# Patient Record
Sex: MALE | Race: WHITE | Employment: OTHER | URBAN - METROPOLITAN AREA
[De-identification: names, ages, dates, MRNs, and addresses within clinical notes are randomized per-mention and may not be internally consistent; named-entity substitution may affect disease eponyms.]

---

## 2017-01-04 ENCOUNTER — OFFICE VISIT (OUTPATIENT)
Dept: SLEEP MEDICINE | Age: 82
End: 2017-01-04

## 2017-01-04 VITALS
SYSTOLIC BLOOD PRESSURE: 106 MMHG | BODY MASS INDEX: 27.77 KG/M2 | HEIGHT: 70 IN | DIASTOLIC BLOOD PRESSURE: 71 MMHG | WEIGHT: 194 LBS | HEART RATE: 85 BPM | OXYGEN SATURATION: 96 %

## 2017-01-04 DIAGNOSIS — E66.3 OVERWEIGHT (BMI 25.0-29.9): ICD-10-CM

## 2017-01-04 DIAGNOSIS — G47.33 OSA (OBSTRUCTIVE SLEEP APNEA): Primary | ICD-10-CM

## 2017-01-04 NOTE — PROGRESS NOTES
217 [facility]. [place], [address]  Tel.  [phone]  Fax. 100 [facility] 60  [place], [address] [facility]  Tel.  [phone]  Fax. [phone] [address]   Tel.  [phone]  Fax. [phone]     S>Christiano Doran is a 80 y.o. male seen for a positive airway pressure follow-up. He reports no problems using the device. The following problems are identified:    Drowsiness no Problems exhaling no   Snoring no Forget to put on no   Mask Comfortable yes Can't fall asleep no   Dry Mouth no Mask falls off no   Air Leaking no Frequent awakenings no         He admits that his sleep has improved. Allergies   Allergen Reactions    Pcn [Penicillins] Rash    Venom-Honey Bee Other (comments)     anaphylaxis       He has a current medication list which includes the following prescription(s): influenza vaccine 2015-16 (65yr+)(pf), vit a/vit c/vit e/zinc/copper, docosahexanoic acid/epa, epinephrine, and cholecalciferol. .      He  has a past medical history of Arthritis; Calculus of kidney; Chronic kidney disease; GERD (gastroesophageal reflux disease); Glaucoma; Pneumonia; prostate cancer (2000); and Unspecified sleep apnea. [place] Sleepiness Score: 7   and Modified F.O.S.Q. Score Total / 2: 18.5   which reflect improved sleep quality over therapy time. O>    Visit Vitals    /71    Pulse 85    Ht 5' 9.5\" (1.765 m)    Wt 194 lb (88 kg)    SpO2 96%    BMI 28.24 kg/m2           General:   Alert, oriented, not in distress   Neck:   No JVD    Chest/Lungs:  symetrical lung expansion , no accessory muscle use    Extremities:  no obvious rashes , negative edema    Neuro:  No focal deficits ; No obvious tremor    Psych:  Normal affect ,  Normal countenance ;           A>    ICD-10-CM ICD-9-CM    1. RONNY (obstructive sleep apnea) G47.33 327.23    2. Overweight (BMI 25.0-29. 9) E66.3 278.02      AHI = 16 (2016).   On CPAP :  5-15 cmH2O. Compliant:      yes    Therapeutic Response:  Positive    P>  * PAP therapy is effective and remains necessary treatment for sleep apnea    * Follow-up Disposition:  Return if symptoms worsen or fail to improve. * He was asked to contact our office for any problems regarding PAP therapy. * Counseling was provided regarding the importance of regular PAP use and on proper sleep hygiene and safe driving. * Re-enforced proper and regular cleaning for the device. Discussed the patient's above normal BMI with him. I have recommended the following interventions: dietary management education, guidance, and counseling . The BMI follow up plan is as follows: BMI is out of normal parameters and plan is as follows: I have counseled this patient on diet and exercise regimens    Thank you for allowing us to participate in your patient's medical care.     Suraj Eden M.D.  (electronically signed)  Diplomate in Sleep Medicine, [facility]

## 2017-01-04 NOTE — PATIENT INSTRUCTIONS
217 [facility]., [address], [address]  Tel.  [phone]  Fax. 100 [facility] 60  [facility], [address]  Tel.  [phone]  Fax. [phone] [facility] [address]  Tel.  [phone]  Fax. [phone]     Learning About CPAP for Sleep Apnea  What is CPAP? CPAP is a small machine that you use at home every night while you sleep. It increases air pressure in your throat to keep your airway open. When you have sleep apnea, this can help you sleep better so you feel much better. CPAP stands for \"continuous positive airway pressure. \"  The CPAP machine will have one of the following:  · A mask that covers your nose and mouth  · Prongs that fit into your nose  · A mask that covers your nose only, the most common type. This type is called NCPAP. The N stands for \"nasal.\"  Why is it done? CPAP is usually the best treatment for obstructive sleep apnea. It is the first treatment choice and the most widely used. Your doctor may suggest CPAP if you have:  · Moderate to severe sleep apnea. · Sleep apnea and coronary artery disease (CAD) or heart failure. How does it help? · CPAP can help you have more normal sleep, so you feel less sleepy and more alert during the daytime. · CPAP may help keep heart failure or other heart problems from getting worse. · NCPAP may help lower your blood pressure. · If you use CPAP, your bed partner may also sleep better because you are not snoring or restless. What are the side effects? Some people who use CPAP have:  · A dry or stuffy nose and a sore throat. · Irritated skin on the face. · Sore eyes. · Bloating. If you have any of these problems, work with your doctor to fix them. Here are some things you can try:  · Be sure the mask or nasal prongs fit well. · See if your doctor can adjust the pressure of your CPAP. · If your nose is dry, try a humidifier.   · If your nose is runny or stuffy, try decongestant medicine or a steroid nasal spray. If these things do not help, you might try a different type of machine. Some machines have air pressure that adjusts on its own. Others have air pressures that are different when you breathe in than when you breathe out. This may reduce discomfort caused by too much pressure in your nose. Where can you learn more? Go to [employer].be  Enter Nyasia Clarkishaan in the search box to learn more about \"Learning About CPAP for Sleep Apnea. \"   © 1029-0897 Healthwise, Incorporated. Care instructions adapted under license by [employer] (which disclaims liability or warranty for this information). This care instruction is for use with your licensed healthcare professional. If you have questions about a medical condition or this instruction, always ask your healthcare professional. [address] any warranty or liability for your use of this information. Content Version: 2.3.65925; Last Revised: January 11, 2010  PROPER SLEEP HYGIENE    What to avoid  · Do not have drinks with caffeine, such as coffee or black tea, for 8 hours before bed. · Do not smoke or use other types of tobacco near bedtime. Nicotine is a stimulant and can keep you awake. · Avoid drinking alcohol late in the evening, because it can cause you to wake in the middle of the night. · Do not eat a big meal close to bedtime. If you are hungry, eat a light snack. · Do not drink a lot of water close to bedtime, because the need to urinate may wake you up during the night. · Do not read or watch TV in bed. Use the bed only for sleeping and sexual activity. What to try  · Go to bed at the same time every night, and wake up at the same time every morning. Do not take naps during the day. · Keep your bedroom quiet, dark, and cool. · Get regular exercise, but not within 3 to 4 hours of your bedtime. .  · Sleep on a comfortable pillow and mattress.   · If watching the clock makes you anxious, turn it facing away from you so you cannot see the time. · If you worry when you lie down, start a worry book. Well before bedtime, write down your worries, and then set the book and your concerns aside. · Try meditation or other relaxation techniques before you go to bed. · If you cannot fall asleep, get up and go to another room until you feel sleepy. Do something relaxing. Repeat your bedtime routine before you go to bed again. · Make your house quiet and calm about an hour before bedtime. Turn down the lights, turn off the TV, log off the computer, and turn down the volume on music. This can help you relax after a busy day. Drowsy Driving: The [employer] cites drowsiness as a causing factor in more than 278,325 police reported crashes annually, resulting in 76,000 injuries and 1,500 deaths. Other surveys suggest 55% of people polled have driven while drowsy in the past year, 23% had fallen asleep but not crashed, 3% crashed, and 2% had and accident due to drowsy driving. Who is at risk? Young Drivers: One study of drowsy driving accidents states that 55% of the drivers were under 25 years. Of those, 75% were male. Shift Workers and Travelers: People who work overnight or travel across time zones frequently are at higher risk of experiencing Circadian Rhythm Disorders. They are trying to work and function when their body is programed to sleep. Sleep Deprived: Lack of sleep has a serious impact on your ability to pay attention or focus on a task. Consistently getting less than the average of 8 hours your body needs creates partial or cumulative sleep deprivation. Untreated Sleep Disorders: Sleep Apnea, Narcolepsy, R.L.S., and other sleep disorders (untreated) prevent a person from getting enough restful sleep. This leads to excessive daytime sleepiness and increases the risk for drowsy driving accidents by up to 7 times.   Medications / Alcohol: Even over the counter medications can cause drowsiness. Medications that impair a drivers attention should have a warning label. Alcohol naturally makes you sleepy and on its own can cause accidents. Combined with excessive drowsiness its effects are amplified. Signs of Drowsy Driving:   * You don't remember driving the last few miles   * You may drift out of your kandis   * You are unable to focus and your thoughts wander   * You may yawn more often than normal   * You have difficulty keeping your eyes open / nodding off   * Missing traffic signs, speeding, or tailgating  Prevention-   Good sleep hygiene, lifestyle and behavioral choices have the most impact on drowsy driving. There is no substitute for sleep and the average person requires 8 hours nightly. If you find yourself driving drowsy, stop and sleep. Consider the sleep hygiene tips provided during your visit as well. Medication Refill Policy: Refills for all medications require 1 week advance notice. Please have your pharmacy fax a refill request. We are unable to fax, or call in \"controled substance\" medications and you will need to pick these prescriptions up from our office. [employer] Activation    Thank you for requesting access to [employer]. Please follow the instructions below to securely access and download your online medical record. [employer] allows you to send messages to your doctor, view your test results, renew your prescriptions, schedule appointments, and more. How Do I Sign Up? 1. In your internet browser, go to [email or web address]. 2. Click on the First Time User? Click Here link in the Sign In box. You will see the New Member Sign Up page. 3. Enter your [employer] Access Code exactly as it appears below. You will not need to use this code after youve completed the sign-up process. If you do not sign up before the expiration date, you must request a new code.     B[employer] Access Code: [email or web address]  Expires: 2017  9:50 AM (This is the date your [employer] access code will )    4. Enter the last four digits of your Social Security Number (xxxx) and Date of Birth (mm/dd/yyyy) as indicated and click Submit. You will be taken to the next sign-up page. 5. Create a [employer]ed Systems ID. This will be your [employer] Systems login ID and cannot be changed, so think of one that is secure and easy to remember. 6. Create a [employer] password. You can change your password at any time. 7. Enter your Password Reset Question and Answer. This can be used at a later time if you forget your password. 8. Enter your e-mail address. You will receive e-mail notification when new information is available in [address] 9. Click Sign Up. You can now view and download portions of your medical record. 10. Click the Download Summary menu link to download a portable copy of your medical information. Additional Information    If you have questions, please call [phone]. Remember, [employer] is NOT to be used for urgent needs. For medical emergencies, dial 911. Starting a Weight Loss Plan: After Your Visit  Your Care Instructions  If you are thinking about losing weight, it can be hard to know where to start. Your doctor can help you set up a weight loss plan that best meets your needs. You may want to take a class on nutrition or exercise, or join a weight loss support group. If you have questions about how to make changes to your eating or exercise habits, ask your doctor about seeing a registered dietitian or an exercise specialist.  It can be a big challenge to lose weight. But you do not have to make huge changes at once. Make small changes, and stick with them. When those changes become habit, add a few more changes. If you do not think you are ready to make changes right now, try to pick a date in the future.  Make an appointment to see your doctor to discuss whether the time is right for you to start a plan.  Follow-up care is a key part of your treatment and safety. Be sure to make and go to all appointments, and call your doctor if you are having problems. It's also a good idea to know your test results and keep a list of the medicines you take. How can you care for yourself at home? · Set realistic goals. Many people expect to lose much more weight than is likely. A weight loss of 5% to 10% of your body weight may be enough to improve your health. · Get family and friends involved to provide support. Talk to them about why you are trying to lose weight, and ask them to help. They can help by participating in exercise and having meals with you, even if they may be eating something different. · Find what works best for you. If you do not have time or do not like to cook, a program that offers meal replacement bars or shakes may be better for you. Or if you like to prepare meals, finding a plan that includes daily menus and recipes may be best.  · Ask your doctor about other health professionals who can help you achieve your weight loss goals. ¨ A dietitian can help you make healthy changes in your diet. ¨ An exercise specialist or  can help you develop a safe and effective exercise program.  ¨ A counselor or psychiatrist can help you cope with issues such as depression, anxiety, or family problems that can make it hard to focus on weight loss. · Consider joining a support group for people who are trying to lose weight. Your doctor can suggest groups in your area. Where can you learn more? Go to [employer].be  Enter U357 in the search box to learn more about \"Starting a Weight Loss Plan: After Your Visit. \"   © 8181-2000 Healthwise, Incorporated. Care instructions adapted under license by [employer] (which disclaims liability or warranty for this information).  This care instruction is for use with your licensed healthcare professional. If you have questions about a medical condition or this instruction, always ask your healthcare professional. [place] [address] any warranty or liability for your use of this information.   Content Version: 6.5.07289; Last Revised: September 1, 2009

## 2017-01-04 NOTE — MR AVS SNAPSHOT
Visit Information Date & Time Provider Department Dept. Phone Encounter #  
 1/4/2017  9:00 AM Albert Piña, [address] [place] [address] [id number] Follow-up Instructions Return if symptoms worsen or fail to improve. Follow-up and Disposition History Your Appointments 1/10/2018  9:00 AM  
Any with Albert Piañ MD  
[id number] [facility] ([address]) Appt Note: yrly cpap follow up Franca [address] [place] [address] [address]  
  
   
 [address] [place] [id number] Upcoming Health Maintenance Date Due MICROALBUMIN Q1 9/23/2016 EYE EXAM RETINAL OR DILATED Q1 11/11/2016 FOOT EXAM Q1 1/20/2017 LIPID PANEL Q1 1/20/2017 HEMOGLOBIN A1C Q6M 3/9/2017 MEDICARE YEARLY EXAM 5/26/2017 GLAUCOMA SCREENING Q2Y 11/11/2017 DTaP/Tdap/Td series (2 - Td) 5/7/2025 Allergies as of 1/4/2017  Review Complete On: 1/4/2017 By: Albert Piña MD  
  
 Severity Noted Reaction Type Reactions Pcn [Penicillins]  12/09/2011    Rash Venom-honey Bee  08/14/2013    Other (comments) anaphylaxis Current Immunizations  Reviewed on 2/21/2014 Name Date Influenza High Dose Vaccine PF 10/15/2015 Influenza Vaccine 10/24/2014, 9/1/2013 Influenza Vaccine Whole 10/28/2012 Pneumococcal Vaccine (Unspecified Type) 10/13/2014, 11/28/2012 Tdap 5/7/2015 Not reviewed this visit You Were Diagnosed With   
  
 Codes Comments RONNY (obstructive sleep apnea)    -  Primary ICD-10-CM: G47.33 
ICD-9-CM: 327.23 Overweight (BMI 25.0-29. 9)     ICD-10-CM: J24.2 ICD-9-CM: 278.02 Vitals BP Pulse Height(growth percentile) Weight(growth percentile) SpO2 BMI  
 106/71 85 5' 9.5\" (1.765 m) 194 lb (88 kg) 96% 28.24 kg/m2 Smoking Status Never Smoker Vitals History BMI and BSA Data Body Mass Index Body Surface Area 28.24 kg/m 2 2.08 m 2 Preferred Pharmacy Pharmacy Name Phone [phone] [phone] Your Updated Medication List  
  
   
This list is accurate as of: 1/4/17  9:27 AM.  Always use your most recent med list.  
  
  
  
  
 EPINEPHrine 0.3 mg/0.3 mL injection Commonly known as:  EPIPEN  
0.3 mL by IntraMUSCular route once as needed for 1 dose. FISH OIL PO Take  by mouth. influenza vaccine 2015-16 (65yr+)(PF) Syrg injection Commonly known as:  FLUZONE HIGH-DOSE  
0.5 mL by IntraMUSCular route PRIOR TO DISCHARGE for 1 dose. PRESERVISION AREDS PO Take  by mouth. VITAMIN D3 1,000 unit tablet Generic drug:  cholecalciferol Take 1,000 Units by mouth daily. Takes one po three times a week. Follow-up Instructions Return if symptoms worsen or fail to improve. Patient Instructions 217 [facility], [place] [place]. 1668 [facility], [address] Tel.  [phone] Fax. 100 [facility] 60 [place], 200 [address] [facility] Tel.  [phone] Fax. [phone] [address] Tel.  [phone] Fax. [phone] Learning About CPAP for Sleep Apnea What is CPAP? CPAP is a small machine that you use at home every night while you sleep. It increases air pressure in your throat to keep your airway open. When you have sleep apnea, this can help you sleep better so you feel much better. CPAP stands for \"continuous positive airway pressure. \" The CPAP machine will have one of the following: · A mask that covers your nose and mouth · Prongs that fit into your nose · A mask that covers your nose only, the most common type. This type is called NCPAP. The N stands for \"nasal.\" Why is it done? CPAP is usually the best treatment for obstructive sleep apnea.  It is the first treatment choice and the most widely used. Your doctor may suggest CPAP if you have: · Moderate to severe sleep apnea. · Sleep apnea and coronary artery disease (CAD) or heart failure. How does it help? · CPAP can help you have more normal sleep, so you feel less sleepy and more alert during the daytime. · CPAP may help keep heart failure or other heart problems from getting worse. · NCPAP may help lower your blood pressure. · If you use CPAP, your bed partner may also sleep better because you are not snoring or restless. What are the side effects? Some people who use CPAP have: · A dry or stuffy nose and a sore throat. · Irritated skin on the face. · Sore eyes. · Bloating. If you have any of these problems, work with your doctor to fix them. Here are some things you can try: · Be sure the mask or nasal prongs fit well. · See if your doctor can adjust the pressure of your CPAP. · If your nose is dry, try a humidifier. · If your nose is runny or stuffy, try decongestant medicine or a steroid nasal spray. If these things do not help, you might try a different type of machine. Some machines have air pressure that adjusts on its own. Others have air pressures that are different when you breathe in than when you breathe out. This may reduce discomfort caused by too much pressure in your nose. Where can you learn more? Go to [employer].be Enter Z527 in the search box to learn more about \"Learning About CPAP for Sleep Apnea. \"  
© 2376-8560 Healthwise, Incorporated. Care instructions adapted under license by Brandy Grimm (which disclaims liability or warranty for this information). This care instruction is for use with your licensed healthcare professional. If you have questions about a medical condition or this instruction, always ask your healthcare professional. [address] any warranty or liability for your use of this information. Content Version: 7.0.27856; Last Revised: January 11, 2010 PROPER SLEEP HYGIENE What to avoid · Do not have drinks with caffeine, such as coffee or black tea, for 8 hours before bed. · Do not smoke or use other types of tobacco near bedtime. Nicotine is a stimulant and can keep you awake. · Avoid drinking alcohol late in the evening, because it can cause you to wake in the middle of the night. · Do not eat a big meal close to bedtime. If you are hungry, eat a light snack. · Do not drink a lot of water close to bedtime, because the need to urinate may wake you up during the night. · Do not read or watch TV in bed. Use the bed only for sleeping and sexual activity. What to try · Go to bed at the same time every night, and wake up at the same time every morning. Do not take naps during the day. · Keep your bedroom quiet, dark, and cool. · Get regular exercise, but not within 3 to 4 hours of your bedtime. Pankaj Grullon · Sleep on a comfortable pillow and mattress. · If watching the clock makes you anxious, turn it facing away from you so you cannot see the time. · If you worry when you lie down, start a worry book. Well before bedtime, write down your worries, and then set the book and your concerns aside. · Try meditation or other relaxation techniques before you go to bed. · If you cannot fall asleep, get up and go to another room until you feel sleepy. Do something relaxing. Repeat your bedtime routine before you go to bed again. · Make your house quiet and calm about an hour before bedtime. Turn down the lights, turn off the TV, log off the computer, and turn down the volume on music. This can help you relax after a busy day. Drowsy Driving: The [employer] cites drowsiness as a causing factor in more than 822,203 police reported crashes annually, resulting in 76,000 injuries and 1,500 deaths.  Other surveys suggest 55% of people polled have driven while drowsy in the past year, 23% had fallen asleep but not crashed, 3% crashed, and 2% had and accident due to drowsy driving. Who is at risk? Young Drivers: One study of drowsy driving accidents states that 55% of the drivers were under 25 years. Of those, 75% were male. Shift Workers and Travelers: People who work overnight or travel across time zones frequently are at higher risk of experiencing Circadian Rhythm Disorders. They are trying to work and function when their body is programed to sleep. Sleep Deprived: Lack of sleep has a serious impact on your ability to pay attention or focus on a task. Consistently getting less than the average of 8 hours your body needs creates partial or cumulative sleep deprivation. Untreated Sleep Disorders: Sleep Apnea, Narcolepsy, R.L.S., and other sleep disorders (untreated) prevent a person from getting enough restful sleep. This leads to excessive daytime sleepiness and increases the risk for drowsy driving accidents by up to 7 times. Medications / Alcohol: Even over the counter medications can cause drowsiness. Medications that impair a drivers attention should have a warning label. Alcohol naturally makes you sleepy and on its own can cause accidents. Combined with excessive drowsiness its effects are amplified. Signs of Drowsy Driving: * You don't remember driving the last few miles * You may drift out of your kandis * You are unable to focus and your thoughts wander * You may yawn more often than normal 
 * You have difficulty keeping your eyes open / nodding off * Missing traffic signs, speeding, or tailgating Prevention-  
Good sleep hygiene, lifestyle and behavioral choices have the most impact on drowsy driving. There is no substitute for sleep and the average person requires 8 hours nightly.  If you find yourself driving drowsy, stop and sleep. Consider the sleep hygiene tips provided during your visit as well. Medication Refill Policy: Refills for all medications require 1 week advance notice. Please have your pharmacy fax a refill request. We are unable to fax, or call in \"controled substance\" medications and you will need to pick these prescriptions up from our office. [employer] Activation Thank you for requesting access to [employer]. Please follow the instructions below to securely access and download your online medical record. [employer] allows you to send messages to your doctor, view your test results, renew your prescriptions, schedule appointments, and more. How Do I Sign Up? 1. In your internet browser, go to [email or web address] 2. Click on the First Time User? Click Here link in the Sign In box. You will see the New Member Sign Up page. 3. Enter your [employer] Access Code exactly as it appears below. You will not need to use this code after youve completed the sign-up process. If you do not sign up before the expiration date, you must request a new code. [employer] Access Code: [email or web address] Expires: 2017  9:50 AM (This is the date your Ar[employer] access code will ) 4. Enter the last four digits of your Social Security Number (xxxx) and Date of Birth (mm/dd/yyyy) as indicated and click Submit. You will be taken to the next sign-up page. 5. Create a Ar[employer]igm ID. This will be your Aradigm login ID and cannot be changed, so think of one that is secure and easy to remember. 6. Create a [employer]igm password. You can change your password at any time. 7. Enter your Password Reset Question and Answer. This can be used at a later time if you forget your password. 8. Enter your e-mail address. You will receive e-mail notification when new information is available in [address] 9. Click Sign Up. You can now view and download portions of your medical record. 10. Click the Download Summary menu link to download a portable copy of your medical information. Additional Information If you have questions, please call [phone]. Remember, [employer] is NOT to be used for urgent needs. For medical emergencies, dial 911. Starting a Weight Loss Plan: After Your Visit Your Care Instructions If you are thinking about losing weight, it can be hard to know where to start. Your doctor can help you set up a weight loss plan that best meets your needs. You may want to take a class on nutrition or exercise, or join a weight loss support group. If you have questions about how to make changes to your eating or exercise habits, ask your doctor about seeing a registered dietitian or an exercise specialist. 
It can be a big challenge to lose weight. But you do not have to make huge changes at once. Make small changes, and stick with them. When those changes become habit, add a few more changes. If you do not think you are ready to make changes right now, try to pick a date in the future. Make an appointment to see your doctor to discuss whether the time is right for you to start a plan. Follow-up care is a key part of your treatment and safety. Be sure to make and go to all appointments, and call your doctor if you are having problems. It's also a good idea to know your test results and keep a list of the medicines you take. How can you care for yourself at home? · Set realistic goals. Many people expect to lose much more weight than is likely. A weight loss of 5% to 10% of your body weight may be enough to improve your health. · Get family and friends involved to provide support. Talk to them about why you are trying to lose weight, and ask them to help. They can help by participating in exercise and having meals with you, even if they may be eating something different. · Find what works best for you.  If you do not have time or do not like to cook, a program that offers meal replacement bars or shakes may be better for you. Or if you like to prepare meals, finding a plan that includes daily menus and recipes may be best. 
· Ask your doctor about other health professionals who can help you achieve your weight loss goals. ¨ A dietitian can help you make healthy changes in your diet. ¨ An exercise specialist or  can help you develop a safe and effective exercise program. 
¨ A counselor or psychiatrist can help you cope with issues such as depression, anxiety, or family problems that can make it hard to focus on weight loss. · Consider joining a support group for people who are trying to lose weight. Your doctor can suggest groups in your area. Where can you learn more? Go to Paperlit.be Enter F061 in the search box to learn more about \"Starting a Weight Loss Plan: After Your Visit. \"  
© 6898-1369 Healthwise, Incorporated. Care instructions adapted under license by Praneeth Dahl (which disclaims liability or warranty for this information). This care instruction is for use with your licensed healthcare professional. If you have questions about a medical condition or this instruction, always ask your healthcare professional. [place] [address] any warranty or liability for your use of this information. Content Version: 2.4.94295; Last Revised: September 1, 2009 Introducing [facility]! Praneeth Dahl introduces doo patient portal. Now you can access parts of your medical record, email your doctor's office, and request medication refills online. 1. In your internet browser, go to [email or web address] 2. Click on the First Time User? Click Here link in the Sign In box. You will see the New Member Sign Up page. 3. Enter your doo Access Code exactly as it appears below.  You will not need to use this code after youve completed the sign-up process. If you do not sign up before the expiration date, you must request a new code. · [employer] Access Code: [email or web address] Expires: 2/7/2017  9:50 AM 
 
4. Enter the last four digits of your Social Security Number (xxxx) and Date of Birth (mm/dd/yyyy) as indicated and click Submit. You will be taken to the next sign-up page. 5. Create a [employer] ID. This will be your [employer] login ID and cannot be changed, so think of one that is secure and easy to remember. 6. Create a [employer] password. You can change your password at any time. 7. Enter your Password Reset Question and Answer. This can be used at a later time if you forget your password. 8. Enter your e-mail address. You will receive e-mail notification when new information is available in [address] 9. Click Sign Up. You can now view and download portions of your medical record. 10. Click the Download Summary menu link to download a portable copy of your medical information. If you have questions, please visit the Frequently Asked Questions section of the [employer] website. Remember, [employer] is NOT to be used for urgent needs. For medical emergencies, dial 911. Now available from your iPhone and Android! Please provide this summary of care documentation to your next provider. Your primary care clinician is listed as Cindi Arriaza. If you have any questions after today's visit, please call [phone].

## 2017-10-03 ENCOUNTER — OFFICE VISIT (OUTPATIENT)
Dept: INTERNAL MEDICINE CLINIC | Age: 82
End: 2017-10-03

## 2017-10-03 VITALS
OXYGEN SATURATION: 97 % | BODY MASS INDEX: 27.99 KG/M2 | DIASTOLIC BLOOD PRESSURE: 84 MMHG | HEIGHT: 70 IN | RESPIRATION RATE: 16 BRPM | HEART RATE: 72 BPM | WEIGHT: 195.5 LBS | SYSTOLIC BLOOD PRESSURE: 140 MMHG | TEMPERATURE: 96.2 F

## 2017-10-03 DIAGNOSIS — Z99.89 OSA ON CPAP: ICD-10-CM

## 2017-10-03 DIAGNOSIS — G47.33 OSA ON CPAP: ICD-10-CM

## 2017-10-03 DIAGNOSIS — N18.30 CONTROLLED TYPE 2 DIABETES MELLITUS WITH STAGE 3 CHRONIC KIDNEY DISEASE, WITHOUT LONG-TERM CURRENT USE OF INSULIN (HCC): ICD-10-CM

## 2017-10-03 DIAGNOSIS — E11.22 CONTROLLED TYPE 2 DIABETES MELLITUS WITH STAGE 3 CHRONIC KIDNEY DISEASE, WITHOUT LONG-TERM CURRENT USE OF INSULIN (HCC): ICD-10-CM

## 2017-10-03 DIAGNOSIS — Z00.00 MEDICARE ANNUAL WELLNESS VISIT, SUBSEQUENT: Primary | ICD-10-CM

## 2017-10-03 DIAGNOSIS — Z00.00 WELL ADULT EXAM: ICD-10-CM

## 2017-10-03 DIAGNOSIS — M25.50 ARTHRALGIA, UNSPECIFIED JOINT: ICD-10-CM

## 2017-10-03 NOTE — PROGRESS NOTES
Subjective:     Chief Complaint   Patient presents with    Diabetes     He  is a 80y.o. year old male who presents for evaluation. Here for follow up of DM. Saw Pulmonary and has new CPAP. Changed filter and doing better. Concerns:  1. Neck and shoulder (left side) pain  2. Bowel incontinence  3. Left arm shoulder and wrist pain  4. Left elbow with bursal swelling  5. Right knee pain  6. Joint stiffness  7. Some urinary difficulties  8. Sleepiness  9. Glaucoma concerns. 10.  Dental issues. 11. Taste and smell diminished. 12. Legs tire easily. Historical Data    Past Medical History:   Diagnosis Date    Arthritis     Calculus of kidney     Chronic kidney disease     slightly increased creatinine    GERD (gastroesophageal reflux disease)     Glaucoma     Pneumonia     prostate cancer 2000    seed radiation, cryosurgery    Unspecified sleep apnea     uses CPAP        Past Surgical History:   Procedure Laterality Date    ENDOSCOPY, COLON, DIAGNOSTIC  2010    HX HEENT      uvulectomy    HX PACEMAKER  09 01 14    HX PROSTATECTOMY      brachytherapy, cryosurgery    HX TONSILLECTOMY      HX UROLOGICAL      vasectomy    LASER SURGERY OF EYE          Outpatient Encounter Prescriptions as of 10/3/2017   Medication Sig Dispense Refill    [DISCONTINUED] influenza vaccine 2015-16, 65yr+,,PF, (FLUZONE HIGH-DOSE) syrg injection 0.5 mL by IntraMUSCular route PRIOR TO DISCHARGE for 1 dose. 1 Syringe 0    VIT A/VIT C/VIT E/ZINC/COPPER (PRESERVISION AREDS PO) Take  by mouth.  DOCOSAHEXANOIC ACID/EPA (FISH OIL PO) Take  by mouth.  EPINEPHrine (EPIPEN) 0.3 mg/0.3 mL injection 0.3 mL by IntraMUSCular route once as needed for 1 dose. 0.3 mL 1    cholecalciferol, vitamin d3, (VITAMIN D3) 1,000 unit tablet Take 1,000 Units by mouth daily. Takes one po three times a week. No facility-administered encounter medications on file as of 10/3/2017.          Allergies   Allergen Reactions    Pcn [Penicillins] Rash    Venom-Honey Bee Other (comments)     anaphylaxis        Social History     Social History    Marital status:      Spouse name: N/A    Number of children: N/A    Years of education: N/A     Occupational History    Not on file. Social History Main Topics    Smoking status: Never Smoker    Smokeless tobacco: Never Used    Alcohol use 3.6 oz/week     4 Standard drinks or equivalent, 2 Glasses of wine per week    Drug use: No    Sexual activity: No     Other Topics Concern    Not on file     Social History Narrative        Review of Systems  A comprehensive review of systems was negative except for that written in the HPI. Objective:     Vitals:    10/03/17 0832   BP: 140/84   Pulse: 72   Resp: 16   Temp: 96.2 °F (35.7 °C)   SpO2: 97%   Weight: 195 lb 8 oz (88.7 kg)   Height: 5' 9.5\" (1.765 m)     Pleasant WM in no acute distress. Neck: Supple. Cardiac: RRR without murmurs, gallops or rubs. Lungs: Clear to auscultation. Abdomen: Benign  Extremities:  Left olecranon bursitis                        DJD knees  Diabetic foot exam:     Left: Reflexes 1+     Vibratory sensation absent    Proprioception normal   Sharp/dull discrimination diminished    Filament test reduced sensation with micro filament   Pulse DP: 2+ (normal)   Pulse PT: 2+ (normal)   Deformities: Mild - fungal nail disease  Right: Reflexes 1+   Vibratory sensation absent   Proprioception diminished   Sharp/dull discrimination diminished   Filament test reduced sensation with micro filament   Pulse DP: 2+ (normal)   Pulse PT: 2+ (normal)   Deformities: Mild - fungal nail disease    ASSESSMENT / PLAN:   1. Medicare annual wellness visit, subsequent  · Patient desires updated AMD    2.  Controlled type 2 diabetes mellitus with stage 3 chronic kidney disease, without long-term current use of insulin (HCC)  · Avoid concentrated sweets  - MICROALBUMIN, UR, RAND W/ MICROALBUMIN/CREA RATIO  - LIPID PANEL  - METABOLIC PANEL, COMPREHENSIVE  - HEMOGLOBIN A1C WITH [facility]  -  DIABETES FOOT EXAM    3. RONNY on CPAP  · Continue follow up with Dr Dominique August    4. Well adult exam    - TSH REFLEX TO T4    5. Arthralgia, unspecified joint  · Has generalized joint aches and pains and some stability concerns.  - REFERRAL TO PHYSICAL THERAPY    Patient Instructions     Follow a no concentrated sweets diet. Stay physically active. Have fasting blood work analysis. Medicare Wellness Visit, Male    The best way to live healthy is to have a healthy lifestyle by eating a well-balanced diet, exercising regularly, limiting alcohol and stopping smoking. Regular physical exams and screening tests are another way to keep healthy. Preventive exams provided by your health care provider can find health problems before they become diseases or illnesses. Preventive services including immunizations, screening tests, monitoring and exams can help you take care of your own health. All people over age 72 should have a pneumovax  and and a prevnar shot to prevent pneumonia. These are once in a lifetime unless you and your provider decide differently. All people over 65 should have a yearly flu shot and a tetanus vaccine every 10 years. Screening for diabetes mellitus with a blood sugar test should be done every year. Glaucoma is a disease of the eye due to increased ocular pressure that can lead to blindness and it should be done every year by an eye professional.    Cardiovascular screening tests that check for elevated lipids (fatty part of blood) which can lead to heart disease and strokes should be done every 5 years. Colorectal screening that evaluates for blood or polyps in your colon should be done yearly as a stool test or every five years as a flexible sigmoidoscope or every 10 years as a colonoscopy up to age 76.     Men up to age 76 may need a screening blood test for prostate cancer at certain intervals, depending on their personal and family history. This decision is between the patient and his provider. If you have been a smoker or had family history of abdominal aortic aneurysms, you and your provider may decide to schedule an ultrasound test of your aorta. Hepatitis C screening is also recommended for anyone born between [birth year] through [address]. A shingles vaccine is also recommended once in a lifetime after age 61. Your Medicare Wellness Exam is recommended annually. Here is a list of your current Health Maintenance items with a due date:  Health Maintenance Due   Topic Date Due    Albumin Urine Test  09/23/2016    Diabetic Foot Care  01/20/2017    Cholesterol Test   01/20/2017    Hemoglobin A1C    03/09/2017    Annual Well Visit  05/26/2017                Follow-up Disposition:  Return in about 6 months (around 4/3/2018) for F/U DM, etc.   Advised him to call back or return to office if symptoms worsen/change/persist.  Discussed expected course/resolution/complications of diagnosis in detail with patient. Medication risks/benefits/costs/interactions/alternatives discussed with patient. He was given an after visit summary which includes diagnoses, current medications, & vitals. He expressed understanding with the diagnosis and plan. This is a Subsequent Medicare Annual Wellness Exam (AWV) (Performed 12 months after IPPE or effective date of Medicare Part B enrollment, Once in a lifetime)    I have reviewed the patient's medical history in detail and updated the computerized patient record.      History     Past Medical History:   Diagnosis Date    Arthritis     Calculus of kidney     Chronic kidney disease     slightly increased creatinine    GERD (gastroesophageal reflux disease)     Glaucoma     Pneumonia     prostate cancer 2000    seed radiation, cryosurgery    Unspecified sleep apnea     uses CPAP      Past Surgical History:   Procedure Laterality Date    ENDOSCOPY, COLON, DIAGNOSTIC  2010  HX HEENT      uvulectomy    HX PACEMAKER  09 01 14    HX PROSTATECTOMY      brachytherapy, cryosurgery    HX TONSILLECTOMY      HX UROLOGICAL      vasectomy    LASER SURGERY OF EYE       Current Outpatient Prescriptions   Medication Sig Dispense Refill    VIT A/VIT C/VIT E/ZINC/COPPER (PRESERVISION AREDS PO) Take  by mouth.  DOCOSAHEXANOIC ACID/EPA (FISH OIL PO) Take  by mouth.  EPINEPHrine (EPIPEN) 0.3 mg/0.3 mL injection 0.3 mL by IntraMUSCular route once as needed for 1 dose. 0.3 mL 1    cholecalciferol, vitamin d3, (VITAMIN D3) 1,000 unit tablet Take 1,000 Units by mouth daily. Takes one po three times a week. Allergies   Allergen Reactions    Pcn [Penicillins] Rash    Venom-Honey Bee Other (comments)     anaphylaxis     Family History   Problem Relation Age of Onset    Heart Disease Father     Cancer Sister      breast/lung    Other Mother      encephalitis    Cancer Maternal Aunt      breast/lower extremity - 2 maternal aunts    Cancer Other      vaginal    No Known Problems Daughter      Social History   Substance Use Topics    Smoking status: Never Smoker    Smokeless tobacco: Never Used    Alcohol use 3.6 oz/week     4 Standard drinks or equivalent, 2 Glasses of wine per week     Patient Active Problem List   Diagnosis Code    Peripheral neuropathy G62.9    Proctitis, 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       Functional Ability and Level of Safety:   Hearing Loss  Hearing is good. Activities of Daily Living  The home contains: no safety equipment  Patient does total self care    Fall RiskFall Risk Assessment, last 12 mths 10/3/2017   Able to walk? Yes   Fall in past 12 months? No       Abuse Screen  Patient is not abused    Cognitive Screening   Evaluation of Cognitive Function:  Has your family/caregiver stated any concerns about your memory: no  Normal    Patient Care Team   Patient Care Team:  Monse Schaefer MD as PCP - General (Internal Medicine)  Chelsi Glover MD (Ophthalmology)  Carmina Bhat MD (Urology)  Ronda Be MD (Orthopedic Surgery)  Aaliyah Caba MD (Cardiology)  Gurdeep Zaldivar MD as Physician (Sleep Medicine)    Assessment/Plan   Education and counseling provided:  Are appropriate based on today's review and evaluation  End-of-Life planning (with patient's consent)    Diagnoses and all orders for this visit:    1. Medicare annual wellness visit, subsequent    2. Controlled type 2 diabetes mellitus with stage 3 chronic kidney disease, without long-term current use of insulin (HCC)  -     MICROALBUMIN, UR, RAND W/ MICROALBUMIN/CREA RATIO  -     LIPID PANEL  -     METABOLIC PANEL, COMPREHENSIVE  -     HEMOGLOBIN A1C WITH EAG  -      DIABETES FOOT EXAM    3. RONNY on CPAP    4. Well adult exam  -     TSH REFLEX TO T4    5.  Arthralgia, unspecified joint  -     REFERRAL TO PHYSICAL THERAPY      Health Maintenance Due   Topic Date Due    MICROALBUMIN Q1  09/23/2016    FOOT EXAM Q1  01/20/2017    LIPID PANEL Q1  01/20/2017    HEMOGLOBIN A1C Q6M  03/09/2017    MEDICARE YEARLY EXAM  05/26/2017

## 2017-10-03 NOTE — PATIENT INSTRUCTIONS
Follow a no concentrated sweets diet. Stay physically active. Have fasting blood work analysis. Medicare Wellness Visit, Male    The best way to live healthy is to have a healthy lifestyle by eating a well-balanced diet, exercising regularly, limiting alcohol and stopping smoking. Regular physical exams and screening tests are another way to keep healthy. Preventive exams provided by your health care provider can find health problems before they become diseases or illnesses. Preventive services including immunizations, screening tests, monitoring and exams can help you take care of your own health. All people over age 72 should have a pneumovax  and and a prevnar shot to prevent pneumonia. These are once in a lifetime unless you and your provider decide differently. All people over 65 should have a yearly flu shot and a tetanus vaccine every 10 years. Screening for diabetes mellitus with a blood sugar test should be done every year. Glaucoma is a disease of the eye due to increased ocular pressure that can lead to blindness and it should be done every year by an eye professional.    Cardiovascular screening tests that check for elevated lipids (fatty part of blood) which can lead to heart disease and strokes should be done every 5 years. Colorectal screening that evaluates for blood or polyps in your colon should be done yearly as a stool test or every five years as a flexible sigmoidoscope or every 10 years as a colonoscopy up to age 76. Men up to age 76 may need a screening blood test for prostate cancer at certain intervals, depending on their personal and family history. This decision is between the patient and his provider. If you have been a smoker or had family history of abdominal aortic aneurysms, you and your provider may decide to schedule an ultrasound test of your aorta. Hepatitis C screening is also recommended for anyone born between [birth year] through [address].     CHELSEA hanley vaccine is also recommended once in a lifetime after age 61. Your Medicare Wellness Exam is recommended annually.     Here is a list of your current Health Maintenance items with a due date:  Health Maintenance Due   Topic Date Due    Albumin Urine Test  09/23/2016    Diabetic Foot Care  01/20/2017    Cholesterol Test   01/20/2017    Hemoglobin A1C    03/09/2017    Annual Well Visit  05/26/2017

## 2017-10-03 NOTE — MR AVS SNAPSHOT
Visit Information Date & Time Provider Department Dept. Phone Encounter #  
 10/3/2017  8:15 AM Ryan Kaminski MD [address] [facility] [id number] Follow-up Instructions Return in about 6 months (around 4/3/2018) for F/U DM, etc.  
  
Your Appointments 1/10/2018  9:00 AM  
Any with Trinidad Corbett MD  
[address] [facility] ([address]) Appt Note: yrly cpap follow up [address] [place] [address] [address]  
  
   
 [address] [address] Upcoming Health Maintenance Date Due MICROALBUMIN Q1 9/23/2016 FOOT EXAM Q1 1/20/2017 LIPID PANEL Q1 1/20/2017 HEMOGLOBIN A1C Q6M 3/9/2017 MEDICARE YEARLY EXAM 5/26/2017 EYE EXAM RETINAL OR DILATED Q1 7/18/2018 GLAUCOMA SCREENING Q2Y 7/18/2019 DTaP/Tdap/Td series (2 - Td) 5/7/2025 Allergies as of 10/3/2017  Review Complete On: 10/3/2017 By: Ryan Kaminski MD  
  
 Severity Noted Reaction Type Reactions Pcn [Penicillins]  12/09/2011    Rash Venom-honey Bee  08/14/2013    Other (comments) anaphylaxis Current Immunizations  Reviewed on 9/20/2017 Name Date Influenza High Dose Vaccine PF 9/13/2017, 10/15/2015 Influenza Vaccine 10/24/2014, 9/1/2013 Influenza Vaccine Whole 10/28/2012 Pneumococcal Vaccine (Unspecified Type) 10/13/2014 Tdap 5/7/2015 ZZZ-RETIRED (DO NOT USE) Pneumococcal Vaccine (Unspecified Type) 11/28/2012 Not reviewed this visit You Were Diagnosed With   
  
 Codes Comments Controlled type 2 diabetes mellitus with stage 3 chronic kidney disease, without long-term current use of insulin (HCC)    -  Primary ICD-10-CM: E11.22, N18.3 ICD-9-CM: 250.40, 585.3 RONNY on CPAP     ICD-10-CM: G47.33, Z99.89 ICD-9-CM: 327.23, V46.8 Well adult exam     ICD-10-CM: Z00.00 ICD-9-CM: V70.0 Vitals BP Pulse Temp Resp Height(growth percentile) Weight(growth percentile) 140/84 (BP 1 Location: Right arm, BP Patient Position: Sitting) 72 96.2 °F (35.7 °C) 16 5' 9.5\" (1.765 m) 195 lb 8 oz (88.7 kg) SpO2 BMI Smoking Status 97% 28.46 kg/m2 Never Smoker BMI and BSA Data Body Mass Index Body Surface Area  
 28.46 kg/m 2 2.09 m 2 Preferred Pharmacy Pharmacy Name Phone [phone] [phone] Your Updated Medication List  
  
   
This list is accurate as of: 10/3/17  8:47 AM.  Always use your most recent med list.  
  
  
  
  
 EPINEPHrine 0.3 mg/0.3 mL injection Commonly known as:  EPIPEN  
0.3 mL by IntraMUSCular route once as needed for 1 dose. FISH OIL PO Take  by mouth. PRESERVISION AREDS PO Take  by mouth. VITAMIN D3 1,000 unit tablet Generic drug:  cholecalciferol Take 1,000 Units by mouth daily. Takes one po three times a week. We Performed the Following HEMOGLOBIN A1C WITH EAG [59729 CPT(R)] LIPID PANEL [26542 CPT(R)] METABOLIC PANEL, COMPREHENSIVE [08750 CPT(R)] MICROALBUMIN, UR, RAND W/ MICROALBUMIN/CREA RATIO [id number] CPT(R)] TSH REFLEX TO T4 [[id number] Custom] Follow-up Instructions Return in about 6 months (around 4/3/2018) for F/U DM, etc.  
  
  
Patient Instructions Follow a no concentrated sweets diet. Stay physically active. Have fasting blood work analysis. Medicare Wellness Visit, Male The best way to live healthy is to have a healthy lifestyle by eating a well-balanced diet, exercising regularly, limiting alcohol and stopping smoking. Regular physical exams and screening tests are another way to keep healthy. Preventive exams provided by your health care provider can find health problems before they become diseases or illnesses.  Preventive services including immunizations, screening tests, monitoring and exams can help you take care of your own health. All people over age 72 should have a pneumovax  and and a prevnar shot to prevent pneumonia. These are once in a lifetime unless you and your provider decide differently. All people over 65 should have a yearly flu shot and a tetanus vaccine every 10 years. Screening for diabetes mellitus with a blood sugar test should be done every year. Glaucoma is a disease of the eye due to increased ocular pressure that can lead to blindness and it should be done every year by an eye professional. 
 
Cardiovascular screening tests that check for elevated lipids (fatty part of blood) which can lead to heart disease and strokes should be done every 5 years. Colorectal screening that evaluates for blood or polyps in your colon should be done yearly as a stool test or every five years as a flexible sigmoidoscope or every 10 years as a colonoscopy up to age 76. Men up to age 76 may need a screening blood test for prostate cancer at certain intervals, depending on their personal and family history. This decision is between the patient and his provider. If you have been a smoker or had family history of abdominal aortic aneurysms, you and your provider may decide to schedule an ultrasound test of your aorta. Hepatitis C screening is also recommended for anyone born between [birth year] through [address]. A shingles vaccine is also recommended once in a lifetime after age 61. Your Medicare Wellness Exam is recommended annually. Here is a list of your current Health Maintenance items with a due date: 
Health Maintenance Due Topic Date Due  
 Albumin Urine Test  09/23/2016 [place] Diabetic Foot Care  01/20/2017  Cholesterol Test   01/20/2017  Hemoglobin A1C    03/09/2017 [place] Annual Well Visit  05/26/2017 Introducing [facility]! [facility] introduces [employer] patient portal. Now you can access parts of your medical record, email your doctor's office, and request medication refills online. 1. In your internet browser, go to [email or web address] 2. Click on the First Time User? Click Here link in the Sign In box. You will see the New Member Sign Up page. 3. Enter your [employer] Access Code exactly as it appears below. You will not need to use this code after youve completed the sign-up process. If you do not sign up before the expiration date, you must request a new code. · [employer] Access Code: [email or web address] Expires: 1/1/2018  8:47 AM 
 
4. Enter the last four digits of your Social Security Number (xxxx) and Date of Birth (mm/dd/yyyy) as indicated and click Submit. You will be taken to the next sign-up page. 5. Create a [employer] ID. This will be your [employer] login ID and cannot be changed, so think of one that is secure and easy to remember. 6. Create a [employer] password. You can change your password at any time. 7. Enter your Password Reset Question and Answer. This can be used at a later time if you forget your password. 8. Enter your e-mail address. You will receive e-mail notification when new information is available in [address] 9. Click Sign Up. You can now view and download portions of your medical record. 10. Click the Download Summary menu link to download a portable copy of your medical information. If you have questions, please visit the Frequently Asked Questions section of the [employer] website. Remember, [employer] is NOT to be used for urgent needs. For medical emergencies, dial 911. Now available from your iPhone and Android! Please provide this summary of care documentation to your next provider. Your primary care clinician is listed as Ryan Kaminski. If you have any questions after today's visit, please call [phone].

## 2017-10-03 NOTE — PROGRESS NOTES
Chief Complaint   Patient presents with    Diabetes     Reviewed record in preparation for visit and have obtained necessary documentation. Identified pt with two pt identifiers(name and ). Health Maintenance Due   Topic    MICROALBUMIN Q1     FOOT EXAM Q1     LIPID PANEL Q1     HEMOGLOBIN A1C Q6M     MEDICARE YEARLY EXAM          Chief Complaint   Patient presents with    Diabetes        Wt Readings from Last 3 Encounters:   10/03/17 195 lb 8 oz (88.7 kg)   17 194 lb (88 kg)   16 191 lb (86.6 kg)     Temp Readings from Last 3 Encounters:   10/03/17 96.2 °F (35.7 °C)   16 97.1 °F (36.2 °C) (Oral)   16 97.6 °F (36.4 °C) (Oral)     BP Readings from Last 3 Encounters:   10/03/17 140/84   17 106/71   16 121/69     Pulse Readings from Last 3 Encounters:   10/03/17 72   17 85   16 83           Learning Assessment:  :     Learning Assessment 2016   PRIMARY LEARNER Patient Patient Patient   HIGHEST LEVEL OF EDUCATION - PRIMARY LEARNER  - > 4 YEARS OF COLLEGE > 4 YEARS OF COLLEGE   BARRIERS PRIMARY LEARNER - NONE NONE   CO-LEARNER CAREGIVER - No No   PRIMARY LANGUAGE ENGLISH ENGLISH ENGLISH    NEED - No No   LEARNER PREFERENCE PRIMARY DEMONSTRATION READING DEMONSTRATION     - DEMONSTRATION READING     - - LISTENING   LEARNING SPECIAL TOPICS - no no   ANSWERED BY patient patient patient   RELATIONSHIP SELF SELF SELF   ASSESSMENT COMMENT - n/a -       Depression Screening:  :     PHQ over the last two weeks 10/3/2017   Little interest or pleasure in doing things Not at all   Feeling down, depressed or hopeless Not at all   Total Score PHQ 2 0       Fall Risk Assessment:  :     Fall Risk Assessment, last 12 mths 10/3/2017   Able to walk? Yes   Fall in past 12 months? No       Abuse Screening:  :     Abuse Screening Questionnaire 2015   Do you ever feel afraid of your partner?  N N   Are you in a relationship with someone who physically or mentally threatens you? N N   Is it safe for you to go home? Y Y       Coordination of Care Questionnaire:  :     1) Have you been to an emergency room, urgent care clinic since your last visit? no   Hospitalized since your last visit? no             2) Have you seen or consulted any other health care providers outside of [address] [place] [address] since your last visit? no  (Include any pap smears or colon screenings in this section.)    3) Do you have an Advance Directive on file? no    4) Are you interested in receiving information on Advance Directives? NO      Patient is accompanied by self I have received verbal consent from Cabe na Mala Solders to discuss any/all medical information while they are present in the room. Reviewed record  In preparation for visit and have obtained necessary documentation.

## 2017-10-05 LAB
ALBUMIN SERPL-MCNC: 3.9 G/DL (ref 3.5–4.7)
ALBUMIN/CREAT UR: 10 MG/G CREAT (ref 0–30)
ALBUMIN/GLOB SERPL: 1.6 {RATIO} (ref 1.2–2.2)
ALP SERPL-CCNC: 54 IU/L (ref 39–117)
ALT SERPL-CCNC: 17 IU/L (ref 0–44)
AST SERPL-CCNC: 20 IU/L (ref 0–40)
BILIRUB SERPL-MCNC: 0.4 MG/DL (ref 0–1.2)
BUN SERPL-MCNC: 18 MG/DL (ref 8–27)
BUN/CREAT SERPL: 12 (ref 10–24)
CALCIUM SERPL-MCNC: 9.3 MG/DL (ref 8.6–10.2)
CHLORIDE SERPL-SCNC: 104 MMOL/L (ref 96–106)
CHOLEST SERPL-MCNC: 159 MG/DL (ref 100–199)
CO2 SERPL-SCNC: 24 MMOL/L (ref 18–29)
CREAT SERPL-MCNC: 1.49 MG/DL (ref 0.76–1.27)
CREAT UR-MCNC: 139.6 MG/DL
EST. AVERAGE GLUCOSE BLD GHB EST-MCNC: 154 MG/DL
GLOBULIN SER CALC-MCNC: 2.5 G/DL (ref 1.5–4.5)
GLUCOSE SERPL-MCNC: 133 MG/DL (ref 65–99)
HBA1C MFR BLD: 7 % (ref 4.8–5.6)
HDLC SERPL-MCNC: 49 MG/DL
INTERPRETATION, 910389: NORMAL
INTERPRETATION: NORMAL
LDLC SERPL CALC-MCNC: 87 MG/DL (ref 0–99)
Lab: NORMAL
MICROALBUMIN UR-MCNC: 14 UG/ML
PDF IMAGE, 910387: NORMAL
POTASSIUM SERPL-SCNC: 4.7 MMOL/L (ref 3.5–5.2)
PROT SERPL-MCNC: 6.4 G/DL (ref 6–8.5)
SODIUM SERPL-SCNC: 142 MMOL/L (ref 134–144)
TRIGL SERPL-MCNC: 115 MG/DL (ref 0–149)
TSH SERPL DL<=0.005 MIU/L-ACNC: 2.11 UIU/ML (ref 0.45–4.5)
VLDLC SERPL CALC-MCNC: 23 MG/DL (ref 5–40)

## 2017-10-13 ENCOUNTER — HOSPITAL ENCOUNTER (OUTPATIENT)
Dept: PHYSICAL THERAPY | Age: 82
Discharge: HOME | End: 2017-10-13
Payer: MEDICARE

## 2017-10-13 PROCEDURE — G8979 MOBILITY GOAL STATUS: HCPCS

## 2017-10-13 PROCEDURE — 97110 THERAPEUTIC EXERCISES: CPT

## 2017-10-13 PROCEDURE — G8978 MOBILITY CURRENT STATUS: HCPCS

## 2017-10-13 PROCEDURE — 97162 PT EVAL MOD COMPLEX 30 MIN: CPT

## 2017-10-13 NOTE — PROGRESS NOTES
[address] [address] (MOB IV), [address] [facility] [place] Elizalde  Phone: [phone] Fax: [phone]    Plan of Care/Statement of Necessity for Physical Therapy Services  2-15    Patient name: Robb Ambrocio  : 1932  Provider#: [id number]  Referral source: Maricarmen aRder MD      Medical/Treatment Diagnosis: Pain in unspecified joint [M25.50]  Knee pain [M25.569]  Foot pain [M79.673]     Prior Hospitalization: see medical history     Comorbidities: Arthritis, cancer, gastrointestinal disease, incontinence, pacemaker, prior surgery, visual impairment  Prior Level of Function: The patient completed 20 minutes of exercise at least 1-2 times a week  Medications: Verified on Patient Summary List    Start of Care: 10/13/17      Onset Date: Chronic       The Plan of Care and following information is based on the information from the initial evaluation. Assessment/ key information: The Pt is a pleasant 80year old male who presents to therapy with chronic right knee pain that began insidiously and right foot pain that occurred following a right Achilles tear 5 years ago. The Pt currently displays decreased right lower extremity strength and stability (mainly hip), decreased right foot and ankle intrinsic and extrinsic strength and stability, restrictions in his hip musculature flexibility and gastroc/solues flexibility, decreased right knee ROM, decreased balance and neuromuscular control, gait impairments, and decreased activity tolerance and endurance. The patient would benefit from skilled physical therapy to help improve the above listed impairments to allow the patient to safely return to their prior level of function with less overall pain or risk of further injury. The patient has a fair prognosis with skilled physical therapy.       Evaluation Complexity History MEDIUM  Complexity : 1-2 comorbidities / personal factors will impact the outcome/ POC ; Examination HIGH Complexity : 4+ Standardized tests and measures addressing body structure, function, activity limitation and / or participation in recreation  ;Presentation MEDIUM Complexity : Evolving with changing characteristics  ; Clinical Decision Making MEDIUM Complexity : FOTO score of 26-74  Overall Complexity Rating: MEDIUM    Problem List: pain affecting function, decrease ROM, decrease strength, edema affecting function, impaired gait/ balance, decrease ADL/ functional abilitiies, decrease activity tolerance, decrease flexibility/ joint mobility and decrease transfer abilities   Treatment Plan may include any combination of the following: Therapeutic exercise, Therapeutic activities, Neuromuscular re-education, Physical agent/modality, Gait/balance training, Manual therapy, Patient education, Self Care training, Functional mobility training, Home safety training and Stair training  Patient / Family readiness to learn indicated by: asking questions and interest  Persons(s) to be included in education: patient (P)  Barriers to Learning/Limitations: None  Patient Goal (s): fix knee and wrist problem  Patient Self Reported Health Status: good  Rehabilitation Potential: good    Short Term Goals: To be accomplished in 6 treatments:  1. The Pt will be independent and complaint with his HEP. Long Term Goals: To be accomplished in 12 treatments:  1. The Pt will score the MCII on his FOTO survey demonstrating improved overall function (50 to 56 points). 2. The Pt will be able to walk independently >/= 30 minutes with 0-2/10 knee and foot pain to allow the Pt to be able to walk for recreation. 3. The Pt will be able to ascend and descend 1 flight of stairs with 0-2/10 pain to allow the Pt to reach his bedroom with less pain or discomfort. Frequency / Duration: Patient to be seen 1-2 times per week for 12 treatments.     Patient/ Caregiver education and instruction: self care, activity modification and exercises    [] Plan of care has been reviewed with PTA    G-Codes (GP)  Mobility   Current  CK= 40-59%   Goal  CK= 40-59%    The severity rating is based on clinical judgment and the FOTO Score score. Certification Period: 10/13/17- 1/13/18    Aparna Menjivar, PT 10/13/2017 12:41 PM    ________________________________________________________________________    I certify that the above Therapy Services are being furnished while the patient is under my care. I agree with the treatment plan and certify that this therapy is necessary.     [de-identified] Signature:____________________  Date:____________Time: _________

## 2017-10-13 NOTE — PROGRESS NOTES
PT INITIAL EVALUATION NOTE - [facility] 2-15    Patient Name: Mansi Juan  Date:10/13/2017  : 1932  [x]  Patient  Verified  Payor: Catrachitomarkell Floresper / Plan: [facility] MEDICARE EXPANDED PART B / Product Type: Managed Care Medicare /    In time: 8:40 AM  Out time: 9:45 AM  Total Treatment Time (min): 65 minutes  Total Timed Codes (min): 25 minutes  1:1 Treatment Time ( only): 65 minutes   Visit #: 1     Treatment Area: Pain in unspecified joint [M25.50]  Knee pain [M25.569]  Foot pain [M79.673]    SUBJECTIVE  Pain Level (0-10 scale): 3/10  Any medication changes, allergies to medications, adverse drug reactions, diagnosis change, or new procedure performed?: [] No    [x] Yes (see summary sheet for update)  Subjective: The Pt reports that her has general aches and pains, but his main complaint is his right knee pain. He reports that his knee has been bothering him for 1-2 years that has gotten progressively worse. He has noticed swelling in the right knee. He had a right Achilles tendon rupture 5 years ago and opted to let it heal without surgery and as a result has hammer toes and right foot/ankle pain and thinks this may have effected his right knee. His knee pain is aggravated by going up and down, walking > 20 minutes, and standing from a low chair. His pain is reduced by rest and hot water soaks. He is independent with all ADLs, but has more pain and discomfort. He has neuropathy in his feet bilaterally, and denies any other numbness/tingling. He denies any shooting pains, weakness, or instability in his LEs. He lives in a 2-story home with his bedroom on the 2nd floor; pain going up and down the stairs with handrails on both sides. He is recently retired. PMH: Right achilles rupture, right hammer toes, neuropathy in feet, pacemaker, prediabetic, and prostate cancer in remission.      OBJECTIVE/EXAMINATION  Posture:  Unremarkable   Other Observations:  N/A  Gait and Functional Mobility:  Right knee no eccentric control into midstance, decreased heel strike bilaterally, mild right trendelenburg gait    Lumbar ROM:   Flexion: Mild   Extension: Moderate   Side Bending: Right: Moderate   Left: Moderate   Rotation: Right: Moderate    Left: Moderate    Balance Assessment: Moderate deficits in balance and neuromuscular control in single limb stance bilaterally (right > left)    Squat Assessment:   Double Leg Forward trunk lean, quadriceps dominant, mild genu valgus   Single Leg NT    Neurological: Sensations: Intact to light touch sensation L1-S1 bilaterally    Flexibility: Moderate-severe restrictions in hamstrings, hip internal and external rotators, quadriceps, iliopsoas, and gastrocnemius/soleus complex bilaterally    Right Knee:  AROM +7-121   Left  Knee:  AROM +4-132     LOWER QUARTER   MUSCLE STRENGTH  KEY       R  L  0 - No Contraction  Hip flex  4  4  1 - Trace          er    4  4  2 - Poor          ir   4  4  3 - Fair           abd  4-  4  4 - Good          ext  3+  4-  5 - Normal   Knee flex  4  4+               Ext  4  4+      Ankle DF  4  4+                PF  4  4+        Gluteal activation: The Pt has improper gluteal activation with hip extension bilaterally     Knee Circumference Measures:  Right: Superior patellar pole- 42.8 cm, middle patella- 42.1 cm, and inferior patellar pole- 41.2 cm  Left: Superior patellar pole- 40.9 cm, middle patella- 40.5 cm, and inferior patellar pole- 37.9 cm    Joint Mobility Assessment: Decreased patellar mobility (superior, inferior, medial, and lateral) bilaterally (right > left); decreased hip capsule mobility  Palpation: Pt is TTP along medial and lateral knee joint lines bilaterally    Special Tests:Varus: Neg B     SLR: Neg B    Valgus: Neg B     Slump: Neg B    Bolivar's: Neg B    Karel: Positive B    Anterior Drawer: Neg B   Obers: Positive B    Posterior Drawer: Neg B   Clonus: Neg B    Lachman's: Neg B      25 min Therapeutic Exercise:  [x] See flow sheet : Rationale: increase ROM, increase strength, improve coordination, improve balance and increase proprioception to improve the patients ability to ambulate and perform ADLs with less pain or discomfort.     With   [x] TE   [] TA   [] neuro   [x] other: Patient Education: [x] Review HEP    [] Progressed/Changed HEP based on:   [] positioning   [] body mechanics   [] transfers   [] heat/ice application    [x] other: Pt educated on diagnosis and prognosis with therapy     Other Objective/Functional Measures: Knee FOTO 50/100    Pain Level (0-10 scale) post treatment: 3/10    ASSESSMENT/Changes in Function:     [x]  See Plan of Mitzi 139, PT 10/13/2017  8:42 AM

## 2017-10-18 ENCOUNTER — TELEPHONE (OUTPATIENT)
Dept: INTERNAL MEDICINE CLINIC | Age: 82
End: 2017-10-18

## 2017-10-20 ENCOUNTER — HOSPITAL ENCOUNTER (OUTPATIENT)
Dept: PHYSICAL THERAPY | Age: 82
Discharge: HOME | End: 2017-10-20
Payer: MEDICARE

## 2017-10-20 PROCEDURE — 97140 MANUAL THERAPY 1/> REGIONS: CPT

## 2017-10-20 PROCEDURE — 97110 THERAPEUTIC EXERCISES: CPT

## 2017-10-20 NOTE — PROGRESS NOTES
PT DAILY TREATMENT NOTE - [facility] 2-15    Patient Name: Mike Irwin  Date:10/20/2017  : 1932  [x]  Patient  Verified  Payor: [employer] / Plan: [facility] HUMAN MEDICARE EXPANDED PART B / Product Type: Managed Care Medicare /    In time: 9:30 AM  Out time: 10:26 AM  Total Treatment Time (min): 56 minutes  Total Timed Codes (min): 56 minutes  1:1 Treatment Time ( only): 45 minutes   Visit #: 2     Treatment Area: Pain in unspecified joint [M25.50]  Knee pain [M25.569]  Foot pain [M79.673]    SUBJECTIVE  Pain Level (0-10 scale): 3/10  Any medication changes, allergies to medications, adverse drug reactions, diagnosis change, or new procedure performed?: [x] No    [] Yes (see summary sheet for update)  Subjective functional status/changes:   [] No changes reported  The Pt denied any increased pain after his initial evaluation, but he tried to do his exercises 2x and each time he had severe left knee pain. He denies any history of left knee pain, but reports that the pain was so severe he could not bend the left knee or put pressure through it without severe pain. The pain would last the rest of the day and then dissipated throughout the next day. He has not had the left knee pain since last Wednesday and today his pain is only in the right foot and knee. OBJECTIVE    41 min Therapeutic Exercise:  [x] See flow sheet :   Rationale: increase ROM, increase strength, improve coordination, improve balance and increase proprioception to improve the patients ability to ambulate and perform ADLs with less pain or discomfort. 15 min Manual Therapy: Right patellar mobilizations (superior, inferior, medial, and lateral) and inferior and lateral hip glides using the [facility] bilaterally    Rationale: decrease pain, increase ROM and increase tissue extensibility to improve the patients ability to ambulate and perform ADLs with less pain or discomfort.     With   [x] TE   [] TA   [] neuro   [] other: Patient Education: [x] Review HEP    [] Progressed/Changed HEP based on:   [] positioning   [] body mechanics   [] transfers   [] heat/ice application    [] other:      Other Objective/Functional Measures: None noted     Pain Level (0-10 scale) post treatment: 2/10    ASSESSMENT/Changes in Function:   The Pt was able to perform all of his exercises without any left knee pain. He did require moderate verbal cues to correct for proper form and technique, but he was able to make the corrections easily. Will continue to progress as tolerated next session. Patient will continue to benefit from skilled PT services to modify and progress therapeutic interventions, address functional mobility deficits, address ROM deficits, address strength deficits, analyze and address soft tissue restrictions, analyze and cue movement patterns, analyze and modify body mechanics/ergonomics, assess and modify postural abnormalities and instruct in home and community integration to attain remaining goals. []  See Plan of Care  []  See progress note/recertification  []  See Discharge Summary         Progress towards goals / Updated goals:  Short Term Goals: To be accomplished in 6 treatments:  1. The Pt will be independent and complaint with his HEP- progressing  Long Term Goals: To be accomplished in 12 treatments:  1. The Pt will score the MCII on his FOTO survey demonstrating improved overall function (50 to 56 points)- progressing  2. The Pt will be able to walk independently >/= 30 minutes with 0-2/10 knee and foot pain to allow the Pt to be able to walk for recreation- progressing  3.  The Pt will be able to ascend and descend 1 flight of stairs with 0-2/10 pain to allow the Pt to reach his bedroom with less pain or discomfort- progressing    PLAN  [x]  Upgrade activities as tolerated     [x]  Continue plan of care  [x]  Update interventions per flow sheet       []  Discharge due to:_  []  Other:_      Jesus Ambrocio, PT 10/20/2017  9:49 AM

## 2017-10-27 ENCOUNTER — APPOINTMENT (OUTPATIENT)
Dept: PHYSICAL THERAPY | Age: 82
End: 2017-10-27
Payer: MEDICARE

## 2017-11-01 ENCOUNTER — HOSPITAL ENCOUNTER (OUTPATIENT)
Dept: PHYSICAL THERAPY | Age: 82
Discharge: HOME | End: 2017-11-01
Payer: MEDICARE

## 2017-11-01 PROCEDURE — 97140 MANUAL THERAPY 1/> REGIONS: CPT

## 2017-11-01 PROCEDURE — 97110 THERAPEUTIC EXERCISES: CPT

## 2017-11-01 NOTE — PROGRESS NOTES
PT DAILY TREATMENT NOTE - [facility] 2-15    Patient Name: Mali Pena  Date:2017  : 1932  [x]  Patient  Verified  Payor: Lana Downey / Plan: [facility] HUMAN MEDICARE EXPANDED PART B / Product Type: Managed Care Medicare /    In time: 7:30 AM  Out time: 8:24 AM  Total Treatment Time (min): 54 minutes  Total Timed Codes (min): 54 minutes  1:1 Treatment Time ( only): 49 minutes   Visit #: 3     Treatment Area: Pain in unspecified joint [M25.50]  Knee pain [M25.569]  Foot pain [M79.673]    SUBJECTIVE  Pain Level (0-10 scale): 0.5/10  Any medication changes, allergies to medications, adverse drug reactions, diagnosis change, or new procedure performed?: [x] No    [] Yes (see summary sheet for update)  Subjective functional status/changes:   [] No changes reported  The Pt reports that he is feeling a little stiff, but no significant pain. He has been able to do his exercises at home without any left knee pain or discomfort. He states that his right foot is also feeling a little better, but continues to bother when he walks for long distances or stands for long periods of time. OBJECTIVE    39 min Therapeutic Exercise:  [x] See flow sheet :   Rationale: increase ROM, increase strength, improve coordination, improve balance and increase proprioception to improve the patients ability to ambulate and perform ADLs with less pain or discomfort. 15 min Manual Therapy:  Right patellar mobilizations (superior, inferior, medial, and lateral) and inferior and lateral hip glides using the [facility] bilaterally     Rationale: decrease pain, increase ROM and increase tissue extensibility to improve the patients ability to ambulate and perform ADLs with less pain or discomfort.           With   [x] TE   [] TA   [] neuro   [] other: Patient Education: [x] Review HEP    [] Progressed/Changed HEP based on:   [] positioning   [] body mechanics   [] transfers   [] heat/ice application    [] other:      Other Objective/Functional Measures: None noted     Pain Level (0-10 scale) post treatment: 0.5/10    ASSESSMENT/Changes in Function:   The Pt tolerated the additions to his therapy program well. He had improved activity tolerance and endurance and did not fatigue as quickly with his exercises. He did have mild discomfort with the step ups, but it was not severe and dissipated quickly. Patient will continue to benefit from skilled PT services to modify and progress therapeutic interventions, address functional mobility deficits, address ROM deficits, address strength deficits, analyze and address soft tissue restrictions, analyze and cue movement patterns, analyze and modify body mechanics/ergonomics, assess and modify postural abnormalities and instruct in home and community integration to attain remaining goals. []  See Plan of Care  []  See progress note/recertification  []  See Discharge Summary         Progress towards goals / Updated goals:  Short Term Goals: To be accomplished in 6 treatments:  1. The Pt will be independent and complaint with his HEP- progressing  Long Term Goals: To be accomplished in 12 treatments:  1. The Pt will score the MCII on his FOTO survey demonstrating improved overall function (50 to 56 points)- progressing  2. The Pt will be able to walk independently >/= 30 minutes with 0-2/10 knee and foot pain to allow the Pt to be able to walk for recreation- progressing  3.  The Pt will be able to ascend and descend 1 flight of stairs with 0-2/10 pain to allow the Pt to reach his bedroom with less pain or discomfort- progressing     PLAN  [x]  Upgrade activities as tolerated     [x]  Continue plan of care  [x]  Update interventions per flow sheet       []  Discharge due to:_  []  Other:_      Ludy Morrell, PT 11/1/2017  8:04 AM

## 2017-11-03 ENCOUNTER — APPOINTMENT (OUTPATIENT)
Dept: PHYSICAL THERAPY | Age: 82
End: 2017-11-03
Payer: MEDICARE

## 2017-11-08 ENCOUNTER — HOSPITAL ENCOUNTER (OUTPATIENT)
Dept: PHYSICAL THERAPY | Age: 82
Discharge: HOME | End: 2017-11-08
Payer: MEDICARE

## 2017-11-08 PROCEDURE — 97110 THERAPEUTIC EXERCISES: CPT

## 2017-11-08 PROCEDURE — 97140 MANUAL THERAPY 1/> REGIONS: CPT

## 2017-11-08 NOTE — PROGRESS NOTES
PT DAILY TREATMENT NOTE - [facility] 2-15    Patient Name: Larry Posada  Date:2017  : 1932  [x]  Patient  Verified  Payor: Roe Becker / Plan: [facility] HUMAN MEDICARE EXPANDED PART B / Product Type: Managed Care Medicare /    In time: 7:32 AM  Out time: 8:30 AM  Total Treatment Time (min): 58 minutes  Total Timed Codes (min): 58 minutes  1:1 Treatment Time ( only): 40 minutes   Visit #: 4     Treatment Area: Pain in unspecified joint [M25.50]  Knee pain [M25.569]  Foot pain [M79.673]    SUBJECTIVE  Pain Level (0-10 scale): 0.5/10  Any medication changes, allergies to medications, adverse drug reactions, diagnosis change, or new procedure performed?: [x] No    [] Yes (see summary sheet for update)  Subjective functional status/changes:   [] No changes reported  The pt reports that his right knee and foot are feeling much better overall and his hips have not bothered him greatly over the last week. 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 5mi without any increased pain. He states that the right knee got achy, but there was no severe pain. He continues to report diligence with his HEP. OBJECTIVE    43 min Therapeutic Exercise:  [x] See flow sheet :   Rationale: increase ROM, increase strength, improve coordination, improve balance and increase proprioception to improve the patients ability to ambulate and perform ADLs with less pain or discomfort. 15 min Manual Therapy:  Right patellar mobilizations (superior, inferior, medial, and lateral) and inferior and lateral hip glides using the [employer]    Rationale: decrease pain, increase ROM and increase tissue extensibility  to improve the patients ability to ambulate and perform ADLs with less pain or discomfort.     With   [x] TE   [] TA   [] neuro   [] other: Patient Education: [x] Review HEP    [] Progressed/Changed HEP based on:   [] positioning   [] body mechanics   [] transfers   [] heat/ice application    [] other:      Other Objective/Functional Measures: None noted     Pain Level (0-10 scale) post treatment: 0/10    ASSESSMENT/Changes in Function:   The Pt tolerated the additions to his therapy program well without any increased pain or discomfort. He had improved balance and neuromuscular control and was able to balance on single leg without any external support or loss of balance. Will continue to progress his strengthening and neuromuscular control and balance exercises as tolerated during next PT session. Patient will continue to benefit from skilled PT services to modify and progress therapeutic interventions, address functional mobility deficits, address ROM deficits, address strength deficits, analyze and address soft tissue restrictions, analyze and cue movement patterns, analyze and modify body mechanics/ergonomics, assess and modify postural abnormalities and instruct in home and community integration to attain remaining goals. []  See Plan of Care  []  See progress note/recertification  []  See Discharge Summary         Progress towards goals / Updated goals:  Short Term Goals: To be accomplished in 6 treatments:  1. The Pt will be independent and complaint with his HEP- progressing  Long Term Goals: To be accomplished in 12 treatments:  1. The Pt will score the MCII on his FOTO survey demonstrating improved overall function (50 to 56 points)- progressing  2. The Pt will be able to walk independently >/= 30 minutes with 0-2/10 knee and foot pain to allow the Pt to be able to walk for recreation- progressing  3.  The Pt will be able to ascend and descend 1 flight of stairs with 0-2/10 pain to allow the Pt to reach his bedroom with less pain or discomfort- progressing     PLAN  [x]  Upgrade activities as tolerated     [x]  Continue plan of care  [x]  Update interventions per flow sheet       []  Discharge due to:_  []  Other:_      Ai Joy, PT 11/8/2017  11:46 AM

## 2017-11-10 ENCOUNTER — APPOINTMENT (OUTPATIENT)
Dept: PHYSICAL THERAPY | Age: 82
End: 2017-11-10
Payer: MEDICARE

## 2017-11-13 ENCOUNTER — HOSPITAL ENCOUNTER (OUTPATIENT)
Dept: PHYSICAL THERAPY | Age: 82
Discharge: HOME | End: 2017-11-13
Payer: MEDICARE

## 2017-11-13 PROCEDURE — G8979 MOBILITY GOAL STATUS: HCPCS

## 2017-11-13 PROCEDURE — 97140 MANUAL THERAPY 1/> REGIONS: CPT

## 2017-11-13 PROCEDURE — 97110 THERAPEUTIC EXERCISES: CPT

## 2017-11-13 PROCEDURE — G8978 MOBILITY CURRENT STATUS: HCPCS

## 2017-11-13 NOTE — PROGRESS NOTES
PT DAILY TREATMENT NOTE - [facility] 2-15    Patient Name: Eliseo Mckenna  Date:2017  : 1932  [x]  Patient  Verified  Payor: Natalie Zhou / Plan: [facility] MEDICARE EXPANDED PART B / Product Type: Managed Care Medicare /    In time: 7:55 AM  Out time: 8:57 AM  Total Treatment Time (min): 62 minutes  Total Timed Codes (min): 62 minutes  1:1 Treatment Time ( only): 57 minutes   Visit #: 5     Treatment Area: Pain in unspecified joint [M25.50]  Knee pain [M25.569]  Foot pain [M79.673]    SUBJECTIVE  Pain Level (0-10 scale): 0/10  Any medication changes, allergies to medications, adverse drug reactions, diagnosis change, or new procedure performed?: [x] No    [] Yes (see summary sheet for update)  Subjective functional status/changes:   [] No changes reported  The Pt reports that his pain is less intense and he is having more frequent periods of relief. He reports that the only discomfort he has is in the knee and he states that it is more stiffness than pain. Overall, he believes that he is 90% improved since beginning therapy. OBJECTIVE  47 min Therapeutic Exercise:  [x] See flow sheet :   Rationale: increase ROM, increase strength, improve coordination, improve balance and increase proprioception to improve the patients ability to ambulate and perform ADLs with less pain or discomfort. 15 min Manual Therapy: Right patellar mobilizations (superior, inferior, medial, and lateral) and inferior and lateral hip glides using the [employer]    Rationale: decrease pain, increase ROM and increase tissue extensibility to improve the patients ability to ambulate and perform ADLs with less pain or discomfort.     With   [x] TE   [] TA   [] neuro   [] other: Patient Education: [x] Review HEP    [] Progressed/Changed HEP based on:   [] positioning   [] body mechanics   [] transfers   [] heat/ice application    [] other:      Other Objective/Functional Measures:  FOTO 71/100 (50/100 at initial evaluation)     Pain Level (0-10 scale) post treatment: 0/10    ASSESSMENT/Changes in Function:     Patient will continue to benefit from skilled PT services to modify and progress therapeutic interventions, address functional mobility deficits, address ROM deficits, address strength deficits, analyze and address soft tissue restrictions, analyze and cue movement patterns, analyze and modify body mechanics/ergonomics, assess and modify postural abnormalities and instruct in home and community integration to attain remaining goals. []  See Plan of Care  [x]  See progress note/recertification  []  See Discharge Summary         Progress towards goals / Updated goals:  Short Term Goals: To be accomplished in 6 treatments:  1. The Pt will be independent and complaint with his HEP- progressing  Long Term Goals: To be accomplished in 12 treatments:  1. The Pt will score the MCII on his FOTO survey demonstrating improved overall function (50 to 56 points)- met FOTO 71/100   2. The Pt will be able to walk independently >/= 30 minutes with 0-2/10 knee and foot pain to allow the Pt to be able to walk for recreation- progressing  3.  The Pt will be able to ascend and descend 1 flight of stairs with 0-2/10 pain to allow the Pt to reach his bedroom with less pain or discomfort- progressing      PLAN  [x]  Upgrade activities as tolerated     [x]  Continue plan of care  [x]  Update interventions per flow sheet       []  Discharge due to:_  []  Other:_      Yuri Sullivan, PT 11/13/2017  8:22 AM

## 2017-11-13 NOTE — PROGRESS NOTES
[facility]  [address] ([facility]), [address] [facility] [place]  Phone: [phone] Fax: [phone]    Progress Note    Name: Jaylyn Patel   : 1932   MD: Dennise Solares MD       Treatment Diagnosis: Pain in unspecified joint [M25.50]  Knee pain [M25.569]  Foot pain [M79.673]  Start of Care: 10/13/17    Visits from Start of Care: 5  Missed Visits: 0    Summary of Care: The Pt has been seen for 5 outpatient physical therapy sessions with chronic right knee pain that began insidiously and right foot pain that occurred following a right Achilles tear 5 years ago. The Pt has progressed well with his therapy program that has focused on improving his lower extremity strength and stability, improving foot and ankle intrinsic and extrinsic muscle strength and stability, stretching his hip capsule and musculature, improving his right patellar mobility, improving his balance and neuromuscular control, and improving his activity tolerance and endurance via therapeutic exercises and manual therapy techniques. The Pt reports that the right foot pain has dissipated greatly and he only notices it when he is walking/standing for significant amounts of time. His right knee pain has dissipated as well and he describes it more a stiffness in the knee. His walking tolerance and endurance has improved greatly and he was able to walk ~1mi round trip without any increased pain. Recommend continued PT for 2 additional visits to help progress his HEP to allow the Pt to fully return to his prior level of function safely with less risk of future injury. Thank you for this referral.    Progress towards goals / Updated goals:  Short Term Goals: To be accomplished in 6 treatments:  1. The Pt will be independent and complaint with his HEP- progressing  Long Term Goals: To be accomplished in 12 treatments:  1.  The Pt will score the MCII on his FOTO survey demonstrating improved overall function (50 to 56 points)- met FOTO 71/100   2. The Pt will be able to walk independently >/= 30 minutes with 0-2/10 knee and foot pain to allow the Pt to be able to walk for recreation- progressing  3. The Pt will be able to ascend and descend 1 flight of stairs with 0-2/10 pain to allow the Pt to reach his bedroom with less pain or discomfort- progressing        G-Codes (GP)  Mobility  [id number] Current  CJ= 20-39%   Goal  CK= 40-59%    The severity rating is based on the FOTO Score score. Ashlee Snellen, PT 11/13/2017 11:32 AM    ________________________________________________________________________  NOTE TO PHYSICIAN:  Please complete the following and fax to: [facility]: Fax: [phone]  . Retain this original for your records. If you are unable to process this request in 24 hours, please contact our office.        ____ I have read the above report and request that my patient continue therapy with the following changes/special instructions:  ____ I have read the above report and request that my patient be discharged from therapy    Physician's Signature:_________________ Date:___________Time:__________

## 2017-11-22 ENCOUNTER — HOSPITAL ENCOUNTER (OUTPATIENT)
Dept: PHYSICAL THERAPY | Age: 82
Discharge: HOME | End: 2017-11-22
Payer: MEDICARE

## 2017-11-22 PROCEDURE — 97140 MANUAL THERAPY 1/> REGIONS: CPT

## 2017-11-22 PROCEDURE — 97110 THERAPEUTIC EXERCISES: CPT

## 2017-11-22 NOTE — PROGRESS NOTES
PT DAILY TREATMENT NOTE - [facility] 2-15    Patient Name: Ben Forbes  Date:2017  : 1932  [x]  Patient  Verified  Payor: Marek Garcia / Plan: [facility] MEDICARE EXPANDED PART B / Product Type: Managed Care Medicare /    In time: 8:02 AM  Out time: 8:55 AM  Total Treatment Time (min): 53 minutes  Total Timed Codes (min): 43 minutes  1:1 Treatment Time ( only): 35 minutes   Visit #: 6     Treatment Area: Pain in unspecified joint [M25.50]  Knee pain [M25.569]  Foot pain [M79.673]    SUBJECTIVE  Pain Level (0-10 scale): 8/10 (right knee)  Any medication changes, allergies to medications, adverse drug reactions, diagnosis change, or new procedure performed?: [x] No    [] Yes (see summary sheet for update)  Subjective functional status/changes:   [] No changes reported  The Pt reports that he was volunteering over the weekend and was having to do a lot of standing and walking. He noticed a little right knee pain, but yesterday he went on a mile walk with his wife and his pain got progressively worse. He states that this was a significant increase in activity from his baseline. OBJECTIVE    Modality rationale: decrease edema, decrease inflammation and decrease pain to improve the patients ability to ambulate and perform ADLs with less pain or discomfort.    Min Type Additional Details    [] Estim: []Att   []Unatt        []TENS instruct                  []IFC  []Premod   []NMES                     []Other:  []w/US   []w/ice   []w/heat  Position:  Location:    []  Traction: [] Cervical       []Lumbar                       [] Prone          []Supine                       []Intermittent   []Continuous Lbs:  [] before manual  [] after manual  []w/heat    []  Ultrasound: []Continuous   [] Pulsed at:                           []1MHz   []3MHz Location:  W/cm2:    [] Paraffin         Location:   []w/heat   10 [x]  Ice     []  Heat  []  Ice massage Position: Supine  Location: Right knee    []  Laser  [] Other: Position:  Location:      []  Vasopneumatic Device Pressure:       [] lo [] med [] hi   Temperature:      [x] Skin assessment post-treatment:  [x]intact []redness- no adverse reaction    []redness  adverse reaction:     33 min Therapeutic Exercise:  [x] See flow sheet :   Rationale: increase ROM, increase strength, improve coordination, improve balance and increase proprioception to improve the patients ability to ambulate and perform ADLs with less pain or discomfort. 10 min Manual Therapy: Inferior and lateral hip glides using the Mulligan belt bilaterally    Rationale: decrease pain, increase ROM and increase tissue extensibility to improve the patients ability to ambulate and perform ADLs with less pain or discomfort. With   [x] TE   [] TA   [] neuro   [] other: Patient Education: [x] Review HEP    [] Progressed/Changed HEP based on:   [] positioning   [] body mechanics   [] transfers   [] heat/ice application    [] other:      Other Objective/Functional Measures:   -TTP along right medial knee joint line     Pain Level (0-10 scale) post treatment: 4/10    ASSESSMENT/Changes in Function:   The Pt was able to perform his hip strengthening exercises well without any increased knee pain. All resisted knee flexion exercises were held today to allow the knee time to heal and reduce the irritation. Will progress as tolerated during next PT session. Patient will continue to benefit from skilled PT services to modify and progress therapeutic interventions, address functional mobility deficits, address ROM deficits, address strength deficits, analyze and address soft tissue restrictions, analyze and cue movement patterns, analyze and modify body mechanics/ergonomics, assess and modify postural abnormalities and instruct in home and community integration to attain remaining goals.      []  See Plan of Care  []  See progress note/recertification  []  See Discharge Summary         Progress towards goals / Updated goals:  Short Term Goals: To be accomplished in 6 treatments:  1. The Pt will be independent and complaint with his HEP- progressing  Long Term Goals: To be accomplished in 12 treatments:  1. The Pt will score the MCII on his FOTO survey demonstrating improved overall function (50 to 56 points)- met FOTO 71/100   2. The Pt will be able to walk independently >/= 30 minutes with 0-2/10 knee and foot pain to allow the Pt to be able to walk for recreation- progressing  3.  The Pt will be able to ascend and descend 1 flight of stairs with 0-2/10 pain to allow the Pt to reach his bedroom with less pain or discomfort- progressing      PLAN  [x]  Upgrade activities as tolerated     [x]  Continue plan of care  [x]  Update interventions per flow sheet       []  Discharge due to:_  []  Other:_      Francine Grullon, PT 11/22/2017  8:27 AM\

## 2017-11-28 ENCOUNTER — APPOINTMENT (OUTPATIENT)
Dept: PHYSICAL THERAPY | Age: 82
End: 2017-11-28
Payer: MEDICARE

## 2017-11-30 ENCOUNTER — HOSPITAL ENCOUNTER (OUTPATIENT)
Dept: PHYSICAL THERAPY | Age: 82
Discharge: HOME | End: 2017-11-30
Payer: MEDICARE

## 2017-11-30 PROCEDURE — 97110 THERAPEUTIC EXERCISES: CPT

## 2017-11-30 PROCEDURE — G8979 MOBILITY GOAL STATUS: HCPCS

## 2017-11-30 PROCEDURE — G8980 MOBILITY D/C STATUS: HCPCS

## 2017-11-30 PROCEDURE — 97140 MANUAL THERAPY 1/> REGIONS: CPT

## 2017-11-30 NOTE — ANCILLARY DISCHARGE INSTRUCTIONS
[facility]  [address] ([facility]), [address] [place]  Phone: [phone] Fax: [phone]    Discharge Summary 2-15    Patient name: Madison Hernandez  : 1932  Provider#: [id number]  Referral source: Herminio Cortez MD      Medical/Treatment Diagnosis: Pain in unspecified joint [M25.50]  Knee pain [M25.569]  Foot pain [M79.673]     Prior Hospitalization: see medical history     Comorbidities: See Plan of Care  Prior Level of Function: See Plan of Care  Medications: Verified on Patient Summary List    Start of Care: 10/13/17      Onset Date: Chronic   Visits from Start of Care: 7     Missed Visits: 0  Reporting Period : 10/13/17 to 17    Assessment/Summary of care: The Pt has been seen for 7 outpatient physical therapy sessions with chronic right knee and foot pain. The Pt has met all of his therapeutic goals and progressed very well with his therapy program that has focused on improving his lower extremity strength and stability (mainly hip), improving his foot and ankle intrinsic and extrinsic muscle strength and stability, stretching his hip musculature and capsule, stretching his gastroc/soleus complex, improving his balance and neuromuscular control, correcting gait impairments, and improving activity tolerance and endurance. The Pt is now able to walk ~1 mi continuously with very mild right knee pain. He reports that overall his right knee pain is less intense and less frequent and he is no longer having any right foot pain or discomfort. He denies any functional limitations and believes that he is overall 95% improved since beginning therapy. At this time, it is believed that the Pt has reached maximum benefit from skilled PT and will continue to progress well independently. The Pt was educated how to safely progress his HEP and voiced understanding.   The Pt is discharged from skilled PT and will contact his referring provider with any further questions or concerns. Thank you for this referral.    Progress towards goals / Updated goals:  Short Term Goals: To be accomplished in 6 treatments:  1. The Pt will be independent and complaint with his HEP- met  Long Term Goals: To be accomplished in 12 treatments:  1. The Pt will score the MCII on his FOTO survey demonstrating improved overall function (50 to 56 points)- met FOTO 70/100   2. The Pt will be able to walk independently >/= 30 minutes with 0-2/10 knee and foot pain to allow the Pt to be able to walk for recreation- met  3. The Pt will be able to ascend and descend 1 flight of stairs with 0-2/10 pain to allow the Pt to reach his bedroom with less pain or discomfort- met      G-Codes (GP)  Mobility    Goal  CK= 40-59%  D/C  CJ= 20-39%     The severity rating is based on clinical judgment and the FOTO Score score.     RECOMMENDATIONS:  [x]Discontinue therapy: [x]Patient has reached or is progressing toward set goals     []Patient is non-compliant or has abdicated     []Due to lack of appreciable progress towards set goals     []Other  Macario Bloch, PT 11/30/2017 11:06 AM

## 2017-12-28 ENCOUNTER — ANESTHESIA (OUTPATIENT)
Dept: SURGERY | Age: 82
DRG: 330 | End: 2017-12-28
Payer: MEDICARE

## 2017-12-28 ENCOUNTER — HOSPITAL ENCOUNTER (INPATIENT)
Age: 82
LOS: 8 days | Discharge: HOME HEALTH | DRG: 330 | End: 2018-01-05
Attending: EMERGENCY MEDICINE | Admitting: SURGERY
Payer: MEDICARE

## 2017-12-28 ENCOUNTER — TELEPHONE (OUTPATIENT)
Dept: INTERNAL MEDICINE CLINIC | Age: 82
End: 2017-12-28

## 2017-12-28 ENCOUNTER — APPOINTMENT (OUTPATIENT)
Dept: GENERAL RADIOLOGY | Age: 82
DRG: 330 | End: 2017-12-28
Attending: EMERGENCY MEDICINE
Payer: MEDICARE

## 2017-12-28 ENCOUNTER — APPOINTMENT (OUTPATIENT)
Dept: CT IMAGING | Age: 82
DRG: 330 | End: 2017-12-28
Attending: EMERGENCY MEDICINE
Payer: MEDICARE

## 2017-12-28 ENCOUNTER — ANESTHESIA EVENT (OUTPATIENT)
Dept: SURGERY | Age: 82
DRG: 330 | End: 2017-12-28
Payer: MEDICARE

## 2017-12-28 ENCOUNTER — OFFICE VISIT (OUTPATIENT)
Dept: INTERNAL MEDICINE CLINIC | Age: 82
End: 2017-12-28

## 2017-12-28 VITALS
BODY MASS INDEX: 29.61 KG/M2 | DIASTOLIC BLOOD PRESSURE: 68 MMHG | RESPIRATION RATE: 18 BRPM | SYSTOLIC BLOOD PRESSURE: 120 MMHG | TEMPERATURE: 97.4 F | HEIGHT: 69 IN | HEART RATE: 99 BPM | WEIGHT: 199.9 LBS | OXYGEN SATURATION: 98 %

## 2017-12-28 DIAGNOSIS — R26.9 GAIT DISTURBANCE: Primary | ICD-10-CM

## 2017-12-28 DIAGNOSIS — R32 BOWEL AND BLADDER INCONTINENCE: ICD-10-CM

## 2017-12-28 DIAGNOSIS — R53.82 CHRONIC FATIGUE: ICD-10-CM

## 2017-12-28 DIAGNOSIS — R15.9 BOWEL AND BLADDER INCONTINENCE: ICD-10-CM

## 2017-12-28 DIAGNOSIS — K63.1 BOWEL PERFORATION (HCC): Primary | ICD-10-CM

## 2017-12-28 DIAGNOSIS — R10.84 GENERALIZED ABDOMINAL PAIN: ICD-10-CM

## 2017-12-28 PROBLEM — K57.00 PERFORATED DIVERTICULUM OF SMALL INTESTINE: Status: ACTIVE | Noted: 2017-12-28

## 2017-12-28 LAB
ABO + RH BLD: NORMAL
ALBUMIN SERPL-MCNC: 2.7 G/DL (ref 3.5–5)
ALBUMIN/GLOB SERPL: 0.6 {RATIO} (ref 1.1–2.2)
ALP SERPL-CCNC: 101 U/L (ref 45–117)
ALT SERPL-CCNC: 38 U/L (ref 12–78)
ANION GAP SERPL CALC-SCNC: 9 MMOL/L (ref 5–15)
APPEARANCE UR: ABNORMAL
APTT PPP: 38.5 SEC (ref 22.1–32.5)
AST SERPL-CCNC: 23 U/L (ref 15–37)
BACTERIA URNS QL MICRO: ABNORMAL /HPF
BASOPHILS # BLD: 0 K/UL (ref 0–0.1)
BASOPHILS NFR BLD: 0 % (ref 0–1)
BILIRUB SERPL-MCNC: 1 MG/DL (ref 0.2–1)
BILIRUB UR QL CFM: NEGATIVE
BLOOD GROUP ANTIBODIES SERPL: NORMAL
BUN SERPL-MCNC: 27 MG/DL (ref 6–20)
BUN/CREAT SERPL: 15 (ref 12–20)
CALCIUM SERPL-MCNC: 8.5 MG/DL (ref 8.5–10.1)
CHLORIDE SERPL-SCNC: 103 MMOL/L (ref 97–108)
CO2 SERPL-SCNC: 23 MMOL/L (ref 21–32)
COLOR UR: ABNORMAL
CREAT SERPL-MCNC: 1.76 MG/DL (ref 0.7–1.3)
EOSINOPHIL # BLD: 0 K/UL (ref 0–0.4)
EOSINOPHIL NFR BLD: 0 % (ref 0–7)
EPITH CASTS URNS QL MICRO: ABNORMAL /LPF
ERYTHROCYTE [DISTWIDTH] IN BLOOD BY AUTOMATED COUNT: 13.7 % (ref 11.5–14.5)
FLUAV AG NPH QL IA: NEGATIVE
FLUBV AG NOSE QL IA: NEGATIVE
GLOBULIN SER CALC-MCNC: 4.2 G/DL (ref 2–4)
GLUCOSE BLD STRIP.AUTO-MCNC: 147 MG/DL (ref 65–100)
GLUCOSE SERPL-MCNC: 187 MG/DL (ref 65–100)
GLUCOSE UR STRIP.AUTO-MCNC: 250 MG/DL
GRAN CASTS URNS QL MICRO: ABNORMAL /LPF
HCT VFR BLD AUTO: 36.7 % (ref 36.6–50.3)
HGB BLD-MCNC: 12.7 G/DL (ref 12.1–17)
HGB UR QL STRIP: NEGATIVE
HYALINE CASTS URNS QL MICRO: ABNORMAL /LPF (ref 0–5)
INR PPP: 1.1 (ref 0.9–1.1)
KETONES UR QL STRIP.AUTO: NEGATIVE MG/DL
LACTATE SERPL-SCNC: 0.8 MMOL/L (ref 0.4–2)
LEUKOCYTE ESTERASE UR QL STRIP.AUTO: NEGATIVE
LYMPHOCYTES # BLD: 0.7 K/UL (ref 0.8–3.5)
LYMPHOCYTES NFR BLD: 7 % (ref 12–49)
MCH RBC QN AUTO: 29.9 PG (ref 26–34)
MCHC RBC AUTO-ENTMCNC: 34.6 G/DL (ref 30–36.5)
MCV RBC AUTO: 86.4 FL (ref 80–99)
MONOCYTES # BLD: 0.7 K/UL (ref 0–1)
MONOCYTES NFR BLD: 7 % (ref 5–13)
NEUTS SEG # BLD: 8.6 K/UL (ref 1.8–8)
NEUTS SEG NFR BLD: 86 % (ref 32–75)
NITRITE UR QL STRIP.AUTO: NEGATIVE
PH UR STRIP: 5.5 [PH] (ref 5–8)
PLATELET # BLD AUTO: 206 K/UL (ref 150–400)
POTASSIUM SERPL-SCNC: 4 MMOL/L (ref 3.5–5.1)
PROT SERPL-MCNC: 6.9 G/DL (ref 6.4–8.2)
PROT UR STRIP-MCNC: 100 MG/DL
PROTHROMBIN TIME: 11.1 SEC (ref 9–11.1)
RBC # BLD AUTO: 4.25 M/UL (ref 4.1–5.7)
RBC #/AREA URNS HPF: ABNORMAL /HPF (ref 0–5)
RBC MORPH BLD: ABNORMAL
SERVICE CMNT-IMP: ABNORMAL
SODIUM SERPL-SCNC: 135 MMOL/L (ref 136–145)
SP GR UR REFRACTOMETRY: 1.02 (ref 1–1.03)
SPECIMEN EXP DATE BLD: NORMAL
THERAPEUTIC RANGE,PTTT: ABNORMAL SECS (ref 58–77)
UROBILINOGEN UR QL STRIP.AUTO: 1 EU/DL (ref 0.2–1)
WBC # BLD AUTO: 10 K/UL (ref 4.1–11.1)
WBC URNS QL MICRO: ABNORMAL /HPF (ref 0–4)

## 2017-12-28 PROCEDURE — 76210000016 HC OR PH I REC 1 TO 1.5 HR: Performed by: SURGERY

## 2017-12-28 PROCEDURE — 74011250636 HC RX REV CODE- 250/636: Performed by: EMERGENCY MEDICINE

## 2017-12-28 PROCEDURE — 96375 TX/PRO/DX INJ NEW DRUG ADDON: CPT

## 2017-12-28 PROCEDURE — 77030032490 HC SLV COMPR SCD KNE COVD -B: Performed by: SURGERY

## 2017-12-28 PROCEDURE — 86900 BLOOD TYPING SEROLOGIC ABO: CPT | Performed by: ANESTHESIOLOGY

## 2017-12-28 PROCEDURE — 74011250636 HC RX REV CODE- 250/636

## 2017-12-28 PROCEDURE — 80053 COMPREHEN METABOLIC PANEL: CPT | Performed by: STUDENT IN AN ORGANIZED HEALTH CARE EDUCATION/TRAINING PROGRAM

## 2017-12-28 PROCEDURE — 77030034850: Performed by: SURGERY

## 2017-12-28 PROCEDURE — 85025 COMPLETE CBC W/AUTO DIFF WBC: CPT | Performed by: STUDENT IN AN ORGANIZED HEALTH CARE EDUCATION/TRAINING PROGRAM

## 2017-12-28 PROCEDURE — 71010 XR CHEST PORT: CPT

## 2017-12-28 PROCEDURE — 74011000250 HC RX REV CODE- 250

## 2017-12-28 PROCEDURE — 77030003029 HC SUT VCRL J&J -B: Performed by: SURGERY

## 2017-12-28 PROCEDURE — 87086 URINE CULTURE/COLONY COUNT: CPT | Performed by: SURGERY

## 2017-12-28 PROCEDURE — 65610000006 HC RM INTENSIVE CARE

## 2017-12-28 PROCEDURE — 85610 PROTHROMBIN TIME: CPT | Performed by: EMERGENCY MEDICINE

## 2017-12-28 PROCEDURE — 77030018846 HC SOL IRR STRL H20 ICUM -A: Performed by: SURGERY

## 2017-12-28 PROCEDURE — 77030011640 HC PAD GRND REM COVD -A: Performed by: SURGERY

## 2017-12-28 PROCEDURE — 76010000131 HC OR TIME 2 TO 2.5 HR: Performed by: SURGERY

## 2017-12-28 PROCEDURE — 76010000171 HC OR TIME 2 TO 2.5 HR INTENSV-TIER 1: Performed by: SURGERY

## 2017-12-28 PROCEDURE — 87040 BLOOD CULTURE FOR BACTERIA: CPT | Performed by: EMERGENCY MEDICINE

## 2017-12-28 PROCEDURE — 87205 SMEAR GRAM STAIN: CPT | Performed by: EMERGENCY MEDICINE

## 2017-12-28 PROCEDURE — 87186 SC STD MICRODIL/AGAR DIL: CPT | Performed by: EMERGENCY MEDICINE

## 2017-12-28 PROCEDURE — 87077 CULTURE AEROBIC IDENTIFY: CPT | Performed by: EMERGENCY MEDICINE

## 2017-12-28 PROCEDURE — 77030011264 HC ELECTRD BLD EXT COVD -A: Performed by: SURGERY

## 2017-12-28 PROCEDURE — 87075 CULTR BACTERIA EXCEPT BLOOD: CPT | Performed by: EMERGENCY MEDICINE

## 2017-12-28 PROCEDURE — 85730 THROMBOPLASTIN TIME PARTIAL: CPT | Performed by: EMERGENCY MEDICINE

## 2017-12-28 PROCEDURE — 74011250636 HC RX REV CODE- 250/636: Performed by: ANESTHESIOLOGY

## 2017-12-28 PROCEDURE — 74011000258 HC RX REV CODE- 258: Performed by: EMERGENCY MEDICINE

## 2017-12-28 PROCEDURE — 99285 EMERGENCY DEPT VISIT HI MDM: CPT

## 2017-12-28 PROCEDURE — 83605 ASSAY OF LACTIC ACID: CPT | Performed by: EMERGENCY MEDICINE

## 2017-12-28 PROCEDURE — 87804 INFLUENZA ASSAY W/OPTIC: CPT | Performed by: EMERGENCY MEDICINE

## 2017-12-28 PROCEDURE — 77030008684 HC TU ET CUF COVD -B: Performed by: ANESTHESIOLOGY

## 2017-12-28 PROCEDURE — 77030011278 HC ELECTRD LIG IMPT COVD -F: Performed by: SURGERY

## 2017-12-28 PROCEDURE — 82962 GLUCOSE BLOOD TEST: CPT

## 2017-12-28 PROCEDURE — 77030008467 HC STPLR SKN COVD -B: Performed by: SURGERY

## 2017-12-28 PROCEDURE — 76060000035 HC ANESTHESIA 2 TO 2.5 HR: Performed by: SURGERY

## 2017-12-28 PROCEDURE — 77030002966 HC SUT PDS J&J -A: Performed by: SURGERY

## 2017-12-28 PROCEDURE — 96365 THER/PROPH/DIAG IV INF INIT: CPT

## 2017-12-28 PROCEDURE — 88307 TISSUE EXAM BY PATHOLOGIST: CPT | Performed by: SURGERY

## 2017-12-28 PROCEDURE — 77030018836 HC SOL IRR NACL ICUM -A: Performed by: SURGERY

## 2017-12-28 PROCEDURE — 77030013079 HC BLNKT BAIR HGGR 3M -A: Performed by: ANESTHESIOLOGY

## 2017-12-28 PROCEDURE — 77030026438 HC STYL ET INTUB CARD -A: Performed by: ANESTHESIOLOGY

## 2017-12-28 PROCEDURE — 77030034818 HC STPLR INT GIA COVD -C: Performed by: SURGERY

## 2017-12-28 PROCEDURE — 74176 CT ABD & PELVIS W/O CONTRAST: CPT

## 2017-12-28 PROCEDURE — 0DTA0ZZ RESECTION OF JEJUNUM, OPEN APPROACH: ICD-10-PCS | Performed by: SURGERY

## 2017-12-28 PROCEDURE — 36415 COLL VENOUS BLD VENIPUNCTURE: CPT | Performed by: EMERGENCY MEDICINE

## 2017-12-28 PROCEDURE — 81001 URINALYSIS AUTO W/SCOPE: CPT | Performed by: EMERGENCY MEDICINE

## 2017-12-28 PROCEDURE — 93005 ELECTROCARDIOGRAM TRACING: CPT

## 2017-12-28 PROCEDURE — 77030002996 HC SUT SLK J&J -A: Performed by: SURGERY

## 2017-12-28 PROCEDURE — 77030025240 HC RELD STPL GIA 2 COVD -C: Performed by: SURGERY

## 2017-12-28 RX ORDER — ONDANSETRON 2 MG/ML
4 INJECTION INTRAMUSCULAR; INTRAVENOUS
Status: DISCONTINUED | OUTPATIENT
Start: 2017-12-28 | End: 2018-01-05 | Stop reason: HOSPADM

## 2017-12-28 RX ORDER — DIPHENHYDRAMINE HYDROCHLORIDE 50 MG/ML
12.5 INJECTION, SOLUTION INTRAMUSCULAR; INTRAVENOUS
Status: ACTIVE | OUTPATIENT
Start: 2017-12-28 | End: 2017-12-29

## 2017-12-28 RX ORDER — FENTANYL CITRATE 50 UG/ML
INJECTION, SOLUTION INTRAMUSCULAR; INTRAVENOUS AS NEEDED
Status: DISCONTINUED | OUTPATIENT
Start: 2017-12-28 | End: 2017-12-28 | Stop reason: HOSPADM

## 2017-12-28 RX ORDER — ROCURONIUM BROMIDE 10 MG/ML
INJECTION, SOLUTION INTRAVENOUS AS NEEDED
Status: DISCONTINUED | OUTPATIENT
Start: 2017-12-28 | End: 2017-12-28 | Stop reason: HOSPADM

## 2017-12-28 RX ORDER — PHENYLEPHRINE HCL IN 0.9% NACL 0.4MG/10ML
SYRINGE (ML) INTRAVENOUS AS NEEDED
Status: DISCONTINUED | OUTPATIENT
Start: 2017-12-28 | End: 2017-12-28 | Stop reason: HOSPADM

## 2017-12-28 RX ORDER — SODIUM CHLORIDE 0.9 % (FLUSH) 0.9 %
5-10 SYRINGE (ML) INJECTION AS NEEDED
Status: DISCONTINUED | OUTPATIENT
Start: 2017-12-28 | End: 2017-12-29 | Stop reason: HOSPADM

## 2017-12-28 RX ORDER — LIDOCAINE HYDROCHLORIDE 10 MG/ML
0.1 INJECTION, SOLUTION EPIDURAL; INFILTRATION; INTRACAUDAL; PERINEURAL AS NEEDED
Status: DISCONTINUED | OUTPATIENT
Start: 2017-12-28 | End: 2017-12-29 | Stop reason: HOSPADM

## 2017-12-28 RX ORDER — MIDAZOLAM HYDROCHLORIDE 1 MG/ML
1 INJECTION, SOLUTION INTRAMUSCULAR; INTRAVENOUS AS NEEDED
Status: DISCONTINUED | OUTPATIENT
Start: 2017-12-28 | End: 2017-12-29 | Stop reason: HOSPADM

## 2017-12-28 RX ORDER — FENTANYL CITRATE 50 UG/ML
50 INJECTION, SOLUTION INTRAMUSCULAR; INTRAVENOUS AS NEEDED
Status: DISCONTINUED | OUTPATIENT
Start: 2017-12-28 | End: 2017-12-29 | Stop reason: HOSPADM

## 2017-12-28 RX ORDER — ONDANSETRON 2 MG/ML
INJECTION INTRAMUSCULAR; INTRAVENOUS AS NEEDED
Status: DISCONTINUED | OUTPATIENT
Start: 2017-12-28 | End: 2017-12-28 | Stop reason: HOSPADM

## 2017-12-28 RX ORDER — MIDAZOLAM HYDROCHLORIDE 1 MG/ML
0.5 INJECTION, SOLUTION INTRAMUSCULAR; INTRAVENOUS
Status: DISCONTINUED | OUTPATIENT
Start: 2017-12-28 | End: 2017-12-29 | Stop reason: HOSPADM

## 2017-12-28 RX ORDER — MORPHINE SULFATE 10 MG/ML
2 INJECTION, SOLUTION INTRAMUSCULAR; INTRAVENOUS
Status: DISCONTINUED | OUTPATIENT
Start: 2017-12-28 | End: 2017-12-29 | Stop reason: HOSPADM

## 2017-12-28 RX ORDER — SODIUM CHLORIDE, SODIUM LACTATE, POTASSIUM CHLORIDE, CALCIUM CHLORIDE 600; 310; 30; 20 MG/100ML; MG/100ML; MG/100ML; MG/100ML
100 INJECTION, SOLUTION INTRAVENOUS CONTINUOUS
Status: DISCONTINUED | OUTPATIENT
Start: 2017-12-29 | End: 2017-12-29

## 2017-12-28 RX ORDER — FENTANYL CITRATE 50 UG/ML
25 INJECTION, SOLUTION INTRAMUSCULAR; INTRAVENOUS
Status: COMPLETED | OUTPATIENT
Start: 2017-12-28 | End: 2017-12-29

## 2017-12-28 RX ORDER — KETOROLAC TROMETHAMINE 30 MG/ML
15 INJECTION, SOLUTION INTRAMUSCULAR; INTRAVENOUS
Status: COMPLETED | OUTPATIENT
Start: 2017-12-28 | End: 2017-12-28

## 2017-12-28 RX ORDER — DIPHENHYDRAMINE HYDROCHLORIDE 50 MG/ML
12.5 INJECTION, SOLUTION INTRAMUSCULAR; INTRAVENOUS AS NEEDED
Status: ACTIVE | OUTPATIENT
Start: 2017-12-28 | End: 2017-12-28

## 2017-12-28 RX ORDER — ROPIVACAINE HYDROCHLORIDE 5 MG/ML
30 INJECTION, SOLUTION EPIDURAL; INFILTRATION; PERINEURAL AS NEEDED
Status: DISCONTINUED | OUTPATIENT
Start: 2017-12-28 | End: 2017-12-29 | Stop reason: HOSPADM

## 2017-12-28 RX ORDER — SODIUM CHLORIDE, SODIUM LACTATE, POTASSIUM CHLORIDE, CALCIUM CHLORIDE 600; 310; 30; 20 MG/100ML; MG/100ML; MG/100ML; MG/100ML
50 INJECTION, SOLUTION INTRAVENOUS CONTINUOUS
Status: DISCONTINUED | OUTPATIENT
Start: 2017-12-29 | End: 2018-01-02

## 2017-12-28 RX ORDER — SODIUM CHLORIDE 0.9 % (FLUSH) 0.9 %
5-10 SYRINGE (ML) INJECTION EVERY 8 HOURS
Status: DISCONTINUED | OUTPATIENT
Start: 2017-12-29 | End: 2018-01-05 | Stop reason: HOSPADM

## 2017-12-28 RX ORDER — HYDROMORPHONE HYDROCHLORIDE 2 MG/ML
.5-1 INJECTION, SOLUTION INTRAMUSCULAR; INTRAVENOUS; SUBCUTANEOUS
Status: DISCONTINUED | OUTPATIENT
Start: 2017-12-28 | End: 2018-01-05

## 2017-12-28 RX ORDER — LIDOCAINE HYDROCHLORIDE 20 MG/ML
INJECTION, SOLUTION EPIDURAL; INFILTRATION; INTRACAUDAL; PERINEURAL AS NEEDED
Status: DISCONTINUED | OUTPATIENT
Start: 2017-12-28 | End: 2017-12-28 | Stop reason: HOSPADM

## 2017-12-28 RX ORDER — SODIUM CHLORIDE, SODIUM LACTATE, POTASSIUM CHLORIDE, CALCIUM CHLORIDE 600; 310; 30; 20 MG/100ML; MG/100ML; MG/100ML; MG/100ML
100 INJECTION, SOLUTION INTRAVENOUS CONTINUOUS
Status: DISCONTINUED | OUTPATIENT
Start: 2017-12-28 | End: 2017-12-29 | Stop reason: HOSPADM

## 2017-12-28 RX ORDER — CEFAZOLIN SODIUM 1 G/3ML
INJECTION, POWDER, FOR SOLUTION INTRAMUSCULAR; INTRAVENOUS AS NEEDED
Status: DISCONTINUED | OUTPATIENT
Start: 2017-12-28 | End: 2017-12-28 | Stop reason: HOSPADM

## 2017-12-28 RX ORDER — SODIUM CHLORIDE 0.9 % (FLUSH) 0.9 %
5-10 SYRINGE (ML) INJECTION EVERY 8 HOURS
Status: DISCONTINUED | OUTPATIENT
Start: 2017-12-28 | End: 2017-12-29 | Stop reason: HOSPADM

## 2017-12-28 RX ORDER — PROPOFOL 10 MG/ML
INJECTION, EMULSION INTRAVENOUS AS NEEDED
Status: DISCONTINUED | OUTPATIENT
Start: 2017-12-28 | End: 2017-12-28 | Stop reason: HOSPADM

## 2017-12-28 RX ORDER — LEVOFLOXACIN 5 MG/ML
750 INJECTION, SOLUTION INTRAVENOUS
Status: DISCONTINUED | OUTPATIENT
Start: 2017-12-29 | End: 2018-01-05

## 2017-12-28 RX ORDER — SODIUM CHLORIDE 9 MG/ML
25 INJECTION, SOLUTION INTRAVENOUS CONTINUOUS
Status: DISCONTINUED | OUTPATIENT
Start: 2017-12-28 | End: 2017-12-29 | Stop reason: HOSPADM

## 2017-12-28 RX ORDER — ACETAMINOPHEN 325 MG/1
650 TABLET ORAL
Status: DISCONTINUED | OUTPATIENT
Start: 2017-12-28 | End: 2018-01-05 | Stop reason: HOSPADM

## 2017-12-28 RX ORDER — ONDANSETRON 2 MG/ML
4 INJECTION INTRAMUSCULAR; INTRAVENOUS AS NEEDED
Status: DISCONTINUED | OUTPATIENT
Start: 2017-12-28 | End: 2017-12-29 | Stop reason: HOSPADM

## 2017-12-28 RX ORDER — ENOXAPARIN SODIUM 100 MG/ML
40 INJECTION SUBCUTANEOUS EVERY 24 HOURS
Status: DISCONTINUED | OUTPATIENT
Start: 2017-12-29 | End: 2018-01-04

## 2017-12-28 RX ORDER — SODIUM CHLORIDE 0.9 % (FLUSH) 0.9 %
5-10 SYRINGE (ML) INJECTION AS NEEDED
Status: DISCONTINUED | OUTPATIENT
Start: 2017-12-28 | End: 2018-01-05 | Stop reason: HOSPADM

## 2017-12-28 RX ORDER — METRONIDAZOLE 500 MG/100ML
500 INJECTION, SOLUTION INTRAVENOUS EVERY 8 HOURS
Status: DISCONTINUED | OUTPATIENT
Start: 2017-12-29 | End: 2018-01-05

## 2017-12-28 RX ADMIN — Medication 80 MCG: at 21:37

## 2017-12-28 RX ADMIN — FENTANYL CITRATE 50 MCG: 50 INJECTION, SOLUTION INTRAMUSCULAR; INTRAVENOUS at 22:11

## 2017-12-28 RX ADMIN — PROPOFOL 50 MG: 10 INJECTION, EMULSION INTRAVENOUS at 21:38

## 2017-12-28 RX ADMIN — FENTANYL CITRATE 50 MCG: 50 INJECTION, SOLUTION INTRAMUSCULAR; INTRAVENOUS at 23:12

## 2017-12-28 RX ADMIN — ONDANSETRON 4 MG: 2 INJECTION INTRAMUSCULAR; INTRAVENOUS at 23:04

## 2017-12-28 RX ADMIN — FENTANYL CITRATE 75 MCG: 50 INJECTION, SOLUTION INTRAMUSCULAR; INTRAVENOUS at 21:37

## 2017-12-28 RX ADMIN — FENTANYL CITRATE 25 MCG: 50 INJECTION, SOLUTION INTRAMUSCULAR; INTRAVENOUS at 23:55

## 2017-12-28 RX ADMIN — ROCURONIUM BROMIDE 10 MG: 10 INJECTION, SOLUTION INTRAVENOUS at 21:59

## 2017-12-28 RX ADMIN — ROCURONIUM BROMIDE 15 MG: 10 INJECTION, SOLUTION INTRAVENOUS at 21:41

## 2017-12-28 RX ADMIN — PROPOFOL 100 MG: 10 INJECTION, EMULSION INTRAVENOUS at 21:37

## 2017-12-28 RX ADMIN — FENTANYL CITRATE 25 MCG: 50 INJECTION, SOLUTION INTRAMUSCULAR; INTRAVENOUS at 21:34

## 2017-12-28 RX ADMIN — SODIUM CHLORIDE 500 ML: 900 INJECTION, SOLUTION INTRAVENOUS at 18:28

## 2017-12-28 RX ADMIN — LIDOCAINE HYDROCHLORIDE 70 MG: 20 INJECTION, SOLUTION EPIDURAL; INFILTRATION; INTRACAUDAL; PERINEURAL at 21:37

## 2017-12-28 RX ADMIN — SODIUM CHLORIDE, POTASSIUM CHLORIDE, SODIUM LACTATE AND CALCIUM CHLORIDE: 600; 310; 30; 20 INJECTION, SOLUTION INTRAVENOUS at 21:33

## 2017-12-28 RX ADMIN — ROCURONIUM BROMIDE 5 MG: 10 INJECTION, SOLUTION INTRAVENOUS at 21:37

## 2017-12-28 RX ADMIN — SODIUM CHLORIDE 1 G: 900 INJECTION, SOLUTION INTRAVENOUS at 20:16

## 2017-12-28 RX ADMIN — CEFAZOLIN SODIUM 2 G: 1 INJECTION, POWDER, FOR SOLUTION INTRAMUSCULAR; INTRAVENOUS at 21:58

## 2017-12-28 RX ADMIN — KETOROLAC TROMETHAMINE 15 MG: 30 INJECTION, SOLUTION INTRAMUSCULAR at 18:28

## 2017-12-28 NOTE — MR AVS SNAPSHOT
Visit Information Date & Time Provider Department Dept. Phone Encounter #  
 12/28/2017  9:00 AM MD [facility] [phone] [id number] Follow-up Instructions Return in about 10 days (around 1/7/2018) for F/U multiple symptoms. Your Appointments 1/17/2018  2:00 PM  
Any with Eulalia Harry MD  
[address] [facility] ([address]) Appt Note: yrly cpap follow up (NEEDS REFERRAL FROM PCP); yrly cpap follow up (NEEDS REFERRAL FROM PCP) [address] [phone]  
[phone]  
  
   
 [address] [place] [id number]  
  
    
 4/3/2018  8:15 AM  
ROUTINE CARE with MD José [facility] [address]) Appt Note: 6m dm  
 [address] [place] , [address] [place] [address]  
[address], [facility] [place] [address] [phone] Upcoming Health Maintenance Date Due HEMOGLOBIN A1C Q6M 4/3/2018 EYE EXAM RETINAL OR DILATED Q1 7/18/2018 FOOT EXAM Q1 10/3/2018 MICROALBUMIN Q1 10/3/2018 LIPID PANEL Q1 10/3/2018 MEDICARE YEARLY EXAM 10/4/2018 GLAUCOMA SCREENING Q2Y 7/18/2019 DTaP/Tdap/Td series (2 - Td) 5/7/2025 Allergies as of 12/28/2017  Review Complete On: 12/28/2017 By: Hollie Claudio MD  
  
 Severity Noted Reaction Type Reactions Pcn [Penicillins]  12/09/2011    Rash Venom-honey Bee  08/14/2013    Other (comments) anaphylaxis Current Immunizations  Reviewed on 9/20/2017 Name Date Influenza High Dose Vaccine PF 9/13/2017, 10/15/2015 Influenza Vaccine 10/24/2014, 9/1/2013 Influenza Vaccine Whole 10/28/2012 Pneumococcal Vaccine (Unspecified Type) 10/13/2014 Tdap 5/7/2015 ZZZ-RETIRED (DO NOT USE) Pneumococcal Vaccine (Unspecified Type) 11/28/2012 Not reviewed this visit You Were Diagnosed With   
  
 Codes Comments Gait disturbance    -  Primary ICD-10-CM: R26.9 ICD-9-CM: 235. 2 Bowel and bladder incontinence     ICD-10-CM: R32, R15.9 ICD-9-CM: 788.30, 787.60 Generalized abdominal pain     ICD-10-CM: R10.84 ICD-9-CM: 789.07 Chronic fatigue     ICD-10-CM: R53.82 
ICD-9-CM: 780.79 Vitals BP Pulse Temp Resp Height(growth percentile) Weight(growth percentile) 120/68 (BP 1 Location: Left arm, BP Patient Position: Sitting) 99 97.4 °F (36.3 °C) (Oral) 18 5' 9\" (1.753 m) 199 lb 14.4 oz (90.7 kg) SpO2 BMI Smoking Status 98% 29.52 kg/m2 Never Smoker Vitals History BMI and BSA Data Body Mass Index Body Surface Area  
 29.52 kg/m 2 2.1 m 2 Preferred Pharmacy Pharmacy Name Phone [id number] [address] [place] [address] [phone] Your Updated Medication List  
  
   
This list is accurate as of: 12/28/17  9:10 AM.  Always use your most recent med list.  
  
  
  
  
 EPINEPHrine 0.3 mg/0.3 mL injection Commonly known as:  EPIPEN  
0.3 mL by IntraMUSCular route once as needed for 1 dose. FISH OIL PO Take  by mouth. PRESERVISION AREDS PO Take  by mouth. VITAMIN D3 1,000 unit tablet Generic drug:  cholecalciferol Take 1,000 Units by mouth daily. Takes one po three times a week. We Performed the Following CBC WITH AUTOMATED DIFF [98565 CPT(R)] METABOLIC PANEL, COMPREHENSIVE [15775 CPT(R)] URINALYSIS W/ RFLX MICROSCOPIC [49040 CPT(R)] Follow-up Instructions Return in about 10 days (around 1/7/2018) for F/U multiple symptoms. To-Do List   
 12/29/2017 Imaging:  MRI BRAIN WO CONT   
  
 12/29/2017 Imaging:  MRI LUMB SPINE WO CONT Patient Instructions Have blood work and MRI's performed. Return in 10 days to discuss. Introducing [facility]!    
 [facility] introduces [employer] patient portal. Now you can access parts of your medical record, email your doctor's office, and request medication refills online. 1. In your internet browser, go to [email or web address] 2. Click on the First Time User? Click Here link in the Sign In box. You will see the New Member Sign Up page. 3. Enter your "[employer]" Access Code exactly as it appears below. You will not need to use this code after youve completed the sign-up process. If you do not sign up before the expiration date, you must request a new code. · "[employer]" Access Code: [email or web address] Expires: 1/1/2018  7:47 AM 
 
4. Enter the last four digits of your Social Security Number (xxxx) and Date of Birth (mm/dd/yyyy) as indicated and click Submit. You will be taken to the next sign-up page. 5. Create a "[employer]" ID. This will be your "[employer]" login ID and cannot be changed, so think of one that is secure and easy to remember. 6. Create a "[employer]" password. You can change your password at any time. 7. Enter your Password Reset Question and Answer. This can be used at a later time if you forget your password. 8. Enter your e-mail address. You will receive e-mail notification when new information is available in [address] 9. Click Sign Up. You can now view and download portions of your medical record. 10. Click the Download Summary menu link to download a portable copy of your medical information. If you have questions, please visit the Frequently Asked Questions section of the "[employer]" website. Remember, "[employer]" is NOT to be used for urgent needs. For medical emergencies, dial 911. Now available from your iPhone and Android! Please provide this summary of care documentation to your next provider. Your primary care clinician is listed as Zion Rodriguez. If you have any questions after today's visit, please call [phone].

## 2017-12-28 NOTE — PROGRESS NOTES
Chief Complaint   Patient presents with    Fatigue    Other     abdominal discomfort, SOB    Neurologic Problem     gait     Reviewed record in preparation for visit and have obtained necessary documentation. Identified pt with two pt identifiers(name and ). There are no preventive care reminders to display for this patient. Chief Complaint   Patient presents with    Fatigue    Other     abdominal discomfort, SOB    Neurologic Problem     gait        Wt Readings from Last 3 Encounters:   17 199 lb 14.4 oz (90.7 kg)   10/03/17 195 lb 8 oz (88.7 kg)   17 194 lb (88 kg)     Temp Readings from Last 3 Encounters:   17 97.4 °F (36.3 °C) (Oral)   10/03/17 96.2 °F (35.7 °C)   16 97.1 °F (36.2 °C) (Oral)     BP Readings from Last 3 Encounters:   17 120/68   10/03/17 140/84   17 106/71     Pulse Readings from Last 3 Encounters:   17 99   10/03/17 72   17 85           Learning Assessment:  :     Learning Assessment 10/3/2017 2016 2015 2014   PRIMARY LEARNER Patient Patient Patient Patient   HIGHEST LEVEL OF EDUCATION - PRIMARY LEARNER  > 4 YEARS OF COLLEGE - > 4 YEARS OF COLLEGE > 4 YEARS OF COLLEGE   BARRIERS PRIMARY LEARNER NONE - NONE NONE   CO-LEARNER CAREGIVER No - No No   PRIMARY LANGUAGE ENGLISH ENGLISH ENGLISH ENGLISH    NEED - - No No   LEARNER PREFERENCE PRIMARY DEMONSTRATION DEMONSTRATION READING DEMONSTRATION     - - DEMONSTRATION READING     - - - LISTENING   LEARNING SPECIAL TOPICS - - no no   ANSWERED BY patient patient patient patient   RELATIONSHIP SELF SELF SELF SELF   ASSESSMENT COMMENT - - n/a -       Depression Screening:  :     PHQ over the last two weeks 10/3/2017   Little interest or pleasure in doing things Not at all   Feeling down, depressed or hopeless Not at all   Total Score PHQ 2 0       Fall Risk Assessment:  :     Fall Risk Assessment, last 12 mths 10/3/2017   Able to walk?  Yes   Fall in past 15 months? No       Abuse Screening:  :     Abuse Screening Questionnaire 10/3/2017 5/5/2015 2/21/2014   Do you ever feel afraid of your partner? N N N   Are you in a relationship with someone who physically or mentally threatens you? N N N   Is it safe for you to go home? Y Y Y       Coordination of Care Questionnaire:  :     1) Have you been to an emergency room, urgent care clinic since your last visit? no   Hospitalized since your last visit? no             2) Have you seen or consulted any other health care providers outside of [address] [place] [address] since your last visit? no  (Include any pap smears or colon screenings in this section.)    3) Do you have an Advance Directive on file? no    4) Are you interested in receiving information on Advance Directives? NO      Patient is accompanied by self I have received verbal consent from Ben Forbes to discuss any/all medical information while they are present in the room. Reviewed record  In preparation for visit and have obtained necessary documentation.

## 2017-12-28 NOTE — TELEPHONE ENCOUNTER
Received call from [address] (wife/LINA). She voiced concerns of the pt having a sudden onset of chills with shivering, states he's freezing (the house is 69 degrees) & hyperventilating. [address] states this is \"abnormal\" and completely different from the symptoms he was previously experiencing earlier today. Briefly spoke to the pt. He c/o shivering, freezing, SOB & \"breathing hard\". Writer had a hard time understanding the pt and was able to identify what sounded like rapid wheezing with difficulty breathing. Consulted with Dr. Marlon Pichardo who recommended the pt be evaluated by the nearest emergency dept. for further evaluation. Informed Ms. Mccrary of the above information and she acknowledged understanding. All questions were answered to satisfaction. No further questions or concerns at this time.

## 2017-12-28 NOTE — IP AVS SNAPSHOT
2700 [facility] [address] [place] [address] 
[phone] Patient: Braxton Long MRN: [id number] OTP:[id number] About your hospitalization You were admitted on:  December 28, 2017 You last received care in the:  [facility] 4 SURG/BARIATRICS You were discharged on:  January 5, 2018 Why you were hospitalized Your primary diagnosis was:  Not on File Your diagnoses also included:  Perforated Diverticulum Of Small Intestine Follow-up Information Follow up With Details Comments Contact Info Jb Piña MD   [address] [place] [address] [address] [facility] [address] 
[phone] Your Scheduled Appointments Tuesday January 09, 2018  8:30 AM EST  
CT HEAD W WO CONT with [facility] CT ER 2 [facility] RAD CT ([address]) 611 [place] [address] [place] [address]  
[phone] CONTRAST STUDY: 1. The patient should not eat solid food four hours before the appointment but should be encouraged to drink clear liquids. 2. The patient will require IV access for contrast administration. 3. The patient should not take  Ibuprofen (Advil, Motrin, etc.) and Naproxen Sodium (Aleve, etc.)  on the day of the exam. Stopping non-steroidal anti-inflammatory agents (NSAIDs) like Ibuprofen decreases the risk of kidney damage from the x-ray contrast (dye). 4. Bring any non [facility] facility films/images pertaining to the area of interest with you on the day of appointment. 5. Bring current lab work if available(within last 90 days CMP) ***If scheduled at [facility], iSTAT is not available, labs will need to be done before appointment*** 6. Check in at registration at least 30 minutes before appt time unless you were instructed to do otherwise. 7. If you have to drink oral contrast please pick it up any weekday prior to your appointment, if you cannot please check in 2 hrs  before appt time. Patient should report to outpatient registration ([address] [place] [address]), 30 minutes prior to the appointment time. (NOT FOR MRI)  After the time of 4:30pm, please go to Admitting (Main Entrance, 1st office on the left) to be registered until 7:00pm (closing). After 7pm pt goes to ER registration On Saturday during the hours of 7:00am - 3:00pm patients can be registered for outpatient services at Admitting. After 3pm go to ER registration. On Sunday pt goes to ER registration only. Tuesday January 09, 2018  9:00 AM EST  
CT SPINE LUMB WO CONT with [facility] CT ER 1 [facility] RAD CT ([address]  
[phone] NON-CONTRAST STUDY: 1. Bring any non [facility] facility films/images pertaining to the area of interest with you on the day of appointment. 2. Check in at registration at least 30 minutes before appt time unless you were instructed to do otherwise. 3. If you have to drink oral contrast please pick it up any weekday prior to your appointment, if you cannot please check in 2 hrs  before appt time. Patient should report to outpatient registration ([address] [place] [address]), 30 minutes prior to the appointment time. (NOT FOR MRI)  After the time of 4:30pm, please go to Admitting (Main Entrance, 1st office on the left) to be registered until 7:00pm (closing). After 7pm pt goes to ER registration On Saturday during the hours of 7:00am - 3:00pm patients can be registered for outpatient services at Admitting. After 3pm go to ER registration. On Sunday pt goes to ER registration only. Thursday January 11, 2018  9:15 AM EST  
POST OP 10 MIN with Dominique Tony MD  
[facility]  
[phone] Wednesday January 17, 2018  2:00 PM EST Any with Oz Roque MD  
 9352 [facility] ([address]) [address] [phone] Discharge Orders None A check saeed indicates which time of day the medication should be taken. My Medications START taking these medications Instructions Each Dose to Equal  
 Morning Noon Evening Bedtime  
 oxyCODONE-acetaminophen 5-325 mg per tablet Commonly known as:  PERCOCET Your last dose was: Your next dose is: Take 1 Tab by mouth every four (4) hours as needed for Pain. Max Daily Amount: 6 Tabs. 1 Tab CONTINUE taking these medications Instructions Each Dose to Equal  
 Morning Noon Evening Bedtime EPINEPHrine 0.3 mg/0.3 mL injection Commonly known as:  Ruffus Grady Your last dose was: Your next dose is: 0.3 mL by IntraMUSCular route once as needed for 1 dose. 0.3 mg Where to Get Your Medications Information on where to get these meds will be given to you by the nurse or doctor. ! Ask your nurse or doctor about these medications  
  oxyCODONE-acetaminophen 5-325 mg per tablet Discharge Instructions 1. Do not drive while on pain medication. You should take a stool softener while on pain medication to prevent constipation. 2.  Do not lift anything greater than 15 pounds for 2 weeks. 3.  You may resume your home medications. 4.  You may shower but do not swim or soak in tub for 2 weeks. 5.  Please call [phone] to schedule a follow-up with Dr. Sánchez Hernandez within 2 weeks. Open Bowel Resection: What to Expect at Home Your Recovery You are likely to have pain that comes and goes for the next few days after bowel surgery. You may have bowel cramps, and your cut (incision) may hurt. You may also feel like you have the flu.  You may have a low fever and feel tired and nauseated. This is common. You should feel better after a week and will probably be back to normal in 2 to 3 weeks. This care sheet gives you a general idea about how long it will take for you to recover. But each person recovers at a different pace. Follow the steps below to get better as quickly as possible. How can you care for yourself at home? Activity ? · Rest when you feel tired. Getting enough sleep will help you recover. ? · Try to walk each day. Start by walking a little more than you did the day before. Bit by bit, increase the amount you walk. Walking boosts blood flow and helps prevent pneumonia and constipation. ? · Avoid strenuous activities, such as biking, jogging, weight lifting, or aerobic exercise, until your doctor says it is okay. ? · Ask your doctor when you can drive again. ? · You will probably need to take 3 to 4 weeks off from work. It depends on the type of work you do and how you feel. You may need to take off 4 to 6 weeks if you lift heavy objects in your job. ? · You may shower 24 to 48 hours after surgery, if your doctor says it is okay. Pat the cut (incision) dry. Do not take a bath for the first 2 weeks, or until your doctor tells you it is okay. ? · Ask your doctor when it is okay for you to have sex. Diet ? · You may not have much appetite after the surgery. But try to eat a healthy diet. Your doctor will tell you about any foods you should not eat. ? · Eat a low-fiber diet for several weeks after surgery. Eat many small meals throughout the day. Add high-fiber foods a little at a time. ? · Eat yogurt. It puts good bacteria into your colon and helps prevent diarrhea. ? · Try to avoid nuts, seeds, and corn for a while. They may be hard to digest.  
? · You may need to take vitamins that contain sodium and potassium. Ask your doctor. ? · Drink plenty of fluids to avoid becoming dehydrated. Medicines ? · Your doctor will tell you if and when you can restart your medicines. He or she will also give you instructions about taking any new medicines. ? · If you take blood thinners, such as warfarin (Coumadin), clopidogrel (Plavix), or aspirin, be sure to talk to your doctor. He or she will tell you if and when to start taking those medicines again. Make sure that you understand exactly what your doctor wants you to do. ? · Take pain medicines exactly as directed. ¨ If the doctor gave you a prescription medicine for pain, take it as prescribed. ¨ If you are not taking a prescription pain medicine, ask your doctor if you can take an over-the-counter medicine. ¨ Do not take two or more pain medicines at the same time unless the doctor told you to. Many pain medicines have acetaminophen, which is Tylenol. Too much acetaminophen (Tylenol) can be harmful. ? · If you think your pain medicine is making you sick to your stomach: 
¨ Take your medicine after meals (unless your doctor tells you not to). ¨ Ask your doctor for a different pain medicine. ? · If your doctor prescribed antibiotics, take them as directed. Do not stop taking them just because you feel better. You need to take the full course of antibiotics. ? · You may need to take some medicines in a different form. You will be told whether to crush pills or take a liquid form of the medicine. ? · If your doctor gives you a stool softener, take it as directed. Incision care ? · If you have strips of tape on the incision, leave the tape on for a week or until it falls off.  
? · Wash the area daily with warm, soapy water, and pat it dry. Follow-up care is a key part of your treatment and safety. Be sure to make and go to all appointments, and call your doctor if you are having problems. It's also a good idea to know your test results and keep a list of the medicines you take. When should you call for help? Call 911 anytime you think you may need emergency care. For example, call if: 
? · You passed out (lost consciousness). ? · You are short of breath. ?Call your doctor now or seek immediate medical care if: 
? · You are sick to your stomach and cannot drink fluids or keep them down. ? · You have signs of a blood clot in your leg (called a deep vein thrombosis), such as: 
¨ Pain in your calf, back of the knee, thigh, or groin. ¨ Redness and swelling in your leg or groin. ? · You have signs of infection, such as: 
¨ Increased pain, swelling, warmth, or redness. ¨ Red streaks leading from the incision. ¨ Pus draining from the incision. ¨ A fever. ? · You have pain that does not get better after you take pain medicine. ? · You have loose stitches, or your incision comes open. ? · Bright red blood has soaked through the bandage. ? · You cannot pass stools or gas. ? Watch closely for any changes in your health, and be sure to contact your doctor if you have any problems. Where can you learn more? Go to http://brien-le.info/. Enter 200 [id number] in the search box to learn more about \"Open Bowel Resection: What to Expect at Home. \" DISCHARGE SUMMARY from Nurse PATIENT INSTRUCTIONS: 
 
After general anesthesia or intravenous sedation, for 24 hours or while taking prescription Narcotics: · Limit your activities · Do not drive and operate hazardous machinery · Do not make important personal or business decisions · Do  not drink alcoholic beverages · If you have not urinated within 8 hours after discharge, please contact your surgeon on call. Report the following to your surgeon: 
· Excessive pain, swelling, redness or odor of or around the surgical area · Temperature over 100.5 · Nausea and vomiting lasting longer than 4 hours or if unable to take medications · Any signs of decreased circulation or nerve impairment to extremity: change in color, persistent  numbness, tingling, coldness or increase pain · Any questions What to do at Home: *  Please give a list of your current medications to your Primary Care Provider. *  Please update this list whenever your medications are discontinued, doses are 
    changed, or new medications (including over-the-counter products) are added. *  Please carry medication information at all times in case of emergency situations. These are general instructions for a healthy lifestyle: No smoking/ No tobacco products/ Avoid exposure to second hand smoke Surgeon General's Warning:  Quitting smoking now greatly reduces serious risk to your health. Obesity, smoking, and sedentary lifestyle greatly increases your risk for illness A healthy diet, regular physical exercise & weight monitoring are important for maintaining a healthy lifestyle You may be retaining fluid if you have a history of heart failure or if you experience any of the following symptoms:  Weight gain of 3 pounds or more overnight or 5 pounds in a week, increased swelling in our hands or feet or shortness of breath while lying flat in bed. Please call your doctor as soon as you notice any of these symptoms; do not wait until your next office visit. Recognize signs and symptoms of STROKE: 
 
F-face looks uneven A-arms unable to move or move unevenly S-speech slurred or non-existent T-time-call 911 as soon as signs and symptoms begin-DO NOT go Back to bed or wait to see if you get better-TIME IS BRAIN. Warning Signs of HEART ATTACK Call 911 if you have these symptoms: 
? Chest discomfort. Most heart attacks involve discomfort in the center of the chest that lasts more than a few minutes, or that goes away and comes back. It can feel like uncomfortable pressure, squeezing, fullness, or pain. ? Discomfort in other areas of the upper body.  Symptoms can include pain or discomfort in one or both arms, the back, neck, jaw, or stomach. ? Shortness of breath with or without chest discomfort. ? Other signs may include breaking out in a cold sweat, nausea, or lightheadedness. Don't wait more than five minutes to call [address]! Fast action can save your life. Calling 911 is almost always the fastest way to get lifesaving treatment. Emergency Medical Services staff can begin treatment when they arrive  up to an hour sooner than if someone gets to the hospital by car. The discharge information has been reviewed with the patient and spouse. The patient and spouse verbalized understanding. Discharge medications reviewed with the patient and spouse and appropriate educational materials and side effects teaching were provided. ___________________________________________________________________________________________________________________________________ Current as of: May 12, 2017 Content Version: 11.4 © 20062835-6288 [employer]. Care instructions adapted under license by [employer] (which disclaims liability or warranty for this information). If you have questions about a medical condition or this instruction, always ask your healthcare professional. [place] [address] any warranty or liability for your use of this information. Skycheckin Announcement We are excited to announce that we are making your provider's discharge notes available to you in Skycheckin. You will see these notes when they are completed and signed by the physician that discharged you from your recent hospital stay. If you have any questions or concerns about any information you see in Skycheckin, please call the Health Information Department where you were seen or reach out to your Primary Care Provider for more information about your plan of care. Introducing hospitals & [facility]! Eve Cobian introduces LiveSchool patient portal. Now you can access parts of your medical record, email your doctor's office, and request medication refills online. 1. In your internet browser, go to [email or web address] 2. Click on the First Time User? Click Here link in the Sign In box. You will see the New Member Sign Up page. 3. Enter your LiveSchool Access Code exactly as it appears below. You will not need to use this code after youve completed the sign-up process. If you do not sign up before the expiration date, you must request a new code. · LiveSchool Access Code: [email or web address] Expires: 4/3/2018  2:35 PM 
 
4. Enter the last four digits of your Social Security Number (xxxx) and Date of Birth (mm/dd/yyyy) as indicated and click Submit. You will be taken to the next sign-up page. 5. Create a LiveSchool ID. This will be your LiveSchool login ID and cannot be changed, so think of one that is secure and easy to remember. 6. Create a LiveSchool password. You can change your password at any time. 7. Enter your Password Reset Question and Answer. This can be used at a later time if you forget your password. 8. Enter your e-mail address. You will receive e-mail notification when new information is available in [address] 9. Click Sign Up. You can now view and download portions of your medical record. 10. Click the Download Summary menu link to download a portable copy of your medical information. If you have questions, please visit the Frequently Asked Questions section of the LiveSchool website. Remember, LiveSchool is NOT to be used for urgent needs. For medical emergencies, dial 911. Now available from your iPhone and Android! Providers Seen During Your Hospitalization Provider Specialty Primary office phone Stephanie Rodriguez MD Emergency Medicine [phone] Cyndi Scott MD General Surgery [phone] Your Primary Care Physician (PCP) Primary Care Physician Office Phone Office Fax Hawk Hammer [phone] You are allergic to the following Allergen Reactions Pcn (Penicillins) Hives Reaction was in 1950's following a PCN shot Venom-Honey Bee Other (comments) anaphylaxis Recent Documentation Height Weight BMI Smoking Status 1.753 m 93.7 kg 30.51 kg/m2 Never Smoker Emergency Contacts Name Discharge Info Relation Home Work Mobile Griselda Mccrary DISCHARGE CAREGIVER [3] Spouse [3] [phone] Patient Belongings The following personal items are in your possession at time of discharge: 
  Dental Appliances: None  Visual Aid: None   Hearing Aids/Status: Right, With patient  Home Medications: None   Jewelry: None  Clothing:  (belongings x2 bags to PACU)    Other Valuables: Cell Phone (with family) Discharge Instructions Attachments/References MEFS - LEVOFLOXACIN (LEVAQUIN, LEVAQUIN LEVA-VINOD) - (BY MOUTH) (ENGLISH) METRONIDAZOLE (BY MOUTH) (ENGLISH) MEFS - OXYCODONE/ACETAMINOPHEN (PERCOCET, ROXICET) - (BY MOUTH) (ENGLISH) Patient Handouts Levofloxacin (Levaquin, Levaquin Leva-vinod) - (By mouth) Why this medicine is used:  
Treats infections. Contact a nurse or doctor right away if you have: · Blistering, peeling, red skin rash · Fast, slow, or uneven heartbeat; lightheadedness or fainting · Dark urine or pale stools, loss of appetite, stomach pain, yellow skin or eyes · Severe or bloody diarrhea · Pain, stiffness, swelling, or bruises around your ankle, leg, shoulder, or other joint Common side effects: · Mild nausea, vomiting, diarrhea · Mild headache © 2017 [facility] Information is for End User's use only and may not be sold, redistributed or otherwise used for commercial purposes. Metronidazole (By mouth) Metronidazole (met-iraida-KYM-da-zole) Treats bacterial infections. Brand Name(s): Flagyl, Flagyl 375 There may be other brand names for this medicine. When This Medicine Should Not Be Used: This medicine is not right for everyone. Do not use if you had an allergic reaction to metronidazole or similar medicines. Do not use this medicine to treat trichomoniasis if you are in the first 3 months of pregnancy. How to Use This Medicine:  
Capsule, Tablet, Long Acting Tablet · Take this medicine as directed, and take it at the same time each day. · Capsule or Tablet: Take with food or milk to avoid stomach upset. · Extended-release tablet: Take it on an empty stomach, 1 hour before or 2 hours after a meal. 
· Swallow the extended-release tablet whole. Do not crush, break, or chew it. · Take all of the medicine in your prescription to clear up your infection, even if you feel better after the first few doses. · Missed dose: Take a dose as soon as you remember. If it is almost time for your next dose, wait until then and take a regular dose. Do not take extra medicine to make up for a missed dose. · Store the medicine in a closed container at room temperature, away from heat, moisture, and direct light. Drugs and Foods to Avoid: Ask your doctor or pharmacist before using any other medicine, including over-the-counter medicines, vitamins, and herbal products. · Do not use this medicine if you have taken disulfiram within the last 2 weeks. Do not drink alcohol or use medicine that contains alcohol or propylene glycol while using this medicine and for at least 3 days after you have finished metronidazole treatment. · Some foods and medicines can affect how metronidazole works. Tell your doctor if you are using busulfan, cimetidine, lithium, phenobarbital, phenytoin, or warfarin or another blood thinner. Warnings While Using This Medicine: · Tell your doctor if you are pregnant or breastfeeding, or if you have kidney disease, liver disease, blood or bone marrow problems, oral thrush, yeast infection, or a history of seizures. · This medicine may cause the following problems: 
¨ Brain or nervous system problems, including seizures, meningitis, or vision problems · Trichomoniasis treatment:  Your doctor may want to also treat your sexual partner, even if he or she has no symptoms. Also, you may want to use a condom during sexual intercourse. These measures will help keep you from getting the infection back again from your partner. If you have any questions, ask your doctor. · Call your doctor if your symptoms do not improve or if they get worse. · Your doctor will do lab tests at regular visits to check on the effects of this medicine. Keep all appointments. · Tell any doctor or dentist who treats you that you are using this medicine. This medicine may affect certain medical test results. · Keep all medicine out of the reach of children. Never share your medicine with anyone. Possible Side Effects While Using This Medicine:  
Call your doctor right away if you notice any of these side effects: · Allergic reaction: Itching or hives, swelling in your face or hands, swelling or tingling in your mouth or throat, chest tightness, trouble breathing · Confusion, drowsiness, fever, headache, loss of appetite, nausea or vomiting, stiff neck or back · Dizziness, problems with muscle control, clumsiness, shakiness, trouble talking · Fever, chills, cough, sore throat, and body aches · Numbness, tingling, or burning pain in your hands, arms, legs, or feet · Seizures If you notice these less serious side effects, talk with your doctor: · Mild nausea · Unusual or unpleasant taste in your mouth If you notice other side effects that you think are caused by this medicine, tell your doctor. Call your doctor for medical advice about side effects. You may report side effects to FDA at 9-620-JGL-1840 © 2017 [address] Information is for End User's use only and may not be sold, redistributed or otherwise used for commercial purposes. The above information is an  only. It is not intended as medical advice for individual conditions or treatments. Talk to your doctor, nurse or pharmacist before following any medical regimen to see if it is safe and effective for you. Oxycodone/Acetaminophen (Percocet, Roxicet) - (By mouth) Why this medicine is used:  
Treats pain. This medicine contains a narcotic pain reliever. Contact a nurse or doctor right away if you have: 
· Extreme weakness, shallow breathing, slow heartbeat · Sweating or cold, clammy skin · Skin blisters, rash, or peeling Common side effects: 
· Constipation · Nausea, vomiting · Tiredness © 2017 [address] Information is for End User's use only and may not be sold, redistributed or otherwise used for commercial purposes. Please provide this summary of care documentation to your next provider. Signatures-by signing, you are acknowledging that this After Visit Summary has been reviewed with you and you have received a copy. Patient Signature:  ____________________________________________________________ Date:  ____________________________________________________________  
  
Desire Taylor Provider Signature:  ____________________________________________________________ Date:  ____________________________________________________________

## 2017-12-28 NOTE — ED TRIAGE NOTES
TRIAGE NOTE:  For the last week, patient has been feeling \" bad\". He is tired, general weakness and feeling SOB.

## 2017-12-28 NOTE — ED PROVIDER NOTES
HPI Comments: 80 y.o. male with past medical history significant for PNA, kidney stones, and CKD who presents from home with chief complaint of SOB. Pt reports an episode of new severe shaking chills 3 hours ago followed by significant SOB after walking across his room. He was seen by his PCP earlier today for 1 week of fatigue, difficulty walking d/t balance, slight headache, L ankle swelling, and RLQ abdominal pain radiating into his LLQ. His PCP ordered a CXR and a CT of brain/spine. Pt also reports he was awakened by sudden explosive diarrhea 3-4 days ago. Pt notes he received a flu vaccine. When asked about urine changes, Pt states he is incontinent of urine d/t prostate removal. Pt denies lung disease, recent antibiotics, and recent hospital admissions. Pt denies cough, fever, N/V, and rash. There are no other acute medical concerns at this time. PCP: Ziggy Jang MD    Note written by Justice Gillis, as dictated by Margaret Kelley MD 5:16 PM    The history is provided by the patient.         Past Medical History:   Diagnosis Date    Arthritis     Calculus of kidney     Chronic kidney disease     slightly increased creatinine    GERD (gastroesophageal reflux disease)     Glaucoma     Pneumonia     prostate cancer 2000    seed radiation, cryosurgery    Unspecified sleep apnea     uses CPAP       Past Surgical History:   Procedure Laterality Date    ENDOSCOPY, COLON, DIAGNOSTIC  2010    HX HEENT      uvulectomy    HX PACEMAKER  09 01 14    HX PROSTATECTOMY      brachytherapy, cryosurgery    HX TONSILLECTOMY      HX UROLOGICAL      vasectomy    LASER SURGERY OF EYE           Family History:   Problem Relation Age of Onset    Heart Disease Father     Cancer Sister      breast/lung    Other Mother      encephalitis    Cancer Maternal Aunt      breast/lower extremity - 2 maternal aunts    Cancer Other      vaginal    No Known Problems Daughter        Social History     Social History    Marital status:      Spouse name: N/A    Number of children: N/A    Years of education: N/A     Occupational History    Not on file. Social History Main Topics    Smoking status: Never Smoker    Smokeless tobacco: Never Used    Alcohol use 3.6 oz/week     2 Glasses of wine, 4 Standard drinks or equivalent per week    Drug use: No    Sexual activity: No     Other Topics Concern    Not on file     Social History Narrative         ALLERGIES: Pcn [penicillins] and Venom-honey bee    Review of Systems   Constitutional: Positive for chills and fatigue. Negative for diaphoresis and fever. HENT: Negative for facial swelling. Eyes: Negative for visual disturbance. Respiratory: Positive for shortness of breath. Negative for cough. Cardiovascular: Positive for leg swelling. Negative for chest pain. Gastrointestinal: Positive for abdominal pain and diarrhea. Negative for nausea and vomiting. Genitourinary: Negative for dysuria. Musculoskeletal: Positive for gait problem. Negative for joint swelling. Skin: Negative for rash. Neurological: Positive for headaches. Hematological: Negative for adenopathy. Psychiatric/Behavioral: Negative for suicidal ideas. All other systems reviewed and are negative. Vitals:    12/28/17 1542 12/28/17 1654 12/28/17 1700   BP: 114/59 128/62 128/62   Pulse: (!) 115 99 98   Resp: 15 14 29   Temp: 98.4 °F (36.9 °C) 99.5 °F (37.5 °C)    SpO2: 95% 95% 95%   Weight: 90.3 kg (199 lb)     Height: 5' 9\" (1.753 m)              Physical Exam   Constitutional: He is oriented to person, place, and time. He appears well-developed and well-nourished. No distress. HENT:   Head: Normocephalic and atraumatic. Mouth/Throat: Oropharynx is clear and moist. Mucous membranes are dry. Eyes: Pupils are equal, round, and reactive to light. Neck: Normal range of motion. Neck supple.    Cardiovascular: Regular rhythm, normal heart sounds and intact distal pulses. Tachycardia present. Pulmonary/Chest: Effort normal and breath sounds normal. No respiratory distress. Pacemaker in R chest wall. Abdominal: Soft. Bowel sounds are normal. He exhibits no distension. There is no tenderness. Musculoskeletal: Normal range of motion. He exhibits no edema. Neurological: He is alert and oriented to person, place, and time. Skin: Skin is warm and dry. Nursing note and vitals reviewed. Note written by Justice Gillis, as dictated by Margaret Kelley MD 5:16 PM    [facility]  ED Course       Procedures    Labs unremarkable. CT Abd Pelvis without contrast:  IMPRESSION:  1. Abnormal small bowel loop in the anterior abdomen with perforation and  extraluminal gas in the mesenteric and omental fat. 2. Normal appendix. 3. Diverticulosis without diverticulitis. 4. Left renal cyst.  5. Atherosclerotic abdominal aorta without aneurysm. 6. Pacemaker. Portable Chest Xray:  Portable chest xray showing no signs of acute disease. This CXR was interpreted by Margaret Kelley MD, ED Provider. CONSULT NOTE:  7:12 PM Margaret Kelley MD spoke with Dr. Anastasiya Zamora, Consult for Surgery. Discussed available diagnostic tests and clinical findings. He is in agreement with care plans as outlined. Dr. Anastasiya Zamora will come evaluate Pt. ED EKG interpretation:  Rhythm: normal sinus rhythm. Rate (approx.): 84. Axis: normal.  ST segment:  No concerning ST elevations or depressions. This EKG was interpreted by Margaret Kelley MD,ED Provider. 7:35 PM  Pt ordered cefepime, blood cultures and lactate. Dr. Anastasiya Zamora to take to surgery. 7:35 PM  I have spent **36* minutes of critical care time involved in lab review, consultations with specialist, family decision-making, and documentation. During this entire length of time I was immediately available to the patient and/or family.

## 2017-12-28 NOTE — IP AVS SNAPSHOT
[address] [facility] [address] [place] [address] 
[phone] Patient: Brooklyn Beltran MRN: [id number] QIL:8/00/2303 A check saeed indicates which time of day the medication should be taken. My Medications START taking these medications Instructions Each Dose to Equal  
 Morning Noon Evening Bedtime  
 oxyCODONE-acetaminophen 5-325 mg per tablet Commonly known as:  PERCOCET Your last dose was: Your next dose is: Take 1 Tab by mouth every four (4) hours as needed for Pain. Max Daily Amount: 6 Tabs. 1 Tab CONTINUE taking these medications Instructions Each Dose to Equal  
 Morning Noon Evening Bedtime EPINEPHrine 0.3 mg/0.3 mL injection Commonly known as:  Didier Elizabeth Your last dose was: Your next dose is: 0.3 mL by IntraMUSCular route once as needed for 1 dose. 0.3 mg Where to Get Your Medications Information on where to get these meds will be given to you by the nurse or doctor. ! Ask your nurse or doctor about these medications  
  oxyCODONE-acetaminophen 5-325 mg per tablet

## 2017-12-29 ENCOUNTER — TELEPHONE (OUTPATIENT)
Dept: INTERNAL MEDICINE CLINIC | Age: 82
End: 2017-12-29

## 2017-12-29 LAB
ALBUMIN SERPL-MCNC: 3.7 G/DL (ref 3.5–4.7)
ALBUMIN/GLOB SERPL: 1.5 {RATIO} (ref 1.2–2.2)
ALP SERPL-CCNC: 96 IU/L (ref 39–117)
ALT SERPL-CCNC: 34 IU/L (ref 0–44)
ANION GAP SERPL CALC-SCNC: 8 MMOL/L (ref 5–15)
APPEARANCE UR: ABNORMAL
AST SERPL-CCNC: 24 IU/L (ref 0–40)
ATRIAL RATE: 84 BPM
BACTERIA #/AREA URNS HPF: NORMAL /[HPF]
BASOPHILS # BLD AUTO: 0 X10E3/UL (ref 0–0.2)
BASOPHILS # BLD: 0 K/UL (ref 0–0.1)
BASOPHILS NFR BLD AUTO: 0 %
BASOPHILS NFR BLD: 0 % (ref 0–1)
BILIRUB SERPL-MCNC: 0.9 MG/DL (ref 0–1.2)
BILIRUB UR QL STRIP: NEGATIVE
BUN SERPL-MCNC: 24 MG/DL (ref 8–27)
BUN SERPL-MCNC: 29 MG/DL (ref 6–20)
BUN/CREAT SERPL: 14 (ref 10–24)
BUN/CREAT SERPL: 15 (ref 12–20)
CALCIUM SERPL-MCNC: 8.2 MG/DL (ref 8.5–10.1)
CALCIUM SERPL-MCNC: 8.9 MG/DL (ref 8.6–10.2)
CALCULATED P AXIS, ECG09: 28 DEGREES
CALCULATED R AXIS, ECG10: -78 DEGREES
CALCULATED T AXIS, ECG11: 64 DEGREES
CASTS URNS QL MICRO: NORMAL /LPF
CHLORIDE SERPL-SCNC: 103 MMOL/L (ref 97–108)
CHLORIDE SERPL-SCNC: 98 MMOL/L (ref 96–106)
CO2 SERPL-SCNC: 23 MMOL/L (ref 18–29)
CO2 SERPL-SCNC: 24 MMOL/L (ref 21–32)
COLOR UR: YELLOW
CREAT SERPL-MCNC: 1.67 MG/DL (ref 0.76–1.27)
CREAT SERPL-MCNC: 1.93 MG/DL (ref 0.7–1.3)
DIAGNOSIS, 93000: NORMAL
EOSINOPHIL # BLD AUTO: 0 X10E3/UL (ref 0–0.4)
EOSINOPHIL # BLD: 0 K/UL (ref 0–0.4)
EOSINOPHIL NFR BLD AUTO: 0 %
EOSINOPHIL NFR BLD: 0 % (ref 0–7)
EPI CELLS #/AREA URNS HPF: NORMAL /HPF
ERYTHROCYTE [DISTWIDTH] IN BLOOD BY AUTOMATED COUNT: 13.8 % (ref 11.5–14.5)
ERYTHROCYTE [DISTWIDTH] IN BLOOD BY AUTOMATED COUNT: 14.4 % (ref 12.3–15.4)
GLOBULIN SER CALC-MCNC: 2.4 G/DL (ref 1.5–4.5)
GLUCOSE SERPL-MCNC: 178 MG/DL (ref 65–100)
GLUCOSE SERPL-MCNC: 196 MG/DL (ref 65–99)
GLUCOSE UR QL: ABNORMAL
HCT VFR BLD AUTO: 38.6 % (ref 36.6–50.3)
HCT VFR BLD AUTO: 38.8 % (ref 37.5–51)
HGB BLD-MCNC: 13.1 G/DL (ref 13–17.7)
HGB BLD-MCNC: 13.2 G/DL (ref 12.1–17)
HGB UR QL STRIP: NEGATIVE
IMM GRANULOCYTES # BLD: 0 X10E3/UL (ref 0–0.1)
IMM GRANULOCYTES NFR BLD: 0 %
INTERPRETATION: NORMAL
KETONES UR QL STRIP: NEGATIVE
LEUKOCYTE ESTERASE UR QL STRIP: NEGATIVE
LYMPHOCYTES # BLD AUTO: 1.3 X10E3/UL (ref 0.7–3.1)
LYMPHOCYTES # BLD: 0.7 K/UL (ref 0.8–3.5)
LYMPHOCYTES NFR BLD AUTO: 10 %
LYMPHOCYTES NFR BLD: 9 % (ref 12–49)
MAGNESIUM SERPL-MCNC: 1.6 MG/DL (ref 1.6–2.4)
MCH RBC QN AUTO: 29.3 PG (ref 26.6–33)
MCH RBC QN AUTO: 30.3 PG (ref 26–34)
MCHC RBC AUTO-ENTMCNC: 33.8 G/DL (ref 31.5–35.7)
MCHC RBC AUTO-ENTMCNC: 34.2 G/DL (ref 30–36.5)
MCV RBC AUTO: 87 FL (ref 79–97)
MCV RBC AUTO: 88.7 FL (ref 80–99)
MICRO URNS: ABNORMAL
MONOCYTES # BLD AUTO: 1.1 X10E3/UL (ref 0.1–0.9)
MONOCYTES # BLD: 0.7 K/UL (ref 0–1)
MONOCYTES NFR BLD AUTO: 9 %
MONOCYTES NFR BLD: 11 % (ref 5–13)
MUCOUS THREADS URNS QL MICRO: PRESENT
NEUTROPHILS # BLD AUTO: 10.1 X10E3/UL (ref 1.4–7)
NEUTROPHILS NFR BLD AUTO: 81 %
NEUTS SEG # BLD: 5.6 K/UL (ref 1.8–8)
NEUTS SEG NFR BLD: 80 % (ref 32–75)
NITRITE UR QL STRIP: NEGATIVE
P-R INTERVAL, ECG05: 172 MS
PH UR STRIP: 5 [PH] (ref 5–7.5)
PHOSPHATE SERPL-MCNC: 2.8 MG/DL (ref 2.6–4.7)
PLATELET # BLD AUTO: 215 K/UL (ref 150–400)
PLATELET # BLD AUTO: 234 X10E3/UL (ref 150–379)
POTASSIUM SERPL-SCNC: 4.3 MMOL/L (ref 3.5–5.2)
POTASSIUM SERPL-SCNC: 4.4 MMOL/L (ref 3.5–5.1)
PROT SERPL-MCNC: 6.1 G/DL (ref 6–8.5)
PROT UR QL STRIP: ABNORMAL
Q-T INTERVAL, ECG07: 422 MS
QRS DURATION, ECG06: 174 MS
QTC CALCULATION (BEZET), ECG08: 498 MS
RBC # BLD AUTO: 4.35 M/UL (ref 4.1–5.7)
RBC # BLD AUTO: 4.47 X10E6/UL (ref 4.14–5.8)
RBC #/AREA URNS HPF: NORMAL /HPF
SODIUM SERPL-SCNC: 135 MMOL/L (ref 136–145)
SODIUM SERPL-SCNC: 136 MMOL/L (ref 134–144)
SP GR UR: 1.02 (ref 1–1.03)
UROBILINOGEN UR STRIP-MCNC: 0.2 MG/DL (ref 0.2–1)
VENTRICULAR RATE, ECG03: 84 BPM
WBC # BLD AUTO: 12.6 X10E3/UL (ref 3.4–10.8)
WBC # BLD AUTO: 7 K/UL (ref 4.1–11.1)
WBC #/AREA URNS HPF: NORMAL /HPF

## 2017-12-29 PROCEDURE — 77010033678 HC OXYGEN DAILY

## 2017-12-29 PROCEDURE — 65610000006 HC RM INTENSIVE CARE

## 2017-12-29 PROCEDURE — 77030027138 HC INCENT SPIROMETER -A

## 2017-12-29 PROCEDURE — 36415 COLL VENOUS BLD VENIPUNCTURE: CPT | Performed by: SURGERY

## 2017-12-29 PROCEDURE — 74011250636 HC RX REV CODE- 250/636: Performed by: ANESTHESIOLOGY

## 2017-12-29 PROCEDURE — 84100 ASSAY OF PHOSPHORUS: CPT | Performed by: SURGERY

## 2017-12-29 PROCEDURE — 74011000250 HC RX REV CODE- 250: Performed by: SURGERY

## 2017-12-29 PROCEDURE — 80048 BASIC METABOLIC PNL TOTAL CA: CPT | Performed by: SURGERY

## 2017-12-29 PROCEDURE — 74011250636 HC RX REV CODE- 250/636: Performed by: SURGERY

## 2017-12-29 PROCEDURE — 74011250637 HC RX REV CODE- 250/637: Performed by: SURGERY

## 2017-12-29 PROCEDURE — 85025 COMPLETE CBC W/AUTO DIFF WBC: CPT | Performed by: SURGERY

## 2017-12-29 PROCEDURE — 83735 ASSAY OF MAGNESIUM: CPT | Performed by: SURGERY

## 2017-12-29 RX ORDER — VANCOMYCIN 2 GRAM/500 ML IN 0.9 % SODIUM CHLORIDE INTRAVENOUS
2000
Status: COMPLETED | OUTPATIENT
Start: 2017-12-29 | End: 2017-12-29

## 2017-12-29 RX ADMIN — FAMOTIDINE 20 MG: 10 INJECTION, SOLUTION INTRAVENOUS at 08:37

## 2017-12-29 RX ADMIN — FAMOTIDINE 20 MG: 10 INJECTION, SOLUTION INTRAVENOUS at 00:12

## 2017-12-29 RX ADMIN — MORPHINE SULFATE 2 MG: 10 INJECTION, SOLUTION INTRAMUSCULAR; INTRAVENOUS at 00:20

## 2017-12-29 RX ADMIN — Medication 10 ML: at 05:30

## 2017-12-29 RX ADMIN — METRONIDAZOLE 500 MG: 500 INJECTION, SOLUTION INTRAVENOUS at 15:49

## 2017-12-29 RX ADMIN — LEVOFLOXACIN 750 MG: 5 INJECTION, SOLUTION INTRAVENOUS at 01:26

## 2017-12-29 RX ADMIN — SODIUM CHLORIDE, SODIUM LACTATE, POTASSIUM CHLORIDE, AND CALCIUM CHLORIDE 125 ML/HR: 600; 310; 30; 20 INJECTION, SOLUTION INTRAVENOUS at 23:36

## 2017-12-29 RX ADMIN — ENOXAPARIN SODIUM 40 MG: 100 INJECTION SUBCUTANEOUS at 15:04

## 2017-12-29 RX ADMIN — SODIUM CHLORIDE, SODIUM LACTATE, POTASSIUM CHLORIDE, AND CALCIUM CHLORIDE 125 ML/HR: 600; 310; 30; 20 INJECTION, SOLUTION INTRAVENOUS at 00:18

## 2017-12-29 RX ADMIN — HYDROMORPHONE HYDROCHLORIDE 1 MG: 2 INJECTION, SOLUTION INTRAMUSCULAR; INTRAVENOUS; SUBCUTANEOUS at 05:30

## 2017-12-29 RX ADMIN — METRONIDAZOLE 500 MG: 500 INJECTION, SOLUTION INTRAVENOUS at 08:38

## 2017-12-29 RX ADMIN — METRONIDAZOLE 500 MG: 500 INJECTION, SOLUTION INTRAVENOUS at 00:12

## 2017-12-29 RX ADMIN — FENTANYL CITRATE 25 MCG: 50 INJECTION, SOLUTION INTRAMUSCULAR; INTRAVENOUS at 00:00

## 2017-12-29 RX ADMIN — HYDROMORPHONE HYDROCHLORIDE 1 MG: 2 INJECTION, SOLUTION INTRAMUSCULAR; INTRAVENOUS; SUBCUTANEOUS at 09:53

## 2017-12-29 RX ADMIN — Medication 10 ML: at 13:11

## 2017-12-29 RX ADMIN — MORPHINE SULFATE 2 MG: 10 INJECTION, SOLUTION INTRAMUSCULAR; INTRAVENOUS at 00:35

## 2017-12-29 RX ADMIN — FENTANYL CITRATE 25 MCG: 50 INJECTION, SOLUTION INTRAMUSCULAR; INTRAVENOUS at 00:15

## 2017-12-29 RX ADMIN — METRONIDAZOLE 500 MG: 500 INJECTION, SOLUTION INTRAVENOUS at 23:36

## 2017-12-29 RX ADMIN — Medication 10 ML: at 00:21

## 2017-12-29 RX ADMIN — HYDROMORPHONE HYDROCHLORIDE 1 MG: 2 INJECTION, SOLUTION INTRAMUSCULAR; INTRAVENOUS; SUBCUTANEOUS at 15:08

## 2017-12-29 RX ADMIN — ACETAMINOPHEN 650 MG: 325 TABLET, FILM COATED ORAL at 18:18

## 2017-12-29 RX ADMIN — MORPHINE SULFATE 2 MG: 10 INJECTION, SOLUTION INTRAMUSCULAR; INTRAVENOUS at 00:30

## 2017-12-29 RX ADMIN — SODIUM CHLORIDE, SODIUM LACTATE, POTASSIUM CHLORIDE, AND CALCIUM CHLORIDE 125 ML/HR: 600; 310; 30; 20 INJECTION, SOLUTION INTRAVENOUS at 11:20

## 2017-12-29 RX ADMIN — VANCOMYCIN HYDROCHLORIDE 2000 MG: 10 INJECTION, POWDER, LYOPHILIZED, FOR SOLUTION INTRAVENOUS at 20:13

## 2017-12-29 RX ADMIN — Medication 10 ML: at 21:00

## 2017-12-29 RX ADMIN — MORPHINE SULFATE 2 MG: 10 INJECTION, SOLUTION INTRAMUSCULAR; INTRAVENOUS at 00:25

## 2017-12-29 RX ADMIN — ACETAMINOPHEN 650 MG: 325 TABLET, FILM COATED ORAL at 23:46

## 2017-12-29 RX ADMIN — FENTANYL CITRATE 25 MCG: 50 INJECTION, SOLUTION INTRAMUSCULAR; INTRAVENOUS at 00:10

## 2017-12-29 RX ADMIN — HYDROMORPHONE HYDROCHLORIDE 1 MG: 2 INJECTION, SOLUTION INTRAMUSCULAR; INTRAVENOUS; SUBCUTANEOUS at 01:28

## 2017-12-29 NOTE — ROUTINE PROCESS
TRANSFER - IN REPORT:    Verbal report received from [facility]) on Nu Elizabeth  being received from ED(unit) for ordered procedure      Report consisted of patients Situation, Background, Assessment and   Recommendations(SBAR). Information from the following report(s) SBAR, Kardex, ED Summary, Procedure Summary, Intake/Output, MAR and Recent Results was reviewed with the receiving nurse. Opportunity for questions and clarification was provided. Assessment completed upon patients arrival to unit and care assumed.

## 2017-12-29 NOTE — ANESTHESIA POSTPROCEDURE EVALUATION
Post-Anesthesia Evaluation and Assessment    Patient: Ben Forbes MRN: [id number]  SSN: [id number]    YOB: 1932  Age: 80 y.o. Sex: male       Cardiovascular Function/Vital Signs  Visit Vitals    /65    Pulse 91    Temp 36.7 °C (98.1 °F)    Resp 19    Ht 5' 9\" (1.753 m)    Wt 91.1 kg (200 lb 12.8 oz)    SpO2 90%    BMI 29.65 kg/m2       Patient is status post general anesthesia for Procedure(s):  LAPAROTOMY EXPLORATORY SMALL BOWEL RESECTION . Nausea/Vomiting: None    Postoperative hydration reviewed and adequate. Pain:  Pain Scale 1: Numeric (0 - 10) (12/29/17 0600)  Pain Intensity 1: 0 (12/29/17 0600)   Managed    Neurological Status:   Neuro (WDL): Exceptions to WDL (12/29/17 0030)  Neuro  Neurologic State: Alert;Drowsy; Eyes open spontaneously (12/29/17 0130)  LUE Motor Response: Purposeful;Spontaneous  (12/29/17 0130)  LLE Motor Response: Purposeful;Spontaneous  (12/29/17 0130)  RUE Motor Response: Purposeful;Spontaneous  (12/29/17 0130)  RLE Motor Response: Purposeful;Spontaneous  (12/29/17 0130)   At baseline    Mental Status and Level of Consciousness: Arousable    Pulmonary Status:   O2 Device: Nasal cannula (12/29/17 0400)   Adequate oxygenation and airway patent    Complications related to anesthesia: None    Post-anesthesia assessment completed.  No concerns    Signed By: Marco Sutherland MD     December 29, 2017

## 2017-12-29 NOTE — TELEPHONE ENCOUNTER
Valeria Manzo 6/22/1932     Pt wife called to inform Dr. Octaviano Mack that pt was taken to the ED like advised and pt was admitted and had emergency surgery last night.

## 2017-12-29 NOTE — BRIEF OP NOTE
BRIEF OPERATIVE NOTE    Date of Procedure: 12/28/2017   Preoperative Diagnosis: PERFORATED BOWEL  Postoperative Diagnosis: PERFORATED BOWEL    Procedure(s):  LAPAROTOMY EXPLORATORY SMALL BOWEL RESECTION   Surgeon(s) and Role:     * Rafaela Ferris MD - Primary         Assistant Staff:   [facility]       Surgical Staff:  Circ-1: Mandie Reyes RN  Circ-Relief: Akash Cerna RN  Scrub Tech-1: Liza Silence  Scrub RN-1: Reema Rader RN  Event Time In   Incision Start 2203   Incision Close 2312     Anesthesia: General   Estimated Blood Loss: 50  Specimens:   ID Type Source Tests Collected by Time Destination   1 : Jejunum Fresh Jejunum  Rafaela Ferris MD 12/28/2017 2222 Pathology   1 : Peritoneal Fluid Body Fluid Peritoneal Fluid AEROBIC/ANAEROBIC CULTURE, GRAM STAIN Rafaela Ferris MD 12/28/2017 2219 Microbiology      Findings: perforated jejunal diverticulum   Complications: none   Implants: * No implants in log *

## 2017-12-29 NOTE — PROGRESS NOTES
Care Management Interventions  PCP Verified by CM: Yes  Last Visit to PCP: 12/28/17  Mode of Transport at Discharge: Other (see comment) (private vehicle)  Discharge Durable Medical Equipment: No  Physical Therapy Consult: No  Occupational Therapy Consult: No  Speech Therapy Consult: No  Current Support Network: Lives with Spouse (independent with ADls)  Confirm Follow Up Transport: Family  Plan discussed with Pt/Family/Caregiver: Yes  Freedom of Choice Offered: Yes  Discharge Location  Discharge Placement: Home    CM reviewed chart. Pt lives with his wife in a 2-story home. Pt was independent with his ADLs and IADLs prior to admission. Pt and his wife are active in the [employer], and pt just recently retired. Pt has participated in Outpatient PT in the past.  Pt's PCP is Dr. Jerson Blake, and he gets his prescriptions filled at his local [address]. CM will continue to follow for discharge needs.   TOAN Rivers, ACM

## 2017-12-29 NOTE — ED NOTES
TRANSFER - OUT REPORT:    Verbal report given to OR RN(name) on Ramo Meyers  being transferred to OR(unit) for ordered procedure       Report consisted of patients Situation, Background, Assessment and   Recommendations(SBAR). Information from the following report(s) SBAR and ED Summary was reviewed with the receiving nurse. Lines:   Peripheral IV 12/28/17 Left Arm (Active)   Site Assessment Clean, dry, & intact 12/28/2017  4:30 PM   Phlebitis Assessment 0 12/28/2017  4:30 PM   Infiltration Assessment 0 12/28/2017  4:30 PM   Dressing Status Clean, dry, & intact 12/28/2017  4:30 PM   Dressing Type 4 X 4;Transparent 12/28/2017  4:30 PM   Hub Color/Line Status Pink 12/28/2017  4:30 PM       Peripheral IV 12/28/17 Right Wrist (Active)   Site Assessment Clean, dry, & intact 12/28/2017  8:03 PM   Phlebitis Assessment 0 12/28/2017  8:03 PM   Infiltration Assessment 0 12/28/2017  8:03 PM   Dressing Status Clean, dry, & intact 12/28/2017  8:03 PM   Dressing Type Transparent 12/28/2017  8:03 PM   Hub Color/Line Status Patent; Flushed 12/28/2017  8:03 PM   Action Taken Blood drawn 12/28/2017  8:03 PM        Opportunity for questions and clarification was provided.

## 2017-12-29 NOTE — TELEPHONE ENCOUNTER
Verbally discussed with Dr. Gio Hopkins this morning. He is aware of the pt's recent ED visit. No further action needed at this time.

## 2017-12-29 NOTE — ANESTHESIA PREPROCEDURE EVALUATION
Anesthetic History   No history of anesthetic complications            Review of Systems / Medical History  Patient summary reviewed, nursing notes reviewed and pertinent labs reviewed    Pulmonary        Sleep apnea           Neuro/Psych   Within defined limits           Cardiovascular            Dysrhythmias   Pacemaker    Exercise tolerance: <4 METS     GI/Hepatic/Renal     GERD    Renal disease: stones       Endo/Other    Diabetes    Arthritis and cancer     Other Findings            Physical Exam    Airway  Mallampati: II  TM Distance: 4 - 6 cm  Neck ROM: normal range of motion   Mouth opening: Normal     Cardiovascular  Regular rate and rhythm,  S1 and S2 normal,  no murmur, click, rub, or gallop             Dental  No notable dental hx       Pulmonary  Breath sounds clear to auscultation               Abdominal  GI exam deferred       Other Findings            Anesthetic Plan    ASA: 3, emergent  Anesthesia type: general          Induction: Intravenous  Anesthetic plan and risks discussed with: Patient

## 2017-12-29 NOTE — PROGRESS NOTES
Day #1 of Levaquin  Indication:  intra-abdominal infection  Current regimen:  500 mg q24h  Abx regimen: Levaquin  Recent Labs      17   1606   WBC  10.0   CREA  1.76*   BUN  27*     Est CrCl: 34 ml/min; UO: n/a ml/kg/hr  Temp (24hrs), Av.5 °F (36.9 °C), Min:97.4 °F (36.3 °C), Max:99.5 °F (37.5 °C)    Cultures:    blood - pending   body fluid - pending    Plan: Change to 750 mg Q48h for CrCl 20-49 ml/min.

## 2017-12-29 NOTE — PROGRESS NOTES
Progress Note    Patient: Wale Oswald MRN: [id number]  SSN: [id number]    YOB: 1932  Age: 80 y.o. Sex: male      Admit Date: 2017    1 Day Post-Op    Procedure:  Procedure(s):  LAPAROTOMY EXPLORATORY SMALL BOWEL RESECTION     Subjective:     Patient has adequate pain control; ambulating, performing spirometry. Objective:     Visit Vitals    /80 (BP 1 Location: Right arm, BP Patient Position: At rest)    Pulse 91    Temp 98.1 °F (36.7 °C)    Resp 18    Ht 5' 9\" (1.753 m)    Wt 200 lb 12.8 oz (91.1 kg)    SpO2 96%    BMI 29.65 kg/m2       Temp (24hrs), Av.4 °F (36.9 °C), Min:97.7 °F (36.5 °C), Max:99.5 °F (37.5 °C)      Physical Exam:    LUNG: clear to auscultation bilaterally, HEART: regular rate and rhythm, S1, S2 normal, no murmur. ABDOMEN: Hypoactive bowel sounds; soft, distended; dressing intact; appropriate incisional pain with palpation.     Data Review: VS, I/O's, Labs    Lab Review:   Recent Results (from the past 12 hour(s))   METABOLIC PANEL, BASIC    Collection Time: 17  5:27 AM   Result Value Ref Range    Sodium 135 (L) 136 - 145 mmol/L    Potassium 4.4 3.5 - 5.1 mmol/L    Chloride 103 97 - 108 mmol/L    CO2 24 21 - 32 mmol/L    Anion gap 8 5 - 15 mmol/L    Glucose 178 (H) 65 - 100 mg/dL    BUN 29 (H) 6 - 20 MG/DL    Creatinine 1.93 (H) 0.70 - 1.30 MG/DL    BUN/Creatinine ratio 15 12 - 20      GFR est AA 40 (L) >60 ml/min/1.73m2    GFR est non-AA 33 (L) >60 ml/min/1.73m2    Calcium 8.2 (L) 8.5 - 10.1 MG/DL   MAGNESIUM    Collection Time: 17  5:27 AM   Result Value Ref Range    Magnesium 1.6 1.6 - 2.4 mg/dL   PHOSPHORUS    Collection Time: 17  5:27 AM   Result Value Ref Range    Phosphorus 2.8 2.6 - 4.7 MG/DL   CBC WITH AUTOMATED DIFF    Collection Time: 17  5:27 AM   Result Value Ref Range    WBC 7.0 4.1 - 11.1 K/uL    RBC 4.35 4.10 - 5.70 M/uL    HGB 13.2 12.1 - 17.0 g/dL    HCT 38.6 36.6 - 50.3 %    MCV 88.7 80.0 - 99.0 FL    MCH 30.3 26.0 - 34.0 PG    MCHC 34.2 30.0 - 36.5 g/dL    RDW 13.8 11.5 - 14.5 %    PLATELET 388 621 - 733 K/uL    NEUTROPHILS 80 (H) 32 - 75 %    LYMPHOCYTES 9 (L) 12 - 49 %    MONOCYTES 11 5 - 13 %    EOSINOPHILS 0 0 - 7 %    BASOPHILS 0 0 - 1 %    ABS. NEUTROPHILS 5.6 1.8 - 8.0 K/UL    ABS. LYMPHOCYTES 0.7 (L) 0.8 - 3.5 K/UL    ABS. MONOCYTES 0.7 0.0 - 1.0 K/UL    ABS. EOSINOPHILS 0.0 0.0 - 0.4 K/UL    ABS. BASOPHILS 0.0 0.0 - 0.1 K/UL         Assessment:     Hospital Problems  Date Reviewed: 2017          Codes Class Noted POA    Perforated diverticulum of small intestine ICD-10-CM: K57.00  ICD-9-CM: 562.00  2017 Unknown            Still with ileus. Plan/Recommendations/Medical Decision Makin. Ice chips today; start clear liquids tomorrow. 2. Ambulation, spirometry. 3. DVT prophylaxis. 4. Antibiotics.     Signed By: Randolph Denny MD     2017

## 2017-12-29 NOTE — ROUTINE PROCESS
0800 Bedside and Verbal shift change report given to Marina Ontiveros RN (oncoming nurse) by Farrukh Keller RN (offgoing nurse). Report included the following information SBAR, Kardex, ED Summary, OR Summary, Procedure Summary, Intake/Output, MAR and Recent Results. 2000 Bedside and Verbal shift change report given to AVI Watts (oncoming nurse) by Marina Ontiveros RN (offgoing nurse). Report included the following information SBAR, Kardex, ED Summary, OR Summary, Procedure Summary, Intake/Output, MAR and Recent Results.

## 2017-12-29 NOTE — PERIOP NOTES
TRANSFER - OUT REPORT:    Verbal report given to AVI Adler(name) on Rosy aMgallanes  being transferred to ICU 10(unit) for routine post - op       Report consisted of patients Situation, Background, Assessment and   Recommendations(SBAR). Time Pre op antibiotic given:21:58 Ancef  Anesthesia Stop time: 23:35  Kunz Present on Transfer to floor:yes  Order for Kunz on Chart:yes    Information from the following report(s) SBAR, Kardex, OR Summary, Procedure Summary, Intake/Output and MAR was reviewed with the receiving nurse. Opportunity for questions and clarification was provided. Is the patient on 02? YES       L/Min 2       Other     Is the patient on a monitor? YES    Is the nurse transporting with the patient? YES    Surgical Waiting Area notified of patient's transfer from PACU? YES      The following personal items collected during your admission accompanied patient upon transfer:   Dental Appliance: Dental Appliances: None  Vision: Visual Aid: None  Hearing Aid:    Jewelry: Jewelry: None  Clothing: Clothing:  (belongings x2 bags to PACU)  Other Valuables:  Other Valuables: Cell Phone (with family)  Valuables sent to safe:

## 2017-12-29 NOTE — ED NOTES
Pt given  chlorahexadine bath. Consent signed and at bedside. Bedside report given to [facility], all questions answered.

## 2017-12-29 NOTE — OP NOTES
[address] [place] [address] REPORT    Raz Pisano  MR#: [id number]  : 1932  ACCOUNT #: [de-identified]   DATE OF SERVICE: 2017    PREOPERATIVE DIAGNOSIS:  Perforated small bowel. POSTOPERATIVE DIAGNOSIS:  Perforated jejunal diverticulum. PROCEDURES PERFORMED:  Exploratory laparotomy with a small-bowel resection. SURGEON:  Enedina Munoz MD     ASSISTANT:  Nemesio Junior.     ANESTHESIA:  General.    ESTIMATED BLOOD LOSS:      SPECIMENS REMOVED:      CLINICAL INDICATIONS:  The patient is an 80-year-old gentleman presented to the hospital complaining of shortness of breath and abdominal pain. Workup revealed a perforated small bowel. He therefore comes to the operating room today for exploratory laparotomy. Informed consent was obtained. DESCRIPTION OF PROCEDURE:  The patient was brought to the operating room and placed on the operative table in supine position. Endotracheal anesthesia was then established. He was then prepped and draped in usual sterile fashion. A midline incision was then made around the umbilicus which was below the area where we expected the area of perforation to be. This was taken down to level of subcutaneous tissues. Bovie electrocautery was used to go through the subcutaneous tissues and the fascia. Once the fascia was opened, the peritoneum was opened using Metzenbaum scissors. There were no adhesions up to the abdominal wall. The incision was taken up superiorly until we were over the area of the hard omentum that appeared to be making somewhat of a mass over the area of perforation. We eventually were able to free up the omentum and discovered what appeared to be a perforated somewhat necrotic diverticulum. The bowel was run both proximally and distally and this was found to be jejunal.  The decision was made at this time to resect it. A window was made within the mesentery proximally and a ERICKA stapler was then fired.   A window was made within the mesentery distally to this area and a ERICKA 80 stapler was fired. The mesentery was then resected using the LigaSure. No bleeding was identified. The area was then sent off to Pathology. The 2 pieces of bowel were then placed next to each other. Enterotomies were made within each and a ERICKA was introduced and fired, creating a healthy sized common channel. The open enterotomy was then grabbed using Allis clamps and this was closed using another firing of the ERICKA stapler. Crotch sutures were then placed using 3-0 Vicryl suture. The mesentery was closed using a running 3-0 Vicryl suture. The suture lines were oversewn using 3-0 Vicryl suture. The anastomosis was found to be adequate. The abdomen was then irrigated. No further bleeding was identified. The abdomen was then closed using #1 PDS, one from above, one from below. The incision was then closed using staples. Dressings were applied. The patient was awoken in the OR and taken to PACU in stable condition.       MD BJORN Julien / RN  D: 12/28/2017 23:26     T: 12/29/2017 10:00  JOB #: [id number]

## 2017-12-29 NOTE — H&P
Surgery History and Physical    Subjective:      Shelli May is a 80 y.o. male who presents complaining of multiple complaints. He has been having SOB and weakness for about a week. For about 3-4 days he  began having RLQ abdominal pain  Radiating to the left. He had a normal BM today. What made him decide to come to the hospital was he developed shaking chills earlier today. Upon arrival to the ER he was found to be tachycardic as well.  He has never had anything similar in the past.       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    Prostate cancer ([facility] 12/09/2011     Past Medical History:   Diagnosis Date    Arthritis     Calculus of kidney     Chronic kidney disease     slightly increased creatinine    GERD (gastroesophageal reflux disease)     Glaucoma     Pneumonia     prostate cancer 2000    seed radiation, cryosurgery    Unspecified sleep apnea     uses CPAP      Past Surgical History:   Procedure Laterality Date    ENDOSCOPY, COLON, DIAGNOSTIC  2010    HX HEENT      uvulectomy    HX PACEMAKER  09 01 14    HX PROSTATECTOMY      brachytherapy, cryosurgery    HX TONSILLECTOMY      HX UROLOGICAL      vasectomy    LASER SURGERY OF EYE        Social History   Substance Use Topics    Smoking status: Never Smoker    Smokeless tobacco: Never Used    Alcohol use 3.6 oz/week     2 Glasses of wine, 4 Standard drinks or equivalent per week      Family History   Problem Relation Age of Onset    Heart Disease Father     Cancer Sister      breast/lung    Other Mother      encephalitis    Cancer Maternal Aunt      breast/lower extremity - 2 maternal aunts    Cancer Other      vaginal    No Known Problems Daughter       Prior to Admission medications Medication Sig Start Date End Date Taking? Authorizing Provider   VIT A/VIT C/VIT E/ZINC/COPPER (PRESERVISION AREDS PO) Take  by mouth. Historical Provider   DOCOSAHEXANOIC ACID/EPA (FISH OIL PO) Take  by mouth. Historical Provider   EPINEPHrine (EPIPEN) 0.3 mg/0.3 mL injection 0.3 mL by IntraMUSCular route once as needed for 1 dose. 7/22/12   Lynda Junior MD   cholecalciferol, vitamin d3, (VITAMIN D3) 1,000 unit tablet Take 1,000 Units by mouth daily. Takes one po three times a week.      Historical Provider     Allergies   Allergen Reactions    Pcn [Penicillins] Rash    Venom-Honey Bee Other (comments)     anaphylaxis         Review of Systems:    Constitutional: positive for chills and fatigue  Eyes: negative  Ears, Nose, Mouth, Throat, and Face: negative  Respiratory: positive for dyspnea  Cardiovascular: positive for lower extremity edema  Gastrointestinal: positive for diarrhea and abdominal pain  Genitourinary:positive for urinary incontinence  Integument/Breast: negative  Hematologic/Lymphatic: negative  Musculoskeletal:negative  Neurological: positive for headaches  Behavioral/Psychiatric: negative  Endocrine: negative  Allergic/Immunologic: negative     Objective:     Visit Vitals    /65 (BP 1 Location: Right arm, BP Patient Position: Supine)    Pulse 86    Temp 98.6 °F (37 °C)    Resp 18    Ht 5' 9\" (1.753 m)    Wt 199 lb (90.3 kg)    SpO2 95%    BMI 29.39 kg/m2       Physical Exam:    GENERAL: alert, cooperative, no distress, appears stated age, EYE: negative, THROAT & NECK: normal, LUNG: clear to auscultation bilaterally, HEART: regular rate and rhythm, ABDOMEN: soft with tenderness in the upper abdomen with some fullness. , EXTREMITIES:  edema 1+, SKIN: Normal., NEUROLOGIC: negative, PSYCH: non focal    Imaging:  images and reports reviewed  CT scan- Abnormal small bowel loop in the anterior abdomen with perforation and  extraluminal gas in the mesenteric and omental fat.  2. Normal appendix. 3. Diverticulosis without diverticulitis. 4. Left renal cyst.  5. Atherosclerotic abdominal aorta without aneurysm. 6. Pacemaker. Lab Review:    Recent Results (from the past 24 hour(s))   CBC WITH AUTOMATED DIFF    Collection Time: 12/28/17  4:06 PM   Result Value Ref Range    WBC 10.0 4.1 - 11.1 K/uL    RBC 4.25 4. 10 - 5.70 M/uL    HGB 12.7 12.1 - 17.0 g/dL    HCT 36.7 36.6 - 50.3 %    MCV 86.4 80.0 - 99.0 FL    MCH 29.9 26.0 - 34.0 PG    MCHC 34.6 30.0 - 36.5 g/dL    RDW 13.7 11.5 - 14.5 %    PLATELET 649 957 - 984 K/uL    NEUTROPHILS 86 (H) 32 - 75 %    LYMPHOCYTES 7 (L) 12 - 49 %    MONOCYTES 7 5 - 13 %    EOSINOPHILS 0 0 - 7 %    BASOPHILS 0 0 - 1 %    ABS. NEUTROPHILS 8.6 (H) 1.8 - 8.0 K/UL    ABS. LYMPHOCYTES 0.7 (L) 0.8 - 3.5 K/UL    ABS. MONOCYTES 0.7 0.0 - 1.0 K/UL    ABS. EOSINOPHILS 0.0 0.0 - 0.4 K/UL    ABS. BASOPHILS 0.0 0.0 - 0.1 K/UL    RBC COMMENTS NORMOCYTIC, NORMOCHROMIC     METABOLIC PANEL, COMPREHENSIVE    Collection Time: 12/28/17  4:06 PM   Result Value Ref Range    Sodium 135 (L) 136 - 145 mmol/L    Potassium 4.0 3.5 - 5.1 mmol/L    Chloride 103 97 - 108 mmol/L    CO2 23 21 - 32 mmol/L    Anion gap 9 5 - 15 mmol/L    Glucose 187 (H) 65 - 100 mg/dL    BUN 27 (H) 6 - 20 MG/DL    Creatinine 1.76 (H) 0.70 - 1.30 MG/DL    BUN/Creatinine ratio 15 12 - 20      GFR est AA 45 (L) >60 ml/min/1.73m2    GFR est non-AA 37 (L) >60 ml/min/1.73m2    Calcium 8.5 8.5 - 10.1 MG/DL    Bilirubin, total 1.0 0.2 - 1.0 MG/DL    ALT (SGPT) 38 12 - 78 U/L    AST (SGOT) 23 15 - 37 U/L    Alk.  phosphatase 101 45 - 117 U/L    Protein, total 6.9 6.4 - 8.2 g/dL    Albumin 2.7 (L) 3.5 - 5.0 g/dL    Globulin 4.2 (H) 2.0 - 4.0 g/dL    A-G Ratio 0.6 (L) 1.1 - 2.2     PROTHROMBIN TIME + INR    Collection Time: 12/28/17  4:07 PM   Result Value Ref Range    INR 1.1 0.9 - 1.1      Prothrombin time 11.1 9.0 - 11.1 sec   PTT    Collection Time: 12/28/17  4:07 PM   Result Value Ref Range    aPTT 38.5 (H) 22.1 - 32.5 sec    aPTT, therapeutic range     58.0 - 77.0 SECS   INFLUENZA A & B AG (RAPID TEST)    Collection Time: 12/28/17  6:33 PM   Result Value Ref Range    Influenza A Antigen NEGATIVE  NEG      Influenza B Antigen NEGATIVE  NEG     EKG, 12 LEAD, INITIAL    Collection Time: 12/28/17  7:24 PM   Result Value Ref Range    Ventricular Rate 84 BPM    Atrial Rate 84 BPM    P-R Interval 172 ms    QRS Duration 174 ms    Q-T Interval 422 ms    QTC Calculation (Bezet) 498 ms    Calculated P Axis 28 degrees    Calculated R Axis -78 degrees    Calculated T Axis 64 degrees    Diagnosis       Atrial-sensed ventricular-paced rhythm  When compared with ECG of 25-OCT-2007 11:41,  Electronic ventricular pacemaker has replaced Sinus rhythm         Assessment:   Perforated Small bowel     Plan:     1. I recommend proceeding with Surgery:  Exploratory laparotomy and and possible bowel resection. 2. Discussed aspects of surgical intervention, methods, risks (including by not limited to infection, bleeding, hematoma, and perforation of the intestines or solid organs), and the risks of general anesthetic. The patient understands the risks; any and all questions were answered to the patient's satisfaction.       Signed By: Dylan Allred MD     December 28, 2017

## 2017-12-29 NOTE — PROGRESS NOTES
Admission Medication Reconciliation:    Information obtained from: patient    Significant PMH/Disease States:   Past Medical History:   Diagnosis Date    Arthritis     Calculus of kidney     Chronic kidney disease     slightly increased creatinine    GERD (gastroesophageal reflux disease)     Glaucoma     Pneumonia     prostate cancer     seed radiation, cryosurgery    Unspecified sleep apnea     uses CPAP       Chief Complaint for this Admission:  fatigue, shortness of breath    Allergies:  Pcn [penicillins] and Venom-honey bee    Prior to Admission Medications:   Prior to Admission Medications   Prescriptions Last Dose Informant Patient Reported? Taking? EPINEPHrine (EPIPEN) 0.3 mg/0.3 mL injection   No No   Si.3 mL by IntraMUSCular route once as needed for 1 dose. Facility-Administered Medications: None       Comments/Recommendations: Medication review done with patient. Removed supplements. Patient takes no regular medications. Epipen for bee stings. Allergies reviewed - hives reaction to a PCN shot in 1954.

## 2017-12-29 NOTE — ED NOTES
Bedside report given to Harry Raya [facility]. Pt remains on monitor x3. Call bell within reach.

## 2017-12-29 NOTE — PROGRESS NOTES
1: TRANSFER - IN REPORT:    Verbal report received from [address], RN(name) on Jabier Peterson  being received from [facility] PACU(unit) for routine post - op      Report consisted of patients Situation, Background, Assessment and   Recommendations(SBAR). Information from the following report(s) SBAR, Kardex, ED Summary, OR Summary, Procedure Summary, Intake/Output, MAR, Cardiac Rhythm VPaced and Alarm Parameters  was reviewed with the receiving nurse. Opportunity for questions and clarification was provided. 0130: Assessment completed upon patients arrival to unit and care assumed. Patient is A/Ox4, Vpaced, afebrile. RAMOS, able to transfer self from stretcher to bed. 2L nasal cannula. 2 PIV's present. Kunz draining vern urine. C/o abdominal pain, medicated with PRN Dilaudid. Primary Nurse Petrona Andrade and Pearle Lefort, RN performed a dual skin assessment on this patient No impairment noted  Gerardo score is 18. Midline abdominal incision dressing is CDI.    0730: Bedside and Verbal shift change report given to BALA Tanner RN (oncoming nurse) by Cloeman Grant. Randa Barrientos RN (offgoing nurse). Report included the following information SBAR, Kardex, ED Summary, OR Summary, Procedure Summary, Intake/Output, MAR, Recent Results, Cardiac Rhythm Vpaced and Alarm Parameters .

## 2017-12-30 LAB
ANION GAP SERPL CALC-SCNC: 9 MMOL/L (ref 5–15)
BACTERIA SPEC CULT: NORMAL
BASOPHILS # BLD: 0 K/UL (ref 0–0.1)
BASOPHILS NFR BLD: 0 % (ref 0–1)
BUN SERPL-MCNC: 26 MG/DL (ref 6–20)
BUN/CREAT SERPL: 15 (ref 12–20)
CALCIUM SERPL-MCNC: 8.3 MG/DL (ref 8.5–10.1)
CC UR VC: NORMAL
CHLORIDE SERPL-SCNC: 108 MMOL/L (ref 97–108)
CO2 SERPL-SCNC: 23 MMOL/L (ref 21–32)
CREAT SERPL-MCNC: 1.79 MG/DL (ref 0.7–1.3)
EOSINOPHIL # BLD: 0.1 K/UL (ref 0–0.4)
EOSINOPHIL NFR BLD: 1 % (ref 0–7)
ERYTHROCYTE [DISTWIDTH] IN BLOOD BY AUTOMATED COUNT: 13.9 % (ref 11.5–14.5)
GLUCOSE SERPL-MCNC: 161 MG/DL (ref 65–100)
HCT VFR BLD AUTO: 37.3 % (ref 36.6–50.3)
HGB BLD-MCNC: 12.6 G/DL (ref 12.1–17)
LYMPHOCYTES # BLD: 0.8 K/UL (ref 0.8–3.5)
LYMPHOCYTES NFR BLD: 11 % (ref 12–49)
MAGNESIUM SERPL-MCNC: 1.7 MG/DL (ref 1.6–2.4)
MCH RBC QN AUTO: 29.7 PG (ref 26–34)
MCHC RBC AUTO-ENTMCNC: 33.8 G/DL (ref 30–36.5)
MCV RBC AUTO: 88 FL (ref 80–99)
MONOCYTES # BLD: 0.9 K/UL (ref 0–1)
MONOCYTES NFR BLD: 12 % (ref 5–13)
NEUTS SEG # BLD: 5.4 K/UL (ref 1.8–8)
NEUTS SEG NFR BLD: 76 % (ref 32–75)
PHOSPHATE SERPL-MCNC: 2.4 MG/DL (ref 2.6–4.7)
PLATELET # BLD AUTO: 235 K/UL (ref 150–400)
POTASSIUM SERPL-SCNC: 4.1 MMOL/L (ref 3.5–5.1)
RBC # BLD AUTO: 4.24 M/UL (ref 4.1–5.7)
SERVICE CMNT-IMP: NORMAL
SODIUM SERPL-SCNC: 140 MMOL/L (ref 136–145)
WBC # BLD AUTO: 7.2 K/UL (ref 4.1–11.1)

## 2017-12-30 PROCEDURE — 97162 PT EVAL MOD COMPLEX 30 MIN: CPT | Performed by: PHYSICAL THERAPIST

## 2017-12-30 PROCEDURE — 84100 ASSAY OF PHOSPHORUS: CPT | Performed by: SURGERY

## 2017-12-30 PROCEDURE — 36415 COLL VENOUS BLD VENIPUNCTURE: CPT | Performed by: SURGERY

## 2017-12-30 PROCEDURE — 74011000250 HC RX REV CODE- 250: Performed by: SURGERY

## 2017-12-30 PROCEDURE — 85025 COMPLETE CBC W/AUTO DIFF WBC: CPT | Performed by: SURGERY

## 2017-12-30 PROCEDURE — 65660000000 HC RM CCU STEPDOWN

## 2017-12-30 PROCEDURE — 74011250636 HC RX REV CODE- 250/636: Performed by: SURGERY

## 2017-12-30 PROCEDURE — 74011250637 HC RX REV CODE- 250/637: Performed by: SURGERY

## 2017-12-30 PROCEDURE — 80048 BASIC METABOLIC PNL TOTAL CA: CPT | Performed by: SURGERY

## 2017-12-30 PROCEDURE — 97530 THERAPEUTIC ACTIVITIES: CPT | Performed by: PHYSICAL THERAPIST

## 2017-12-30 PROCEDURE — 83735 ASSAY OF MAGNESIUM: CPT | Performed by: SURGERY

## 2017-12-30 RX ORDER — VANCOMYCIN HYDROCHLORIDE
1250 EVERY 24 HOURS
Status: DISCONTINUED | OUTPATIENT
Start: 2017-12-30 | End: 2018-01-01 | Stop reason: ALTCHOICE

## 2017-12-30 RX ORDER — DOCUSATE SODIUM 100 MG/1
100 CAPSULE, LIQUID FILLED ORAL DAILY
Status: DISCONTINUED | OUTPATIENT
Start: 2017-12-30 | End: 2017-12-31

## 2017-12-30 RX ADMIN — METRONIDAZOLE 500 MG: 500 INJECTION, SOLUTION INTRAVENOUS at 23:46

## 2017-12-30 RX ADMIN — DOCUSATE SODIUM 100 MG: 100 CAPSULE, LIQUID FILLED ORAL at 15:55

## 2017-12-30 RX ADMIN — LEVOFLOXACIN 750 MG: 5 INJECTION, SOLUTION INTRAVENOUS at 23:41

## 2017-12-30 RX ADMIN — FAMOTIDINE 20 MG: 10 INJECTION, SOLUTION INTRAVENOUS at 08:35

## 2017-12-30 RX ADMIN — Medication 10 ML: at 15:51

## 2017-12-30 RX ADMIN — METRONIDAZOLE 500 MG: 500 INJECTION, SOLUTION INTRAVENOUS at 15:55

## 2017-12-30 RX ADMIN — ACETAMINOPHEN 650 MG: 325 TABLET, FILM COATED ORAL at 08:40

## 2017-12-30 RX ADMIN — SODIUM CHLORIDE, SODIUM LACTATE, POTASSIUM CHLORIDE, AND CALCIUM CHLORIDE 125 ML/HR: 600; 310; 30; 20 INJECTION, SOLUTION INTRAVENOUS at 18:29

## 2017-12-30 RX ADMIN — Medication 10 ML: at 22:30

## 2017-12-30 RX ADMIN — METRONIDAZOLE 500 MG: 500 INJECTION, SOLUTION INTRAVENOUS at 08:41

## 2017-12-30 RX ADMIN — SODIUM CHLORIDE, SODIUM LACTATE, POTASSIUM CHLORIDE, AND CALCIUM CHLORIDE 125 ML/HR: 600; 310; 30; 20 INJECTION, SOLUTION INTRAVENOUS at 08:34

## 2017-12-30 RX ADMIN — VANCOMYCIN HYDROCHLORIDE 1250 MG: 10 INJECTION, POWDER, LYOPHILIZED, FOR SOLUTION INTRAVENOUS at 20:18

## 2017-12-30 RX ADMIN — ENOXAPARIN SODIUM 40 MG: 100 INJECTION SUBCUTANEOUS at 15:51

## 2017-12-30 NOTE — PROGRESS NOTES
TRANSFER - IN REPORT:    Verbal report received from yvonne(name) on Nu Elizabeth  being received from ICU(unit) for routine progression of care      Report consisted of patients Situation, Background, Assessment and   Recommendations(SBAR). Information from the following report(s) SBAR, Intake/Output, MAR and Recent Results was reviewed with the receiving nurse. Opportunity for questions and clarification was provided. Assessment completed upon patients arrival to unit and care assumed.

## 2017-12-30 NOTE — ROUTINE PROCESS
1120:  Bed changed as current bed patient was on was not comfortable. 1400:  Dr. Mariee He voiced to keep Kunz another day because of incontinence issue and pt just started on clears.

## 2017-12-30 NOTE — PROGRESS NOTES
Day #2 of Vancomycin  Indication:  GPC bacteremia; also with peritonitis secondary to perforated jejunal diverticulum  - S/P ex lap with small bowel resection on 17  Current regimen:  Based on levels, last dose: 2 gm IV x 1 on   Abx regimen:  Levaquin + Flagyl + Vancomycin  ID Following ?: NO  Concomitant nephrotoxic drugs (requires more frequent monitoring): None  Frequency of BMP?: Daily    Recent Labs      17   0441  17   0527  17   1606   WBC  7.2  7.0  10.0   CREA  1.79*  1.93*  1.76*   BUN  26*  29*  27*     Est CrCl: ~30-40 ml/min; UO: ~0.4 ml/kg/hr  Temp (24hrs), Av °F (36.7 °C), Min:97.8 °F (36.6 °C), Max:98.2 °F (36.8 °C)    Cultures:    Blood: GPCs in chains in 2/4 bottles, pending   Urine: 15k mixed skin winston, final   Peritoneal fluid: scant GNRs + GPCs, pending   Peritoneal fluid [anaerobic]: pending    Goal trough = 15 - 20 mcg/mL    Recent trough history (date/time/level/dose/action taken):  None    Plan: Given the improvement in the patient's Scr, will order a maintenance dose of 1250 mg IV Q 24 hr to start this evening at 2000. Pharmacy will continue to monitor patient daily and will make dosage adjustments based upon changing renal function.

## 2017-12-30 NOTE — PROGRESS NOTES
General Surgery Daily Progress Note    Admit Date: 2017  Post-Operative Day: 2 Days Post-Op from Procedure(s):  LAPAROTOMY EXPLORATORY SMALL BOWEL RESECTION      Subjective:     Last 24 hrs: Feeling better today but still c/o abdominal pain with , better with lying on his left side. Denies NV. Has only had ice chips thus far. Has not been out of bed, and says he ws very week for about a week pre-op, so he is concerned that he may be weak with walking. PT here now to work with him. Denies fevers or chills. No CP or SOB. No flatus but hears his belly rumbling. Kunz still in.      Objective:     Blood pressure 156/80, pulse 95, temperature 97.8 °F (36.6 °C), resp. rate 20, height 5' 9\" (1.753 m), weight 210 lb 6.4 oz (95.4 kg), SpO2 95 %. Temp (24hrs), Av °F (36.7 °C), Min:97.8 °F (36.6 °C), Max:98.2 °F (36.8 °C)      _____________________  Physical Exam:     Alert and Oriented, cooperative, in no acute distress. Cardiovascular: RRR  Lungs:CTAB   Abdomen: round, firm. +BS Staple line c/d/i      Assessment:   Active Problems:    Perforated diverticulum of small intestine (2017)            Plan:     Ambulation with PT & OOB as tolerated. Analgesics as needed. IS  DVT proph  IV antibiotics  Advance diet to clears & assess tolerablitly.   Further treatment per Dr. Connor Brunner,    Data Review:    Recent Labs      17   0441  17   0527  17   1606   WBC  7.2  7.0  10.0   HGB  12.6  13.2  12.7   HCT  37.3  38.6  36.7   PLT  235  215  206     Recent Labs      17   0441  17   0527  17   1607  17   1606  17   0915   NA  140  135*   --   135*  136   K  4.1  4.4   --   4.0  4.3   CL  108  103   --   103  98   CO2  23  24   --   23  23   GLU  161*  178*   --   187*  196*   BUN  26*  29*   --   27*  24   CREA  1.79*  1.93*   --   1.76*  1.67*   CA  8.3*  8.2*   --   8.5  8.9   MG  1.7  1.6   --    --    --    PHOS  2.4*  2.8   --    --    --    ALB   --    --    -- 2. 7*  3.7   SGOT   --    --    --   23  24   ALT   --    --    --   38  34   INR   --    --   1.1   --    --      No results for input(s): AML, LPSE in the last 72 hours.         ______________________  Medications:    Current Facility-Administered Medications   Medication Dose Route Frequency    famotidine (PF) (PEPCID) 20 mg in sodium chloride 0.9 % 10 mL injection  20 mg IntraVENous DAILY    Vancomycin - pharmacy to dose   Other Rx Dosing/Monitoring    sodium chloride (NS) flush 5-10 mL  5-10 mL IntraVENous Q8H    sodium chloride (NS) flush 5-10 mL  5-10 mL IntraVENous PRN    lactated Ringers infusion  125 mL/hr IntraVENous CONTINUOUS    acetaminophen (TYLENOL) tablet 650 mg  650 mg Oral Q6H PRN    ondansetron (ZOFRAN) injection 4 mg  4 mg IntraVENous Q4H PRN    enoxaparin (LOVENOX) injection 40 mg  40 mg SubCUTAneous Q24H    HYDROmorphone (PF) (DILAUDID) injection 0.5-1 mg  0.5-1 mg IntraVENous Q2H PRN    levoFLOXacin (LEVAQUIN) 750 mg in D5W IVPB  750 mg IntraVENous Q48H    metroNIDAZOLE (FLAGYL) IVPB premix 500 mg  500 mg IntraVENous Q8H       Vandana Camargo PA-C  12/30/2017

## 2017-12-30 NOTE — PROGRESS NOTES
Primary Nurse Tonio Vazquez and Piter Coronado RN performed a dual skin assessment on this patient No impairment noted  Gerardo score is 22

## 2017-12-30 NOTE — PROGRESS NOTES
Pharmacist Note - Vancomycin Dosing    Consult provided for this 80 y.o. male for indication of GPC bacteremia. Antibiotic regimen(s): Vanc + Cefepime + Flagyl    Recent Labs      17   0527  17   1606  17   0915   WBC  7.0  10.0  12.6*   CREA  1.93*  1.76*  1.67*   BUN  29*  27*  24     Frequency of BMP: daily  Height: 175.3 cm  Weight: 91.1 kg  Est CrCl: 31 ml/min; UO: 0.4 ml/kg/hr  Temp (24hrs), Av.1 °F (36.7 °C), Min:97.7 °F (36.5 °C), Max:98.8 °F (37.1 °C)    Cultures:   blood - GPC in 2/4 bottles   urine - pending   peritoneal fluid - scant GNR + scant GPC      Goal trough = 15 - 20 mcg/mL    Therapy will be initiated with a loading dose of 2000 mg IV x 1. Will wait for morning labs to determine dosing plan given rising Scr, currently a 36h dosing interval is predicted. Pharmacy to follow patient daily and order levels / make dose adjustments as appropriate.

## 2017-12-30 NOTE — ROUTINE PROCESS
TRANSFER - OUT REPORT:    Verbal report given to Thea Ramirez RN(name) on Sandra Bay  being transferred to [facility](unit) for routine progression of care       Report consisted of patients Situation, Background, Assessment and   Recommendations(SBAR). Information from the following report(s) SBAR, Kardex, Procedure Summary, Intake/Output, MAR, Accordion, Recent Results, Med Rec Status, Cardiac Rhythm V-Paced and Alarm Parameters  was reviewed with the receiving nurse. Lines:   Peripheral IV 12/28/17 Left Arm (Active)   Site Assessment Clean, dry, & intact 12/30/2017 12:00 AM   Phlebitis Assessment 0 12/30/2017 12:00 AM   Infiltration Assessment 0 12/30/2017 12:00 AM   Dressing Status Clean, dry, & intact 12/30/2017 12:00 AM   Dressing Type Transparent 12/30/2017 12:00 AM   Hub Color/Line Status Pink;Capped 12/30/2017 12:00 AM   Action Taken Open ports on tubing capped 12/30/2017 12:00 AM   Alcohol Cap Used Yes 12/30/2017 12:00 AM       Peripheral IV 12/28/17 Right Wrist (Active)   Site Assessment Clean, dry, & intact 12/30/2017 12:00 AM   Phlebitis Assessment 0 12/30/2017 12:00 AM   Infiltration Assessment 0 12/30/2017 12:00 AM   Dressing Status Clean, dry, & intact 12/30/2017 12:00 AM   Dressing Type Transparent 12/30/2017 12:00 AM   Hub Color/Line Status Green; Infusing 12/30/2017 12:00 AM   Action Taken Open ports on tubing capped 12/30/2017 12:00 AM   Alcohol Cap Used Yes 12/30/2017 12:00 AM        Opportunity for questions and clarification was provided.       Patient transported with:   Monitor  Registered Nurse  Tech   Home CPAP and personal belongings

## 2017-12-30 NOTE — PROGRESS NOTES
Problem: Falls - Risk of  Goal: *Absence of Falls  Document [place] Fall Risk and appropriate interventions in the flowsheet.    Outcome: Progressing Towards Goal  Fall Risk Interventions:            Medication Interventions: Evaluate medications/consider consulting pharmacy, Patient to call before getting OOB, Teach patient to arise slowly    Elimination Interventions: Call light in reach, Patient to call for help with toileting needs

## 2017-12-30 NOTE — ROUTINE PROCESS
Bedside and Verbal shift change report given to AVI Miranda (oncoming nurse) by Nagi Hernandez RN (offgoing nurse). Report included the following information SBAR, Kardex, Intake/Output, MAR, Accordion, Recent Results, Med Rec Status, Cardiac Rhythm V-Paced and Alarm Parameters .

## 2017-12-30 NOTE — PROGRESS NOTES
Problem: Mobility Impaired (Adult and Pediatric)  Goal: *Acute Goals and Plan of Care (Insert Text)  Physical Therapy Goals  Initiated 12/30/2017  1. Patient will move from supine to sit and sit to supine  in bed with modified independence within 7 day(s). 2.  Patient will transfer from bed to chair and chair to bed with modified independence using the least restrictive device within 7 day(s). 3.  Patient will perform sit to stand with modified independence within 7 day(s). 4.  Patient will ambulate with modified independence for 250 feet with the least restrictive device within 7 day(s). 5.  Patient will ascend/descend 12 stairs with 1 handrail(s) with modified independence within 7 day(s). physical Therapy EVALUATION  Patient: Rodolfo Fernandes (5 y.o. male)  Date: 12/30/2017  Primary Diagnosis: PERFORATED BOWEL  Perforated diverticulum of small intestine  Procedure(s) (LRB):  LAPAROTOMY EXPLORATORY SMALL BOWEL RESECTION  (N/A) 2 Days Post-Op   Precautions: cardiac       ASSESSMENT :  Based on the objective data described below, the patient presents with poor activity tolerance, generalized deconditioning, decreased strength and ROM, R knee & L ankle pain at baseline, compromised balance, need for assistance with functional mobility, and pain with motion. Patient was received up in bedside chair and returned to same. Requires min A for mobilizing, tho clearly very effortfull and uncomfortable. Pain at 7 with sit-stand; then down to 3 with gait. Becomes SOB with activity, patient reporting both the act of breathing and mobilizing as 'hard work.'  Remains mildly tachycardic; O2 sats in 90s on room air. He is unstable when ambulating & intermittently seeks tactile stability. Tinetti indicates he is at high risk for fall. Mr Chiquita Teague may benefit from a walker in the short term to enhance endurance vs focusing upon balance. Gait is abnormal at baseline given musculoskeletal impairments.  However, he did not utilize any assistive devices & denies falls. He & wife remain active with programs at [facility]. They live in 2 story home with 'a few' steps to enter. His bedroom is on the 2nd floor, and does not anticipate a problem at discharge. Clinician with concerns as to his personal assessment of anticipated needs at discharge. Discussed probable need for home health, possible necessity of first floor sleeping arrangements, and use of walker, all in short term. Patient will benefit from skilled intervention to address the above impairments. Patients rehabilitation potential is considered to be Excellent  Factors which may influence rehabilitation potential include:   [x]         None noted  []         Mental ability/status  []         Medical condition  []         Home/family situation and support systems  []         Safety awareness  []         Pain tolerance/management  []         Other:      PLAN :  Recommendations and Planned Interventions:  [x]           Bed Mobility Training             [x]    Neuromuscular Re-Education  [x]           Transfer Training                   []    Orthotic/Prosthetic Training  [x]           Gait Training                         []    Modalities  [x]           Therapeutic Exercises           []    Edema Management/Control  [x]           Therapeutic Activities            [x]    Patient and Family Training/Education  []           Other (comment):    Frequency/Duration: Patient will be followed by physical therapy  6 times a week to address goals. Discharge Recommendations: Home Health  Further Equipment Recommendations for Discharge: tbd     SUBJECTIVE:   Patient stated oh, I think I'll be all right.     OBJECTIVE DATA SUMMARY:   HISTORY:    Past Medical History:   Diagnosis Date    Arthritis     Calculus of kidney     Chronic kidney disease     slightly increased creatinine    GERD (gastroesophageal reflux disease)     Glaucoma     Pneumonia     prostate cancer 2000    seed radiation, cryosurgery    Unspecified sleep apnea     uses CPAP     Past Surgical History:   Procedure Laterality Date    ENDOSCOPY, COLON, DIAGNOSTIC  2010    HX HEENT      uvulectomy    HX PACEMAKER  09 01 14    HX PROSTATECTOMY      brachytherapy, cryosurgery    HX TONSILLECTOMY      HX UROLOGICAL      vasectomy    LASER SURGERY OF EYE       Prior Level of Function/Home Situation: see above  Personal factors and/or comorbidities impacting plan of care: neuropathy, msk impairments    Home Situation  Home Environment: Private residence  One/Two Story Residence: Two story  Living Alone: No  Support Systems: Child(dasia), Family member(s)  Patient Expects to be Discharged to[de-identified] Private residence  Current DME Used/Available at Home: CPAP    EXAMINATION/PRESENTATION/DECISION MAKING:   Critical Behavior:  Neurologic State: Alert           Hearing: Auditory  Auditory Impairment: Hard of hearing, bilateral  Hearing Aids/Status: Right, With patient  Skin:  No problems noted  Edema: as above  Range Of Motion:  AROM: Generally decreased, functional.  Minor limitations ankles, L>R                       Strength:    Strength: Generally decreased, functional                    Tone & Sensation:   Tone: Normal              Sensation: Impaired (neuropathy)               Coordination:  Coordination: Within functional limits  Vision:      Functional Mobility:  Bed Mobility: NT today              Transfers:  Sit to Stand: Minimum assistance  Stand to Sit: Minimum assistance                       Balance:   Sitting: Intact  Standing: Impaired; Without support  Standing - Static: Good  Standing - Dynamic : Fair  Ambulation/Gait Training:  Distance (ft): 80 Feet (ft)  Assistive Device:  (none)  Ambulation - Level of Assistance: Minimal assistance (to help stabilize)        Gait Abnormalities: Antalgic;Decreased step clearance (flexed posture)        Base of Support: Widened  Stance: Left decreased  Speed/Nia: Slow  Step Length: Right shortened;Left shortened                       Therapeutic Exercises:   Seated heel & toe presses, hip abd/add  Pursed lip breathing   Breathing coord with arm motion  Axial ext    Functional Measure:  Tinetti test:    Sitting Balance: 1  Arises: 0  Attempts to Rise: 0  Immediate Standing Balance: 0  Standing Balance: 0  Nudged: 1  Eyes Closed: 0  Turn 360 Degrees - Continuous/Discontinuous: 0  Turn 360 Degrees - Steady/Unsteady: 1  Sitting Down: 0  Balance Score: 3  Indication of Gait: 0  R Step Length/Height: 1  L Step Length/Height: 1  R Foot Clearance: 1  L Foot Clearance: 1  Step Symmetry: 1  Step Continuity: 1  Path: 1  Trunk: 1  Walking Time: 0  Gait Score: 8  Total Score: 11       Tinetti Test and G-code impairment scale:  Percentage of Impairment CH    0%   CI    1-19% CJ    20-39% CK    40-59% CL    60-79% CM    80-99% CN     100%   Tinetti  Score 0-28 28 23-27 17-22 12-16 6-11 1-5 0       Tinetti Tool Score Risk of Falls  <19 = High Fall Risk  19-24 = Moderate Fall Risk  25-28 = Low Fall Risk  Tinetti ME. Performance-Oriented Assessment of Mobility Problems in Elderly Patients. Rosales 66; [id number]. (Scoring Description: PT Bulletin Feb. 10, 1993)    Older adults: Abimael Skaggs et al, 2009; n = 1000 [facility] elderly evaluated with ABC, LYN, ADL, and IADL)  · Mean LYN score for males aged 69-68 years = 26.21(3.40)  · Mean LYN score for females age 69-68 years = 25.16(4.30)  · Mean LYN score for males over 80 years = 23.29(6.02)  · Mean LYN score for females over 80 years = 17.20(8.32)       G codes: In compliance with CMSs Claims Based Outcome Reporting, the following G-code set was chosen for this patient based on their primary functional limitation being treated: The outcome measure chosen to determine the severity of the functional limitation was the tinetti with a score of 11/28 which was correlated with the impairment scale.     ? Mobility - Walking and Moving Around:     - CURRENT STATUS: CL - 60%-79% impaired, limited or restricted    - GOAL STATUS: CK - 40%-59% impaired, limited or restricted    - D/C STATUS:  ---------------To be determined---------------      Physical Therapy Evaluation Charge Determination   History Examination Presentation Decision-Making   MEDIUM  Complexity : 1-2 comorbidities / personal factors will impact the outcome/ POC  MEDIUM Complexity : 3 Standardized tests and measures addressing body structure, function, activity limitation and / or participation in recreation  MEDIUM Complexity : Evolving with changing characteristics  Other outcome measures tinetti  MEDIUM      Based on the above components, the patient evaluation is determined to be of the following complexity level: MEDIUM    Pain:  Pain Scale 1: Numeric (0 - 10)  Pain Intensity 1: 3  Pain Location 1: Abdomen  Pain Orientation 1: Anterior  Pain Description 1: Aching  Pain Intervention(s) 1: Medication (see MAR); Repositioned  Activity Tolerance:   See aboe  Please refer to the flowsheet for vital signs taken during this treatment. After treatment:   [x]         Patient left in no apparent distress sitting up in chair  []         Patient left in no apparent distress in bed  [x]         Call bell left within reach  [x]         Nursing notified  []         Caregiver present  []         Bed alarm activated    COMMUNICATION/EDUCATION:   The patients plan of care was discussed with: Registered Nurse and PA. [x]         Fall prevention education was provided and the patient/caregiver indicated understanding. [x]         Patient/family have participated as able in goal setting and plan of care. []         Patient/family agree to work toward stated goals and plan of care. []         Patient understands intent and goals of therapy, but is neutral about his/her participation. []         Patient is unable to participate in goal setting and plan of care.     Thank you for this referral.  Kvng Wiseman, PT   Time Calculation: 23 mins

## 2017-12-31 LAB
ANION GAP SERPL CALC-SCNC: 10 MMOL/L (ref 5–15)
BACTERIA SPEC CULT: NORMAL
BASOPHILS # BLD: 0 K/UL (ref 0–0.1)
BASOPHILS NFR BLD: 0 % (ref 0–1)
BUN SERPL-MCNC: 24 MG/DL (ref 6–20)
BUN/CREAT SERPL: 15 (ref 12–20)
CALCIUM SERPL-MCNC: 8.5 MG/DL (ref 8.5–10.1)
CHLORIDE SERPL-SCNC: 110 MMOL/L (ref 97–108)
CO2 SERPL-SCNC: 22 MMOL/L (ref 21–32)
CREAT SERPL-MCNC: 1.65 MG/DL (ref 0.7–1.3)
EOSINOPHIL # BLD: 0.1 K/UL (ref 0–0.4)
EOSINOPHIL NFR BLD: 2 % (ref 0–7)
ERYTHROCYTE [DISTWIDTH] IN BLOOD BY AUTOMATED COUNT: 14.2 % (ref 11.5–14.5)
GLUCOSE SERPL-MCNC: 174 MG/DL (ref 65–100)
HCT VFR BLD AUTO: 33.1 % (ref 36.6–50.3)
HGB BLD-MCNC: 11 G/DL (ref 12.1–17)
LYMPHOCYTES # BLD: 0.8 K/UL (ref 0.8–3.5)
LYMPHOCYTES NFR BLD: 12 % (ref 12–49)
MAGNESIUM SERPL-MCNC: 1.7 MG/DL (ref 1.6–2.4)
MCH RBC QN AUTO: 29.2 PG (ref 26–34)
MCHC RBC AUTO-ENTMCNC: 33.2 G/DL (ref 30–36.5)
MCV RBC AUTO: 87.8 FL (ref 80–99)
MONOCYTES # BLD: 0.6 K/UL (ref 0–1)
MONOCYTES NFR BLD: 9 % (ref 5–13)
NEUTS SEG # BLD: 5.3 K/UL (ref 1.8–8)
NEUTS SEG NFR BLD: 77 % (ref 32–75)
PHOSPHATE SERPL-MCNC: 2.2 MG/DL (ref 2.6–4.7)
PLATELET # BLD AUTO: 234 K/UL (ref 150–400)
POTASSIUM SERPL-SCNC: 4 MMOL/L (ref 3.5–5.1)
RBC # BLD AUTO: 3.77 M/UL (ref 4.1–5.7)
SERVICE CMNT-IMP: NORMAL
SODIUM SERPL-SCNC: 142 MMOL/L (ref 136–145)
WBC # BLD AUTO: 6.9 K/UL (ref 4.1–11.1)

## 2017-12-31 PROCEDURE — 77010033678 HC OXYGEN DAILY

## 2017-12-31 PROCEDURE — 74011250636 HC RX REV CODE- 250/636: Performed by: SURGERY

## 2017-12-31 PROCEDURE — 74011000250 HC RX REV CODE- 250: Performed by: SURGERY

## 2017-12-31 PROCEDURE — 80048 BASIC METABOLIC PNL TOTAL CA: CPT | Performed by: SURGERY

## 2017-12-31 PROCEDURE — 65660000000 HC RM CCU STEPDOWN

## 2017-12-31 PROCEDURE — 85025 COMPLETE CBC W/AUTO DIFF WBC: CPT | Performed by: SURGERY

## 2017-12-31 PROCEDURE — 74011250637 HC RX REV CODE- 250/637: Performed by: SURGERY

## 2017-12-31 PROCEDURE — 84100 ASSAY OF PHOSPHORUS: CPT | Performed by: SURGERY

## 2017-12-31 PROCEDURE — 83735 ASSAY OF MAGNESIUM: CPT | Performed by: SURGERY

## 2017-12-31 PROCEDURE — 36415 COLL VENOUS BLD VENIPUNCTURE: CPT | Performed by: SURGERY

## 2017-12-31 RX ORDER — HYDRALAZINE HYDROCHLORIDE 20 MG/ML
20 INJECTION INTRAMUSCULAR; INTRAVENOUS
Status: DISCONTINUED | OUTPATIENT
Start: 2017-12-31 | End: 2018-01-01

## 2017-12-31 RX ORDER — DOCUSATE SODIUM 100 MG/1
100 CAPSULE, LIQUID FILLED ORAL 2 TIMES DAILY
Status: DISCONTINUED | OUTPATIENT
Start: 2017-12-31 | End: 2018-01-03

## 2017-12-31 RX ADMIN — VANCOMYCIN HYDROCHLORIDE 1250 MG: 10 INJECTION, POWDER, LYOPHILIZED, FOR SOLUTION INTRAVENOUS at 20:54

## 2017-12-31 RX ADMIN — METRONIDAZOLE 500 MG: 500 INJECTION, SOLUTION INTRAVENOUS at 15:49

## 2017-12-31 RX ADMIN — Medication 10 ML: at 13:58

## 2017-12-31 RX ADMIN — DOCUSATE SODIUM 100 MG: 100 CAPSULE, LIQUID FILLED ORAL at 08:39

## 2017-12-31 RX ADMIN — HYDRALAZINE HYDROCHLORIDE 20 MG: 20 INJECTION INTRAMUSCULAR; INTRAVENOUS at 12:32

## 2017-12-31 RX ADMIN — METRONIDAZOLE 500 MG: 500 INJECTION, SOLUTION INTRAVENOUS at 23:44

## 2017-12-31 RX ADMIN — DOCUSATE SODIUM 100 MG: 100 CAPSULE, LIQUID FILLED ORAL at 17:26

## 2017-12-31 RX ADMIN — HYDRALAZINE HYDROCHLORIDE 20 MG: 20 INJECTION INTRAMUSCULAR; INTRAVENOUS at 04:26

## 2017-12-31 RX ADMIN — ACETAMINOPHEN 650 MG: 325 TABLET, FILM COATED ORAL at 16:26

## 2017-12-31 RX ADMIN — FAMOTIDINE 20 MG: 10 INJECTION, SOLUTION INTRAVENOUS at 08:39

## 2017-12-31 RX ADMIN — METRONIDAZOLE 500 MG: 500 INJECTION, SOLUTION INTRAVENOUS at 08:38

## 2017-12-31 RX ADMIN — ENOXAPARIN SODIUM 40 MG: 100 INJECTION SUBCUTANEOUS at 15:47

## 2017-12-31 NOTE — ROUTINE PROCESS
Bedside and Verbal shift change report given to Venkatesh Steele (oncoming nurse) by Georges Gonzalez (offgoing nurse). Report included the following information SBAR, Kardex, Intake/Output, MAR, Recent Results and Cardiac Rhythm V paced.

## 2017-12-31 NOTE — PROGRESS NOTES
Bedside shift change report given to Sharad (oncoming nurse) by Amanda Zamorano (offgoing nurse). Report included the following information SBAR, Intake/Output, MAR and Cardiac Rhythm V Paced.

## 2017-12-31 NOTE — PROGRESS NOTES
Problem: Falls - Risk of  Goal: *Absence of Falls  Document Lourdes Fall Risk and appropriate interventions in the flowsheet. Outcome: Progressing Towards Goal  Fall Risk Interventions:            Medication Interventions: Patient to call before getting OOB, Teach patient to arise slowly    Elimination Interventions: Call light in reach             Problem: Pressure Injury - Risk of  Goal: *Prevention of pressure ulcer  Outcome: Progressing Towards Goal  Skin on pressure points cdi with no apparent breakdown at this time.     Problem: Surgical Pathway Post-Op Day 2 through Discharge  Goal: *Adequate urinary output (equal to or greater than 30 milliliters/hour)  Outcome: Progressing Towards Goal  Kunz producing adequate urinary output

## 2018-01-01 LAB
ANION GAP SERPL CALC-SCNC: 10 MMOL/L (ref 5–15)
BACTERIA SPEC CULT: ABNORMAL
BUN SERPL-MCNC: 24 MG/DL (ref 6–20)
BUN/CREAT SERPL: 14 (ref 12–20)
CALCIUM SERPL-MCNC: 8.6 MG/DL (ref 8.5–10.1)
CHLORIDE SERPL-SCNC: 111 MMOL/L (ref 97–108)
CO2 SERPL-SCNC: 23 MMOL/L (ref 21–32)
CREAT SERPL-MCNC: 1.69 MG/DL (ref 0.7–1.3)
GLUCOSE SERPL-MCNC: 162 MG/DL (ref 65–100)
GRAM STN SPEC: ABNORMAL
GRAM STN SPEC: ABNORMAL
POTASSIUM SERPL-SCNC: 3.7 MMOL/L (ref 3.5–5.1)
SERVICE CMNT-IMP: ABNORMAL
SODIUM SERPL-SCNC: 144 MMOL/L (ref 136–145)

## 2018-01-01 PROCEDURE — 74011250636 HC RX REV CODE- 250/636: Performed by: SURGERY

## 2018-01-01 PROCEDURE — 74011250637 HC RX REV CODE- 250/637: Performed by: SURGERY

## 2018-01-01 PROCEDURE — 80048 BASIC METABOLIC PNL TOTAL CA: CPT | Performed by: SURGERY

## 2018-01-01 PROCEDURE — 51798 US URINE CAPACITY MEASURE: CPT

## 2018-01-01 PROCEDURE — 36415 COLL VENOUS BLD VENIPUNCTURE: CPT | Performed by: SURGERY

## 2018-01-01 PROCEDURE — 74011000250 HC RX REV CODE- 250: Performed by: SURGERY

## 2018-01-01 PROCEDURE — 65660000000 HC RM CCU STEPDOWN

## 2018-01-01 RX ORDER — HYDRALAZINE HYDROCHLORIDE 20 MG/ML
20 INJECTION INTRAMUSCULAR; INTRAVENOUS
Status: DISCONTINUED | OUTPATIENT
Start: 2018-01-01 | End: 2018-01-05 | Stop reason: HOSPADM

## 2018-01-01 RX ORDER — FAMOTIDINE 20 MG/1
20 TABLET, FILM COATED ORAL DAILY
Status: DISCONTINUED | OUTPATIENT
Start: 2018-01-02 | End: 2018-01-05 | Stop reason: HOSPADM

## 2018-01-01 RX ADMIN — ENOXAPARIN SODIUM 40 MG: 100 INJECTION SUBCUTANEOUS at 15:00

## 2018-01-01 RX ADMIN — METRONIDAZOLE 500 MG: 500 INJECTION, SOLUTION INTRAVENOUS at 23:24

## 2018-01-01 RX ADMIN — DOCUSATE SODIUM 100 MG: 100 CAPSULE, LIQUID FILLED ORAL at 20:09

## 2018-01-01 RX ADMIN — ENOXAPARIN SODIUM 40 MG: 100 INJECTION SUBCUTANEOUS at 13:49

## 2018-01-01 RX ADMIN — ACETAMINOPHEN 650 MG: 325 TABLET, FILM COATED ORAL at 20:09

## 2018-01-01 RX ADMIN — HYDRALAZINE HYDROCHLORIDE 20 MG: 20 INJECTION INTRAMUSCULAR; INTRAVENOUS at 16:50

## 2018-01-01 RX ADMIN — METRONIDAZOLE 500 MG: 500 INJECTION, SOLUTION INTRAVENOUS at 08:31

## 2018-01-01 RX ADMIN — FAMOTIDINE 20 MG: 10 INJECTION, SOLUTION INTRAVENOUS at 08:30

## 2018-01-01 RX ADMIN — DOCUSATE SODIUM 100 MG: 100 CAPSULE, LIQUID FILLED ORAL at 08:30

## 2018-01-01 RX ADMIN — SODIUM CHLORIDE, SODIUM LACTATE, POTASSIUM CHLORIDE, AND CALCIUM CHLORIDE 50 ML/HR: 600; 310; 30; 20 INJECTION, SOLUTION INTRAVENOUS at 23:26

## 2018-01-01 RX ADMIN — METRONIDAZOLE 500 MG: 500 INJECTION, SOLUTION INTRAVENOUS at 16:49

## 2018-01-01 RX ADMIN — HYDRALAZINE HYDROCHLORIDE 20 MG: 20 INJECTION INTRAMUSCULAR; INTRAVENOUS at 07:02

## 2018-01-01 RX ADMIN — Medication 10 ML: at 21:00

## 2018-01-01 NOTE — PROGRESS NOTES
Progress Note    Patient: Emeli Piña MRN: [id number]  SSN: [id number]    YOB: 1932  Age: 80 y.o. Sex: male      Admit Date: 2017    4 Days Post-Op    Procedure:  Procedure(s):  LAPAROTOMY EXPLORATORY SMALL BOWEL RESECTION     Subjective:     Patient didn't take much this morning PO. Wanted just liquids and applesauce. He is passing copious flatus but no stool yet. Had difficulty voiding yesterday. Wants to get oob today. Objective:     Visit Vitals    /75 (BP 1 Location: Right arm, BP Patient Position: At rest)    Pulse 99    Temp 97.7 °F (36.5 °C)    Resp 20    Ht 5' 9\" (1.753 m)    Wt 203 lb 4.2 oz (92.2 kg)    SpO2 94%    BMI 30.02 kg/m2       Temp (24hrs), Av °F (36.7 °C), Min:97.5 °F (36.4 °C), Max:98.5 °F (36.9 °C)        Physical Exam:    Gen:  NAD  Pulm:  Unlabored. Has some end expiratory wheezing centrally  Abd:  S/moderately distended/appropriate TTP. Wound:  C/D/I, closed w/ staples. Recent Results (from the past 24 hour(s))   METABOLIC PANEL, BASIC    Collection Time: 18  3:50 AM   Result Value Ref Range    Sodium 144 136 - 145 mmol/L    Potassium 3.7 3.5 - 5.1 mmol/L    Chloride 111 (H) 97 - 108 mmol/L    CO2 23 21 - 32 mmol/L    Anion gap 10 5 - 15 mmol/L    Glucose 162 (H) 65 - 100 mg/dL    BUN 24 (H) 6 - 20 MG/DL    Creatinine 1.69 (H) 0.70 - 1.30 MG/DL    BUN/Creatinine ratio 14 12 - 20      GFR est AA 47 (L) >60 ml/min/1.73m2    GFR est non-AA 39 (L) >60 ml/min/1.73m2    Calcium 8.6 8.5 - 10.1 MG/DL         Assessment:     Hospital Problems  Date Reviewed: 2017          Codes Class Noted POA    Perforated diverticulum of small intestine ICD-10-CM: K57.00  ICD-9-CM: 562.00  2017 Unknown              Plan/Recommendations/Medical Decision Making:     - On GI lite diet.     - Will keep on low rate IVF until taking better PO  - Bowel regiment  - Out of bed encourage ambulation  - AM labs    Signed By: Ronny Bhatt MD January 1, 2018

## 2018-01-01 NOTE — PROGRESS NOTES
Bedside shift change report given to Juancho's RN (oncoming nurse) by Arthur Alfredo (offgoing nurse). Report included the following information SBAR, Intake/Output, MAR, Recent Results and Cardiac Rhythm V Paced.

## 2018-01-01 NOTE — PROGRESS NOTES
Problem: Falls - Risk of  Goal: *Absence of Falls  Document Lourdes Fall Risk and appropriate interventions in the flowsheet.    Fall Risk Interventions:            Medication Interventions: Teach patient to arise slowly    Elimination Interventions: Call light in reach

## 2018-01-01 NOTE — PROGRESS NOTES
0406  Pt has produced minimal urine overnight and complains of sensation of pressure. Bladder scan shows ~200ml. Straight cathed per protocol for 700ml.

## 2018-01-01 NOTE — ROUTINE PROCESS
Bedside and Verbal shift change report given to Susana (oncoming nurse) by Radha Costa (offgoing nurse). Report included the following information SBAR, Kardex, Procedure Summary, Recent Results and Cardiac Rhythm Sinus Felipe/Sinus rythm.

## 2018-01-01 NOTE — PROGRESS NOTES
Clinical Pharmacy Note: IV to PO Automatic Conversion  Please note: Ping Rai medication(s) (famotidine) has been changed from IV to PO based on the following critiera:    - Patient is taking scheduled oral medications  - Patient is tolerating diet    First dose set for tomorrow AM.    This IV to PO conversion is based on the [employer] approved automatic conversion policy for eligible patients. Please call with questions.

## 2018-01-01 NOTE — ROUTINE PROCESS
Bedside and Verbal shift change report given to [facility] 7715 (oncoming nurse) by Petrona Mir (offgoing nurse). Report included the following information SBAR, Kardex, Intake/Output, MAR, Recent Results and Cardiac Rhythm V paced.

## 2018-01-01 NOTE — PROGRESS NOTES
Problem: Falls - Risk of  Goal: *Absence of Falls  Document Lourdes Fall Risk and appropriate interventions in the flowsheet. Outcome: Progressing Towards Goal  Fall Risk Interventions:            Medication Interventions: Teach patient to arise slowly    Elimination Interventions: Call light in reach             Problem: Pressure Injury - Risk of  Goal: *Prevention of pressure ulcer  Outcome: Progressing Towards Goal  Pt independently ambulatory, turns self in bed easily. Problem: Surgical Pathway Post-Op Day 2 through Discharge  Goal: *Optimal pain control at patient's stated goal  Outcome: Progressing Towards Goal  Pt denies pain at this time.   Goal: *Adequate urinary output (equal to or greater than 30 milliliters/hour)  Outcome: Progressing Towards Goal  Pt producing adequate urinary output after fung removal

## 2018-01-02 LAB
ANION GAP SERPL CALC-SCNC: 9 MMOL/L (ref 5–15)
BASOPHILS # BLD: 0 K/UL (ref 0–0.1)
BASOPHILS NFR BLD: 0 % (ref 0–1)
BUN SERPL-MCNC: 24 MG/DL (ref 6–20)
BUN/CREAT SERPL: 14 (ref 12–20)
CALCIUM SERPL-MCNC: 8.2 MG/DL (ref 8.5–10.1)
CHLORIDE SERPL-SCNC: 111 MMOL/L (ref 97–108)
CO2 SERPL-SCNC: 24 MMOL/L (ref 21–32)
CREAT SERPL-MCNC: 1.73 MG/DL (ref 0.7–1.3)
DIFFERENTIAL METHOD BLD: ABNORMAL
EOSINOPHIL # BLD: 0.1 K/UL (ref 0–0.4)
EOSINOPHIL NFR BLD: 1 % (ref 0–7)
ERYTHROCYTE [DISTWIDTH] IN BLOOD BY AUTOMATED COUNT: 14.6 % (ref 11.5–14.5)
GLUCOSE SERPL-MCNC: 161 MG/DL (ref 65–100)
HCT VFR BLD AUTO: 34.6 % (ref 36.6–50.3)
HGB BLD-MCNC: 11.4 G/DL (ref 12.1–17)
LYMPHOCYTES # BLD: 1.4 K/UL (ref 0.8–3.5)
LYMPHOCYTES NFR BLD: 22 % (ref 12–49)
MCH RBC QN AUTO: 29.2 PG (ref 26–34)
MCHC RBC AUTO-ENTMCNC: 32.9 G/DL (ref 30–36.5)
MCV RBC AUTO: 88.7 FL (ref 80–99)
METAMYELOCYTES NFR BLD MANUAL: 1 %
MONOCYTES # BLD: 0.5 K/UL (ref 0–1)
MONOCYTES NFR BLD: 8 % (ref 5–13)
MYELOCYTES NFR BLD MANUAL: 2 %
NEUTS SEG # BLD: 4.2 K/UL (ref 1.8–8)
NEUTS SEG NFR BLD: 66 % (ref 32–75)
PLATELET # BLD AUTO: 318 K/UL (ref 150–400)
PLATELET COMMENTS,PCOM: ABNORMAL
POTASSIUM SERPL-SCNC: 3.8 MMOL/L (ref 3.5–5.1)
RBC # BLD AUTO: 3.9 M/UL (ref 4.1–5.7)
RBC MORPH BLD: ABNORMAL
SODIUM SERPL-SCNC: 144 MMOL/L (ref 136–145)
WBC # BLD AUTO: 6.4 K/UL (ref 4.1–11.1)

## 2018-01-02 PROCEDURE — 80048 BASIC METABOLIC PNL TOTAL CA: CPT | Performed by: SURGERY

## 2018-01-02 PROCEDURE — 74011250636 HC RX REV CODE- 250/636: Performed by: NURSE PRACTITIONER

## 2018-01-02 PROCEDURE — 74011250637 HC RX REV CODE- 250/637: Performed by: SURGERY

## 2018-01-02 PROCEDURE — 74011000250 HC RX REV CODE- 250: Performed by: SURGERY

## 2018-01-02 PROCEDURE — 51798 US URINE CAPACITY MEASURE: CPT

## 2018-01-02 PROCEDURE — 85025 COMPLETE CBC W/AUTO DIFF WBC: CPT | Performed by: SURGERY

## 2018-01-02 PROCEDURE — 74011250636 HC RX REV CODE- 250/636: Performed by: SURGERY

## 2018-01-02 PROCEDURE — 74011000250 HC RX REV CODE- 250

## 2018-01-02 PROCEDURE — 74011250637 HC RX REV CODE- 250/637: Performed by: NURSE PRACTITIONER

## 2018-01-02 PROCEDURE — 97116 GAIT TRAINING THERAPY: CPT

## 2018-01-02 PROCEDURE — 36415 COLL VENOUS BLD VENIPUNCTURE: CPT | Performed by: SURGERY

## 2018-01-02 PROCEDURE — 65660000000 HC RM CCU STEPDOWN

## 2018-01-02 PROCEDURE — 97530 THERAPEUTIC ACTIVITIES: CPT

## 2018-01-02 RX ORDER — TAMSULOSIN HYDROCHLORIDE 0.4 MG/1
0.4 CAPSULE ORAL DAILY
Status: DISCONTINUED | OUTPATIENT
Start: 2018-01-02 | End: 2018-01-05 | Stop reason: HOSPADM

## 2018-01-02 RX ORDER — FUROSEMIDE 10 MG/ML
20 INJECTION INTRAMUSCULAR; INTRAVENOUS ONCE
Status: COMPLETED | OUTPATIENT
Start: 2018-01-02 | End: 2018-01-02

## 2018-01-02 RX ORDER — SODIUM CHLORIDE 9 MG/ML
INJECTION INTRAMUSCULAR; INTRAVENOUS; SUBCUTANEOUS
Status: COMPLETED
Start: 2018-01-02 | End: 2018-01-02

## 2018-01-02 RX ORDER — SODIUM CHLORIDE 0.9 % (FLUSH) 0.9 %
5-10 SYRINGE (ML) INJECTION EVERY 8 HOURS
Status: DISCONTINUED | OUTPATIENT
Start: 2018-01-02 | End: 2018-01-05 | Stop reason: HOSPADM

## 2018-01-02 RX ORDER — BISACODYL 5 MG
5 TABLET, DELAYED RELEASE (ENTERIC COATED) ORAL DAILY PRN
Status: DISCONTINUED | OUTPATIENT
Start: 2018-01-02 | End: 2018-01-05 | Stop reason: HOSPADM

## 2018-01-02 RX ORDER — SODIUM CHLORIDE 0.9 % (FLUSH) 0.9 %
5-10 SYRINGE (ML) INJECTION AS NEEDED
Status: DISCONTINUED | OUTPATIENT
Start: 2018-01-02 | End: 2018-01-05 | Stop reason: HOSPADM

## 2018-01-02 RX ORDER — MINERAL OIL
45 OIL (ML) ORAL DAILY
Status: DISCONTINUED | OUTPATIENT
Start: 2018-01-03 | End: 2018-01-04

## 2018-01-02 RX ADMIN — LEVOFLOXACIN 750 MG: 5 INJECTION, SOLUTION INTRAVENOUS at 00:00

## 2018-01-02 RX ADMIN — METRONIDAZOLE 500 MG: 500 INJECTION, SOLUTION INTRAVENOUS at 16:28

## 2018-01-02 RX ADMIN — BISACODYL 5 MG: 5 TABLET, COATED ORAL at 13:31

## 2018-01-02 RX ADMIN — DOCUSATE SODIUM 100 MG: 100 CAPSULE, LIQUID FILLED ORAL at 18:09

## 2018-01-02 RX ADMIN — FUROSEMIDE 20 MG: 10 INJECTION, SOLUTION INTRAMUSCULAR; INTRAVENOUS at 13:31

## 2018-01-02 RX ADMIN — Medication 10 ML: at 22:00

## 2018-01-02 RX ADMIN — Medication 10 ML: at 06:00

## 2018-01-02 RX ADMIN — HYDRALAZINE HYDROCHLORIDE 20 MG: 20 INJECTION INTRAMUSCULAR; INTRAVENOUS at 04:03

## 2018-01-02 RX ADMIN — DOCUSATE SODIUM 100 MG: 100 CAPSULE, LIQUID FILLED ORAL at 09:10

## 2018-01-02 RX ADMIN — SODIUM CHLORIDE 10 ML: 9 INJECTION INTRAMUSCULAR; INTRAVENOUS; SUBCUTANEOUS at 04:05

## 2018-01-02 RX ADMIN — METRONIDAZOLE 500 MG: 500 INJECTION, SOLUTION INTRAVENOUS at 09:10

## 2018-01-02 RX ADMIN — ENOXAPARIN SODIUM 40 MG: 100 INJECTION SUBCUTANEOUS at 16:27

## 2018-01-02 RX ADMIN — ACETAMINOPHEN 650 MG: 325 TABLET, FILM COATED ORAL at 02:29

## 2018-01-02 RX ADMIN — TAMSULOSIN HYDROCHLORIDE 0.4 MG: 0.4 CAPSULE ORAL at 18:27

## 2018-01-02 RX ADMIN — FAMOTIDINE 20 MG: 20 TABLET, FILM COATED ORAL at 09:10

## 2018-01-02 NOTE — PROGRESS NOTES
Problem: Falls - Risk of  Goal: *Absence of Falls  Document Lourdes Fall Risk and appropriate interventions in the flowsheet.    Outcome: Progressing Towards Goal  Fall Risk Interventions:  Mobility Interventions: Patient to call before getting OOB         Medication Interventions: Assess postural VS orthostatic hypotension, Patient to call before getting OOB    Elimination Interventions: Call light in reach, Toilet paper/wipes in reach, Patient to call for help with toileting needs

## 2018-01-02 NOTE — PROGRESS NOTES
Problem: Mobility Impaired (Adult and Pediatric)  Goal: *Acute Goals and Plan of Care (Insert Text)  Physical Therapy Goals  Initiated 12/30/2017  1. Patient will move from supine to sit and sit to supine  in bed with modified independence within 7 day(s). 2.  Patient will transfer from bed to chair and chair to bed with modified independence using the least restrictive device within 7 day(s). 3.  Patient will perform sit to stand with modified independence within 7 day(s). 4.  Patient will ambulate with modified independence for 250 feet with the least restrictive device within 7 day(s). 5.  Patient will ascend/descend 12 stairs with 1 handrail(s) with modified independence within 7 day(s). physical Therapy TREATMENT  Patient: Yesenai Mitchell (2 y.o. male)  Date: 1/2/2018  Diagnosis: PERFORATED BOWEL  Perforated diverticulum of small intestine <principal problem not specified>  Procedure(s) (LRB):  LAPAROTOMY EXPLORATORY SMALL BOWEL RESECTION  (N/A) 5 Days Post-Op  Precautions:      ASSESSMENT:  Patient hesitant to ambulate, says he received lasix 2 hours ago and feels intense pressure, but has been unable to urinate. Encouraged patient to participate in the hopes mobility will help with urination. Patient able to perform supine to sit with SBA, then stood with CGA. Upon standing, patient immediately incontinent of urine. Patient utilized urinal and then was incontinent again in standing. Brief donned and patient able to ambulate 120 ft with CGA. Increased stability noted this date compared with last visit. Patient hopes to discharge home tomorrow. Recommend HHPT and family assist at discharge. Will continue to follow.    Progression toward goals:  []    Improving appropriately and progressing toward goals  [x]    Improving slowly and progressing toward goals  []    Not making progress toward goals and plan of care will be adjusted     PLAN:  Patient continues to benefit from skilled intervention to address the above impairments. Continue treatment per established plan of care. Discharge Recommendations:  Home Health  Further Equipment Recommendations for Discharge:  none     SUBJECTIVE:   Patient stated I don't know if I should walk. I'm really hurting from the pressure and I can't pee.     OBJECTIVE DATA SUMMARY:   Critical Behavior:  Neurologic State: Alert           Functional Mobility Training:  Bed Mobility:     Supine to Sit: Stand-by asssistance  Sit to Supine:  (not assessed, OOB to chair)           Transfers:   sit to stand: CGA                                Balance:  Sitting: Intact  Standing: Impaired; Without support  Standing - Static: Good  Standing - Dynamic : Fair  Ambulation/Gait Training:  Distance (ft): 120 Feet (ft)  Assistive Device: Gait belt  Ambulation - Level of Assistance: Contact guard assistance        Gait Abnormalities: Decreased step clearance;Trunk sway increased (flexed posture)        Base of Support: Widened     Speed/Nia: Shuffled             Pain:                    Activity Tolerance:   SpO2 99%, HR 98 during ambulation. Patient visibly SOB during mobility. Please refer to the flowsheet for vital signs taken during this treatment.   After treatment:   [x]    Patient left in no apparent distress sitting up in chair  []    Patient left in no apparent distress in bed  [x]    Call bell left within reach  [x]    Nursing notified  [x]    Caregiver present  []    Bed alarm activated    COMMUNICATION/COLLABORATION:   The patients plan of care was discussed with: Registered Nurse    Jayashree Rodriguez, PT   Time Calculation: 23 mins

## 2018-01-02 NOTE — PROGRESS NOTES
Bedside and Verbal shift change report given to AVI Casey (oncoming nurse) by Blanca Parada RN (offgoing nurse). Report included the following information SBAR, OR Summary, Intake/Output and Cardiac Rhythm V Paced.

## 2018-01-03 ENCOUNTER — HOME HEALTH ADMISSION (OUTPATIENT)
Dept: HOME HEALTH SERVICES | Facility: HOME HEALTH | Age: 83
End: 2018-01-03
Payer: MEDICARE

## 2018-01-03 LAB
ANION GAP SERPL CALC-SCNC: 8 MMOL/L (ref 5–15)
BACTERIA SPEC CULT: ABNORMAL
BUN SERPL-MCNC: 23 MG/DL (ref 6–20)
BUN/CREAT SERPL: 13 (ref 12–20)
CALCIUM SERPL-MCNC: 8.4 MG/DL (ref 8.5–10.1)
CHLORIDE SERPL-SCNC: 110 MMOL/L (ref 97–108)
CO2 SERPL-SCNC: 25 MMOL/L (ref 21–32)
CREAT SERPL-MCNC: 1.71 MG/DL (ref 0.7–1.3)
GLUCOSE SERPL-MCNC: 156 MG/DL (ref 65–100)
MAGNESIUM SERPL-MCNC: 1.7 MG/DL (ref 1.6–2.4)
POTASSIUM SERPL-SCNC: 3.8 MMOL/L (ref 3.5–5.1)
SERVICE CMNT-IMP: ABNORMAL
SODIUM SERPL-SCNC: 143 MMOL/L (ref 136–145)

## 2018-01-03 PROCEDURE — 51798 US URINE CAPACITY MEASURE: CPT

## 2018-01-03 PROCEDURE — 74011250637 HC RX REV CODE- 250/637: Performed by: SURGERY

## 2018-01-03 PROCEDURE — 36415 COLL VENOUS BLD VENIPUNCTURE: CPT | Performed by: SURGERY

## 2018-01-03 PROCEDURE — 74011250636 HC RX REV CODE- 250/636: Performed by: NURSE PRACTITIONER

## 2018-01-03 PROCEDURE — 65660000000 HC RM CCU STEPDOWN

## 2018-01-03 PROCEDURE — 74011250636 HC RX REV CODE- 250/636: Performed by: SURGERY

## 2018-01-03 PROCEDURE — 97116 GAIT TRAINING THERAPY: CPT

## 2018-01-03 PROCEDURE — 74011000250 HC RX REV CODE- 250: Performed by: SURGERY

## 2018-01-03 PROCEDURE — 83735 ASSAY OF MAGNESIUM: CPT | Performed by: SURGERY

## 2018-01-03 PROCEDURE — 80048 BASIC METABOLIC PNL TOTAL CA: CPT | Performed by: SURGERY

## 2018-01-03 RX ORDER — METRONIDAZOLE 500 MG/1
500 TABLET ORAL 3 TIMES DAILY
Qty: 21 TAB | Refills: 0 | Status: SHIPPED | OUTPATIENT
Start: 2018-01-03 | End: 2018-01-05

## 2018-01-03 RX ORDER — LEVOFLOXACIN 500 MG/1
500 TABLET, FILM COATED ORAL DAILY
Qty: 7 TAB | Refills: 0 | Status: SHIPPED | OUTPATIENT
Start: 2018-01-03 | End: 2018-01-05

## 2018-01-03 RX ORDER — OXYCODONE AND ACETAMINOPHEN 5; 325 MG/1; MG/1
1 TABLET ORAL
Qty: 30 TAB | Refills: 0 | Status: SHIPPED | OUTPATIENT
Start: 2018-01-03 | End: 2018-01-16

## 2018-01-03 RX ORDER — FUROSEMIDE 10 MG/ML
20 INJECTION INTRAMUSCULAR; INTRAVENOUS ONCE
Status: COMPLETED | OUTPATIENT
Start: 2018-01-03 | End: 2018-01-03

## 2018-01-03 RX ADMIN — Medication 10 ML: at 14:18

## 2018-01-03 RX ADMIN — DOCUSATE SODIUM 100 MG: 100 CAPSULE, LIQUID FILLED ORAL at 08:46

## 2018-01-03 RX ADMIN — ENOXAPARIN SODIUM 40 MG: 100 INJECTION SUBCUTANEOUS at 14:25

## 2018-01-03 RX ADMIN — METRONIDAZOLE 500 MG: 500 INJECTION, SOLUTION INTRAVENOUS at 08:48

## 2018-01-03 RX ADMIN — FAMOTIDINE 20 MG: 20 TABLET, FILM COATED ORAL at 08:47

## 2018-01-03 RX ADMIN — TAMSULOSIN HYDROCHLORIDE 0.4 MG: 0.4 CAPSULE ORAL at 08:47

## 2018-01-03 RX ADMIN — METRONIDAZOLE 500 MG: 500 INJECTION, SOLUTION INTRAVENOUS at 00:00

## 2018-01-03 RX ADMIN — Medication 10 ML: at 22:00

## 2018-01-03 RX ADMIN — FUROSEMIDE 20 MG: 10 INJECTION, SOLUTION INTRAMUSCULAR; INTRAVENOUS at 14:17

## 2018-01-03 RX ADMIN — Medication 10 ML: at 14:00

## 2018-01-03 RX ADMIN — Medication 10 ML: at 06:00

## 2018-01-03 RX ADMIN — METRONIDAZOLE 500 MG: 500 INJECTION, SOLUTION INTRAVENOUS at 16:37

## 2018-01-03 NOTE — PROGRESS NOTES
Bladder scan at bedside 186ml Dr Meng Kelley aware. Orders for Continue to monitor I/o and encourage patient to increase PO intake.

## 2018-01-03 NOTE — PROGRESS NOTES
Care Management: Physical therapy suggesting at home PT. Spoke with patient and wife and they have used [facility] for wife and were very pleased. Referral made via [facility]. Will follow transition of care. 1-4-18 / 6804: [facility] accepted patient for home PT. To continue to follow transition of care.     Thalia Shi RN BSN CM

## 2018-01-03 NOTE — PROGRESS NOTES
Bedside shift change report given to Joni (oncoming nurse) by Enrique Singh (offgoing nurse). Report included the following information SBAR, OR Summary, MAR, Recent Results and Cardiac Rhythm Paced.

## 2018-01-03 NOTE — PROGRESS NOTES
Problem: Mobility Impaired (Adult and Pediatric)  Goal: *Acute Goals and Plan of Care (Insert Text)  Physical Therapy Goals  Initiated 12/30/2017  1. Patient will move from supine to sit and sit to supine  in bed with modified independence within 7 day(s). 2.  Patient will transfer from bed to chair and chair to bed with modified independence using the least restrictive device within 7 day(s). 3.  Patient will perform sit to stand with modified independence within 7 day(s). 4.  Patient will ambulate with modified independence for 250 feet with the least restrictive device within 7 day(s). 5.  Patient will ascend/descend 12 stairs with 1 handrail(s) with modified independence within 7 day(s). physical Therapy TREATMENT  Patient: River Gallagher (39 y.o. male)  Date: 1/3/2018  Diagnosis: PERFORATED BOWEL  Perforated diverticulum of small intestine <principal problem not specified>  Procedure(s) (LRB):  LAPAROTOMY EXPLORATORY SMALL BOWEL RESECTION  (N/A) 6 Days Post-Op  Precautions:      ASSESSMENT:  Patient up in chair upon arrival with wife present, says he ambulated in hallways with RN earlier. Patient ambulated 200 ft without AD with CGA for safety. Gait steady, however patient dyspneic upon exertion. Verbal cues for slow, controlled breathing during activity. SpO2 98%, HR 99 during mobility on room air. Patient left up in chair at end of session. Recommend HHPT at discharge. Will continue to follow to progress mobility. Progression toward goals:  []    Improving appropriately and progressing toward goals  [x]    Improving slowly and progressing toward goals  []    Not making progress toward goals and plan of care will be adjusted     PLAN:  Patient continues to benefit from skilled intervention to address the above impairments. Continue treatment per established plan of care.   Discharge Recommendations:  Home Health  Further Equipment Recommendations for Discharge:  none     SUBJECTIVE:   Patient stated I feel a little better today.     OBJECTIVE DATA SUMMARY:   Critical Behavior:  Neurologic State: Alert           Functional Mobility Training:  Bed Mobility:     Supine to Sit:  (not assessed, in chair upon arrival)              Transfers:  Sit to Stand: Contact guard assistance  Stand to Sit: Contact guard assistance                             Balance:  Sitting: Intact  Standing: Impaired; Without support  Standing - Static: Good  Standing - Dynamic : Fair  Ambulation/Gait Training:  Distance (ft): 200 Feet (ft)  Assistive Device: Gait belt  Ambulation - Level of Assistance: Contact guard assistance        Gait Abnormalities: Decreased step clearance;Trunk sway increased        Base of Support: Widened     Speed/Nia: Slow  Step Length: Left shortened;Right shortened          Therapeutic Exercises:   LAQs, marching x 10 reps each  Pain:  Pain Scale 1: Numeric (0 - 10)  Pain Intensity 1: 0              Activity Tolerance:   SpO2 98%, HR 99 during mobility on room air  Please refer to the flowsheet for vital signs taken during this treatment.   After treatment:   [x]    Patient left in no apparent distress sitting up in chair  []    Patient left in no apparent distress in bed  [x]    Call bell left within reach  [x]    Nursing notified  [x]    Caregiver present  []    Bed alarm activated    COMMUNICATION/COLLABORATION:   The patients plan of care was discussed with: Registered Nurse    Jayashree Rodriguez, PT   Time Calculation: 11 mins

## 2018-01-03 NOTE — PROGRESS NOTES
Problem: Patient Education: Go to Patient Education Activity  Goal: Patient/Family Education  NUTRITION     Completed a Low Fiber diet instruction with patient and patient's wife. Both very interested in healthy eating and pay a lot of attention to fiber content of diet and types of foods they eat. Included information on how to increase fiber back into diet when advised by physician to do so. Encouraged patient to ensure drinking sufficient fluid as usual overall daily fluid intake may be insufficient for a higher fiber diet. Provided appropriate dietary handouts and answered all questions. More interested in eating today and ate a good lunch. Currently receiving a GI Lite diet.       Alicia Edwards DTR

## 2018-01-03 NOTE — DISCHARGE INSTRUCTIONS
1.  Do not drive while on pain medication. You should take a stool softener while on pain medication to prevent constipation. 2.  Do not lift anything greater than 15 pounds for 2 weeks. 3.  You may resume your home medications. 4.  You may shower but do not swim or soak in tub for 2 weeks. 5.  Please call [phone] to schedule a follow-up with Dr. Jamal Chen within 2 weeks. Open Bowel Resection: What to Expect at [address] [place] [address] are likely to have pain that comes and goes for the next few days after bowel surgery. You may have bowel cramps, and your cut (incision) may hurt. You may also feel like you have the flu. You may have a low fever and feel tired and nauseated. This is common. You should feel better after a week and will probably be back to normal in 2 to 3 weeks. This care sheet gives you a general idea about how long it will take for you to recover. But each person recovers at a different pace. Follow the steps below to get better as quickly as possible. How can you care for yourself at home? Activity  ? · Rest when you feel tired. Getting enough sleep will help you recover. ? · Try to walk each day. Start by walking a little more than you did the day before. Bit by bit, increase the amount you walk. Walking boosts blood flow and helps prevent pneumonia and constipation. ? · Avoid strenuous activities, such as biking, jogging, weight lifting, or aerobic exercise, until your doctor says it is okay. ? · Ask your doctor when you can drive again. ? · You will probably need to take 3 to 4 weeks off from work. It depends on the type of work you do and how you feel. You may need to take off 4 to 6 weeks if you lift heavy objects in your job. ? · You may shower 24 to 48 hours after surgery, if your doctor says it is okay. Pat the cut (incision) dry. Do not take a bath for the first 2 weeks, or until your doctor tells you it is okay.    ? · Ask your doctor when it is okay for you to have sex. Diet  ? · You may not have much appetite after the surgery. But try to eat a healthy diet. Your doctor will tell you about any foods you should not eat. ? · Eat a low-fiber diet for several weeks after surgery. Eat many small meals throughout the day. Add high-fiber foods a little at a time. ? · Eat yogurt. It puts good bacteria into your colon and helps prevent diarrhea. ? · Try to avoid nuts, seeds, and corn for a while. They may be hard to digest.   ? · You may need to take vitamins that contain sodium and potassium. Ask your doctor. ? · Drink plenty of fluids to avoid becoming dehydrated. Medicines  ? · Your doctor will tell you if and when you can restart your medicines. He or she will also give you instructions about taking any new medicines. ? · If you take blood thinners, such as warfarin (Coumadin), clopidogrel (Plavix), or aspirin, be sure to talk to your doctor. He or she will tell you if and when to start taking those medicines again. Make sure that you understand exactly what your doctor wants you to do. ? · Take pain medicines exactly as directed. ¨ If the doctor gave you a prescription medicine for pain, take it as prescribed. ¨ If you are not taking a prescription pain medicine, ask your doctor if you can take an over-the-counter medicine. ¨ Do not take two or more pain medicines at the same time unless the doctor told you to. Many pain medicines have acetaminophen, which is Tylenol. Too much acetaminophen (Tylenol) can be harmful. ? · If you think your pain medicine is making you sick to your stomach:  ¨ Take your medicine after meals (unless your doctor tells you not to). ¨ Ask your doctor for a different pain medicine. ? · If your doctor prescribed antibiotics, take them as directed. Do not stop taking them just because you feel better. You need to take the full course of antibiotics. ? · You may need to take some medicines in a different form.  You will be told whether to crush pills or take a liquid form of the medicine. ? · If your doctor gives you a stool softener, take it as directed. Incision care  ? · If you have strips of tape on the incision, leave the tape on for a week or until it falls off.   ? · Wash the area daily with warm, soapy water, and pat it dry. Follow-up care is a key part of your treatment and safety. Be sure to make and go to all appointments, and call your doctor if you are having problems. It's also a good idea to know your test results and keep a list of the medicines you take. When should you call for help? Call 911 anytime you think you may need emergency care. For example, call if:  ? · You passed out (lost consciousness). ? · You are short of breath. ?Call your doctor now or seek immediate medical care if:  ? · You are sick to your stomach and cannot drink fluids or keep them down. ? · You have signs of a blood clot in your leg (called a deep vein thrombosis), such as:  ¨ Pain in your calf, back of the knee, thigh, or groin. ¨ Redness and swelling in your leg or groin. ? · You have signs of infection, such as:  ¨ Increased pain, swelling, warmth, or redness. ¨ Red streaks leading from the incision. ¨ Pus draining from the incision. ¨ A fever. ? · You have pain that does not get better after you take pain medicine. ? · You have loose stitches, or your incision comes open. ? · Bright red blood has soaked through the bandage. ? · You cannot pass stools or gas. ? Watch closely for any changes in your health, and be sure to contact your doctor if you have any problems. Where can you learn more? Go to http://brien-le.info/. Enter [id number] [id number] in the search box to learn more about \"Open Bowel Resection: What to Expect at Home. \"      DISCHARGE SUMMARY from Nurse    PATIENT INSTRUCTIONS:    After general anesthesia or intravenous sedation, for 24 hours or while taking prescription Narcotics:  · Limit your activities  · Do not drive and operate hazardous machinery  · Do not make important personal or business decisions  · Do  not drink alcoholic beverages  · If you have not urinated within 8 hours after discharge, please contact your surgeon on call. Report the following to your surgeon:  · Excessive pain, swelling, redness or odor of or around the surgical area  · Temperature over 100.5  · Nausea and vomiting lasting longer than 4 hours or if unable to take medications  · Any signs of decreased circulation or nerve impairment to extremity: change in color, persistent  numbness, tingling, coldness or increase pain  · Any questions    What to do at Home:  *  Please give a list of your current medications to your Primary Care Provider. *  Please update this list whenever your medications are discontinued, doses are      changed, or new medications (including over-the-counter products) are added. *  Please carry medication information at all times in case of emergency situations. These are general instructions for a healthy lifestyle:    No smoking/ No tobacco products/ Avoid exposure to second hand smoke  Surgeon General's Warning:  Quitting smoking now greatly reduces serious risk to your health. Obesity, smoking, and sedentary lifestyle greatly increases your risk for illness    A healthy diet, regular physical exercise & weight monitoring are important for maintaining a healthy lifestyle    You may be retaining fluid if you have a history of heart failure or if you experience any of the following symptoms:  Weight gain of 3 pounds or more overnight or 5 pounds in a week, increased swelling in our hands or feet or shortness of breath while lying flat in bed. Please call your doctor as soon as you notice any of these symptoms; do not wait until your next office visit.     Recognize signs and symptoms of STROKE:    F-face looks uneven    A-arms unable to move or move unevenly    S-speech slurred or non-existent    T-time-call 911 as soon as signs and symptoms begin-DO NOT go       Back to bed or wait to see if you get better-TIME IS BRAIN. Warning Signs of HEART ATTACK     Call 911 if you have these symptoms:   Chest discomfort. Most heart attacks involve discomfort in the center of the chest that lasts more than a few minutes, or that goes away and comes back. It can feel like uncomfortable pressure, squeezing, fullness, or pain.  Discomfort in other areas of the upper body. Symptoms can include pain or discomfort in one or both arms, the back, neck, jaw, or stomach.  Shortness of breath with or without chest discomfort.  Other signs may include breaking out in a cold sweat, nausea, or lightheadedness. Don't wait more than five minutes to call 911 - MINUTES MATTER! Fast action can save your life. Calling 911 is almost always the fastest way to get lifesaving treatment. Emergency Medical Services staff can begin treatment when they arrive -- up to an hour sooner than if someone gets to the hospital by car. The discharge information has been reviewed with the patient and spouse. The patient and spouse verbalized understanding. Discharge medications reviewed with the patient and spouse and appropriate educational materials and side effects teaching were provided. ___________________________________________________________________________________________________________________________________  Current as of: May 12, 2017  Content Version: 11.4  © 4809-2026 [employer]. Care instructions adapted under license by [employer] (which disclaims liability or warranty for this information). If you have questions about a medical condition or this instruction, always ask your healthcare professional. [place] [address] any warranty or liability for your use of this information.

## 2018-01-03 NOTE — PROGRESS NOTES
Spiritual Care Assessment/Progress Notes    Yesenia Mitchell [id number]  [id number]    6/22/1932  80 y.o.  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 ([facility] 08/19/2013    Peripheral neuropathy 12/09/2011    Proctitis, 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   []         Not able to assess due to medical condition    Services Provided Today:  []         crisis intervention    []         reading Scriptures  [x]         spiritual assessment    []         Prayer for healing / Mishabeirach  []         empathic listening/emotional support  []         rites and rituals (cite in comments)  [x]         life review     []         Taoism support  []         theological development   []         advocacy  []         ethical dialog     []         Psalms for healing / Tehillim  []         bereavement support    []         support to family  []         anticipatory grief support   []         help with AMD  []         spiritual guidance    [] meditation      Spiritual Care Needs  []         Emotional Support  []         Spiritual/Adventism Care  []         Loss/Adjustment  []         Advocacy/Referral /Ethics  [x]         No needs expressed at this time  []         Other: (note in comments)  [address]  []         Follow up visits with pt/family  []         Provide materials  []         Schedule sacraments  []         Contact [facility]  [x]         Follow up as needed  []         Other: (note in comments)     Comments: Patient was appreciative of visit. He does not belong to a Buddhist, but he does go to classes at the [facility]. He attended a recent dinner at my Buddhist. He's optimistic that he's headed in the right direction. He has supportive family. Words of comfort offered. Advised of continuous availability. Nothing further at this time.     Raymundo Delgado, [facility], Adventism Provider

## 2018-01-03 NOTE — PROGRESS NOTES
General Surgery Daily Progress Note    Admit Date: 2017  Post-Operative Day: 6 Days Post-Op from Procedure(s):  LAPAROTOMY EXPLORATORY SMALL BOWEL RESECTION      Subjective:     Last 24 hrs: Pt had a nl BM followed by loose ones. Eating well. Fung placed yesterday. Had good diuresis w/ lasix  He feels less swollen. Cr 1.71 (baseline 1.5-1.6)    Objective:     Blood pressure 138/64, pulse 92, temperature 98.4 °F (36.9 °C), resp. rate 18, height 5' 9\" (1.753 m), weight 206 lb 12.7 oz (93.8 kg), SpO2 97 %. Temp (24hrs), Av.1 °F (36.7 °C), Min:97.5 °F (36.4 °C), Max:98.5 °F (36.9 °C)      _____________________  Physical Exam:     Alert and Oriented, x3, in no acute distress. Cardiovascular: RRR - paced, trace peripheral edema  Lungs:CTAB   Abdomen: soft, nl BS, staples intact at midline       Assessment:   Active Problems:    Perforated diverticulum of small intestine (2017)            Plan:     Cont diet  Gi/dvt proph  ? D/c fung - per Dr Rocío Adamson soon    Data Review:    Recent Labs      18   0231   WBC  6.4   HGB  11.4*   HCT  34.6*   PLT  318     Recent Labs      18   0340  18   0231  18   0350   NA  143  144  144   K  3.8  3.8  3.7   CL  110*  111*  111*   CO2  25  24  23   GLU  156*  161*  162*   BUN  23*  24*  24*   CREA  1.71*  1.73*  1.69*   CA  8.4*  8.2*  8.6   MG  1.7   --    --      No results for input(s): AML, LPSE in the last 72 hours.         ______________________  Medications:    Current Facility-Administered Medications   Medication Dose Route Frequency    bisacodyl (DULCOLAX) tablet 5 mg  5 mg Oral DAILY PRN    sodium chloride (NS) flush 5-10 mL  5-10 mL IntraVENous Q8H    sodium chloride (NS) flush 5-10 mL  5-10 mL IntraVENous PRN    tamsulosin (FLOMAX) capsule 0.4 mg  0.4 mg Oral DAILY    mineral oil 45 mL  45 mL Oral DAILY    famotidine (PEPCID) tablet 20 mg  20 mg Oral DAILY    hydrALAZINE (APRESOLINE) 20 mg/mL injection 20 mg  20 mg IntraVENous Q6H PRN    sodium chloride (NS) flush 5-10 mL  5-10 mL IntraVENous Q8H    sodium chloride (NS) flush 5-10 mL  5-10 mL IntraVENous PRN    acetaminophen (TYLENOL) tablet 650 mg  650 mg Oral Q6H PRN    ondansetron (ZOFRAN) injection 4 mg  4 mg IntraVENous Q4H PRN    enoxaparin (LOVENOX) injection 40 mg  40 mg SubCUTAneous Q24H    HYDROmorphone (PF) (DILAUDID) injection 0.5-1 mg  0.5-1 mg IntraVENous Q2H PRN    levoFLOXacin (LEVAQUIN) 750 mg in D5W IVPB  750 mg IntraVENous Q48H    metroNIDAZOLE (FLAGYL) IVPB premix 500 mg  500 mg IntraVENous Q8H       Thomas Drake NP  1/3/2018        ADDENDUM:  Vidal Jacques MD  Pt seen and examined. No acute surgical issues. Pt reported passing flatus and having BM. Pt reported feeling much better. Creatinine still elevated. Pt will benefit from another day of hospital stay to monitor urinary retention and renal function. MARCELL fung.   Labs in am.

## 2018-01-03 NOTE — PROGRESS NOTES
Pt ambulated in the [place]. Pt reported some RICHTER, and continues with generalized edema. Lungs are clear diminished on Right. SpO2 is 96% with exertion.  RR 22-24 Pt also requests fung removal. Surgical group paged x2

## 2018-01-04 ENCOUNTER — TELEPHONE (OUTPATIENT)
Dept: SURGERY | Age: 83
End: 2018-01-04

## 2018-01-04 ENCOUNTER — APPOINTMENT (OUTPATIENT)
Dept: ULTRASOUND IMAGING | Age: 83
DRG: 330 | End: 2018-01-04
Attending: INTERNAL MEDICINE
Payer: MEDICARE

## 2018-01-04 LAB
ANION GAP SERPL CALC-SCNC: 7 MMOL/L (ref 5–15)
APPEARANCE UR: CLEAR
BACTERIA URNS QL MICRO: NEGATIVE /HPF
BILIRUB UR QL: NEGATIVE
BUN SERPL-MCNC: 27 MG/DL (ref 6–20)
BUN/CREAT SERPL: 13 (ref 12–20)
CALCIUM SERPL-MCNC: 8.1 MG/DL (ref 8.5–10.1)
CHLORIDE SERPL-SCNC: 108 MMOL/L (ref 97–108)
CO2 SERPL-SCNC: 27 MMOL/L (ref 21–32)
COLOR UR: ABNORMAL
CREAT SERPL-MCNC: 2.03 MG/DL (ref 0.7–1.3)
CREAT UR-MCNC: 125 MG/DL
CREAT UR-MCNC: 125 MG/DL
EPITH CASTS URNS QL MICRO: ABNORMAL /LPF
GLUCOSE SERPL-MCNC: 176 MG/DL (ref 65–100)
GLUCOSE UR STRIP.AUTO-MCNC: NEGATIVE MG/DL
HGB UR QL STRIP: NEGATIVE
HYALINE CASTS URNS QL MICRO: ABNORMAL /LPF (ref 0–5)
KETONES UR QL STRIP.AUTO: NEGATIVE MG/DL
LEUKOCYTE ESTERASE UR QL STRIP.AUTO: ABNORMAL
MAGNESIUM SERPL-MCNC: 1.7 MG/DL (ref 1.6–2.4)
MICROALBUMIN UR-MCNC: 1.54 MG/DL
MICROALBUMIN/CREAT UR-RTO: 12 MG/G (ref 0–30)
NITRITE UR QL STRIP.AUTO: NEGATIVE
PH UR STRIP: 5 [PH] (ref 5–8)
POTASSIUM SERPL-SCNC: 3.9 MMOL/L (ref 3.5–5.1)
PROT UR STRIP-MCNC: NEGATIVE MG/DL
RBC #/AREA URNS HPF: ABNORMAL /HPF (ref 0–5)
SODIUM SERPL-SCNC: 142 MMOL/L (ref 136–145)
SODIUM UR-SCNC: 50 MMOL/L
SP GR UR REFRACTOMETRY: 1.02 (ref 1–1.03)
UROBILINOGEN UR QL STRIP.AUTO: 0.2 EU/DL (ref 0.2–1)
WBC URNS QL MICRO: ABNORMAL /HPF (ref 0–4)

## 2018-01-04 PROCEDURE — 36415 COLL VENOUS BLD VENIPUNCTURE: CPT | Performed by: SURGERY

## 2018-01-04 PROCEDURE — 97116 GAIT TRAINING THERAPY: CPT | Performed by: PHYSICAL THERAPIST

## 2018-01-04 PROCEDURE — 83735 ASSAY OF MAGNESIUM: CPT | Performed by: SURGERY

## 2018-01-04 PROCEDURE — 74011250637 HC RX REV CODE- 250/637: Performed by: SURGERY

## 2018-01-04 PROCEDURE — 82570 ASSAY OF URINE CREATININE: CPT | Performed by: INTERNAL MEDICINE

## 2018-01-04 PROCEDURE — 82043 UR ALBUMIN QUANTITATIVE: CPT | Performed by: INTERNAL MEDICINE

## 2018-01-04 PROCEDURE — 84300 ASSAY OF URINE SODIUM: CPT | Performed by: INTERNAL MEDICINE

## 2018-01-04 PROCEDURE — 74011000250 HC RX REV CODE- 250: Performed by: SURGERY

## 2018-01-04 PROCEDURE — 74011250636 HC RX REV CODE- 250/636: Performed by: SURGERY

## 2018-01-04 PROCEDURE — 65660000000 HC RM CCU STEPDOWN

## 2018-01-04 PROCEDURE — 80048 BASIC METABOLIC PNL TOTAL CA: CPT | Performed by: SURGERY

## 2018-01-04 PROCEDURE — 81001 URINALYSIS AUTO W/SCOPE: CPT | Performed by: INTERNAL MEDICINE

## 2018-01-04 PROCEDURE — 76770 US EXAM ABDO BACK WALL COMP: CPT

## 2018-01-04 PROCEDURE — 51798 US URINE CAPACITY MEASURE: CPT

## 2018-01-04 RX ORDER — HEPARIN SODIUM 5000 [USP'U]/ML
5000 INJECTION, SOLUTION INTRAVENOUS; SUBCUTANEOUS EVERY 8 HOURS
Status: DISCONTINUED | OUTPATIENT
Start: 2018-01-04 | End: 2018-01-05 | Stop reason: HOSPADM

## 2018-01-04 RX ADMIN — Medication 10 ML: at 05:24

## 2018-01-04 RX ADMIN — Medication 10 ML: at 22:00

## 2018-01-04 RX ADMIN — HEPARIN SODIUM 5000 UNITS: 5000 INJECTION, SOLUTION INTRAVENOUS; SUBCUTANEOUS at 12:11

## 2018-01-04 RX ADMIN — METRONIDAZOLE 500 MG: 500 INJECTION, SOLUTION INTRAVENOUS at 00:29

## 2018-01-04 RX ADMIN — HEPARIN SODIUM 5000 UNITS: 5000 INJECTION, SOLUTION INTRAVENOUS; SUBCUTANEOUS at 20:37

## 2018-01-04 RX ADMIN — HYDRALAZINE HYDROCHLORIDE 20 MG: 20 INJECTION INTRAMUSCULAR; INTRAVENOUS at 12:30

## 2018-01-04 RX ADMIN — FAMOTIDINE 20 MG: 20 TABLET, FILM COATED ORAL at 08:47

## 2018-01-04 RX ADMIN — METRONIDAZOLE 500 MG: 500 INJECTION, SOLUTION INTRAVENOUS at 23:56

## 2018-01-04 RX ADMIN — METRONIDAZOLE 500 MG: 500 INJECTION, SOLUTION INTRAVENOUS at 16:08

## 2018-01-04 RX ADMIN — HYDRALAZINE HYDROCHLORIDE 20 MG: 20 INJECTION INTRAMUSCULAR; INTRAVENOUS at 05:24

## 2018-01-04 RX ADMIN — LEVOFLOXACIN 750 MG: 5 INJECTION, SOLUTION INTRAVENOUS at 00:29

## 2018-01-04 RX ADMIN — METRONIDAZOLE 500 MG: 500 INJECTION, SOLUTION INTRAVENOUS at 08:42

## 2018-01-04 NOTE — PROGRESS NOTES
Bedside and Verbal shift change report given to shawn (oncoming nurse) by Ashly Lehman (offgoing nurse). Report included the following information SBAR, Kardex, OR Summary, Procedure Summary, Intake/Output, MAR, Recent Results and Cardiac Rhythm paced.

## 2018-01-04 NOTE — PROGRESS NOTES
Progress Note    Patient: Zhou Ramirez MRN: [id number]  SSN: [id number]    YOB: 1932  Age: 80 y.o. Sex: male      Admit Date: 2017    7 Days Post-Op    Procedure:  Procedure(s):  LAPAROTOMY EXPLORATORY SMALL BOWEL RESECTION     Subjective:     No acute surgical issues. Pt reported having a rough night due to diarrhea. Creatinine also worsening. Pt stated he is urinating more. Objective:     Visit Vitals    /79 (BP 1 Location: Right arm, BP Patient Position: At rest)    Pulse 84    Temp 97.9 °F (36.6 °C)    Resp 16    Ht 5' 9\" (1.753 m)    Wt 206 lb 9.1 oz (93.7 kg)    SpO2 96%    BMI 30.51 kg/m2       Temp (24hrs), Av.9 °F (36.6 °C), Min:97.5 °F (36.4 °C), Max:98.2 °F (36.8 °C)        Physical Exam:    Gen:  NAD  Pulm:  Unlabored  Abd:  S/ND/appropriate TTP  Wound:  C/D/I    Recent Results (from the past 24 hour(s))   METABOLIC PANEL, BASIC    Collection Time: 18 12:50 AM   Result Value Ref Range    Sodium 142 136 - 145 mmol/L    Potassium 3.9 3.5 - 5.1 mmol/L    Chloride 108 97 - 108 mmol/L    CO2 27 21 - 32 mmol/L    Anion gap 7 5 - 15 mmol/L    Glucose 176 (H) 65 - 100 mg/dL    BUN 27 (H) 6 - 20 MG/DL    Creatinine 2.03 (H) 0.70 - 1.30 MG/DL    BUN/Creatinine ratio 13 12 - 20      GFR est AA 38 (L) >60 ml/min/1.73m2    GFR est non-AA 31 (L) >60 ml/min/1.73m2    Calcium 8.1 (L) 8.5 - 10.1 MG/DL   MAGNESIUM    Collection Time: 18 12:50 AM   Result Value Ref Range    Magnesium 1.7 1.6 - 2.4 mg/dL         Assessment:     Hospital Problems  Date Reviewed: 2017          Codes Class Noted POA    Perforated diverticulum of small intestine ICD-10-CM: K57.00  ICD-9-CM: 562.00  2017 Unknown              Plan/Recommendations/Medical Decision Making:     - Diarrhea: Will stop mineral oil and bowel regiment. No evidence of antibiotic associated infectious diarrhea.     - Continue Flagyl for now  - Consult nephrology to evaluate renal function  - Encourage oral intake of fluid  - Will switch Lovenox to heparin.   No toradol or lasix at this time  - May transfer to floor if bed is needed in [facility]    Signed By: Sally Yao MD     January 4, 2018

## 2018-01-04 NOTE — CONSULTS
Consultation Note    NAME: Emeli Piña   :  1932   MRN:  [id number]     Date/Time:  2018 1:05 PM    I have been asked to see this patient by Dr. Marzena Roblero  for advice/opinion re: OG. Assessment :    Plan:  CKD-3  OG  Leg edema  Diarrhea   OG likely multifactorial. He received lasix the past 2 days. He was briefly on Iv vanc. He has had loose stool which may contribute to intravascular volume depletion. His Kunz was removed 2 days ago and he had urinary incontinence and urgency. Will repeat U/A and check renal U/S. Will check FENA. Subjective:   CHIEF COMPLAINT:  \"What's my creatinine? \"    HISTORY OF PRESENT ILLNESS:     Ran Valdez is a 80 y.o.   male who has a history of CKD-3 with a creatinine of 1.5 in October of last year. 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 His creatinine on admission was 1.76. On  he got one dose of vanc and his creatinine was 1.93. His creatinine decreased to 1.69 on . On  his creatinine was 1.73 and his Kunz was removed. He also received lasix that day. He received lasix again on 1/3 and his creatinine was up to 2.03 today. He says that he has urinary urgency and incontinence.     Past Medical History:   Diagnosis Date    Arthritis     Calculus of kidney     Chronic kidney disease     slightly increased creatinine    GERD (gastroesophageal reflux disease)     Glaucoma     Pneumonia     prostate cancer     seed radiation, cryosurgery    Unspecified sleep apnea     uses CPAP      Past Surgical History:   Procedure Laterality Date    ENDOSCOPY, COLON, DIAGNOSTIC      HX HEENT      uvulectomy    HX PACEMAKER  14    HX PROSTATECTOMY      brachytherapy, cryosurgery    HX TONSILLECTOMY      HX UROLOGICAL      vasectomy    LASER SURGERY OF EYE       Social History   Substance Use Topics    Smoking status: Never Smoker    Smokeless tobacco: Never Used    Alcohol use 3.6 oz/week     2 Glasses of wine, 4 Standard drinks or equivalent per week      Family History   Problem Relation Age of Onset    Heart Disease Father     Cancer Sister      breast/lung    Other Mother      encephalitis    Cancer Maternal Aunt      breast/lower extremity - 2 maternal aunts    Cancer Other      vaginal    No Known Problems Daughter       Allergies   Allergen Reactions    Pcn [Penicillins] Hives     Reaction was in 65's following a PCN shot    Venom-Honey Bee Other (comments)     anaphylaxis      Prior to Admission medications    Medication Sig Start Date End Date Taking? Authorizing Provider   metroNIDAZOLE (FLAGYL) 500 mg tablet Take 1 Tab by mouth three (3) times daily for 7 days. 1/3/18 1/10/18 Yes Franciso Ahumada, MD   levoFLOXacin (LEVAQUIN) 500 mg tablet Take 1 Tab by mouth daily for 7 days. 1/3/18 1/10/18 Yes Franciso Ahumada, MD   oxyCODONE-acetaminophen (PERCOCET) 5-325 mg per tablet Take 1 Tab by mouth every four (4) hours as needed for Pain. Max Daily Amount: 6 Tabs. 1/3/18  Yes Franciso Ahumada, MD   EPINEPHrine (EPIPEN) 0.3 mg/0.3 mL injection 0.3 mL by IntraMUSCular route once as needed for 1 dose.  7/22/12   Parish Patiño MD     REVIEW OF SYSTEMS:     []  Unable to obtain reliable ROS due to  [] mental status  [] sedated   [] intubated   [x] Total of 12 systems reviewed as follows:  Constitutional: negative fever, negative chills, negative weight loss  Eyes:   negative visual changes  ENT:   negative sore throat, tongue or lip swelling  Respiratory:  negative cough, negative dyspnea  Cards:  negative for chest pain, palpitations, lower extremity edema  GI:   negative for nausea, vomiting, diarrhea, and abdominal pain  :  negative for frequency, dysuria  Integument:  negative for rash and pruritus  Heme:  negative for easy bruising and gum/nose bleeding  Musculoskel: negative for myalgias,  back pain and muscle weakness  Neuro:  negative for headaches, dizziness, vertigo  Psych:  negative for feelings of anxiety, depression   Travel?: none    Objective:   VITALS:    Visit Vitals    /88 (BP 1 Location: Right arm, BP Patient Position: At rest)    Pulse 82    Temp 97.6 °F (36.4 °C)    Resp 16    Ht 5' 9\" (1.753 m)    Wt 93.7 kg (206 lb 9.1 oz)    SpO2 98%    BMI 30.51 kg/m2     PHYSICAL EXAM:  Gen:  [x]  WD [x]  WN  [] cachectic []  thin []  obese []  disheveled             []  ill apearing  []   Critical  []   Chronic    [x]  No acute distress    HEENT:   [x] NC/AT/PERRL    [x] pink conjunctivae      [] pale conjunctivae                  PERRL  [] yes  [] no      [] moist mucosa    [] dry mucosa    hearing intact to voice [] yes  [] No                 NECK:   supple [x] yes  [] no        masses [] yes  [x] No               thyroid  []  non tender  []  tender    RESP:   [x] CTA bilaterally/no wheezing/rhonchi/rales/crackles    [] rhonchi bilaterally - no dullness  [] wheezing   [] rhonchi   [] crackles     use of accessory muscles [] yes [] no    CARD:   [x]  regular rate and rhythm/No murmurs/rubs/gallops    murmur  [] yes ()  [] no      Rubs  [] yes  [] no       Gallops [] yes  [] no    Rate []  regular  []  irregular        carotid bruits  [] Right  []  Left                 LE edema [x] yes - 2+  [] no           JVP  []  yes   []  no    ABD:    [x] soft/non distended/ tender suprapubic/+bowel sounds/no HSM    []  Rigid    tenderness [] yes [] no   Liver enlargement  []  yes []  no                Spleen enlargement  []  yes []  no     distended []  yes [] no     bowel sound  [] hypoactive   [] hyperactive    LYMPH:    Neck []  yes [x]  no       Axillae []  yes [x]  no    SKIN:   Rashes []  yes   [x]  no    Ulcers []  yes   [x]  no               [] tight to palpitation    skin turgor []  good  [] poor  [] decreased               Cyanosis/clubbing []  yes []  no    NEUR:   [x] cranial nerves II-XII grossly intact       [] Cranial nerves deficit                 []  facial droop    []  slurred speech   [] aphasic     [] Strength normal     []  weakness  []  LUE  []   RUE/ []  LLE  []   RLE    follows commands  [x]  yes []  no           PSYCH:   insight [] poor [x] good   Alert and Oriented to  [x] person  [x] place  [x]  time                    [] depressed [] anxious [] agitated  [] lethargic [] stuporous  [] sedated     LAB DATA REVIEWED:    Recent Labs      01/02/18   0231   WBC  6.4   HGB  11.4*   HCT  34.6*   PLT  318     Recent Labs      01/04/18   0050  01/03/18   0340  01/02/18   0231   NA  142  143  144   K  3.9  3.8  3.8   CL  108  110*  111*   CO2  27  25  24   BUN  27*  23*  24*   CREA  2.03*  1.71*  1.73*   GLU  176*  156*  161*   CA  8.1*  8.4*  8.2*   MG  1.7  1.7   --      No results for input(s): SGOT, GPT, ALT, AP, TBIL, TBILI, ALB, GLOB, GGT, AML, LPSE in the last 72 hours. No lab exists for component: AMYP, HLPSE  No results for input(s): INR, PTP, APTT in the last 72 hours. No lab exists for component: INREXT   No results for input(s): FE, TIBC, PSAT, FERR in the last 72 hours. No results for input(s): PH, PCO2, PO2 in the last 72 hours. No results for input(s): CPK, CKMB in the last 72 hours.     No lab exists for component: TROPONINI  Lab Results   Component Value Date/Time    Glucose (POC) 147 12/28/2017 11:40 PM       Procedures: see electronic medical records for all procedures/Xrays and details which were not copied into this note but were reviewed prior to creation of Plan.    ________________________________________________________________________       ___________________________________________________  Consulting Physician: Beverly Garcia MD

## 2018-01-04 NOTE — PROGRESS NOTES
Looked in patient's chart because I was going to be receiving the patient on my floor, but was informed we are not getting the patient anymore.

## 2018-01-04 NOTE — PROGRESS NOTES
Bedside and Verbal shift change report given to [address] [facility] (oncoming nurse) by James Amaya (offgoing nurse). Report included the following information SBAR.

## 2018-01-04 NOTE — TELEPHONE ENCOUNTER
Patient's wife Myrna Howard would like to speak to you about what she should do regarding her  seeing an urologist.. She says Dr. Meng Kelley was going to recommend one but she wants to know would it be easier to just see the one in the hospital.

## 2018-01-04 NOTE — PROGRESS NOTES
Problem: Mobility Impaired (Adult and Pediatric)  Goal: *Acute Goals and Plan of Care (Insert Text)  Physical Therapy Goals  Initiated 12/30/2017  1. Patient will move from supine to sit and sit to supine  in bed with modified independence within 7 day(s). 2.  Patient will transfer from bed to chair and chair to bed with modified independence using the least restrictive device within 7 day(s). 3.  Patient will perform sit to stand with modified independence within 7 day(s). 4.  Patient will ambulate with modified independence for 250 feet with the least restrictive device within 7 day(s). 5.  Patient will ascend/descend 12 stairs with 1 handrail(s) with modified independence within 7 day(s). physical Therapy TREATMENT  Patient: Tamra Robertson (27 y.o. male)  Date: 1/4/2018  Diagnosis: PERFORATED BOWEL  Perforated diverticulum of small intestine <principal problem not specified>  Procedure(s) (LRB):  LAPAROTOMY EXPLORATORY SMALL BOWEL RESECTION  (N/A) 7 Days Post-Op  Precautions:      ASSESSMENT:  Patient making steady progress toward goals. Moving well overall but continues to be limited by low activity tolerance. Patient currently needs SBA-CGA for sit to stand transfers. Amb approx 250 feet without an assistive device and using IV pole for support. Does require 1 brief standing rest break jail. Patient returned to room and positioned up in chair with needs in reach. Recommend HH PT at [facility] to continue mobility progression and strengthening. Progression toward goals:  [x]    Improving appropriately and progressing toward goals  []    Improving slowly and progressing toward goals  []    Not making progress toward goals and plan of care will be adjusted     PLAN:  Patient continues to benefit from skilled intervention to address the above impairments. Continue treatment per established plan of care.   Discharge Recommendations:  Home Health  Further Equipment Recommendations for Discharge:  TBD (likely none)     SUBJECTIVE:   Patient stated I'm doing better but I still have such low endurance.     OBJECTIVE DATA SUMMARY:   Critical Behavior:  Neurologic State: Alert  Orientation Level: Oriented X4  Cognition: Appropriate decision making     Functional Mobility Training:  Bed Mobility:     Supine to Sit: Supervision; Additional time              Transfers:  Sit to Stand: Stand-by asssistance  Stand to Sit: Stand-by asssistance                             Balance:  Sitting: Intact  Standing: Impaired; With support  Standing - Static: Good  Standing - Dynamic : Fair  Ambulation/Gait Training:  Distance (ft): 250 Feet (ft)  Assistive Device: Gait belt (pushing IV pole for support)  Ambulation - Level of Assistance: Contact guard assistance        Gait Abnormalities: Decreased step clearance        Base of Support: Widened     Speed/Nia: Pace decreased (<100 feet/min); Slow  Step Length: Left shortened;Right shortened          Therapeutic Exercises:   Seated:  Marching x 10  Hip add with pillow x 10, hold 5 secs  LAQ x 10  Ankle pumps x 10  Pain:  Pain Scale 1: Numeric (0 - 10)  Pain Intensity 1: 0              Activity Tolerance:   VSS  Please refer to the flowsheet for vital signs taken during this treatment.   After treatment:   [x]    Patient left in no apparent distress sitting up in chair  []    Patient left in no apparent distress in bed  [x]    Call bell left within reach  [x]    Nursing notified  []    Caregiver present  []    Bed alarm activated    COMMUNICATION/COLLABORATION:   The patients plan of care was discussed with: Physical Therapist, Occupational Therapist and Registered Nurse    Bruno Milligan PT, DPT   Time Calculation: 18 mins

## 2018-01-05 VITALS
SYSTOLIC BLOOD PRESSURE: 155 MMHG | BODY MASS INDEX: 30.6 KG/M2 | TEMPERATURE: 97.2 F | HEART RATE: 78 BPM | RESPIRATION RATE: 18 BRPM | WEIGHT: 206.57 LBS | HEIGHT: 69 IN | OXYGEN SATURATION: 95 % | DIASTOLIC BLOOD PRESSURE: 77 MMHG

## 2018-01-05 LAB
ALBUMIN SERPL-MCNC: 2.1 G/DL (ref 3.5–5)
ANION GAP SERPL CALC-SCNC: 10 MMOL/L (ref 5–15)
BUN SERPL-MCNC: 27 MG/DL (ref 6–20)
BUN/CREAT SERPL: 15 (ref 12–20)
CALCIUM SERPL-MCNC: 7.8 MG/DL (ref 8.5–10.1)
CHLORIDE SERPL-SCNC: 106 MMOL/L (ref 97–108)
CO2 SERPL-SCNC: 24 MMOL/L (ref 21–32)
CREAT SERPL-MCNC: 1.82 MG/DL (ref 0.7–1.3)
GLUCOSE SERPL-MCNC: 143 MG/DL (ref 65–100)
MAGNESIUM SERPL-MCNC: 1.6 MG/DL (ref 1.6–2.4)
PHOSPHATE SERPL-MCNC: 2.9 MG/DL (ref 2.6–4.7)
POTASSIUM SERPL-SCNC: 3.8 MMOL/L (ref 3.5–5.1)
SODIUM SERPL-SCNC: 140 MMOL/L (ref 136–145)

## 2018-01-05 PROCEDURE — 74011250637 HC RX REV CODE- 250/637: Performed by: SURGERY

## 2018-01-05 PROCEDURE — 74011000250 HC RX REV CODE- 250: Performed by: SURGERY

## 2018-01-05 PROCEDURE — 36415 COLL VENOUS BLD VENIPUNCTURE: CPT | Performed by: SURGERY

## 2018-01-05 PROCEDURE — 74011250636 HC RX REV CODE- 250/636: Performed by: SURGERY

## 2018-01-05 PROCEDURE — 97116 GAIT TRAINING THERAPY: CPT

## 2018-01-05 PROCEDURE — 83735 ASSAY OF MAGNESIUM: CPT | Performed by: SURGERY

## 2018-01-05 PROCEDURE — 80069 RENAL FUNCTION PANEL: CPT | Performed by: SURGERY

## 2018-01-05 RX ORDER — HYDROCODONE BITARTRATE AND ACETAMINOPHEN 5; 325 MG/1; MG/1
1 TABLET ORAL
Status: DISCONTINUED | OUTPATIENT
Start: 2018-01-05 | End: 2018-01-05 | Stop reason: HOSPADM

## 2018-01-05 RX ADMIN — FAMOTIDINE 20 MG: 20 TABLET, FILM COATED ORAL at 08:55

## 2018-01-05 RX ADMIN — Medication 10 ML: at 06:00

## 2018-01-05 RX ADMIN — METRONIDAZOLE 500 MG: 500 INJECTION, SOLUTION INTRAVENOUS at 08:56

## 2018-01-05 RX ADMIN — HEPARIN SODIUM 5000 UNITS: 5000 INJECTION, SOLUTION INTRAVENOUS; SUBCUTANEOUS at 03:26

## 2018-01-05 RX ADMIN — HEPARIN SODIUM 5000 UNITS: 5000 INJECTION, SOLUTION INTRAVENOUS; SUBCUTANEOUS at 11:53

## 2018-01-05 RX ADMIN — TAMSULOSIN HYDROCHLORIDE 0.4 MG: 0.4 CAPSULE ORAL at 08:56

## 2018-01-05 NOTE — PROGRESS NOTES
Bedside and Verbal shift change report given to Abby Saavedra RN (oncoming nurse) by Rodrigo Claros RN (offgoing nurse). Report included the following information SBAR, Intake/Output, MAR, Med Rec Status and Cardiac Rhythm V paced.

## 2018-01-05 NOTE — PROGRESS NOTES
Surgery Progress Note    Admit Date: 12/28/2017      Subjective:      Pt complains of would like to go home today if able, he had no acute issues overnight, was up to void 3 x and passed some soft stools but diarrhea has improved, his pain is minimal and he has not required any pain medications, he is up in the chair after breakfast, feels well. Pts pain is minimal .  No SOB. No CP. Pt is ambulating. Patient 's current diet is GI lite . Chalino Jews Pt reports  no nausea and no vomiting. Pt reports no fever or chills    Bowel Movements: Flatus and soft stools x 3 overnight,         Objective:     Patient Vitals for the past 8 hrs:   BP Temp Pulse Resp SpO2   01/05/18 0755 151/77 98.3 °F (36.8 °C) 88 17 95 %   01/05/18 0325 141/68 98.6 °F (37 °C) 80 16 95 %        01/03 1901 - 01/05 0700  In: 350 [I.V.:350]  Out: 802 [Urine:802]ip  Physical Exam:    General: alert, cooperative, no distress, up in chair, well appearing   Cardiac: normal S1 and S2  Lungs: Normal chest wall and respirations. Clear to auscultation.   Abdomen: soft, protuberant, nondistended, normal bowel sounds, tenderness mild - incisional , without guarding, without rebound  Wounds:clean, dry, no drainage, staples in place  Neuro: alert, oriented x 3, no defects noted in general exam.  Extremities: no edema      CBC: Lab Results   Component Value Date/Time    WBC 6.4 01/02/2018 02:31 AM    RBC 3.90 01/02/2018 02:31 AM    HGB 11.4 01/02/2018 02:31 AM    HCT 34.6 01/02/2018 02:31 AM    PLATELET 932 47/75/9225 02:31 AM     BMP: Lab Results   Component Value Date/Time    Glucose 143 01/05/2018 03:34 AM    Sodium 140 01/05/2018 03:34 AM    Potassium 3.8 01/05/2018 03:34 AM    Chloride 106 01/05/2018 03:34 AM    CO2 24 01/05/2018 03:34 AM    BUN 27 01/05/2018 03:34 AM    Creatinine 1.82 01/05/2018 03:34 AM    Calcium 7.8 01/05/2018 03:34 AM     CMP:  Lab Results   Component Value Date/Time    Glucose 143 01/05/2018 03:34 AM    Sodium 140 01/05/2018 03:34 AM Potassium 3.8 01/05/2018 03:34 AM    Chloride 106 01/05/2018 03:34 AM    CO2 24 01/05/2018 03:34 AM    BUN 27 01/05/2018 03:34 AM    Creatinine 1.82 01/05/2018 03:34 AM    Calcium 7.8 01/05/2018 03:34 AM    Anion gap 10 01/05/2018 03:34 AM    BUN/Creatinine ratio 15 01/05/2018 03:34 AM    Alk. phosphatase 101 12/28/2017 04:06 PM    Protein, total 6.9 12/28/2017 04:06 PM    Albumin 2.1 01/05/2018 03:34 AM    Globulin 4.2 12/28/2017 04:06 PM    A-G Ratio 0.6 12/28/2017 04:06 PM       Radiology review: na        Assessment:   Pt is POD #7 s/p Procedure(s):  LAPAROTOMY EXPLORATORY SMALL BOWEL RESECTION       Plan:   Diet: pt is tolerating GI lite diet, diarrhea has improved,   Activity: walk in [place]/ seeing PT   Pain management--his pain is minimal   GI and DVT prophylaxis  His wound is clean and dry with staples in place   Meds: begin oral analgesics prn   Labs: appreciated nephrology input, cr improving   Antibiotics: Levaquin and metronidazole--can we DC?    Pt is doing well with good po intake and bowel function, he has been cleared for DC to home from a nephrology standpoint, will DW Dr. Janus Jeans for [address] for home PT on discharge   Perla Gretchen Wallace and pt has been cleared for DC to home today  [id number] to DC ABX given contained perforation,  and recent Cr issues, afebrile,   Instructions reviewed with pt and his wife, all questions addressed  He has f/u with Dr. Marcelo Emerson for staple removal         Future Appointments  Date Time Provider Jenna Mcgregor                 1/11/2018 [address], MD Karlie Carmona Do Fredy Ivone [address] [place] [address]

## 2018-01-05 NOTE — PROGRESS NOTES
Bedside and Verbal shift change report given to candis (oncoming nurse) by Linda Hogan (offgoing nurse).  Report included the following information SBAR, Kardex, OR Summary, Procedure Summary, Intake/Output, MAR, Recent Results and Cardiac Rhythm paced

## 2018-01-05 NOTE — PROGRESS NOTES
Cm received order for home PT. Notified [facility] [phone] that patient may be discharged today and order in [facility] care. Name and number of agency placed on discharge instructions and patient advised to call agency if not contacted by noon the day after discharge to inquire as to the day and time of initial visit. Patient lives at home with his wife and was self care and independent prior to admission. Cm will assist if any other needs arise.

## 2018-01-05 NOTE — DISCHARGE SUMMARY
Physician Discharge Summary     Patient ID:  Celia Villaseñor  [id number]  80 y.o.  6/22/1932    Admit Date: 12/28/2017    Discharge Date: 1/5/2018    * Admission Diagnoses: PERFORATED BOWEL  Perforated diverticulum of small intestine    * Discharge Diagnoses:    Hospital Problems as of 1/5/2018  Date Reviewed: 12/28/2017          Codes Class Noted - Resolved POA    Perforated diverticulum of small intestine ICD-10-CM: K57.00  ICD-9-CM: 562.00  12/28/2017 - Present Unknown               Admission Condition: Fair    * Discharge Condition: good and improved    * Procedures: Procedure(s):  [address] Course:   Pt admitted with abdominal pain and CT findings of contained small bowel perforation   Pt taken to the OR for laparotomy with small bowel resection  Kunz placed post op for diuresis and monitoring   Post op treated with iv abx and bowel rest, CR elevated and pt seen by nephrology   Bowel function  Returned in 2-3 days post op, , diet advanced as tolerated  to GI lite with good po intake  Daily ambulation with PT   VSS, abdomen soft, Cr. Decreasing and pt cleared for DC by nephrology  DC home on POD 7 with Home PT ordered,  follow up appointmnt with Dr. Denise Ceballos in place for check up/ staple removal     Consults: nephrology     Significant Diagnostic Studies: admission CT with contained small bowel perf. * Disposition: Home    Discharge Medications:   Current Discharge Medication List      START taking these medications    Details   oxyCODONE-acetaminophen (PERCOCET) 5-325 mg per tablet Take 1 Tab by mouth every four (4) hours as needed for Pain. Max Daily Amount: 6 Tabs. Qty: 30 Tab, Refills: 0    Associated Diagnoses: Bowel perforation ([address]         CONTINUE these medications which have NOT CHANGED    Details   EPINEPHrine (EPIPEN) 0.3 mg/0.3 mL injection 0.3 mL by IntraMUSCular route once as needed for 1 dose.   Qty: 0.3 mL, Refills: 1             * Follow-up Care/Patient Instructions:   Activity: No driving while on analgesics and No heavy lifting for 4 weeks  Diet: Regular Diet  Wound Care: Keep wound clean and dry    Future Appointments  Date Time Provider Jenna Meche   1/11/2018 9:15 AM Salvador King MD [facility] CATHY SCHED   1/17/2018 2:00 PM Arnold Carey MD [facility] CATHY SCHED   4/3/2018 8:15 AM Candelaria Landeros MD [facility] 10.         Follow-up Information     Follow up With Details [address], [place]   [address] [place] [address]   [facility]  [address] [place] [address]  [phone]          Follow-up tests/labs na    Signed:  JUAN FRANCISCO Kay  1/5/2018  12:39 PM

## 2018-01-05 NOTE — PROGRESS NOTES
Problem: Mobility Impaired (Adult and Pediatric)  Goal: *Acute Goals and Plan of Care (Insert Text)  Physical Therapy Goals  Initiated 12/30/2017  1. Patient will move from supine to sit and sit to supine  in bed with modified independence within 7 day(s). 2.  Patient will transfer from bed to chair and chair to bed with modified independence using the least restrictive device within 7 day(s). 3.  Patient will perform sit to stand with modified independence within 7 day(s). 4.  Patient will ambulate with modified independence for 250 feet with the least restrictive device within 7 day(s). 5.  Patient will ascend/descend 12 stairs with 1 handrail(s) with modified independence within 7 day(s). physical Therapy TREATMENT  Patient: Zhou Ramirez (89 y.o. male)  Date: 1/5/2018  Diagnosis: PERFORATED BOWEL  Perforated diverticulum of small intestine <principal problem not specified>  Procedure(s) (LRB):  LAPAROTOMY EXPLORATORY SMALL BOWEL RESECTION  (N/A) 8 Days Post-Op  Precautions:    Chart, physical therapy assessment, plan of care and goals were reviewed. ASSESSMENT:  Pt was able to increase gait tolerance, but required standing rest break during gait. Pt slightly unsteady but able to self recover. Pt was able to perform 3 steps to enter home. Pt expressing no concerns with discharge. Pt may benefit from HHPT to progress activity tolerance and balance. Progression toward goals:  [x]      Improving appropriately and progressing toward goals  []      Improving slowly and progressing toward goals  []      Not making progress toward goals and plan of care will be adjusted     PLAN:  Patient continues to benefit from skilled intervention to address the above impairments. Continue treatment per established plan of care. Discharge Recommendations:  Home Health  Further Equipment Recommendations for Discharge:  None     SUBJECTIVE:   Patient stated  I just get tired.     OBJECTIVE DATA SUMMARY:   Critical Behavior:  Neurologic State: Alert  Orientation Level: Oriented X4  Cognition: Appropriate decision making     Functional Mobility Training:  Bed Mobility:                     Transfers:  Sit to Stand: Supervision  Stand to Sit: Supervision                             Balance:  Sitting: Intact  Standing: Impaired  Standing - Static: Good  Standing - Dynamic : Fair  Ambulation/Gait Training:  Distance (ft): 1000 Feet (ft) (2 standing rest break)     Ambulation - Level of Assistance: Supervision        Gait Abnormalities: Decreased step clearance; Path deviations        Base of Support: Widened     Speed/Nia: Pace decreased (<100 feet/min); Slow  Step Length: Left shortened;Right shortened                   Stairs:             Neuro Re-Education:    Therapeutic Exercises:     Pain:  Pain Scale 1: Numeric (0 - 10)  Pain Intensity 1: 0              Activity Tolerance:   Limited   Please refer to the flowsheet for vital signs taken during this treatment.   After treatment:   [x] Patient left in no apparent distress sitting up in chair  [] Patient left in no apparent distress in bed  [x] Call bell left within reach  [x] Nursing notified  [x] Caregiver present  [] Bed alarm activated    COMMUNICATION/COLLABORATION:   The patients plan of care was discussed with: Registered Nurse    Derek Vasquez PTA   Time Calculation: 13 mins

## 2018-01-08 ENCOUNTER — PATIENT OUTREACH (OUTPATIENT)
Dept: INTERNAL MEDICINE CLINIC | Age: 83
End: 2018-01-08

## 2018-01-08 ENCOUNTER — HOME CARE VISIT (OUTPATIENT)
Dept: SCHEDULING | Facility: HOME HEALTH | Age: 83
End: 2018-01-08
Payer: MEDICARE

## 2018-01-08 PROCEDURE — 400013 HH SOC

## 2018-01-08 PROCEDURE — 3331090001 HH PPS REVENUE CREDIT

## 2018-01-08 PROCEDURE — G0151 HHCP-SERV OF PT,EA 15 MIN: HCPCS

## 2018-01-08 PROCEDURE — 3331090002 HH PPS REVENUE DEBIT

## 2018-01-08 RX ORDER — METRONIDAZOLE 500 MG/1
500 TABLET ORAL DAILY
COMMUNITY
End: 2018-01-16

## 2018-01-08 RX ORDER — LEVOFLOXACIN 500 MG/1
TABLET, FILM COATED ORAL DAILY
COMMUNITY
End: 2018-01-16

## 2018-01-08 NOTE — PROGRESS NOTES
Hospital Discharge Follow-Up      Date/Time:  2018 11:29 AM    Inpatient RRAT score: 28  Presented to ED with SOB, weakness x1 wk and developed RLQ pain with shaking chills. Upon arrival to the ER he was found to be tachycardic. Consults:  Surgery and nephrology (Dr. Humberto Scott)  Procedure: SMALL BOWEL RESECTION      Patient was admitted to [facility] on 17 and discharged on 18 for perforated diverticulum. Patient contacted within two days of discharge. Nurse Navigator(NN) contacted the patient by telephone to perform post hospital discharge assessment. Verified name and  with patient as identifiers. Provided introduction to self, and explanation of the Nurses Navigator role. Reviewed discharge instructions and red flags with patient, and patient verbalizes understanding. Patient given an opportunity to ask questions and does not have any further questions or concerns at this time. The patient agrees to contact the PCP office for questions related to their healthcare. NN provided contact information to the patient for future reference. Patients top problems:  1. Diet: low fiber  2. Monitor wound (increase protein)  3. Deconditioning from hospitalization    [address] orders at discharge and company being used? [address]; admission date 18    Additional orders/further diagnostics post discharge: none    Barriers to care? lack of knowledge about disease    Abnormal labs: Cr 1.82  (will need BMP lab ordered at f/u)    Medication:   Performed medication reconciliation with patient, and patient verbalizes understanding of administration of home medications. There were no barriers to obtaining medications identified at this time.     New medications at discharge include percocet 5-325 mg  Prescription Medication total: 2 (pharmacy consult for polypharm needed?) no   Medication changes (dose adjustments or discontinued meds): n/a    Advance Care Planning:   Does patient have an Advance Directive:  Unknown at this time. Will discuss at f/u appt  If no, was patient was offered the opportunity to discuss advance care planning:  No. Patient states [facility] physical therapist had arrived at home and needed to hang up. PCP/Specialist follow up: Patient scheduled to follow up with Killian De La Cruz MD on 1/16/18. F/u with surgeon 1/11/18; staple removal    Goals      Monitor wound (pt-stated)            1/3/18 - monitor wound for signs of infection; complete abx course       Physical therapy (pt-stated)            1/3/18:  PT in place; gait/balance/deconditioning.  Cont to work with PT 3x wk

## 2018-01-09 ENCOUNTER — TELEPHONE (OUTPATIENT)
Dept: SURGERY | Age: 83
End: 2018-01-09

## 2018-01-09 ENCOUNTER — HOME CARE VISIT (OUTPATIENT)
Dept: HOME HEALTH SERVICES | Facility: HOME HEALTH | Age: 83
End: 2018-01-09
Payer: MEDICARE

## 2018-01-09 PROCEDURE — 3331090002 HH PPS REVENUE DEBIT

## 2018-01-09 PROCEDURE — 3331090001 HH PPS REVENUE CREDIT

## 2018-01-09 NOTE — TELEPHONE ENCOUNTER
Marilou Munguia is returning call from Talisha Pompa in regards with the patient's home health order.

## 2018-01-10 ENCOUNTER — HOME CARE VISIT (OUTPATIENT)
Dept: SCHEDULING | Facility: HOME HEALTH | Age: 83
End: 2018-01-10
Payer: MEDICARE

## 2018-01-10 VITALS
RESPIRATION RATE: 24 BRPM | SYSTOLIC BLOOD PRESSURE: 144 MMHG | HEART RATE: 94 BPM | DIASTOLIC BLOOD PRESSURE: 78 MMHG | TEMPERATURE: 97.3 F | OXYGEN SATURATION: 94 %

## 2018-01-10 VITALS
HEART RATE: 73 BPM | SYSTOLIC BLOOD PRESSURE: 124 MMHG | DIASTOLIC BLOOD PRESSURE: 78 MMHG | RESPIRATION RATE: 17 BRPM | TEMPERATURE: 97.6 F | OXYGEN SATURATION: 98 %

## 2018-01-10 PROCEDURE — G0157 HHC PT ASSISTANT EA 15: HCPCS

## 2018-01-10 PROCEDURE — 3331090002 HH PPS REVENUE DEBIT

## 2018-01-10 PROCEDURE — 3331090001 HH PPS REVENUE CREDIT

## 2018-01-11 ENCOUNTER — OFFICE VISIT (OUTPATIENT)
Dept: SURGERY | Age: 83
End: 2018-01-11

## 2018-01-11 VITALS
DIASTOLIC BLOOD PRESSURE: 80 MMHG | WEIGHT: 197 LBS | HEIGHT: 69 IN | HEART RATE: 84 BPM | SYSTOLIC BLOOD PRESSURE: 140 MMHG | OXYGEN SATURATION: 99 % | BODY MASS INDEX: 29.18 KG/M2 | TEMPERATURE: 97.4 F | RESPIRATION RATE: 16 BRPM

## 2018-01-11 DIAGNOSIS — K57.10 DIVERTICULOSIS OF SMALL INTESTINE WITHOUT HEMORRHAGE: Primary | ICD-10-CM

## 2018-01-11 PROCEDURE — 3331090002 HH PPS REVENUE DEBIT

## 2018-01-11 PROCEDURE — 3331090001 HH PPS REVENUE CREDIT

## 2018-01-11 NOTE — PROGRESS NOTES
1. Have you been to the ER, urgent care clinic since your last visit? Hospitalized since your last visit? No-not since surgery on 12/28/17    2. Have you seen or consulted any other health care providers outside of the [facility] since your last visit? Include any pap smears or colon screening.  No

## 2018-01-11 NOTE — PROGRESS NOTES
Pt here for follow up of his Small bowel resection for perforated Jejunal diverticulitis. He states that overall he is feeling better. He is tolerating a low fiber diet. He is having BM and is off all pain medication. Visit Vitals    /80 (BP 1 Location: Left arm, BP Patient Position: Sitting)    Pulse 84    Temp 97.4 °F (36.3 °C) (Axillary)    Resp 16    Ht 5' 9\" (1.753 m)    Wt 197 lb (89.4 kg)    SpO2 99%    BMI 29.09 kg/m2     Gen- Alert in NAD  Abd- Soft nontender nondistended  Incision healing well.     Pathology  Perforated Small bowel diverticulum no malignancy     A/p S/p Small bowel resection  Doing well   Continue to limit physical activity, though ok to work with PT  Diet as tolerated  F/u 6 weeks

## 2018-01-12 ENCOUNTER — HOME CARE VISIT (OUTPATIENT)
Dept: SCHEDULING | Facility: HOME HEALTH | Age: 83
End: 2018-01-12
Payer: MEDICARE

## 2018-01-12 VITALS
HEART RATE: 93 BPM | RESPIRATION RATE: 18 BRPM | TEMPERATURE: 97.4 F | SYSTOLIC BLOOD PRESSURE: 122 MMHG | OXYGEN SATURATION: 98 % | DIASTOLIC BLOOD PRESSURE: 76 MMHG

## 2018-01-12 PROCEDURE — G0157 HHC PT ASSISTANT EA 15: HCPCS

## 2018-01-12 PROCEDURE — 3331090001 HH PPS REVENUE CREDIT

## 2018-01-12 PROCEDURE — 3331090002 HH PPS REVENUE DEBIT

## 2018-01-13 PROCEDURE — 3331090002 HH PPS REVENUE DEBIT

## 2018-01-13 PROCEDURE — 3331090001 HH PPS REVENUE CREDIT

## 2018-01-14 PROCEDURE — 3331090001 HH PPS REVENUE CREDIT

## 2018-01-14 PROCEDURE — 3331090002 HH PPS REVENUE DEBIT

## 2018-01-15 ENCOUNTER — HOME CARE VISIT (OUTPATIENT)
Dept: SCHEDULING | Facility: HOME HEALTH | Age: 83
End: 2018-01-15
Payer: MEDICARE

## 2018-01-15 PROCEDURE — G0157 HHC PT ASSISTANT EA 15: HCPCS

## 2018-01-15 PROCEDURE — 3331090001 HH PPS REVENUE CREDIT

## 2018-01-15 PROCEDURE — 3331090002 HH PPS REVENUE DEBIT

## 2018-01-16 ENCOUNTER — OFFICE VISIT (OUTPATIENT)
Dept: INTERNAL MEDICINE CLINIC | Age: 83
End: 2018-01-16

## 2018-01-16 VITALS
SYSTOLIC BLOOD PRESSURE: 128 MMHG | TEMPERATURE: 97.5 F | RESPIRATION RATE: 17 BRPM | OXYGEN SATURATION: 95 % | HEART RATE: 102 BPM | DIASTOLIC BLOOD PRESSURE: 86 MMHG

## 2018-01-16 VITALS
HEART RATE: 68 BPM | DIASTOLIC BLOOD PRESSURE: 84 MMHG | RESPIRATION RATE: 16 BRPM | HEIGHT: 69 IN | SYSTOLIC BLOOD PRESSURE: 120 MMHG | TEMPERATURE: 97 F | WEIGHT: 189.6 LBS | OXYGEN SATURATION: 99 % | BODY MASS INDEX: 28.08 KG/M2

## 2018-01-16 DIAGNOSIS — K57.20 DIVERTICULITIS OF LARGE INTESTINE WITH PERFORATION WITHOUT BLEEDING: Primary | ICD-10-CM

## 2018-01-16 DIAGNOSIS — N18.30 CONTROLLED TYPE 2 DIABETES MELLITUS WITH STAGE 3 CHRONIC KIDNEY DISEASE, WITHOUT LONG-TERM CURRENT USE OF INSULIN (HCC): ICD-10-CM

## 2018-01-16 DIAGNOSIS — E11.22 CONTROLLED TYPE 2 DIABETES MELLITUS WITH STAGE 3 CHRONIC KIDNEY DISEASE, WITHOUT LONG-TERM CURRENT USE OF INSULIN (HCC): ICD-10-CM

## 2018-01-16 PROCEDURE — 3331090001 HH PPS REVENUE CREDIT

## 2018-01-16 PROCEDURE — 3331090002 HH PPS REVENUE DEBIT

## 2018-01-16 NOTE — PROGRESS NOTES
Subjective:     Chief Complaint   Patient presents with   [facility] Follow Up     He  is a 80y.o. year old male who presents for evaluation. Here for transition of care. Was hospitalized for bowel perforation secondary to diverticular perforation. Came home on the 5th of January. Has lost 8 pounds. Is eating better but doesn't have a good appetite. Is on a low fiber diet. Sleeping well, although gets up several times during the night. Uses CPAP machine. Has host of symptoms on last visit that by and large have improved. Historical Data    Past Medical History:   Diagnosis Date    Arthritis     Calculus of kidney     Chronic kidney disease     slightly increased creatinine    GERD (gastroesophageal reflux disease)     Glaucoma     Pneumonia     prostate cancer 2000    seed radiation, cryosurgery    Unspecified sleep apnea     uses CPAP        Past Surgical History:   Procedure Laterality Date    ENDOSCOPY, COLON, DIAGNOSTIC  2010    HX HEENT      uvulectomy    HX OTHER SURGICAL  12/28/2017    Exploratory Lap with small bowel opidtefwi-CKM-ZrDr. Romy Trujillo    HX PACEMAKER  09 01 14    HX PROSTATECTOMY      brachytherapy, cryosurgery    HX TONSILLECTOMY      HX UROLOGICAL      vasectomy    TN TRABECULOPLASTY BY LASER SURGERY          Outpatient Encounter Prescriptions as of 1/16/2018   Medication Sig Dispense Refill    levoFLOXacin (LEVAQUIN) 500 mg tablet Take  by mouth daily.  metroNIDAZOLE (FLAGYL) 500 mg tablet Take 500 mg by mouth daily.  oxyCODONE-acetaminophen (PERCOCET) 5-325 mg per tablet Take 1 Tab by mouth every four (4) hours as needed for Pain. Max Daily Amount: 6 Tabs. (Patient not taking: Reported on 1/9/2018) 30 Tab 0    EPINEPHrine (EPIPEN) 0.3 mg/0.3 mL injection 0.3 mL by IntraMUSCular route once as needed for 1 dose. 0.3 mL 1     No facility-administered encounter medications on file as of 1/16/2018.          Allergies   Allergen Reactions    Pcn [Penicillins] Hives     Reaction was in 1950's following a PCN shot    Venom-Honey Bee Other (comments)     anaphylaxis        Social History     Social History    Marital status:      Spouse name: N/A    Number of children: N/A    Years of education: N/A     Occupational History    Not on file. Social History Main Topics    Smoking status: Never Smoker    Smokeless tobacco: Never Used    Alcohol use 3.6 oz/week     2 Glasses of wine, 4 Standard drinks or equivalent per week    Drug use: No    Sexual activity: No     Other Topics Concern    Not on file     Social History Narrative        Review of Systems  A comprehensive review of systems was negative except for that written in the HPI. Objective:     Vitals:    01/16/18 1101   BP: 120/84   Pulse: 68   Resp: 16   Temp: 97 °F (36.1 °C)   TempSrc: Oral   SpO2: 99%   Weight: 189 lb 9.6 oz (86 kg)   Height: 5' 9\" (1.753 m)     Pleasant WM in no acute distress. Neck: Supple. Cardiac: RRR without murmurs, gallops or rubs. Lungs: Clear to auscultation. Abdomen:  Healing abdominal incision without sign of infection. Steristrips in place. Slightly tender throughout but no rebound. Neuro: No focal motor deficits. ASSESSMENT / PLAN:   1. Diverticulitis of large intestine with perforation without bleeding  · Overall, feeling much better. · Slowly advance diet. · Discussed potential future issues with disease. · Encouraged deep breathing.  - CBC WITH AUTOMATED DIFF    2. Controlled type 2 diabetes mellitus with stage 3 chronic kidney disease, without long-term current use of insulin (HCC)  · Be mindful of sweets. - METABOLIC PANEL, COMPREHENSIVE             Follow-up Disposition:  Return in about 6 weeks (around 2/27/2018) for F/U surgery. Advised him to call back or return to office if symptoms worsen/change/persist.  Discussed expected course/resolution/complications of diagnosis in detail with patient.     Medication risks/benefits/costs/interactions/alternatives discussed with patient. He was given an after visit summary which includes diagnoses, current medications, & vitals. He expressed understanding with the diagnosis and plan.

## 2018-01-16 NOTE — PROGRESS NOTES
Chief Complaint   Patient presents with   [facility] Follow Up     Reviewed record in preparation for visit and have obtained necessary documentation. Identified pt with two pt identifiers(name and ). There are no preventive care reminders to display for this patient. Chief Complaint   Patient presents with   [facility] Follow Up        Wt Readings from Last 3 Encounters:   18 189 lb 9.6 oz (86 kg)   18 197 lb (89.4 kg)   18 206 lb 9.1 oz (93.7 kg)     Temp Readings from Last 3 Encounters:   18 97 °F (36.1 °C) (Oral)   01/15/18 97.5 °F (36.4 °C) (Temporal)   18 97.4 °F (36.3 °C) (Temporal)     BP Readings from Last 3 Encounters:   18 120/84   01/15/18 128/86   18 122/76     Pulse Readings from Last 3 Encounters:   18 68   01/15/18 (!) 102   18 93           Learning Assessment:  :     Learning Assessment 10/3/2017 2016 2015 2014   PRIMARY LEARNER Patient Patient Patient Patient   HIGHEST LEVEL OF EDUCATION - PRIMARY LEARNER  > 4 YEARS OF COLLEGE - > 4 YEARS OF COLLEGE > 4 YEARS OF COLLEGE   BARRIERS PRIMARY LEARNER NONE - NONE NONE   CO-LEARNER CAREGIVER No - No No   PRIMARY LANGUAGE ENGLISH ENGLISH ENGLISH ENGLISH    NEED - - No No   LEARNER PREFERENCE PRIMARY DEMONSTRATION DEMONSTRATION READING DEMONSTRATION     - - DEMONSTRATION READING     - - - LISTENING   LEARNING SPECIAL TOPICS - - no no   ANSWERED BY patient patient patient patient   RELATIONSHIP SELF SELF SELF SELF   ASSESSMENT COMMENT - - n/a -       Depression Screening:  :     PHQ over the last two weeks 10/3/2017   Little interest or pleasure in doing things Not at all   Feeling down, depressed or hopeless Not at all   Total Score PHQ 2 0       Fall Risk Assessment:  :     Fall Risk Assessment, last 12 mths 2018   Able to walk? Yes   Fall in past 12 months?  No       Abuse Screening:  :     Abuse Screening Questionnaire 10/3/2017 2015 2014   Do you ever feel afraid of your partner? N N N   Are you in a relationship with someone who physically or mentally threatens you? N N N   Is it safe for you to go home? Y Y Y       Coordination of Care Questionnaire:  :     1) Have you been to an emergency room, urgent care clinic since your last visit? no   Hospitalized since your last visit? yes             2) Have you seen or consulted any other health care providers outside of [address] [place] [address] since your last visit? no  (Include any pap smears or colon screenings in this section.)    3) Do you have an Advance Directive on file? no    4) Are you interested in receiving information on Advance Directives? NO      Patient is accompanied by self I have received verbal consent from Ashley Chavez to discuss any/all medical information while they are present in the room. Reviewed record  In preparation for visit and have obtained necessary documentation.

## 2018-01-16 NOTE — MR AVS SNAPSHOT
[address] [facility], [address] [facility] 
[phone] Patient: Juan Antonio Restrepo MRN: [id number] SYM:[birth year] Visit Information Date & Time Provider Department Dept. Phone Encounter #  
 1/16/2018 10:45 AM MD [facility] [phone] Follow-up Instructions Return in about 6 weeks (around 2/27/2018) for F/U surgery. Your Appointments 2/20/2018  9:00 AM  
POST OP 10 MIN with Alon Miller NP  
[facility] [address] ([address]) Appt Note: PO; 6wk. F/U w/Marlena; [facility]; 1/11/18 [address] [place] [phone]  
[facility] [phone]  
  
    
 3/8/2018  9:00 AM  
Any with Nito Villegas MD  
[facility] ([facility]) Appt Note: yrly cpap follow up (NEEDS REFERRAL FROM PCP); yrly cpap follow up (NEEDS REFERRAL FROM PCP); yrly cpap follow up (NEEDS REFERRAL FROM PCP); yrly cpap follow up (NEEDS REFERRAL FROM PCP) [address] [facility] [address] [phone] [address] [place] [address], [address] [address]  
  
    
 4/3/2018  8:15 AM  
ROUTINE CARE with MD José [facility] [address] [place] [address]) Appt Note: 6m dm  
 [address], [address] [facility] 57  
[address], [facility] [phone] Upcoming Health Maintenance Date Due HEMOGLOBIN A1C Q6M 4/3/2018 EYE EXAM RETINAL OR DILATED Q1 7/18/2018 FOOT EXAM Q1 10/3/2018 LIPID PANEL Q1 10/3/2018 MEDICARE YEARLY EXAM 10/4/2018 MICROALBUMIN Q1 1/4/2019 GLAUCOMA SCREENING Q2Y 7/18/2019 DTaP/Tdap/Td series (2 - Td) 5/7/2025 Allergies as of 1/16/2018  Review Complete On: 1/16/2018 By: Nathen Woodson MD  
  
 Severity Noted Reaction Type Reactions Pcn [Penicillins]  12/09/2011    Hives Reaction was in 1950's following a PCN shot Venom-honey Bee  08/14/2013    Other (comments) anaphylaxis Current Immunizations  Reviewed on 1/3/2018 Name Date Influenza High Dose Vaccine PF 9/13/2017, 10/15/2015 Influenza Vaccine 10/24/2014, 9/1/2013 Influenza Vaccine Whole 10/28/2012 Pneumococcal Vaccine (Unspecified Type) 10/13/2014 Tdap 5/7/2015 ZZZ-RETIRED (DO NOT USE) Pneumococcal Vaccine (Unspecified Type) 11/28/2012 Not reviewed this visit You Were Diagnosed With   
  
 Codes Comments Diverticulitis of large intestine with perforation without bleeding    -  Primary ICD-10-CM: L53.57 ICD-9-CM: 562.11, [id number] Controlled type 2 diabetes mellitus with stage 3 chronic kidney disease, without long-term current use of insulin (HCC)     ICD-10-CM: E11.22, N18.3 ICD-9-CM: 250.40, 585.3 Vitals BP Pulse Temp Resp Height(growth percentile) Weight(growth percentile) 120/84 (BP 1 Location: Right arm, BP Patient Position: Sitting) 68 97 °F (36.1 °C) (Oral) 16 5' 9\" (1.753 m) 189 lb 9.6 oz (86 kg) SpO2 BMI Smoking Status 99% 28 kg/m2 Never Smoker BMI and BSA Data Body Mass Index Body Surface Area  
 28 kg/m 2 2.05 m 2 Preferred Pharmacy Pharmacy Name Phone [phone] [phone] Your Updated Medication List  
  
Notice  As of 1/16/2018 11:25 AM  
 You have not been prescribed any medications. We Performed the Following CBC WITH AUTOMATED DIFF [43326 CPT(R)] METABOLIC PANEL, COMPREHENSIVE [83276 CPT(R)] Follow-up Instructions Return in about 6 weeks (around 2/27/2018) for F/U surgery.   
  
To-Do List   
 01/17/2018 10:00 AM  
  Appointment with Tristan Johnson PTA at [place] [address]  
  
 01/22/2018 To Be Determined Appointment with Sahil Ziegler PTA at [place] [address]  
  
 01/24/2018 To Be Determined Appointment with Chelsi Caro PT at [place] [address] Patient Instructions Advance your diet to high fiber. Avoid small seeds and popcorn. Slowly increase activity level. Introducing [facility]! Dear Chauncey Klinefelter: Thank you for requesting a [employer] account. Our records indicate that you already have an active [employer] account. You can access your account anytime at [email or web address] Did you know that you can access your hospital and ER discharge instructions at any time in [employer]? You can also review all of your test results from your hospital stay or ER visit. Additional Information If you have questions, please visit the Frequently Asked Questions section of the [employer] website at [email or web address] Remember, [employer] is NOT to be used for urgent needs. For medical emergencies, dial 911. Now available from your iPhone and Android! Please provide this summary of care documentation to your next provider. Your primary care clinician is listed as Hannah Mai. If you have any questions after today's visit, please call [phone].

## 2018-01-17 ENCOUNTER — HOME CARE VISIT (OUTPATIENT)
Dept: HOME HEALTH SERVICES | Facility: HOME HEALTH | Age: 83
End: 2018-01-17
Payer: MEDICARE

## 2018-01-17 LAB
ALBUMIN SERPL-MCNC: 3.6 G/DL (ref 3.5–4.7)
ALBUMIN/GLOB SERPL: 1.2 {RATIO} (ref 1.2–2.2)
ALP SERPL-CCNC: 62 IU/L (ref 39–117)
ALT SERPL-CCNC: 13 IU/L (ref 0–44)
AST SERPL-CCNC: 18 IU/L (ref 0–40)
BASOPHILS # BLD AUTO: 0.1 X10E3/UL (ref 0–0.2)
BASOPHILS NFR BLD AUTO: 1 %
BILIRUB SERPL-MCNC: 0.3 MG/DL (ref 0–1.2)
BUN SERPL-MCNC: 22 MG/DL (ref 8–27)
BUN/CREAT SERPL: 16 (ref 10–24)
CALCIUM SERPL-MCNC: 9.4 MG/DL (ref 8.6–10.2)
CHLORIDE SERPL-SCNC: 102 MMOL/L (ref 96–106)
CO2 SERPL-SCNC: 24 MMOL/L (ref 18–29)
CREAT SERPL-MCNC: 1.36 MG/DL (ref 0.76–1.27)
EOSINOPHIL # BLD AUTO: 0.1 X10E3/UL (ref 0–0.4)
EOSINOPHIL NFR BLD AUTO: 1 %
ERYTHROCYTE [DISTWIDTH] IN BLOOD BY AUTOMATED COUNT: 14.8 % (ref 12.3–15.4)
GLOBULIN SER CALC-MCNC: 2.9 G/DL (ref 1.5–4.5)
GLUCOSE SERPL-MCNC: 114 MG/DL (ref 65–99)
HCT VFR BLD AUTO: 41 % (ref 37.5–51)
HGB BLD-MCNC: 14.2 G/DL (ref 13–17.7)
IMM GRANULOCYTES # BLD: 0 X10E3/UL (ref 0–0.1)
IMM GRANULOCYTES NFR BLD: 0 %
INTERPRETATION: NORMAL
LYMPHOCYTES # BLD AUTO: 1.8 X10E3/UL (ref 0.7–3.1)
LYMPHOCYTES NFR BLD AUTO: 22 %
Lab: NORMAL
MCH RBC QN AUTO: 30.1 PG (ref 26.6–33)
MCHC RBC AUTO-ENTMCNC: 34.6 G/DL (ref 31.5–35.7)
MCV RBC AUTO: 87 FL (ref 79–97)
MONOCYTES # BLD AUTO: 0.6 X10E3/UL (ref 0.1–0.9)
MONOCYTES NFR BLD AUTO: 7 %
NEUTROPHILS # BLD AUTO: 5.5 X10E3/UL (ref 1.4–7)
NEUTROPHILS NFR BLD AUTO: 69 %
PLATELET # BLD AUTO: 349 X10E3/UL (ref 150–379)
POTASSIUM SERPL-SCNC: 5.1 MMOL/L (ref 3.5–5.2)
PROT SERPL-MCNC: 6.5 G/DL (ref 6–8.5)
RBC # BLD AUTO: 4.71 X10E6/UL (ref 4.14–5.8)
SODIUM SERPL-SCNC: 141 MMOL/L (ref 134–144)
WBC # BLD AUTO: 8.1 X10E3/UL (ref 3.4–10.8)

## 2018-01-17 PROCEDURE — 3331090001 HH PPS REVENUE CREDIT

## 2018-01-17 PROCEDURE — 3331090002 HH PPS REVENUE DEBIT

## 2018-01-18 PROCEDURE — 3331090001 HH PPS REVENUE CREDIT

## 2018-01-18 PROCEDURE — 3331090002 HH PPS REVENUE DEBIT

## 2018-01-19 PROCEDURE — 3331090001 HH PPS REVENUE CREDIT

## 2018-01-19 PROCEDURE — 3331090002 HH PPS REVENUE DEBIT

## 2018-01-20 PROCEDURE — 3331090002 HH PPS REVENUE DEBIT

## 2018-01-20 PROCEDURE — 3331090001 HH PPS REVENUE CREDIT

## 2018-01-21 PROCEDURE — 3331090002 HH PPS REVENUE DEBIT

## 2018-01-21 PROCEDURE — 3331090001 HH PPS REVENUE CREDIT

## 2018-01-22 PROCEDURE — 3331090001 HH PPS REVENUE CREDIT

## 2018-01-22 PROCEDURE — 3331090002 HH PPS REVENUE DEBIT

## 2018-01-23 ENCOUNTER — HOME CARE VISIT (OUTPATIENT)
Dept: SCHEDULING | Facility: HOME HEALTH | Age: 83
End: 2018-01-23
Payer: MEDICARE

## 2018-01-23 PROCEDURE — 3331090001 HH PPS REVENUE CREDIT

## 2018-01-23 PROCEDURE — G0157 HHC PT ASSISTANT EA 15: HCPCS

## 2018-01-23 PROCEDURE — 3331090002 HH PPS REVENUE DEBIT

## 2018-01-24 VITALS
TEMPERATURE: 97.3 F | SYSTOLIC BLOOD PRESSURE: 118 MMHG | RESPIRATION RATE: 17 BRPM | OXYGEN SATURATION: 99 % | HEART RATE: 83 BPM | DIASTOLIC BLOOD PRESSURE: 80 MMHG

## 2018-01-24 PROCEDURE — 3331090002 HH PPS REVENUE DEBIT

## 2018-01-24 PROCEDURE — 3331090001 HH PPS REVENUE CREDIT

## 2018-01-25 PROCEDURE — 3331090002 HH PPS REVENUE DEBIT

## 2018-01-25 PROCEDURE — 3331090001 HH PPS REVENUE CREDIT

## 2018-01-26 ENCOUNTER — HOME CARE VISIT (OUTPATIENT)
Dept: SCHEDULING | Facility: HOME HEALTH | Age: 83
End: 2018-01-26
Payer: MEDICARE

## 2018-01-26 PROCEDURE — 3331090002 HH PPS REVENUE DEBIT

## 2018-01-26 PROCEDURE — 3331090001 HH PPS REVENUE CREDIT

## 2018-01-26 PROCEDURE — G0151 HHCP-SERV OF PT,EA 15 MIN: HCPCS

## 2018-01-26 PROCEDURE — 3331090003 HH PPS REVENUE ADJ

## 2018-01-27 VITALS
SYSTOLIC BLOOD PRESSURE: 140 MMHG | OXYGEN SATURATION: 98 % | TEMPERATURE: 96.8 F | DIASTOLIC BLOOD PRESSURE: 80 MMHG | HEART RATE: 84 BPM

## 2018-02-08 ENCOUNTER — PATIENT OUTREACH (OUTPATIENT)
Dept: INTERNAL MEDICINE CLINIC | Age: 83
End: 2018-02-08

## 2018-02-08 NOTE — PROGRESS NOTES
Nurse Navigator noting no ED or inpatient hospitalizations within the last 30 days. No notification of any medical needs and/or barriers to current treatment plan. Patient has attended follow up appointment with PCP as directed. Compliant/noncompliant with scheduling follow up appt with PCP Nurse Navigator will be available as medical needs arise. The current hospital episode will be closed at this time.

## 2018-02-20 ENCOUNTER — OFFICE VISIT (OUTPATIENT)
Dept: SURGERY | Age: 83
End: 2018-02-20

## 2018-02-20 VITALS
WEIGHT: 190 LBS | HEIGHT: 69 IN | SYSTOLIC BLOOD PRESSURE: 128 MMHG | HEART RATE: 85 BPM | BODY MASS INDEX: 28.14 KG/M2 | TEMPERATURE: 97.4 F | DIASTOLIC BLOOD PRESSURE: 82 MMHG | RESPIRATION RATE: 16 BRPM | OXYGEN SATURATION: 96 %

## 2018-02-20 DIAGNOSIS — Z09 POSTOPERATIVE EXAMINATION: Primary | ICD-10-CM

## 2018-02-20 NOTE — PROGRESS NOTES
1. Have you been to the ER, urgent care clinic since your last visit? Hospitalized since your last visit? No    2. Have you seen or consulted any other health care providers outside of the [address] [place] [address] since your last visit? Include any pap smears or colon screening.  No

## 2018-02-20 NOTE — PATIENT INSTRUCTIONS
Open Bowel Resection: What to Expect at [address] are likely to have pain that comes and goes for the next few days after bowel surgery. You may have bowel cramps, and your cut (incision) may hurt. You may also feel like you have the flu. You may have a low fever and feel tired and nauseated. This is common. You should feel better after a week and will probably be back to normal in 2 to 3 weeks. This care sheet gives you a general idea about how long it will take for you to recover. But each person recovers at a different pace. Follow the steps below to get better as quickly as possible. How can you care for yourself at home? Activity  ? · Rest when you feel tired. Getting enough sleep will help you recover. ? · Try to walk each day. Start by walking a little more than you did the day before. Bit by bit, increase the amount you walk. Walking boosts blood flow and helps prevent pneumonia and constipation. ? · Avoid strenuous activities, such as biking, jogging, weight lifting, or aerobic exercise, until your doctor says it is okay. ? · Ask your doctor when you can drive again. ? · You will probably need to take 3 to 4 weeks off from work. It depends on the type of work you do and how you feel. You may need to take off 4 to 6 weeks if you lift heavy objects in your job. ? · You may shower 24 to 48 hours after surgery, if your doctor says it is okay. Pat the cut (incision) dry. Do not take a bath for the first 2 weeks, or until your doctor tells you it is okay. ? · Ask your doctor when it is okay for you to have sex. Diet  ? · You may not have much appetite after the surgery. But try to eat a healthy diet. Your doctor will tell you about any foods you should not eat. ? · Eat a low-fiber diet for several weeks after surgery. Eat many small meals throughout the day. Add high-fiber foods a little at a time. ? · Eat yogurt.  It puts good bacteria into your colon and helps prevent diarrhea. ? · Try to avoid nuts, seeds, and corn for a while. They may be hard to digest.   ? · You may need to take vitamins that contain sodium and potassium. Ask your doctor. ? · Drink plenty of fluids to avoid becoming dehydrated. Medicines  ? · Your doctor will tell you if and when you can restart your medicines. He or she will also give you instructions about taking any new medicines. ? · If you take blood thinners, such as warfarin (Coumadin), clopidogrel (Plavix), or aspirin, be sure to talk to your doctor. He or she will tell you if and when to start taking those medicines again. Make sure that you understand exactly what your doctor wants you to do. ? · Take pain medicines exactly as directed. ¨ If the doctor gave you a prescription medicine for pain, take it as prescribed. ¨ If you are not taking a prescription pain medicine, ask your doctor if you can take an over-the-counter medicine. ¨ Do not take two or more pain medicines at the same time unless the doctor told you to. Many pain medicines have acetaminophen, which is Tylenol. Too much acetaminophen (Tylenol) can be harmful. ? · If you think your pain medicine is making you sick to your stomach:  ¨ Take your medicine after meals (unless your doctor tells you not to). ¨ Ask your doctor for a different pain medicine. ? · If your doctor prescribed antibiotics, take them as directed. Do not stop taking them just because you feel better. You need to take the full course of antibiotics. ? · You may need to take some medicines in a different form. You will be told whether to crush pills or take a liquid form of the medicine. ? · If your doctor gives you a stool softener, take it as directed. Incision care  ? · If you have strips of tape on the incision, leave the tape on for a week or until it falls off.   ? · Wash the area daily with warm, soapy water, and pat it dry. Follow-up care is a key part of your treatment and safety.  Be sure to make and go to all appointments, and call your doctor if you are having problems. It's also a good idea to know your test results and keep a list of the medicines you take. When should you call for help? Call 911 anytime you think you may need emergency care. For example, call if:  ? · You passed out (lost consciousness). ? · You are short of breath. ?Call your doctor now or seek immediate medical care if:  ? · You are sick to your stomach and cannot drink fluids or keep them down. ? · You have signs of a blood clot in your leg (called a deep vein thrombosis), such as:  ¨ Pain in your calf, back of the knee, thigh, or groin. ¨ Redness and swelling in your leg or groin. ? · You have signs of infection, such as:  ¨ Increased pain, swelling, warmth, or redness. ¨ Red streaks leading from the incision. ¨ Pus draining from the incision. ¨ A fever. ? · You have pain that does not get better after you take pain medicine. ? · You have loose stitches, or your incision comes open. ? · Bright red blood has soaked through the bandage. ? · You cannot pass stools or gas. ? Watch closely for any changes in your health, and be sure to contact your doctor if you have any problems. Where can you learn more? Go to http://brien-le.info/. Enter [id number] [id number] in the search box to learn more about \"Open Bowel Resection: What to Expect at Home. \"  Current as of: May 12, 2017  Content Version: 11.4  © 8693-4623 Healthwise[employer]. Care instructions adapted under license by [employer] (which disclaims liability or warranty for this information). If you have questions about a medical condition or this instruction, always ask your healthcare professional. [address] any warranty or liability for your use of this information.

## 2018-02-20 NOTE — MR AVS SNAPSHOT
[address] [facility] [address] [address] 
[phone] Patient: Wale Oswald MRN: [id number] Visit Information Date & Time Provider Department Dept. Phone Encounter #  
 2/20/2018  9:00 AM Binh Teague NP [facility] 22 4 [phone] [id number] Your Appointments 3/6/2018  8:15 AM  
ROUTINE CARE with MD José Villalobos [facility]) Appt Note: 6wk f/u  Surgery [address], [address], [address] [place] [address]  
  
    
 3/8/2018  9:00 AM  
Any with Niall Bell MD  
[address] [place] [address] ([facility]) Appt Note: yrly cpap follow up (NEEDS REFERRAL FROM PCP); yrly cpap follow up (NEEDS REFERRAL FROM PCP); yrly cpap follow up (NEEDS REFERRAL FROM PCP); yrly cpap follow up (NEEDS REFERRAL FROM PCP) [address] [place] [address], [address] [address] [address] [place] [address], [address] [phone]  
  
    
 4/3/2018  8:15 AM  
ROUTINE CARE with MD José Villalobos [facility]) Appt Note: 6m dm  
 [address], [address] [place] [address]  
[phone] Upcoming Health Maintenance Date Due HEMOGLOBIN A1C Q6M 4/3/2018 EYE EXAM RETINAL OR DILATED Q1 7/18/2018 FOOT EXAM Q1 10/3/2018 LIPID PANEL Q1 10/3/2018 MEDICARE YEARLY EXAM 10/4/2018 MICROALBUMIN Q1 1/4/2019 GLAUCOMA SCREENING Q2Y 7/18/2019 DTaP/Tdap/Td series (2 - Td) 5/7/2025 Allergies as of 2/20/2018  Review Complete On: 2/20/2018 By: Binh Teague NP Severity Noted Reaction Type Reactions Pcn [Penicillins]  12/09/2011    Hives Reaction was in 1950's following a PCN shot Venom-honey Bee  08/14/2013    Other (comments) anaphylaxis Current Immunizations  Reviewed on 1/3/2018 Name Date Influenza High Dose Vaccine PF 9/13/2017, 10/15/2015 Influenza Vaccine 10/24/2014, 9/1/2013 Influenza Vaccine Whole 10/28/2012 Pneumococcal Vaccine (Unspecified Type) 10/13/2014 Tdap 5/7/2015 ZZZ-RETIRED (DO NOT USE) Pneumococcal Vaccine (Unspecified Type) 11/28/2012 Not reviewed this visit Vitals BP Pulse Temp Resp Height(growth percentile) Weight(growth percentile) 128/82 (BP 1 Location: Left arm, BP Patient Position: Sitting) 85 97.4 °F (36.3 °C) (Oral) 16 5' 9\" (1.753 m) 190 lb (86.2 kg) SpO2 BMI Smoking Status 96% 28.06 kg/m2 Never Smoker Vitals History BMI and BSA Data Body Mass Index Body Surface Area 28.06 kg/m 2 2.05 m 2 Preferred Pharmacy Pharmacy Name Phone [facility] [facility], [address] [place] [address] AT [address] [place] [address] [phone] Your Updated Medication List  
  
Notice  As of 2/20/2018  9:27 AM  
 You have not been prescribed any medications. Patient Instructions Open Bowel Resection: What to Expect at Home Your Recovery You are likely to have pain that comes and goes for the next few days after bowel surgery. You may have bowel cramps, and your cut (incision) may hurt. You may also feel like you have the flu. You may have a low fever and feel tired and nauseated. This is common. You should feel better after a week and will probably be back to normal in 2 to 3 weeks. This care sheet gives you a general idea about how long it will take for you to recover. But each person recovers at a different pace. Follow the steps below to get better as quickly as possible. How can you care for yourself at home? Activity ? · Rest when you feel tired. Getting enough sleep will help you recover. ? · Try to walk each day.  Start by walking a little more than you did the day before. Bit by bit, increase the amount you walk. Walking boosts blood flow and helps prevent pneumonia and constipation. ? · Avoid strenuous activities, such as biking, jogging, weight lifting, or aerobic exercise, until your doctor says it is okay. ? · Ask your doctor when you can drive again. ? · You will probably need to take 3 to 4 weeks off from work. It depends on the type of work you do and how you feel. You may need to take off 4 to 6 weeks if you lift heavy objects in your job. ? · You may shower 24 to 48 hours after surgery, if your doctor says it is okay. Pat the cut (incision) dry. Do not take a bath for the first 2 weeks, or until your doctor tells you it is okay. ? · Ask your doctor when it is okay for you to have sex. Diet ? · You may not have much appetite after the surgery. But try to eat a healthy diet. Your doctor will tell you about any foods you should not eat. ? · Eat a low-fiber diet for several weeks after surgery. Eat many small meals throughout the day. Add high-fiber foods a little at a time. ? · Eat yogurt. It puts good bacteria into your colon and helps prevent diarrhea. ? · Try to avoid nuts, seeds, and corn for a while. They may be hard to digest.  
? · You may need to take vitamins that contain sodium and potassium. Ask your doctor. ? · Drink plenty of fluids to avoid becoming dehydrated. Medicines ? · Your doctor will tell you if and when you can restart your medicines. He or she will also give you instructions about taking any new medicines. ? · If you take blood thinners, such as warfarin (Coumadin), clopidogrel (Plavix), or aspirin, be sure to talk to your doctor. He or she will tell you if and when to start taking those medicines again. Make sure that you understand exactly what your doctor wants you to do. ? · Take pain medicines exactly as directed. ¨ If the doctor gave you a prescription medicine for pain, take it as prescribed. ¨ If you are not taking a prescription pain medicine, ask your doctor if you can take an over-the-counter medicine. ¨ Do not take two or more pain medicines at the same time unless the doctor told you to. Many pain medicines have acetaminophen, which is Tylenol. Too much acetaminophen (Tylenol) can be harmful. ? · If you think your pain medicine is making you sick to your stomach: 
¨ Take your medicine after meals (unless your doctor tells you not to). ¨ Ask your doctor for a different pain medicine. ? · If your doctor prescribed antibiotics, take them as directed. Do not stop taking them just because you feel better. You need to take the full course of antibiotics. ? · You may need to take some medicines in a different form. You will be told whether to crush pills or take a liquid form of the medicine. ? · If your doctor gives you a stool softener, take it as directed. Incision care ? · If you have strips of tape on the incision, leave the tape on for a week or until it falls off.  
? · Wash the area daily with warm, soapy water, and pat it dry. Follow-up care is a key part of your treatment and safety. Be sure to make and go to all appointments, and call your doctor if you are having problems. It's also a good idea to know your test results and keep a list of the medicines you take. When should you call for help? Call 911 anytime you think you may need emergency care. For example, call if: 
? · You passed out (lost consciousness). ? · You are short of breath. ?Call your doctor now or seek immediate medical care if: 
? · You are sick to your stomach and cannot drink fluids or keep them down. ? · You have signs of a blood clot in your leg (called a deep vein thrombosis), such as: 
¨ Pain in your calf, back of the knee, thigh, or groin. ¨ Redness and swelling in your leg or groin. ? · You have signs of infection, such as: 
¨ Increased pain, swelling, warmth, or redness. ¨ Red streaks leading from the incision. ¨ Pus draining from the incision. ¨ A fever. ? · You have pain that does not get better after you take pain medicine. ? · You have loose stitches, or your incision comes open. ? · Bright red blood has soaked through the bandage. ? · You cannot pass stools or gas. ? Watch closely for any changes in your health, and be sure to contact your doctor if you have any problems. Where can you learn more? Go to http://brien-le.info/. Enter 621 [id number] in the search box to learn more about \"Open Bowel Resection: What to Expect at Home. \" Current as of: May 12, 2017 Content Version: 11.4 © 8105-0172 [employer]. Care instructions adapted under license by [employer] (which disclaims liability or warranty for this information). If you have questions about a medical condition or this instruction, always ask your healthcare professional. [place] [address] any warranty or liability for your use of this information. Introducing [facility] & HEALTH SERVICES! Dear Sandra Rowe: Thank you for requesting a meinKauf account. Our records indicate that you already have an active meinKauf account. You can access your account anytime at [email or web address] Did you know that you can access your hospital and ER discharge instructions at any time in meinKauf? You can also review all of your test results from your hospital stay or ER visit. Additional Information If you have questions, please visit the Frequently Asked Questions section of the meinKauf website at [email or web address] Remember, meinKauf is NOT to be used for urgent needs. For medical emergencies, dial 911. Now available from your iPhone and Android! Please provide this summary of care documentation to your next provider. Your primary care clinician is listed as Darylene Bramble.  If you have any questions after today's visit, please call [phone].

## 2018-03-06 ENCOUNTER — OFFICE VISIT (OUTPATIENT)
Dept: INTERNAL MEDICINE CLINIC | Age: 83
End: 2018-03-06

## 2018-03-06 VITALS
TEMPERATURE: 97.9 F | BODY MASS INDEX: 27.81 KG/M2 | DIASTOLIC BLOOD PRESSURE: 70 MMHG | WEIGHT: 187.8 LBS | HEIGHT: 69 IN | SYSTOLIC BLOOD PRESSURE: 120 MMHG | RESPIRATION RATE: 16 BRPM | OXYGEN SATURATION: 98 % | HEART RATE: 88 BPM

## 2018-03-06 DIAGNOSIS — N18.30 CONTROLLED TYPE 2 DIABETES MELLITUS WITH STAGE 3 CHRONIC KIDNEY DISEASE, WITHOUT LONG-TERM CURRENT USE OF INSULIN (HCC): Primary | ICD-10-CM

## 2018-03-06 DIAGNOSIS — E11.22 CONTROLLED TYPE 2 DIABETES MELLITUS WITH STAGE 3 CHRONIC KIDNEY DISEASE, WITHOUT LONG-TERM CURRENT USE OF INSULIN (HCC): Primary | ICD-10-CM

## 2018-03-06 DIAGNOSIS — G47.33 OSA ON CPAP: ICD-10-CM

## 2018-03-06 DIAGNOSIS — K57.30 DIVERTICULOSIS OF LARGE INTESTINE WITHOUT HEMORRHAGE: ICD-10-CM

## 2018-03-06 DIAGNOSIS — Z99.89 OSA ON CPAP: ICD-10-CM

## 2018-03-06 PROBLEM — E11.40 TYPE 2 DIABETES MELLITUS WITH DIABETIC NEUROPATHY (HCC): Status: ACTIVE | Noted: 2018-03-06

## 2018-03-06 NOTE — PROGRESS NOTES
Subjective:     Chief Complaint   Patient presents with    Neurologic Problem     f/u     He  is a 80y.o. year old male who presents for evaluation. Is s/p surgery for ruptures diverticuli. Continent again. Bowel movements are less than normal.  Doesn't feel like he empties rectum like he should. Historical Data    Past Medical History:   Diagnosis Date    Arthritis     Calculus of kidney     Chronic kidney disease     slightly increased creatinine    GERD (gastroesophageal reflux disease)     Glaucoma     Pneumonia     prostate cancer 2000    seed radiation, cryosurgery    Unspecified sleep apnea     uses CPAP        Past Surgical History:   Procedure Laterality Date    ENDOSCOPY, COLON, DIAGNOSTIC  2010    HX HEENT      uvulectomy    HX OTHER SURGICAL  12/28/2017    Exploratory Lap with small bowel gtwobcghb-MCP-VeDr. Sonido Brothers Shindel    HX PACEMAKER  09 01 14    HX PROSTATECTOMY      brachytherapy, cryosurgery    HX TONSILLECTOMY      HX UROLOGICAL      vasectomy    ND TRABECULOPLASTY BY LASER SURGERY          No outpatient encounter prescriptions on file as of 3/6/2018. No facility-administered encounter medications on file as of 3/6/2018. Allergies   Allergen Reactions    Pcn [Penicillins] Hives     Reaction was in 65's following a PCN shot    Venom-Honey Bee Other (comments)     anaphylaxis        Social History     Social History    Marital status:      Spouse name: N/A    Number of children: N/A    Years of education: N/A     Occupational History    Not on file. Social History Main Topics    Smoking status: Never Smoker    Smokeless tobacco: Never Used    Alcohol use 3.6 oz/week     2 Glasses of wine, 4 Standard drinks or equivalent per week    Drug use: No    Sexual activity: No     Other Topics Concern    Not on file     Social History Narrative        Review of Systems  Pertinent items are noted in HPI.     Objective:     Vitals:    03/06/18 0806   BP: 120/70   Pulse: 88   Resp: 16   Temp: 97.9 °F (36.6 °C)   TempSrc: Oral   SpO2: 98%   Weight: 187 lb 12.8 oz (85.2 kg)   Height: 5' 9\" (1.753 m)     Pleasant WM in no acute distress. Cardiac: RRR without murmurs, gallops or rubs. Lungs: Clear to auscultation. Abdomen: Well healed surgical scar. No tenderness. Rectal:  External hemorrhoids. Apparent swelling of distal rectum. No masses. ASSESSMENT / PLAN:   1. Controlled type 2 diabetes mellitus with stage 3 chronic kidney disease, without long-term current use of insulin (HCC)  · Avoid high glycemic index foods. · Reassess L5I  - METABOLIC PANEL, COMPREHENSIVE  - HEMOGLOBIN A1C WITH EAG    2. Diverticulosis of large intestine without hemorrhage  · Doing well. 3. RONNY on CPAP  · Follow up with Dr Chad Freed.  - Harriett Baldwin    Patient Instructions   Eat a balanced high fiber diet. Stay physically active. Stay well hydrated. Avoid high glycemic index foods. Follow-up Disposition:  Return in about 4 months (around 7/6/2018) for F/U DM. Advised him to call back or return to office if symptoms worsen/change/persist.  Discussed expected course/resolution/complications of diagnosis in detail with patient. Medication risks/benefits/costs/interactions/alternatives discussed with patient. He was given an after visit summary which includes diagnoses, current medications, & vitals. He expressed understanding with the diagnosis and plan.

## 2018-03-06 NOTE — PATIENT INSTRUCTIONS
Eat a balanced high fiber diet. Stay physically active. Stay well hydrated. Avoid high glycemic index foods.

## 2018-03-06 NOTE — MR AVS SNAPSHOT
[address] [facility], [address] [facility] 57 
[phone] Patient: Patricia Perez MRN: [id number] Visit Information Date & Time Provider Department Dept. Phone Encounter #  
 3/6/2018  8:15 AM MD José Malcolm [facility] [phone] [id number] Follow-up Instructions Return in about 4 months (around 7/6/2018) for F/U DM. Your Appointments 3/8/2018  9:00 AM  
Any with Randal Rice MD  
[address] [facility] ([facility]) Appt Note: yrly cpap follow up (NEEDS REFERRAL FROM PCP); yrly cpap follow up (NEEDS REFERRAL FROM PCP); yrly cpap follow up (NEEDS REFERRAL FROM PCP); yrly cpap follow up (NEEDS REFERRAL FROM PCP) [place] [address]., [address] [phone] [address] [facility]., [address] [address]  
  
    
 4/3/2018  8:15 AM  
ROUTINE CARE with MD José Malcolm [facility] [address] [place] [address]) Appt Note: 6m dm  
 [address], [address] [facility] [address]  
[address], [place] [address] Upcoming Health Maintenance Date Due HEMOGLOBIN A1C Q6M 4/3/2018 EYE EXAM RETINAL OR DILATED Q1 7/18/2018 FOOT EXAM Q1 10/3/2018 LIPID PANEL Q1 10/3/2018 MEDICARE YEARLY EXAM 10/4/2018 MICROALBUMIN Q1 1/4/2019 GLAUCOMA SCREENING Q2Y 7/18/2019 DTaP/Tdap/Td series (2 - Td) 5/7/2025 Allergies as of 3/6/2018  Review Complete On: 3/6/2018 By: Gage Spear MD  
  
 Severity Noted Reaction Type Reactions Pcn [Penicillins]  12/09/2011    Hives Reaction was in 1950's following a PCN shot Venom-honey Bee  08/14/2013    Other (comments) anaphylaxis Current Immunizations  Reviewed on 1/3/2018 Name Date Influenza High Dose Vaccine PF 9/13/2017, 10/15/2015 Influenza Vaccine 10/24/2014, 9/1/2013 Influenza Vaccine Whole 10/28/2012 Pneumococcal Vaccine (Unspecified Type) 10/13/2014 Tdap 5/7/2015 ZZZ-RETIRED (DO NOT USE) Pneumococcal Vaccine (Unspecified Type) 11/28/2012 Not reviewed this visit You Were Diagnosed With   
  
 Codes Comments Controlled type 2 diabetes mellitus with stage 3 chronic kidney disease, without long-term current use of insulin (HCC)    -  Primary ICD-10-CM: E11.22, N18.3 ICD-9-CM: 250.40, 585.3 Diverticulosis of large intestine without hemorrhage     ICD-10-CM: K57.30 ICD-9-CM: 562.10 Vitals BP Pulse Temp Resp Height(growth percentile) Weight(growth percentile) 120/70 (BP 1 Location: Left arm, BP Patient Position: Sitting) 88 97.9 °F (36.6 °C) (Oral) 16 5' 9\" (1.753 m) 187 lb 12.8 oz (85.2 kg) SpO2 BMI Smoking Status 98% 27.73 kg/m2 Never Smoker BMI and BSA Data Body Mass Index Body Surface Area  
 27.73 kg/m 2 2.04 m 2 Preferred Pharmacy Pharmacy Name Phone [facility] [address] [facility], [address] [place] [address] AT [address] [place] [address]  [phone] Your Updated Medication List  
  
Notice  As of 3/6/2018  8:23 AM  
 You have not been prescribed any medications. We Performed the Following HEMOGLOBIN A1C WITH EAG [36636 CPT(R)] METABOLIC PANEL, COMPREHENSIVE [58439 CPT(R)] Follow-up Instructions Return in about 4 months (around 7/6/2018) for F/U DM. Patient Instructions Eat a balanced high fiber diet. Stay physically active. Stay well hydrated. Avoid high glycemic index foods. Introducing [facility] & HEALTH SERVICES! Dear Timbo Nelson: Thank you for requesting a 4INFO account. Our records indicate that you already have an active 4INFO account. You can access your account anytime at [email or web address] Did you know that you can access your hospital and ER discharge instructions at any time in RewardsPay? You can also review all of your test results from your hospital stay or ER visit. Additional Information If you have questions, please visit the Frequently Asked Questions section of the RewardsPay website at [email or web address] Remember, RewardsPay is NOT to be used for urgent needs. For medical emergencies, dial 911. Now available from your iPhone and Android! Please provide this summary of care documentation to your next provider. Your primary care clinician is listed as Jordan Magallanes. If you have any questions after today's visit, please call [phone].

## 2018-03-06 NOTE — PROGRESS NOTES
Chief Complaint   Patient presents with    Neurologic Problem     f/u     Reviewed record in preparation for visit and have obtained necessary documentation. Identified pt with two pt identifiers(name and ). Health Maintenance Due   Topic    HEMOGLOBIN A1C Q6M          Chief Complaint   Patient presents with    Neurologic Problem     f/u        Wt Readings from Last 3 Encounters:   18 187 lb 12.8 oz (85.2 kg)   18 190 lb (86.2 kg)   18 189 lb 9.6 oz (86 kg)     Temp Readings from Last 3 Encounters:   18 97.9 °F (36.6 °C) (Oral)   18 97.4 °F (36.3 °C) (Oral)   18 96.8 °F (36 °C) (Temporal)     BP Readings from Last 3 Encounters:   18 120/70   18 128/82   18 140/80     Pulse Readings from Last 3 Encounters:   18 88   18 85   18 84           Learning Assessment:  :     Learning Assessment 10/3/2017 2016 2015 2014   PRIMARY LEARNER Patient Patient Patient Patient   HIGHEST LEVEL OF EDUCATION - PRIMARY LEARNER  > 4 YEARS OF COLLEGE - > 4 YEARS OF COLLEGE > 4 YEARS OF COLLEGE   BARRIERS PRIMARY LEARNER NONE - NONE NONE   CO-LEARNER CAREGIVER No - No No   PRIMARY LANGUAGE ENGLISH ENGLISH ENGLISH ENGLISH    NEED - - No No   LEARNER PREFERENCE PRIMARY DEMONSTRATION DEMONSTRATION READING DEMONSTRATION     - - DEMONSTRATION READING     - - - LISTENING   LEARNING SPECIAL TOPICS - - no no   ANSWERED BY patient patient patient patient   RELATIONSHIP SELF SELF SELF SELF   ASSESSMENT COMMENT - - n/a -       Depression Screening:  :     PHQ over the last two weeks 10/3/2017   Little interest or pleasure in doing things Not at all   Feeling down, depressed or hopeless Not at all   Total Score PHQ 2 0       Fall Risk Assessment:  :     Fall Risk Assessment, last 12 mths 2018   Able to walk? Yes   Fall in past 12 months?  No       Abuse Screening:  :     Abuse Screening Questionnaire 10/3/2017 2015 2014   Do you ever feel afraid of your partner? N N N   Are you in a relationship with someone who physically or mentally threatens you? N N N   Is it safe for you to go home? Silva Boyce       Coordination of Care Questionnaire:  :     1) Have you been to an emergency room, urgent care clinic since your last visit? no   Hospitalized since your last visit? no             2) Have you seen or consulted any other health care providers outside of [address] [place] [address] since your last visit? yes  (Include any pap smears or colon screenings in this section.)    3) Do you have an Advance Directive on file? no    4) Are you interested in receiving information on Advance Directives? NO      Patient is accompanied by self I have received verbal consent from Doreen Maher to discuss any/all medical information while they are present in the room. Reviewed record  In preparation for visit and have obtained necessary documentation.

## 2018-03-07 LAB
ALBUMIN SERPL-MCNC: 4.2 G/DL (ref 3.5–4.7)
ALBUMIN/GLOB SERPL: 1.7 {RATIO} (ref 1.2–2.2)
ALP SERPL-CCNC: 54 IU/L (ref 39–117)
ALT SERPL-CCNC: 19 IU/L (ref 0–44)
AST SERPL-CCNC: 20 IU/L (ref 0–40)
BILIRUB SERPL-MCNC: 0.6 MG/DL (ref 0–1.2)
BUN SERPL-MCNC: 21 MG/DL (ref 8–27)
BUN/CREAT SERPL: 15 (ref 10–24)
CALCIUM SERPL-MCNC: 9.7 MG/DL (ref 8.6–10.2)
CHLORIDE SERPL-SCNC: 101 MMOL/L (ref 96–106)
CO2 SERPL-SCNC: 24 MMOL/L (ref 18–29)
CREAT SERPL-MCNC: 1.42 MG/DL (ref 0.76–1.27)
EST. AVERAGE GLUCOSE BLD GHB EST-MCNC: 140 MG/DL
GFR SERPLBLD CREATININE-BSD FMLA CKD-EPI: 45 ML/MIN/1.73
GFR SERPLBLD CREATININE-BSD FMLA CKD-EPI: 52 ML/MIN/1.73
GLOBULIN SER CALC-MCNC: 2.5 G/DL (ref 1.5–4.5)
GLUCOSE SERPL-MCNC: 126 MG/DL (ref 65–99)
HBA1C MFR BLD: 6.5 % (ref 4.8–5.6)
INTERPRETATION: NORMAL
Lab: NORMAL
POTASSIUM SERPL-SCNC: 4.6 MMOL/L (ref 3.5–5.2)
PROT SERPL-MCNC: 6.7 G/DL (ref 6–8.5)
SODIUM SERPL-SCNC: 143 MMOL/L (ref 134–144)

## 2018-03-08 ENCOUNTER — DOCUMENTATION ONLY (OUTPATIENT)
Dept: SLEEP MEDICINE | Age: 83
End: 2018-03-08

## 2018-03-08 ENCOUNTER — OFFICE VISIT (OUTPATIENT)
Dept: SLEEP MEDICINE | Age: 83
End: 2018-03-08

## 2018-03-08 VITALS
HEIGHT: 69 IN | OXYGEN SATURATION: 99 % | SYSTOLIC BLOOD PRESSURE: 136 MMHG | BODY MASS INDEX: 28.58 KG/M2 | WEIGHT: 193 LBS | DIASTOLIC BLOOD PRESSURE: 85 MMHG | HEART RATE: 84 BPM

## 2018-03-08 DIAGNOSIS — E11.40 TYPE 2 DIABETES MELLITUS WITH DIABETIC NEUROPATHY, WITHOUT LONG-TERM CURRENT USE OF INSULIN (HCC): ICD-10-CM

## 2018-03-08 DIAGNOSIS — G47.33 OSA (OBSTRUCTIVE SLEEP APNEA): Primary | ICD-10-CM

## 2018-03-08 NOTE — PATIENT INSTRUCTIONS
217 [facility] [place]. [place], [address]  Tel.  [phone]  Fax. 100 [facility] 60  [place], [address]  Tel.  [phone]  Fax. [phone] [address]  Tel.  [phone]  Fax. [phone]     PROPER SLEEP HYGIENE    What to avoid  · Do not have drinks with caffeine, such as coffee or black tea, for 8 hours before bed. · Do not smoke or use other types of tobacco near bedtime. Nicotine is a stimulant and can keep you awake. · Avoid drinking alcohol late in the evening, because it can cause you to wake in the middle of the night. · Do not eat a big meal close to bedtime. If you are hungry, eat a light snack. · Do not drink a lot of water close to bedtime, because the need to urinate may wake you up during the night. · Do not read or watch TV in bed. Use the bed only for sleeping and sexual activity. What to try  · Go to bed at the same time every night, and wake up at the same time every morning. Do not take naps during the day. · Keep your bedroom quiet, dark, and cool. · Get regular exercise, but not within 3 to 4 hours of your bedtime. .  · Sleep on a comfortable pillow and mattress. · If watching the clock makes you anxious, turn it facing away from you so you cannot see the time. · If you worry when you lie down, start a worry book. Well before bedtime, write down your worries, and then set the book and your concerns aside. · Try meditation or other relaxation techniques before you go to bed. · If you cannot fall asleep, get up and go to another room until you feel sleepy. Do something relaxing. Repeat your bedtime routine before you go to bed again. · Make your house quiet and calm about an hour before bedtime. Turn down the lights, turn off the TV, log off the computer, and turn down the volume on music. This can help you relax after a busy day.     Drowsy Driving  The [address] [place] [address] Road Traffic Safety Administration cites drowsiness as a causing factor in more than 645,546 police reported crashes annually, resulting in 76,000 injuries and 1,500 deaths. Other surveys suggest 55% of people polled have driven while drowsy in the past year, 23% had fallen asleep but not crashed, 3% crashed, and 2% had and accident due to drowsy driving. Who is at risk? Young Drivers: One study of drowsy driving accidents states that 55% of the drivers were under 25 years. Of those, 75% were male. Shift Workers and Travelers: People who work overnight or travel across time zones frequently are at higher risk of experiencing Circadian Rhythm Disorders. They are trying to work and function when their body is programed to sleep. Sleep Deprived: Lack of sleep has a serious impact on your ability to pay attention or focus on a task. Consistently getting less than the average of 8 hours your body needs creates partial or cumulative sleep deprivation. Untreated Sleep Disorders: Sleep Apnea, Narcolepsy, R.L.S., and other sleep disorders (untreated) prevent a person from getting enough restful sleep. This leads to excessive daytime sleepiness and increases the risk for drowsy driving accidents by up to 7 times. Medications / Alcohol: Even over the counter medications can cause drowsiness. Medications that impair a drivers attention should have a warning label. Alcohol naturally makes you sleepy and on its own can cause accidents. Combined with excessive drowsiness its effects are amplified. Signs of Drowsy Driving:   * You don't remember driving the last few miles   * You may drift out of your kandis   * You are unable to focus and your thoughts wander   * You may yawn more often than normal   * You have difficulty keeping your eyes open / nodding off   * Missing traffic signs, speeding, or tailgating  Prevention-   Good sleep hygiene, lifestyle and behavioral choices have the most impact on drowsy driving.  There is no substitute for sleep and the average person requires 8 hours nightly. If you find yourself driving drowsy, stop and sleep. Consider the sleep hygiene tips provided during your visit as well. Medication Refill Policy: Refills for all medications require 1 week advance notice. Please have your pharmacy fax a refill request. We are unable to fax, or call in \"controled substance\" medications and you will need to pick these prescriptions up from our office. [employer] Activation    Thank you for requesting access to [employer]. Please follow the instructions below to securely access and download your online medical record. [employer] allows you to send messages to your doctor, view your test results, renew your prescriptions, schedule appointments, and more. How Do I Sign Up? 1. In your internet browser, go to [email or web address]. 2. Click on the First Time User? Click Here link in the Sign In box. You will see the New Member Sign Up page. 3. Enter your [employer] Access Code exactly as it appears below. You will not need to use this code after youve completed the sign-up process. If you do not sign up before the expiration date, you must request a new code. [employer] Access Code: Activation code not generated  Current [employer] [facility] Status: Active (This is the date your [employer] access code will )    4. Enter the last four digits of your Social Security Number (xxxx) and Date of Birth (mm/dd/yyyy) as indicated and click Submit. You will be taken to the next sign-up page. 5. Create a [employer] ID. This will be your [employer] login ID and cannot be changed, so think of one that is secure and easy to remember. 6. Create a [employer] password. You can change your password at any time. 7. Enter your Password Reset Question and Answer. This can be used at a later time if you forget your password. 8. Enter your e-mail address. You will receive e-mail notification when new information is available in [address]   9. Click Sign Up. You can now view and download portions of your medical record. 10. Click the Download Summary menu link to download a portable copy of your medical information. Additional Information    If you have questions, please call [phone]. Remember, light is NOT to be used for urgent needs. For medical emergencies, dial 911.

## 2018-03-08 NOTE — PROGRESS NOTES
217 [facility]. [place], [address]  Tel.  [phone]  Fax. 100 [facility] 60  [place], [address]  Tel.  [phone]  Fax. [phone] [address]  Tel.  [phone]  Fax. [phone]     S>Christiano Hernandez is a 80 y.o. male seen for a positive airway pressure follow-up. He reports no problems using the device. The following problems are identified:    Drowsiness no Problems exhaling no   Snoring no Forget to put on no   Mask Comfortable yes Can't fall asleep no   Dry Mouth no Mask falls off no   Air Leaking no Frequent awakenings no     Download reviewed. He admits that his sleep has improved. Therapy Apnea Index averaged over PAP use: 9 /hr which reflects improved sleep breathing condition. Allergies   Allergen Reactions    Pcn [Penicillins] Hives     Reaction was in 65's following a PCN shot    Venom-Honey Bee Other (comments)     anaphylaxis       He currently has no medications in their medication list.. He  has a past medical history of Arthritis; Calculus of kidney; Chronic kidney disease; GERD (gastroesophageal reflux disease); Glaucoma; Pneumonia; prostate cancer (2000); and Unspecified sleep apnea. [place] Sleepiness Score: 8   and Modified F.O.S.Q. Score Total / 2: 19.5   which reflect improved sleep quality over therapy time. O>    Visit Vitals    /85    Pulse 84    Ht 5' 9\" (1.753 m)    Wt 193 lb (87.5 kg)    SpO2 99%    BMI 28.5 kg/m2           General:   Alert, oriented, not in distress   Neck:   No JVD    Chest/Lungs:  symetrical lung expansion , no accessory muscle use    Extremities:  no obvious rashes , negative edema    Neuro:  No focal deficits ; No obvious tremor    Psych:  Normal affect ,  Normal countenance ;         A>    ICD-10-CM ICD-9-CM    1. RONNY (obstructive sleep apnea) G47.33 327.23 AMB SUPPLY ORDER   2.  Type 2 diabetes mellitus with diabetic neuropathy, without long-term current use of insulin (HCC) E11.40 250.60      357.2      AHI = 19(8-15). On CPAP :  5-15 cmH2O. Compliant:      yes    Therapeutic Response:  Positive    P>      * Follow-up Disposition:  Return in about 1 year (around 3/8/2019). PAP therapy is effective and remains necessary for treatment of sleep apnea  Change settings to 7-13 cm. * He was asked to contact our office for any problems regarding PAP therapy. * Counseling was provided regarding the importance of regular PAP use and on proper sleep hygiene and safe driving. * Re-enforced proper and regular cleaning for the device. 2. Type II diabetes - he continues on his current regimen. I have reviewed the relationship between sleep disordered breathing as it relates to diabetes.     Leonardo Delgado MD  Diplomate in Sleep Medicine  KATIE

## 2018-07-20 ENCOUNTER — OFFICE VISIT (OUTPATIENT)
Dept: INTERNAL MEDICINE CLINIC | Age: 83
End: 2018-07-20

## 2018-07-20 VITALS
HEIGHT: 69 IN | BODY MASS INDEX: 28.2 KG/M2 | DIASTOLIC BLOOD PRESSURE: 84 MMHG | TEMPERATURE: 96.8 F | SYSTOLIC BLOOD PRESSURE: 139 MMHG | RESPIRATION RATE: 16 BRPM | OXYGEN SATURATION: 98 % | HEART RATE: 82 BPM | WEIGHT: 190.4 LBS

## 2018-07-20 DIAGNOSIS — N18.30 CONTROLLED TYPE 2 DIABETES MELLITUS WITH STAGE 3 CHRONIC KIDNEY DISEASE, WITHOUT LONG-TERM CURRENT USE OF INSULIN (HCC): Primary | ICD-10-CM

## 2018-07-20 DIAGNOSIS — E11.22 CONTROLLED TYPE 2 DIABETES MELLITUS WITH STAGE 3 CHRONIC KIDNEY DISEASE, WITHOUT LONG-TERM CURRENT USE OF INSULIN (HCC): Primary | ICD-10-CM

## 2018-07-20 NOTE — MR AVS SNAPSHOT
[address] [facility], [address] 1400 [address] [place] [address] 
[phone] Patient: Denise Garzon MRN: [id number] [id number] Visit Information Date & Time Provider Department Dept. Phone Encounter #  
 7/20/2018  9:00 AM Kylah Ventura MD [place] [id number] [id number] Follow-up Instructions Return in about 4 months (around 11/20/2018) for F/U DM. Your Appointments 3/11/2019  9:20 AM  
Any with Anil De Souza MD  
9[address] [facility] [place] ([facility]) Appt Note: 1 year f/up- bring machine [address] [facility], [address] [phone] [address] [place] [address], [address] [phone] Upcoming Health Maintenance Date Due  
 EYE EXAM RETINAL OR DILATED Q1 10/26/2018* Influenza Age 5 to Adult 8/1/2018 HEMOGLOBIN A1C Q6M 9/6/2018 FOOT EXAM Q1 10/3/2018 LIPID PANEL Q1 10/3/2018 MEDICARE YEARLY EXAM 10/4/2018 MICROALBUMIN Q1 1/4/2019 GLAUCOMA SCREENING Q2Y 2/28/2020 DTaP/Tdap/Td series (2 - Td) 5/7/2025 *Topic was postponed. The date shown is not the original due date. Allergies as of 7/20/2018  Review Complete On: 7/20/2018 By: Kylah Ventura MD  
  
 Severity Noted Reaction Type Reactions Pcn [Penicillins]  12/09/2011    Hives Reaction was in 1950's following a PCN shot Venom-honey Bee  08/14/2013    Other (comments) anaphylaxis Current Immunizations  Reviewed on 1/3/2018 Name Date Influenza High Dose Vaccine PF 9/13/2017, 10/15/2015 Influenza Vaccine 10/24/2014, 9/1/2013 Influenza Vaccine Whole 10/28/2012 Pneumococcal Vaccine (Unspecified Type) 10/13/2014 Tdap 5/7/2015 ZZZ-RETIRED (DO NOT USE) Pneumococcal Vaccine (Unspecified Type) 11/28/2012 Not reviewed this visit You Were Diagnosed With   
  
 Codes Comments Controlled type 2 diabetes mellitus with stage 3 chronic kidney disease, without long-term current use of insulin (HCC)    -  Primary ICD-10-CM: E11.22, N18.3 ICD-9-CM: 250.40, 585.3 Vitals BP Pulse Temp Resp Height(growth percentile) Weight(growth percentile) 139/84 (BP 1 Location: Left arm, BP Patient Position: Sitting) 82 96.8 °F (36 °C) (Oral) 16 5' 9\" (1.753 m) 190 lb 6.4 oz (86.4 kg) SpO2 BMI Smoking Status 98% 28.12 kg/m2 Never Smoker Vitals History BMI and BSA Data Body Mass Index Body Surface Area  
 28.12 kg/m 2 2.05 m 2 Preferred Pharmacy Pharmacy Name Phone [facility] [address] [facility], [address] [place] [address] AT [address] [place] [address] [phone] Your Updated Medication List  
  
Notice  As of 7/20/2018  9:10 AM  
 You have not been prescribed any medications. We Performed the Following HEMOGLOBIN A1C WITH EAG [13082 CPT(R)] METABOLIC PANEL, BASIC [82844 CPT(R)] Follow-up Instructions Return in about 4 months (around 11/20/2018) for F/U DM. Patient Instructions Follow a low glycemic index diet. Stay well hydrated. Do strengthening exercises for your legs. Introducing [facility]! Dear Mayda Garcia: Thank you for requesting a [employer] account. Our records indicate that you already have an active [employer] account. You can access your account anytime at [email or web address] Did you know that you can access your hospital and ER discharge instructions at any time in [employer]? You can also review all of your test results from your hospital stay or ER visit. Additional Information If you have questions, please visit the Frequently Asked Questions section of the [employer] website at [email or web address] Remember, [employer] is NOT to be used for urgent needs. For medical emergencies, dial 911. Now available from your iPhone and Android! Please provide this summary of care documentation to your next provider. Your primary care clinician is listed as Anne Marie Pham. If you have any questions after today's visit, please call [phone].

## 2018-07-20 NOTE — PROGRESS NOTES
Chief Complaint   Patient presents with    Diabetes     Reviewed record in preparation for visit and have obtained necessary documentation. Identified pt with two pt identifiers(name and ). Health Maintenance Due   Topic    EYE EXAM RETINAL OR DILATED Q1          Chief Complaint   Patient presents with    Diabetes        Wt Readings from Last 3 Encounters:   18 190 lb 6.4 oz (86.4 kg)   18 193 lb (87.5 kg)   18 187 lb 12.8 oz (85.2 kg)     Temp Readings from Last 3 Encounters:   18 96.8 °F (36 °C) (Oral)   18 97.9 °F (36.6 °C) (Oral)   18 97.4 °F (36.3 °C) (Oral)     BP Readings from Last 3 Encounters:   18 140/80   18 136/85   18 120/70     Pulse Readings from Last 3 Encounters:   18 82   18 84   18 88           Learning Assessment:  :     Learning Assessment 10/3/2017 2016 2015 2014   PRIMARY LEARNER Patient Patient Patient Patient   HIGHEST LEVEL OF EDUCATION - PRIMARY LEARNER  > 4 YEARS OF COLLEGE - > 4 YEARS OF COLLEGE > 4 YEARS OF COLLEGE   BARRIERS PRIMARY LEARNER NONE - NONE NONE   CO-LEARNER CAREGIVER No - No No   PRIMARY LANGUAGE ENGLISH ENGLISH ENGLISH ENGLISH    NEED - - No No   LEARNER PREFERENCE PRIMARY DEMONSTRATION DEMONSTRATION READING DEMONSTRATION     - - DEMONSTRATION READING     - - - LISTENING   LEARNING SPECIAL TOPICS - - no no   ANSWERED BY patient patient patient patient   RELATIONSHIP SELF SELF SELF SELF   ASSESSMENT COMMENT - - n/a -       Depression Screening:  :     PHQ over the last two weeks 10/3/2017   Little interest or pleasure in doing things Not at all   Feeling down, depressed, irritable, or hopeless Not at all   Total Score PHQ 2 0       Fall Risk Assessment:  :     Fall Risk Assessment, last 12 mths 2018   Able to walk? Yes   Fall in past 12 months?  No       Abuse Screening:  :     Abuse Screening Questionnaire 10/3/2017 2015 2014   Do you ever feel afraid of your partner? N N N   Are you in a relationship with someone who physically or mentally threatens you? N N N   Is it safe for you to go home? Y Y Y       Coordination of Care Questionnaire:  :     1) Have you been to an emergency room, urgent care clinic since your last visit? no   Hospitalized since your last visit? no             2) Have you seen or consulted any other health care providers outside of [address] [place] [address] since your last visit? no  (Include any pap smears or colon screenings in this section.)    3) Do you have an Advance Directive on file? no    4) Are you interested in receiving information on Advance Directives? NO      Patient is accompanied by self I have received verbal consent from Larry Posada to discuss any/all medical information while they are present in the room. Reviewed record  In preparation for visit and have obtained necessary documentation.

## 2018-07-20 NOTE — PROGRESS NOTES
Subjective:     Chief Complaint   Patient presents with    Diabetes     He  is a 80y.o. year old male who presents for evaluation. Digestion is going well. No CP. Not much energy. Knee pain. Historical Data    Past Medical History:   Diagnosis Date    Arthritis     Calculus of kidney     Chronic kidney disease     slightly increased creatinine    GERD (gastroesophageal reflux disease)     Glaucoma     Pneumonia     prostate cancer 2000    seed radiation, cryosurgery    Unspecified sleep apnea     uses CPAP        Past Surgical History:   Procedure Laterality Date    ENDOSCOPY, COLON, DIAGNOSTIC  2010    HX HEENT      uvulectomy    HX OTHER SURGICAL  12/28/2017    Exploratory Lap with small bowel viefggpne-EJZ-YsDr. Dwight Trujillo    HX PACEMAKER  09 01 14    HX PROSTATECTOMY      brachytherapy, cryosurgery    HX TONSILLECTOMY      HX UROLOGICAL      vasectomy    NC TRABECULOPLASTY BY LASER SURGERY          No outpatient encounter prescriptions on file as of 7/20/2018. No facility-administered encounter medications on file as of 7/20/2018. Allergies   Allergen Reactions    Pcn [Penicillins] Hives     Reaction was in 65's following a PCN shot    Venom-Honey Bee Other (comments)     anaphylaxis        Social History     Social History    Marital status:      Spouse name: N/A    Number of children: N/A    Years of education: N/A     Occupational History    Not on file. Social History Main Topics    Smoking status: Never Smoker    Smokeless tobacco: Never Used    Alcohol use 3.6 oz/week     2 Glasses of wine, 4 Standard drinks or equivalent per week    Drug use: No    Sexual activity: No     Other Topics Concern    Not on file     Social History Narrative        Review of Systems  Pertinent items are noted in HPI.     Objective:     Vitals:    07/20/18 0851   BP: 140/80   Pulse: 82   Resp: 16   Temp: 96.8 °F (36 °C)   TempSrc: Oral   SpO2: 98%   Weight: 190 lb 6.4 oz (86.4 kg)   Height: 5' 9\" (1.753 m)     Pleasant WM in no acute distress. Neck: Supple. No masses. Cardiac:  RRR without murmurs, gallops or rubs. Lungs: Clear to auscultation. Abdomen: Soft and non-tender. No masses. Extremities: No edema  Neuro: Intact    ASSESSMENT / PLAN:   1. Controlled type 2 diabetes mellitus with stage 3 chronic kidney disease, without long-term current use of insulin (HCC)  · Low glycemic index diet. · Regular exercise. · Stay well hydrated. - METABOLIC PANEL, BASIC  - HEMOGLOBIN A1C WITH EAG    2. Knee pain  · Strengthening exercises recommended. Patient Instructions   Follow a low glycemic index diet. Stay well hydrated. Do strengthening exercises for your legs. Follow-up Disposition:  Return in about 4 months (around 11/20/2018) for F/U DM. Advised him to call back or return to office if symptoms worsen/change/persist.  Discussed expected course/resolution/complications of diagnosis in detail with patient. Medication risks/benefits/costs/interactions/alternatives discussed with patient. He was given an after visit summary which includes diagnoses, current medications, & vitals. He expressed understanding with the diagnosis and plan.

## 2018-07-20 NOTE — LETTER
7/20/2018 9:00 AM 
 
Mr. Brandee Woo [address] [phone] Please take this form to your eye care provider at the time of your exam. 
 
Patient to Complete: 
 
Patient Name (print) _____________________________________________________ Date of Birth ___________________________________________________________ I hereby give release of my medical information to Dariela Byrne MD  to incorporate this information into my medical records. Patient's Signature ______________________________________________________ Eye Care Provider to Complete: 
 
Date of last dilated retinal eye exam _______________________________________ Summary of Eye Exam 
 
 ___   Normal eye exam (no abnormalities noted in either eye) 
 
 ___   Cataracts: R    L  (please [place])  Glaucoma: R    L  (please [place]) 
 
 ___   Diabetic Retinopathy:  R    L  (please [place]) 
 
 ___   Hypertensive Retinopathy 
 
 ___   Other ______________________________________________________ Provider Name (print) ___________________________________________________ Provider Signature  _____________________________________________________ Please fax completed form to Dariela Byrne MD [place] [address] [facility] [phone]! Sincerely, Dariela Byrne MD

## 2018-07-21 LAB
BUN SERPL-MCNC: 19 MG/DL (ref 8–27)
BUN/CREAT SERPL: 14 (ref 10–24)
CALCIUM SERPL-MCNC: 9.4 MG/DL (ref 8.6–10.2)
CHLORIDE SERPL-SCNC: 104 MMOL/L (ref 96–106)
CO2 SERPL-SCNC: 23 MMOL/L (ref 20–29)
CREAT SERPL-MCNC: 1.4 MG/DL (ref 0.76–1.27)
EST. AVERAGE GLUCOSE BLD GHB EST-MCNC: 151 MG/DL
GLUCOSE SERPL-MCNC: 146 MG/DL (ref 65–99)
HBA1C MFR BLD: 6.9 % (ref 4.8–5.6)
INTERPRETATION: NORMAL
Lab: NORMAL
POTASSIUM SERPL-SCNC: 5.2 MMOL/L (ref 3.5–5.2)
SODIUM SERPL-SCNC: 141 MMOL/L (ref 134–144)

## 2019-01-17 ENCOUNTER — OFFICE VISIT (OUTPATIENT)
Dept: INTERNAL MEDICINE CLINIC | Age: 84
End: 2019-01-17

## 2019-01-17 VITALS
BODY MASS INDEX: 28.5 KG/M2 | WEIGHT: 192.4 LBS | TEMPERATURE: 96 F | HEART RATE: 83 BPM | RESPIRATION RATE: 18 BRPM | OXYGEN SATURATION: 98 % | DIASTOLIC BLOOD PRESSURE: 82 MMHG | HEIGHT: 69 IN | SYSTOLIC BLOOD PRESSURE: 126 MMHG

## 2019-01-17 DIAGNOSIS — Z13.39 SCREENING FOR ALCOHOLISM: ICD-10-CM

## 2019-01-17 DIAGNOSIS — Z00.00 MEDICARE ANNUAL WELLNESS VISIT, SUBSEQUENT: Primary | ICD-10-CM

## 2019-01-17 DIAGNOSIS — Z13.31 SCREENING FOR DEPRESSION: ICD-10-CM

## 2019-01-17 DIAGNOSIS — E11.9 DIABETES MELLITUS TYPE 2, DIET-CONTROLLED (HCC): ICD-10-CM

## 2019-01-17 PROBLEM — E11.40 TYPE 2 DIABETES MELLITUS WITH DIABETIC NEUROPATHY (HCC): Status: RESOLVED | Noted: 2018-03-06 | Resolved: 2019-01-17

## 2019-01-17 PROBLEM — Z12.11 COLON CANCER SCREENING: Chronic | Status: ACTIVE | Noted: 2019-01-17

## 2019-01-17 NOTE — PATIENT INSTRUCTIONS
Medicare Wellness Visit, Male The best way to live healthy is to have a lifestyle where you eat a well-balanced diet, exercise regularly, limit alcohol use, and quit all forms of tobacco/nicotine, if applicable. Regular preventive services are another way to keep healthy. Preventive services (vaccines, screening tests, monitoring & exams) can help personalize your care plan, which helps you manage your own care. Screening tests can find health problems at the earliest stages, when they are easiest to treat. [address] [facility] follows the current, evidence-based guidelines published by the [facility] Srini Skyler ([facility]) when recommending preventive services for our patients. Because we follow these guidelines, sometimes recommendations change over time as research supports it. (For example, a prostate screening blood test is no longer routinely recommended for men with no symptoms.) Of course, you and your doctor may decide to screen more often for some diseases, based on your risk and co-morbidities (chronic disease you are already diagnosed with). Preventive services for you include: - Medicare offers their members a free annual wellness visit, which is time for you and your primary care provider to discuss and plan for your preventive service needs. Take advantage of this benefit every year! 
-All adults over age 72 should receive the recommended pneumonia vaccines. Current USPSTF guidelines recommend a series of two vaccines for the best pneumonia protection.  
-All adults should have a flu vaccine yearly and an ECG.  All adults age 61 and older should receive a shingles vaccine once in their lifetime.   
-All adults age 38-68 who are overweight should have a diabetes screening test once every three years.  
-Other screening tests & preventive services for persons with diabetes include: an eye exam to screen for diabetic retinopathy, a kidney function test, a foot exam, and stricter control over your cholesterol.  
-Cardiovascular screening for adults with routine risk involves an electrocardiogram (ECG) at intervals determined by the provider.  
-Colorectal cancer screening should be done for adults age 54-65 with no increased risk factors for colorectal cancer. There are a number of acceptable methods of screening for this type of cancer. Each test has its own benefits and drawbacks. Discuss with your provider what is most appropriate for you during your annual wellness visit. The different tests include: colonoscopy (considered the best screening method), a fecal occult blood test, a fecal DNA test, and sigmoidoscopy. 
-All adults born between [facility] should be screened once for Hepatitis C. 
-An Abdominal Aortic Aneurysm (AAA) Screening is recommended for men age 73-68 who has ever smoked in their lifetime. Here is a list of your current Health Maintenance items (your personalized list of preventive services) with a due date: 
Health Maintenance Due Topic Date Due  Shingles Vaccine (1 of 2) 06/22/1982  Cholesterol Test   10/03/2018  Albumin Urine Test  01/04/2019  Hemoglobin A1C    01/20/2019

## 2019-01-17 NOTE — PROGRESS NOTES
Reviewed record in preparation for visit and have obtained necessary documentation. Identified pt with two pt identifiers(name and ). Health Maintenance Due Topic  Shingrix Vaccine Age 50> (1 of 2)  FOOT EXAM Q1   
 LIPID PANEL Q1   
 MEDICARE YEARLY EXAM   
 MICROALBUMIN Q1   
 HEMOGLOBIN A1C Q6M Chief Complaint Patient presents with Chaney Establish Care Switch from Dr. Evans Och Wt Readings from Last 3 Encounters:  
19 192 lb 6.4 oz (87.3 kg) 18 190 lb 6.4 oz (86.4 kg) 18 193 lb (87.5 kg) Temp Readings from Last 3 Encounters:  
18 96.8 °F (36 °C) (Oral) 18 97.9 °F (36.6 °C) (Oral) 18 97.4 °F (36.3 °C) (Oral) BP Readings from Last 3 Encounters:  
18 139/84  
18 136/85  
18 120/70 Pulse Readings from Last 3 Encounters:  
18 82  
18 84  
18 88 Learning Assessment: 
:  
 
Learning Assessment 2019 10/3/2017 2016 2015 2014 PRIMARY LEARNER Patient Patient Patient Patient Patient HIGHEST LEVEL OF EDUCATION - PRIMARY LEARNER  > 4 YEARS OF COLLEGE > 4 YEARS OF COLLEGE - > 4 YEARS OF COLLEGE > 4 YEARS OF COLLEGE  
BARRIERS PRIMARY LEARNER NONE NONE - NONE NONE  
CO-LEARNER CAREGIVER - No - No No  
PRIMARY LANGUAGE ENGLISH ENGLISH ENGLISH ENGLISH ENGLISH  NEED - - - No No  
LEARNER PREFERENCE PRIMARY DEMONSTRATION DEMONSTRATION DEMONSTRATION READING DEMONSTRATION  
  - - - DEMONSTRATION READING  
  - - - - LISTENING  
LEARNING SPECIAL TOPICS - - - no no ANSWERED BY patient patient patient patient patient RELATIONSHIP SELF SELF SELF SELF SELF  
ASSESSMENT COMMENT - - - n/a - Depression Screening: 
:  
 
PHQ over the last two weeks 2019 Little interest or pleasure in doing things Not at all Feeling down, depressed, irritable, or hopeless Not at all Total Score PHQ 2 0 Fall Risk Assessment: 
:  
 
 Fall Risk Assessment, last 12 mths 1/17/2019 Able to walk? Yes Fall in past 12 months? Yes Fall with injury? No  
Number of falls in past 12 months 1 Fall Risk Score 1 Abuse Screening: 
:  
 
Abuse Screening Questionnaire 1/17/2019 10/3/2017 5/5/2015 2/21/2014 Do you ever feel afraid of your partner? N N N N Are you in a relationship with someone who physically or mentally threatens you? N N N N Is it safe for you to go home? Abdelrahman Helms Coordination of Care Questionnaire: 
:  
 
1) Have you been to an emergency room, urgent care clinic since your last visit? yes Urgent Care 1/2019 DX: Cold symptoms Hospitalized since your last visit? no          
 
2) Have you seen or consulted any other health care providers outside of [address] [place] [address] since your last visit? no (Include any pap smears or colon screenings in this section.) 3) Do you have an Advance Directive on file? no 
 
4) Are you interested in receiving information on Advance Directives? NO Patient is accompanied by self I have received verbal consent from Judi Nichols to discuss any/all medical information while they are present in the room.

## 2019-01-17 NOTE — PROGRESS NOTES
Subjective:  
  
Akua Yusuf is a 80 y.o. male who presents today to become established and for his medicare exam.  
 
Prior PCP - Dr. Gale Snider. Last visit was Active Medical Problems: 
-diet controlled diabetes mellitus type 2 - regular eye exams with Dr. Cholo Márquez due to history of glaucoma. Diet controlled. Last labs done 7/2018 are stable.  
-history of prostate cancer - [facility] - Dr. Denny Linton 
-RONNY - Dr. Maite Warner [facility] Maintenance 
-screening colonoscopy - 3/26/12. Dr. Ramirez Couch 
-immunizations - need shingrix Patient Active Problem List  
Diagnosis Code  Peripheral neuropathy G62.9  Prostate cancer ([facility] [address] C61  
 DM (diabetes mellitus) type II controlled with renal manifestation (HCC) E11.29  
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 Repeat in 2-6 months 0.5 mL 1 Review of Systems A comprehensive review of systems was negative except for that written in the HPI. Objective:  
 
Visit Vitals /82 (BP 1 Location: Left arm, BP Patient Position: Sitting) Pulse 83 Temp 96 °F (35.6 °C) (Oral) Resp 18 Ht 5' 9\" (1.753 m) Wt 192 lb 6.4 oz (87.3 kg) SpO2 98% BMI 28.41 kg/m² General appearance: alert, cooperative, no distress, appears stated age Head: Normocephalic, without obvious abnormality, atraumatic Neck: supple, symmetrical, trachea midline, no adenopathy, no carotid bruit and no JVD Lungs: clear to auscultation bilaterally Heart: regular rate and rhythm, S1, S2 normal, no murmur, click, rub or gallop Abdomen: soft, non-tender. Bowel sounds normal. No masses,  no organomegaly Extremities: extremities normal, atraumatic, no cyanosis or edema Pulses: 2+ and symmetric Assessment/Plan: 1. Medicare annual wellness visit, subsequent 
-recommended getting the shingles vaccine. A prescription for Shingrix was given to the patient. - [place] ANNUAL ALCOHOL SCREEN 15 MIN 
-  2. Screening for alcoholism - [place] ANNUAL ALCOHOL SCREEN 15 MIN 3. Screening for depression 
 
-  4. Diabetes mellitus type 2, diet-controlled ([facility] [address] 
-has neuropathy in both feet and renal insufficiency - METABOLIC PANEL, COMPREHENSIVE 
- HEMOGLOBIN A1C WITH EAG 
- MICROALBUMIN, UR, RAND W/ MICROALB/CREAT RATIO 
- LIPID PANEL Orders Placed This Encounter  Depression Screen Annual  
 METABOLIC PANEL, COMPREHENSIVE  
 HEMOGLOBIN A1C WITH EAG  
 MICROALBUMIN, UR, RAND W/ MICROALB/CREAT RATIO  LIPID PANEL  
 Annual  Alcohol Screen 15 min ()  varicella-zoster recombinant, PF, (SHINGRIX) 50 mcg/0.5 mL susr injection Si.5 mL by IntraMUSCular route once for 1 dose. Repeat in 2-6 months Dispense:  0.5 mL Refill:  1 Follow-up Disposition:  
 
Follow up in 6 months Return if symptoms worsen or fail to improve. Advised patient to call back or return to office if symptoms worsen/change/persist.  
 
Discussed expected course/resolution/complications of diagnosis in detail with patient. Medication risks/benefits/costs/interactions/alternatives discussed with patient. Patient was given an after visit summary which includes diagnoses, current medications, & vitals. Patient expressed understanding with the diagnosis and plan. This is the Subsequent Medicare Annual Wellness Exam, performed 12 months or more after the Initial AWV or the last Subsequent AWV I have reviewed the patient's medical history in detail and updated the computerized patient record. History Past Medical History:  
Diagnosis Date  Arthritis  Calculus of kidney  Chronic kidney disease slightly increased creatinine  GERD (gastroesophageal reflux disease)  Glaucoma  Pneumonia  prostate cancer 2000  
 seed radiation, cryosurgery  Unspecified sleep apnea   
 uses CPAP Past Surgical History:  
Procedure Laterality Date  ENDOSCOPY, COLON, DIAGNOSTIC  2010  HX HEENT    
 uvulectomy  HX OTHER SURGICAL  12/28/2017 Exploratory Lap with small bowel [id number] Arturo  HX PACEMAKER  09 01 15  
 HX PROSTATECTOMY    
 brachytherapy, cryosurgery  HX TONSILLECTOMY  HX UROLOGICAL    
 vasectomy  IA TRABECULOPLASTY BY LASER SURGERY Allergies Allergen Reactions  Pcn [Penicillins] Hives Reaction was in 1950's following a PCN shot  Venom-Honey Bee Other (comments) anaphylaxis Family History Problem Relation Age of Onset  Heart Disease Father  Cancer Sister   
     breast/lung  Other Mother   
     encephalitis  Cancer Maternal Aunt   
     breast/lower extremity - 2 maternal aunts  Cancer Other   
     vaginal  
 No Known Problems Daughter Social History Tobacco Use  Smoking status: Never Smoker  Smokeless tobacco: Never Used Substance Use Topics  Alcohol use: Yes Alcohol/week: 3.6 oz Types: 2 Glasses of wine, 4 Standard drinks or equivalent per week Patient Active Problem List  
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  
 DM (diabetes mellitus) type II controlled with renal manifestation (HCC) E11.29  
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  
 
 Alcohol Risk Factor Screening: You do not drink alcohol or very rarely. Functional Ability and Level of Safety:  
Hearing Loss Hearing is good. Activities of Daily Living The home contains: no safety equipment. Patient does total self care Fall Risk Fall Risk Assessment, last 12 mths 1/17/2019 Able to walk? Yes Fall in past 12 months? Yes Fall with injury? No  
Number of falls in past 12 months 1 Fall Risk Score 1 Abuse Screen Patient is not abused Cognitive Screening Evaluation of Cognitive Function: 
Has your family/caregiver stated any concerns about your memory: no 
Normal 
 
Patient Care Team  
Patient Care Team: 
Pancho Conroy MD as PCP - General (Internal Medicine) Valerio Green MD (Ophthalmology) Vladislav Barcenas MD (Urology) Avila Ramirez MD (Cardiology) Suzanne Nicholson MD as Surgeon (General Surgery) Assessment/Plan Education and counseling provided: 
Are appropriate based on today's review and evaluation Diagnoses and all orders for this visit: 
 
1. Medicare annual wellness visit, subsequent -     [place] ANNUAL ALCOHOL SCREEN 15 MIN 
-     Dwayne Chavarria 2. Screening for alcoholism -     [place] ANNUAL ALCOHOL SCREEN 15 MIN 3. Screening for depression 
-     [address] Health Maintenance Due Topic Date Due  Shingrix Vaccine Age 50> (1 of 2) 06/22/1982  LIPID PANEL Q1  10/03/2018  MICROALBUMIN Q1  01/04/2019  
 HEMOGLOBIN A1C Q6M  01/20/2019

## 2019-01-17 NOTE — ACP (ADVANCE CARE PLANNING)
Has the [place] Template and it needs to be revise. New form given to patient.   He will return once it is completed

## 2019-01-18 LAB
ALBUMIN SERPL-MCNC: 4.1 G/DL (ref 3.5–4.7)
ALBUMIN/CREAT UR: 20.6 MG/G CREAT (ref 0–30)
ALBUMIN/GLOB SERPL: 1.9 {RATIO} (ref 1.2–2.2)
ALP SERPL-CCNC: 55 IU/L (ref 39–117)
ALT SERPL-CCNC: 18 IU/L (ref 0–44)
AST SERPL-CCNC: 18 IU/L (ref 0–40)
BILIRUB SERPL-MCNC: 0.6 MG/DL (ref 0–1.2)
BUN SERPL-MCNC: 24 MG/DL (ref 8–27)
BUN/CREAT SERPL: 15 (ref 10–24)
CALCIUM SERPL-MCNC: 9.3 MG/DL (ref 8.6–10.2)
CHLORIDE SERPL-SCNC: 103 MMOL/L (ref 96–106)
CHOLEST SERPL-MCNC: 163 MG/DL (ref 100–199)
CO2 SERPL-SCNC: 22 MMOL/L (ref 20–29)
CREAT SERPL-MCNC: 1.6 MG/DL (ref 0.76–1.27)
CREAT UR-MCNC: 89.2 MG/DL
EST. AVERAGE GLUCOSE BLD GHB EST-MCNC: 186 MG/DL
GLOBULIN SER CALC-MCNC: 2.2 G/DL (ref 1.5–4.5)
GLUCOSE SERPL-MCNC: 127 MG/DL (ref 65–99)
HBA1C MFR BLD: 8.1 % (ref 4.8–5.6)
HDLC SERPL-MCNC: 48 MG/DL
INTERPRETATION, 910389: NORMAL
INTERPRETATION: NORMAL
LDLC SERPL CALC-MCNC: 82 MG/DL (ref 0–99)
Lab: NORMAL
MICROALBUMIN UR-MCNC: 18.4 UG/ML
PDF IMAGE, 910387: NORMAL
POTASSIUM SERPL-SCNC: 5 MMOL/L (ref 3.5–5.2)
PROT SERPL-MCNC: 6.3 G/DL (ref 6–8.5)
SODIUM SERPL-SCNC: 140 MMOL/L (ref 134–144)
TRIGL SERPL-MCNC: 166 MG/DL (ref 0–149)
VLDLC SERPL CALC-MCNC: 33 MG/DL (ref 5–40)

## 2019-03-21 ENCOUNTER — OFFICE VISIT (OUTPATIENT)
Dept: SLEEP MEDICINE | Age: 84
End: 2019-03-21

## 2019-03-21 ENCOUNTER — DOCUMENTATION ONLY (OUTPATIENT)
Dept: SLEEP MEDICINE | Age: 84
End: 2019-03-21

## 2019-03-21 VITALS
DIASTOLIC BLOOD PRESSURE: 66 MMHG | HEART RATE: 83 BPM | BODY MASS INDEX: 29.03 KG/M2 | SYSTOLIC BLOOD PRESSURE: 109 MMHG | WEIGHT: 196 LBS | RESPIRATION RATE: 17 BRPM | HEIGHT: 69 IN | OXYGEN SATURATION: 96 %

## 2019-03-21 DIAGNOSIS — E11.40 TYPE 2 DIABETES MELLITUS WITH DIABETIC NEUROPATHY, WITHOUT LONG-TERM CURRENT USE OF INSULIN (HCC): ICD-10-CM

## 2019-03-21 DIAGNOSIS — G47.33 OSA (OBSTRUCTIVE SLEEP APNEA): Primary | ICD-10-CM

## 2019-03-21 NOTE — PATIENT INSTRUCTIONS
[address] [facility] [place]. [place], [address]  Tel.  [phone]  Fax. [address] [facility] [address]  [place], [address] [facility]  Tel.  [phone]  Fax. [phone] [address]  Tel.  [phone]  Fax. [phone]     PROPER SLEEP HYGIENE    What to avoid  · Do not have drinks with caffeine, such as coffee or black tea, for 8 hours before bed. · Do not smoke or use other types of tobacco near bedtime. Nicotine is a stimulant and can keep you awake. · Avoid drinking alcohol late in the evening, because it can cause you to wake in the middle of the night. · Do not eat a big meal close to bedtime. If you are hungry, eat a light snack. · Do not drink a lot of water close to bedtime, because the need to urinate may wake you up during the night. · Do not read or watch TV in bed. Use the bed only for sleeping and sexual activity. What to try  · Go to bed at the same time every night, and wake up at the same time every morning. Do not take naps during the day. · Keep your bedroom quiet, dark, and cool. · Get regular exercise, but not within 3 to 4 hours of your bedtime. .  · Sleep on a comfortable pillow and mattress. · If watching the clock makes you anxious, turn it facing away from you so you cannot see the time. · If you worry when you lie down, start a worry book. Well before bedtime, write down your worries, and then set the book and your concerns aside. · Try meditation or other relaxation techniques before you go to bed. · If you cannot fall asleep, get up and go to another room until you feel sleepy. Do something relaxing. Repeat your bedtime routine before you go to bed again. · Make your house quiet and calm about an hour before bedtime. Turn down the lights, turn off the TV, log off the computer, and turn down the volume on music. This can help you relax after a busy day.     Drowsy Driving  The [address] [place] [address] Road Traffic Safety Administration cites drowsiness as a causing factor in more than 691,610 police reported crashes annually, resulting in 76,000 injuries and 1,500 deaths. Other surveys suggest 55% of people polled have driven while drowsy in the past year, 23% had fallen asleep but not crashed, 3% crashed, and 2% had and accident due to drowsy driving. Who is at risk? Young Drivers: One study of drowsy driving accidents states that 55% of the drivers were under 25 years. Of those, 75% were male. Shift Workers and Travelers: People who work overnight or travel across time zones frequently are at higher risk of experiencing Circadian Rhythm Disorders. They are trying to work and function when their body is programed to sleep. Sleep Deprived: Lack of sleep has a serious impact on your ability to pay attention or focus on a task. Consistently getting less than the average of 8 hours your body needs creates partial or cumulative sleep deprivation. Untreated Sleep Disorders: Sleep Apnea, Narcolepsy, R.L.S., and other sleep disorders (untreated) prevent a person from getting enough restful sleep. This leads to excessive daytime sleepiness and increases the risk for drowsy driving accidents by up to 7 times. Medications / Alcohol: Even over the counter medications can cause drowsiness. Medications that impair a drivers attention should have a warning label. Alcohol naturally makes you sleepy and on its own can cause accidents. Combined with excessive drowsiness its effects are amplified. Signs of Drowsy Driving:   * You don't remember driving the last few miles   * You may drift out of your kandis   * You are unable to focus and your thoughts wander   * You may yawn more often than normal   * You have difficulty keeping your eyes open / nodding off   * Missing traffic signs, speeding, or tailgating  Prevention-   Good sleep hygiene, lifestyle and behavioral choices have the most impact on drowsy driving.  There is no substitute for sleep and the average person requires 8 hours nightly. If you find yourself driving drowsy, stop and sleep. Consider the sleep hygiene tips provided during your visit as well. Medication Refill Policy: Refills for all medications require 1 week advance notice. Please have your pharmacy fax a refill request. We are unable to fax, or call in \"controled substance\" medications and you will need to pick these prescriptions up from our office. [employer] Activation    Thank you for requesting access to [employer]. Please follow the instructions below to securely access and download your online medical record. [employer] allows you to send messages to your doctor, view your test results, renew your prescriptions, schedule appointments, and more. How Do I Sign Up? 1. In your internet browser, go to [email or web address]. 2. Click on the First Time User? Click Here link in the Sign In box. You will see the New Member Sign Up page. 3. Enter your [employer] Access Code exactly as it appears below. You will not need to use this code after youve completed the sign-up process. If you do not sign up before the expiration date, you must request a new code. [employer] Access Code: [email or web address]  Expires: 2019  2:12 PM (This is the date your [employer] access code will )    4. Enter the last four digits of your Social Security Number (xxxx) and Date of Birth (mm/dd/yyyy) as indicated and click Submit. You will be taken to the next sign-up page. 5. Create a [employer] ID. This will be your [employer] login ID and cannot be changed, so think of one that is secure and easy to remember. 6. Create a [employer] password. You can change your password at any time. 7. Enter your Password Reset Question and Answer. This can be used at a later time if you forget your password. 8. Enter your e-mail address. You will receive e-mail notification when new information is available in [address] 9. Click Sign Up.  You can now view and download portions of your medical record. 10. Click the Download Summary menu link to download a portable copy of your medical information. Additional Information    If you have questions, please call [phone]. Remember, [facility] is NOT to be used for urgent needs. For medical emergencies, dial 911.

## 2019-03-21 NOTE — PROGRESS NOTES
[address] [facility]. [place], [address]  Tel.  [phone]  Fax. 100 [facility] 60  [place], [address] [facility]  Tel.  [phone]  Fax. [phone] [address]  Tel.  [phone]  Fax. [phone]     S>Christiano Kebede is a 80 y.o. male seen for a positive airway pressure follow-up. He reports no problems using the device. The following problems are identified:    Drowsiness no Problems exhaling no   Snoring no Forget to put on no   Mask Comfortable yes Can't fall asleep no   Dry Mouth no Mask falls off no   Air Leaking no Frequent awakenings no     Download reviewed. He admits that his sleep has improved. Therapy Apnea Index averaged over PAP use: 8 /hr which reflects improved sleep breathing condition. Allergies   Allergen Reactions    Pcn [Penicillins] Hives     Reaction was in 65's following a PCN shot    Venom-Honey Bee Other (comments)     anaphylaxis       He currently has no medications in their medication list.. He  has a past medical history of Arthritis, Calculus of kidney, Chronic kidney disease, GERD (gastroesophageal reflux disease), Glaucoma, Pneumonia, prostate cancer (2000), and Unspecified sleep apnea. [place] Sleepiness Score: 9   and Modified F.O.S.Q. Score Total / 2: 18.5   which reflect improved sleep quality over therapy time. O>    Visit Vitals  /66 (BP 1 Location: Left arm, BP Patient Position: Sitting)   Pulse 83   Resp 17   Ht 5' 9\" (1.753 m)   Wt 196 lb (88.9 kg)   SpO2 96%   BMI 28.94 kg/m²           General:   Alert, oriented, not in distress   Neck:   No JVD    Chest/Lungs:  symetrical lung expansion , no accessory muscle use    Extremities:  no obvious rashes , negative edema    Neuro:  No focal deficits ; No obvious tremor    Psych:  Normal affect ,  Normal countenance ;         A>    ICD-10-CM ICD-9-CM    1. RONNY (obstructive sleep apnea) G47.33 327.23 AMB SUPPLY ORDER   2.  Type 2 diabetes mellitus with diabetic neuropathy, without long-term current use of insulin (HCC) E11.40 250.60      357.2      AHI = 19(8-15). On CPAP :  7-13 cmH2O. Compliant:      yes    Therapeutic Response:  Positive    P>      *   Increase settings to 8-13 cm  he is compliant with PAP therapy and PAP continues to benefit patient and remains necessary for control of his sleep apnea. I have ordered replacement supplies    * He was asked to contact our office for any problems regarding PAP therapy. * Counseling was provided regarding the importance of regular PAP use and on proper sleep hygiene and safe driving. * Re-enforced proper and regular cleaning for the device. 2. Type II diabetes - he continues on his current regimen. I have reviewed the relationship between sleep disordered breathing as it relates to diabetes.     Electronically signed by    Feliz Simmonds, MD  Diplomate in Sleep Medicine  [facility]

## 2019-04-05 ENCOUNTER — TELEPHONE (OUTPATIENT)
Dept: SLEEP MEDICINE | Age: 84
End: 2019-04-05

## 2019-04-05 NOTE — TELEPHONE ENCOUNTER
Patient called in stating Dr. Althea Moscoso advised him that he would be seeing pressure change after his OV on 3/21/19 and he has not noticed that change yet as of 4/5/19    Increase settings to 8-13 cm

## 2019-04-11 NOTE — TELEPHONE ENCOUNTER
Pressure changed in lab via modem per Dr. Thakur Innocent instructions to APAP to 8-13cm. Patient was called and notified.

## 2019-07-01 ENCOUNTER — OFFICE VISIT (OUTPATIENT)
Dept: INTERNAL MEDICINE CLINIC | Age: 84
End: 2019-07-01

## 2019-07-01 VITALS
DIASTOLIC BLOOD PRESSURE: 70 MMHG | RESPIRATION RATE: 18 BRPM | OXYGEN SATURATION: 99 % | HEIGHT: 69 IN | HEART RATE: 83 BPM | SYSTOLIC BLOOD PRESSURE: 110 MMHG | WEIGHT: 189.2 LBS | BODY MASS INDEX: 28.02 KG/M2 | TEMPERATURE: 96.4 F

## 2019-07-01 DIAGNOSIS — L03.116 CELLULITIS OF HEEL, LEFT: Primary | ICD-10-CM

## 2019-07-01 RX ORDER — SULFAMETHOXAZOLE AND TRIMETHOPRIM 800; 160 MG/1; MG/1
1 TABLET ORAL 2 TIMES DAILY
Qty: 10 TAB | Refills: 0 | Status: SHIPPED | OUTPATIENT
Start: 2019-07-01 | End: 2019-07-06

## 2019-07-01 NOTE — PATIENT INSTRUCTIONS

## 2019-07-01 NOTE — PROGRESS NOTES
81 yo male reports L ankle swelling for 1 week. No recent injury. His heel posteriorly has been sore, too. He has never had gout or hx of blood clot in that extremity. PE: Elderly WM   T - 96.4   BP - 110/70   Hesrt - RRR    LLL - 1+ pitting pre-tibially; heel is swollen, hot to touch and red; toenails are thickened but no surrounding skin redness; skin on heel is dry    Imp: Cellulitis L heel w/ LE swelling    Plan: (discussed with Dr. Farhad Story)   Bactrim DS for 5 days    Elevate LE and apply cool compress  _____________________  Expected course of current diagnosed problem(s) as well as expected progression and possible complications, and desired follow up with provider are discussed with patient. Patient is encouraged to be back in touch with any questions or concerns. Patient expresses understanding of plan of care. Patient is given AVS which includes diagnoses, current medications, vitals.

## 2019-07-01 NOTE — PROGRESS NOTES
Exam room Jose Walls is a 80 y.o. male    Chief Complaint   Patient presents with    Ankle swelling     Left ankle swelling that started 1 week ago per patient     1. Have you been to the ER, urgent care clinic since your last visit? Hospitalized since your last visit? No    2. Have you seen or consulted any other health care providers outside of the [address] [place] [address] since your last visit? Include any pap smears or colon screening.  No

## 2019-07-08 ENCOUNTER — OFFICE VISIT (OUTPATIENT)
Dept: INTERNAL MEDICINE CLINIC | Age: 84
End: 2019-07-08

## 2019-07-08 VITALS
OXYGEN SATURATION: 97 % | BODY MASS INDEX: 28.2 KG/M2 | HEART RATE: 71 BPM | HEIGHT: 69 IN | WEIGHT: 190.4 LBS | TEMPERATURE: 97.5 F | DIASTOLIC BLOOD PRESSURE: 66 MMHG | RESPIRATION RATE: 15 BRPM | SYSTOLIC BLOOD PRESSURE: 102 MMHG

## 2019-07-08 DIAGNOSIS — R60.0 MILD PERIPHERAL EDEMA: ICD-10-CM

## 2019-07-08 DIAGNOSIS — L03.116 CELLULITIS OF HEEL, LEFT: Primary | ICD-10-CM

## 2019-07-08 NOTE — PROGRESS NOTES
Verified name and birth date for privacy precautions. Chart reviewed in preparation for today's visit. Chief Complaint   Patient presents with    Skin Infection     1 wk f/u          Health Maintenance Due   Topic    Pneumococcal 65+ years (2 of 2 - PCV13)    HEMOGLOBIN A1C Q6M          Wt Readings from Last 3 Encounters:   07/08/19 190 lb 6.4 oz (86.4 kg)   07/01/19 189 lb 3.2 oz (85.8 kg)   03/21/19 196 lb (88.9 kg)     Temp Readings from Last 3 Encounters:   07/08/19 97.5 °F (36.4 °C) (Oral)   07/01/19 96.4 °F (35.8 °C) (Oral)   01/17/19 96 °F (35.6 °C) (Oral)     BP Readings from Last 3 Encounters:   07/08/19 98/66   07/01/19 110/70   03/21/19 109/66     Pulse Readings from Last 3 Encounters:   07/08/19 71   07/01/19 83   03/21/19 83         Learning Assessment:  :     Learning Assessment 1/17/2019 10/3/2017 11/9/2016 5/5/2015 2/21/2014   PRIMARY LEARNER Patient Patient Patient Patient Patient   HIGHEST LEVEL OF EDUCATION - PRIMARY LEARNER  > 4 YEARS OF COLLEGE > 4 YEARS OF COLLEGE - > 4 YEARS OF COLLEGE > 4 YEARS OF COLLEGE   BARRIERS PRIMARY LEARNER NONE NONE - NONE NONE   CO-LEARNER CAREGIVER - No - No No   PRIMARY LANGUAGE ENGLISH ENGLISH ENGLISH ENGLISH ENGLISH    NEED - - - No No   LEARNER PREFERENCE PRIMARY DEMONSTRATION DEMONSTRATION DEMONSTRATION READING DEMONSTRATION     - - - DEMONSTRATION READING     - - - - LISTENING   LEARNING SPECIAL TOPICS - - - no no   ANSWERED BY patient patient patient patient patient   RELATIONSHIP SELF SELF SELF SELF SELF   ASSESSMENT COMMENT - - - n/a -       Depression Screening:  :     3 most recent PHQ Screens 7/8/2019   Little interest or pleasure in doing things Not at all   Feeling down, depressed, irritable, or hopeless Not at all   Total Score PHQ 2 0       Fall Risk Assessment:  :     Fall Risk Assessment, last 12 mths 7/8/2019   Able to walk? Yes   Fall in past 12 months?  No   Fall with injury? -   Number of falls in past 12 months -   Fall Risk Score -       Abuse Screening:  :     Abuse Screening Questionnaire 7/8/2019 1/17/2019 10/3/2017 5/5/2015 2/21/2014   Do you ever feel afraid of your partner? N N N N N   Are you in a relationship with someone who physically or mentally threatens you? N N N N N   Is it safe for you to go home?  Y Y Y Y Y       Coordination of Care Questionnaire:  :     1) Have you been to an emergency room, urgent care clinic since your last visit? no   Hospitalized since your last visit? no             2) Have you seen or consulted any other health care providers outside of [address] [place] [address] since your last visit? no  (Include any pap smears or colon screenings in this section.)    3) Do you have an Advance Directive on file? no      Patient is currently accompanied by his self.     ------------------------------------------------

## 2019-07-08 NOTE — PROGRESS NOTES
79 yo male in for f/u of cellulitis of heel after 5 days of Bactrim. He feels the problem has improved greatly, and walking is better, but problem is not completely resolved. PE: WNWD WM   LEs - bilat trace pre-tibial pitting    L heel no longer red or swollen or tender; skin over heel is superficially dry, split and peeling - no drainage    L foot with DP and PT pulses easily found with Doppler   Repeat BP = 102/66    Imp: L Heel Cellulitis - resolved   Mild Dependent Edema - bilat    Plan: Discussed sodium intake and advised keeping sodium intake to 2000 mg or less per day; discussed label reading   Discussed good hydration   He will see Dr. Mariya Coreas for scheduled visit in 1 week  _____________________  Expected course of current diagnosed problem(s) as well as expected progression and possible complications, and desired follow up with provider are discussed with patient. Patient is encouraged to be back in touch with any questions or concerns. Patient expresses understanding of plan of care. Patient is given AVS which includes diagnoses, current medications, vitals.

## 2019-07-15 ENCOUNTER — OFFICE VISIT (OUTPATIENT)
Dept: INTERNAL MEDICINE CLINIC | Age: 84
End: 2019-07-15

## 2019-07-15 VITALS
HEIGHT: 69 IN | WEIGHT: 190 LBS | BODY MASS INDEX: 28.14 KG/M2 | TEMPERATURE: 96 F | SYSTOLIC BLOOD PRESSURE: 132 MMHG | HEART RATE: 83 BPM | DIASTOLIC BLOOD PRESSURE: 64 MMHG | RESPIRATION RATE: 14 BRPM | OXYGEN SATURATION: 99 %

## 2019-07-15 DIAGNOSIS — R14.0 ABDOMINAL BLOATING: ICD-10-CM

## 2019-07-15 DIAGNOSIS — N18.30 CONTROLLED TYPE 2 DIABETES MELLITUS WITH STAGE 3 CHRONIC KIDNEY DISEASE, WITHOUT LONG-TERM CURRENT USE OF INSULIN (HCC): Primary | ICD-10-CM

## 2019-07-15 DIAGNOSIS — E11.22 CONTROLLED TYPE 2 DIABETES MELLITUS WITH STAGE 3 CHRONIC KIDNEY DISEASE, WITHOUT LONG-TERM CURRENT USE OF INSULIN (HCC): Primary | ICD-10-CM

## 2019-07-15 NOTE — PROGRESS NOTES
Subjective:      Afia Almeida is a 80 y.o. male who presents today for follow up of his diet controlled diabetes mellitus type 2    He was seen by Otto Alicia NP on 7/8/19 for cellulitis of his left heel. States that the pain has completely resolved. Slight swelling left. Feeling much better. Has not been great with his diet. Still has some bloating. No bowel changes. 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 Objective:     Visit Vitals  /64 (BP 1 Location: Left arm, BP Patient Position: Sitting)   Pulse 83   Temp 96 °F (35.6 °C) (Oral)   Resp 14   Ht 5' 9\" (1.753 m)   Wt 190 lb (86.2 kg)   SpO2 99%   BMI 28.06 kg/m²     General appearance: alert, cooperative, no distress, appears stated age  Head: Normocephalic, without obvious abnormality, atraumatic  Neck: supple, symmetrical, trachea midline, no adenopathy, no carotid bruit and no JVD  Lungs: clear to auscultation bilaterally  Heart: regular rate and rhythm, S1, S2 normal, no murmur, click, rub or gallop  Abdomen: soft, non-tender. Bowel sounds normal. No masses,  no organomegaly  Extremities: 1+ edema in his left lower leg. No erythema, varicose veins noted in both lower legs. Assessment/Plan:     1. Controlled type 2 diabetes mellitus with stage 3 chronic kidney disease, without long-term current use of insulin ([address]  -encouraged him to maintain diabetes mellitus diet. - METABOLIC PANEL, COMPREHENSIVE  - HEMOGLOBIN A1C WITH EAG  - MICROALBUMIN, UR, RAND W/ MICROALB/CREAT RATIO    2.  Abdominal bloating  -recommended that he keep a food log to help him figure out which food could be contributing to his symptoms.   -discussed also with patient that uncontrolled diabetes mellitus can cause gastroparesis which can also present as bloating. Again, strongly encouraged him to maintain diabetes mellitus diet     Orders Placed This Encounter    METABOLIC PANEL, COMPREHENSIVE    HEMOGLOBIN A1C WITH EAG    MICROALBUMIN, UR, RAND W/ MICROALB/CREAT RATIO         Follow-up Disposition:     Follow up 6 months      Return if symptoms worsen or fail to improve. Advised patient to call back or return to office if symptoms worsen/change/persist.     Discussed expected course/resolution/complications of diagnosis in detail with patient. Medication risks/benefits/costs/interactions/alternatives discussed with patient. Patient was given an after visit summary which includes diagnoses, current medications, & vitals. Patient expressed understanding with the diagnosis and plan.

## 2019-07-15 NOTE — PROGRESS NOTES
Patient's identity verified with two patient identifiers (name and date of birth). Reviewed record in preparation for visit and have obtained necessary documentation. 1. Have you been to the ER, urgent care clinic since your last visit? Hospitalized since your last visit? No  2. Have you seen or consulted any other health care providers outside of the [address] [place] [address] since your last visit? Include any pap smears or colon screening. No    Chief Complaint   Patient presents with    Diabetes     6 month follow up, fasting.  Skin Infection     Left heel x1wk follow up, pain improved but swelling still present. Finished antibx. Fasting. Health Maintenance Due   Topic Date Due    Pneumococcal 65+ years (2 of 2 - PCV13)  Patient reports he is unsure of second dose.  10/13/2015    HEMOGLOBIN A1C Q6M  07/17/2019       Wt Readings from Last 3 Encounters:   07/15/19 190 lb (86.2 kg)   07/08/19 190 lb 6.4 oz (86.4 kg)   07/01/19 189 lb 3.2 oz (85.8 kg)     Temp Readings from Last 3 Encounters:   07/15/19 96 °F (35.6 °C) (Oral)   07/08/19 97.5 °F (36.4 °C) (Oral)   07/01/19 96.4 °F (35.8 °C) (Oral)     BP Readings from Last 3 Encounters:   07/15/19 132/64   07/08/19 102/66   07/01/19 110/70     Pulse Readings from Last 3 Encounters:   07/15/19 83   07/08/19 71   07/01/19 83

## 2019-07-16 LAB
ALBUMIN SERPL-MCNC: 4 G/DL (ref 3.5–4.7)
ALBUMIN/CREAT UR: 7.8 MG/G CREAT (ref 0–30)
ALBUMIN/GLOB SERPL: 1.7 {RATIO} (ref 1.2–2.2)
ALP SERPL-CCNC: 55 IU/L (ref 39–117)
ALT SERPL-CCNC: 17 IU/L (ref 0–44)
AST SERPL-CCNC: 18 IU/L (ref 0–40)
BILIRUB SERPL-MCNC: 0.4 MG/DL (ref 0–1.2)
BUN SERPL-MCNC: 20 MG/DL (ref 8–27)
BUN/CREAT SERPL: 13 (ref 10–24)
CALCIUM SERPL-MCNC: 9.5 MG/DL (ref 8.6–10.2)
CHLORIDE SERPL-SCNC: 106 MMOL/L (ref 96–106)
CO2 SERPL-SCNC: 23 MMOL/L (ref 20–29)
CREAT SERPL-MCNC: 1.5 MG/DL (ref 0.76–1.27)
CREAT UR-MCNC: 115.9 MG/DL
EST. AVERAGE GLUCOSE BLD GHB EST-MCNC: 160 MG/DL
GLOBULIN SER CALC-MCNC: 2.4 G/DL (ref 1.5–4.5)
GLUCOSE SERPL-MCNC: 127 MG/DL (ref 65–99)
HBA1C MFR BLD: 7.2 % (ref 4.8–5.6)
INTERPRETATION: NORMAL
Lab: NORMAL
MICROALBUMIN UR-MCNC: 9 UG/ML
POTASSIUM SERPL-SCNC: 5.2 MMOL/L (ref 3.5–5.2)
PROT SERPL-MCNC: 6.4 G/DL (ref 6–8.5)
SODIUM SERPL-SCNC: 142 MMOL/L (ref 134–144)

## 2020-01-15 ENCOUNTER — OFFICE VISIT (OUTPATIENT)
Dept: INTERNAL MEDICINE CLINIC | Age: 85
End: 2020-01-15

## 2020-01-15 VITALS
RESPIRATION RATE: 16 BRPM | BODY MASS INDEX: 28.2 KG/M2 | HEART RATE: 77 BPM | SYSTOLIC BLOOD PRESSURE: 120 MMHG | HEIGHT: 70 IN | TEMPERATURE: 97.4 F | WEIGHT: 197 LBS | DIASTOLIC BLOOD PRESSURE: 78 MMHG | OXYGEN SATURATION: 98 %

## 2020-01-15 DIAGNOSIS — N18.30 CONTROLLED TYPE 2 DIABETES MELLITUS WITH STAGE 3 CHRONIC KIDNEY DISEASE, WITHOUT LONG-TERM CURRENT USE OF INSULIN (HCC): ICD-10-CM

## 2020-01-15 DIAGNOSIS — E11.22 CONTROLLED TYPE 2 DIABETES MELLITUS WITH STAGE 3 CHRONIC KIDNEY DISEASE, WITHOUT LONG-TERM CURRENT USE OF INSULIN (HCC): ICD-10-CM

## 2020-01-15 NOTE — PROGRESS NOTES
Subjective:      Brandon Haddad is a 80 y.o. male who presents today for follow up of his diet controlled diabetes mellitus type 2. He follows with Dr. Robbie Martinez for his sleep apnea. He has been doing quite well. No new concerns today. He denies polyuria, polydipsia, nocturia, nausea/vomiting, bowel changes, chest pain, syncope, dyspnea or lightheadedness. 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 Objective:     Visit Vitals  /78 (BP 1 Location: Left arm, BP Patient Position: Sitting)   Pulse 77   Temp 97.4 °F (36.3 °C) (Oral)   Resp 16   Ht 5' 9.5\" (1.765 m)   Wt 197 lb (89.4 kg)   SpO2 98%   BMI 28.67 kg/m²     General appearance: alert, cooperative, no distress, appears stated age  Head: Normocephalic, without obvious abnormality, atraumatic  Neck: supple, symmetrical, trachea midline, no adenopathy, no carotid bruit and no JVD  Lungs: clear to auscultation bilaterally  Heart: regular rate and rhythm  Extremities: no edema    Assessment/Plan:     1. Controlled type 2 diabetes mellitus with stage 3 chronic kidney disease, without long-term current use of insulin ([facility]  -check labs at this time. - CBC WITH AUTOMATED DIFF  - METABOLIC PANEL, COMPREHENSIVE  - LIPID PANEL  - HEMOGLOBIN A1C WITH EAG  - MICROALBUMIN, UR, RAND W/ MICROALB/CREAT RATIO     Follow-up Disposition:     Follow up in 6 months      Return if symptoms worsen or fail to improve.    Advised patient to call back or return to office if symptoms worsen/change/persist.     Discussed expected course/resolution/complications of diagnosis in detail with patient. Medication risks/benefits/costs/interactions/alternatives discussed with patient. Patient was given an after visit summary which includes diagnoses, current medications, & vitals. Patient expressed understanding with the diagnosis and plan.

## 2020-01-15 NOTE — PROGRESS NOTES
Verified name and birth date for privacy precautions. Chart reviewed in preparation for today's visit.      Chief Complaint   Patient presents with    Diabetes          Health Maintenance Due   Topic    Shingrix Vaccine Age 49> (1 of 2)    Influenza Age 5 to Adult     HEMOGLOBIN A1C Q6M     LIPID PANEL Q1     MEDICARE YEARLY EXAM          Wt Readings from Last 3 Encounters:   01/15/20 197 lb (89.4 kg)   07/15/19 190 lb (86.2 kg)   07/08/19 190 lb 6.4 oz (86.4 kg)     Temp Readings from Last 3 Encounters:   01/15/20 97.4 °F (36.3 °C) (Oral)   07/15/19 96 °F (35.6 °C) (Oral)   07/08/19 97.5 °F (36.4 °C) (Oral)     BP Readings from Last 3 Encounters:   01/15/20 120/78   07/15/19 132/64   07/08/19 102/66     Pulse Readings from Last 3 Encounters:   01/15/20 77   07/15/19 83   07/08/19 71         Learning Assessment:  :     Learning Assessment 1/15/2020 1/17/2019 10/3/2017 11/9/2016 5/5/2015 2/21/2014   PRIMARY LEARNER Patient Patient Patient Patient Patient Patient   HIGHEST LEVEL OF EDUCATION - PRIMARY LEARNER  > 4 YEARS OF COLLEGE > 4 YEARS OF COLLEGE > 4 YEARS OF COLLEGE - > 4 YEARS OF COLLEGE > 4 YEARS OF COLLEGE   BARRIERS PRIMARY LEARNER NONE NONE NONE - NONE NONE   CO-LEARNER CAREGIVER No - No - No No   PRIMARY LANGUAGE ENGLISH ENGLISH ENGLISH ENGLISH ENGLISH ENGLISH    NEED - - - - No No   LEARNER PREFERENCE PRIMARY READING DEMONSTRATION DEMONSTRATION DEMONSTRATION READING DEMONSTRATION     - - - - DEMONSTRATION READING     - - - - - LISTENING   LEARNING SPECIAL TOPICS - - - - no no   ANSWERED BY patient  patient patient patient patient patient   RELATIONSHIP SELF SELF SELF SELF SELF SELF   ASSESSMENT COMMENT - - - - n/a -       Depression Screening:  :     3 most recent PHQ Screens 1/15/2020   Little interest or pleasure in doing things Not at all   Feeling down, depressed, irritable, or hopeless Not at all   Total Score PHQ 2 0       Fall Risk Assessment:  :     Fall Risk Assessment, last 15 mths 1/15/2020   Able to walk? Yes   Fall in past 12 months? No   Fall with injury? -   Number of falls in past 12 months -   Fall Risk Score -       Abuse Screening:  :     Abuse Screening Questionnaire 1/15/2020 7/8/2019 1/17/2019 10/3/2017 5/5/2015 2/21/2014   Do you ever feel afraid of your partner? N N N N N N   Are you in a relationship with someone who physically or mentally threatens you? N N N N N N   Is it safe for you to go home? Y Y Y Y Y Y       Coordination of Care Questionnaire:  :     1) Have you been to an emergency room, urgent care clinic since your last visit? no   Hospitalized since your last visit? no             2) Have you seen or consulted any other health care providers outside of [address] [place] [address] since your last visit?  yes  (Include any pap smears or colon screenings in this section.)    3) Do you have an Advance Directive on file? no            ------------------------------------------------

## 2020-01-16 LAB
ALBUMIN SERPL-MCNC: 3.8 G/DL (ref 3.5–4.7)
ALBUMIN/CREAT UR: 17 MG/G CREAT (ref 0–30)
ALBUMIN/GLOB SERPL: 1.5 {RATIO} (ref 1.2–2.2)
ALP SERPL-CCNC: 60 IU/L (ref 39–117)
ALT SERPL-CCNC: 14 IU/L (ref 0–44)
AST SERPL-CCNC: 18 IU/L (ref 0–40)
BASOPHILS # BLD AUTO: 0.1 X10E3/UL (ref 0–0.2)
BASOPHILS NFR BLD AUTO: 1 %
BILIRUB SERPL-MCNC: 0.4 MG/DL (ref 0–1.2)
BUN SERPL-MCNC: 18 MG/DL (ref 8–27)
BUN/CREAT SERPL: 13 (ref 10–24)
CALCIUM SERPL-MCNC: 9.4 MG/DL (ref 8.6–10.2)
CHLORIDE SERPL-SCNC: 104 MMOL/L (ref 96–106)
CHOLEST SERPL-MCNC: 170 MG/DL (ref 100–199)
CO2 SERPL-SCNC: 24 MMOL/L (ref 20–29)
CREAT SERPL-MCNC: 1.37 MG/DL (ref 0.76–1.27)
CREAT UR-MCNC: 108 MG/DL
EOSINOPHIL # BLD AUTO: 0.2 X10E3/UL (ref 0–0.4)
EOSINOPHIL NFR BLD AUTO: 2 %
ERYTHROCYTE [DISTWIDTH] IN BLOOD BY AUTOMATED COUNT: 12.7 % (ref 11.6–15.4)
EST. AVERAGE GLUCOSE BLD GHB EST-MCNC: 183 MG/DL
GLOBULIN SER CALC-MCNC: 2.5 G/DL (ref 1.5–4.5)
GLUCOSE SERPL-MCNC: 125 MG/DL (ref 65–99)
HBA1C MFR BLD: 8 % (ref 4.8–5.6)
HCT VFR BLD AUTO: 44.9 % (ref 37.5–51)
HDLC SERPL-MCNC: 51 MG/DL
HGB BLD-MCNC: 14.9 G/DL (ref 13–17.7)
IMM GRANULOCYTES # BLD AUTO: 0 X10E3/UL (ref 0–0.1)
IMM GRANULOCYTES NFR BLD AUTO: 0 %
LDLC SERPL CALC-MCNC: 89 MG/DL (ref 0–99)
LYMPHOCYTES # BLD AUTO: 1.9 X10E3/UL (ref 0.7–3.1)
LYMPHOCYTES NFR BLD AUTO: 24 %
MCH RBC QN AUTO: 29.3 PG (ref 26.6–33)
MCHC RBC AUTO-ENTMCNC: 33.2 G/DL (ref 31.5–35.7)
MCV RBC AUTO: 88 FL (ref 79–97)
MICROALBUMIN UR-MCNC: 18.4 UG/ML
MONOCYTES # BLD AUTO: 0.7 X10E3/UL (ref 0.1–0.9)
MONOCYTES NFR BLD AUTO: 8 %
NEUTROPHILS # BLD AUTO: 5.3 X10E3/UL (ref 1.4–7)
NEUTROPHILS NFR BLD AUTO: 65 %
PLATELET # BLD AUTO: 249 X10E3/UL (ref 150–450)
POTASSIUM SERPL-SCNC: 5.1 MMOL/L (ref 3.5–5.2)
PROT SERPL-MCNC: 6.3 G/DL (ref 6–8.5)
RBC # BLD AUTO: 5.09 X10E6/UL (ref 4.14–5.8)
SODIUM SERPL-SCNC: 140 MMOL/L (ref 134–144)
TRIGL SERPL-MCNC: 148 MG/DL (ref 0–149)
VLDLC SERPL CALC-MCNC: 30 MG/DL (ref 5–40)
WBC # BLD AUTO: 8.2 X10E3/UL (ref 3.4–10.8)

## 2020-01-22 NOTE — PROGRESS NOTES
Hemoglobin A1c has increased due to holiday sweets. Need to get back on track.  Letter sent 1/22/2020

## 2020-04-06 ENCOUNTER — APPOINTMENT (OUTPATIENT)
Dept: GENERAL RADIOLOGY | Age: 85
DRG: 388 | End: 2020-04-06
Attending: EMERGENCY MEDICINE
Payer: MEDICARE

## 2020-04-06 ENCOUNTER — HOSPITAL ENCOUNTER (INPATIENT)
Age: 85
LOS: 3 days | Discharge: HOME | DRG: 388 | End: 2020-04-09
Attending: EMERGENCY MEDICINE | Admitting: INTERNAL MEDICINE
Payer: MEDICARE

## 2020-04-06 ENCOUNTER — APPOINTMENT (OUTPATIENT)
Dept: CT IMAGING | Age: 85
DRG: 388 | End: 2020-04-06
Attending: EMERGENCY MEDICINE
Payer: MEDICARE

## 2020-04-06 DIAGNOSIS — R73.9 HYPERGLYCEMIA: ICD-10-CM

## 2020-04-06 DIAGNOSIS — R79.89 AZOTEMIA: ICD-10-CM

## 2020-04-06 DIAGNOSIS — R91.8 PULMONARY INFILTRATE: ICD-10-CM

## 2020-04-06 DIAGNOSIS — K56.609 SBO (SMALL BOWEL OBSTRUCTION) (HCC): Primary | ICD-10-CM

## 2020-04-06 LAB
ALBUMIN SERPL-MCNC: 3.6 G/DL (ref 3.5–5)
ALBUMIN/GLOB SERPL: 0.9 {RATIO} (ref 1.1–2.2)
ALP SERPL-CCNC: 64 U/L (ref 45–117)
ALT SERPL-CCNC: 24 U/L (ref 12–78)
ANION GAP SERPL CALC-SCNC: 8 MMOL/L (ref 5–15)
APPEARANCE UR: CLEAR
AST SERPL-CCNC: 18 U/L (ref 15–37)
ATRIAL RATE: 112 BPM
BACTERIA URNS QL MICRO: NEGATIVE /HPF
BASOPHILS # BLD: 0.1 K/UL (ref 0–0.1)
BASOPHILS NFR BLD: 0 % (ref 0–1)
BILIRUB SERPL-MCNC: 1.1 MG/DL (ref 0.2–1)
BILIRUB UR QL: NEGATIVE
BUN SERPL-MCNC: 23 MG/DL (ref 6–20)
BUN/CREAT SERPL: 13 (ref 12–20)
CALCIUM SERPL-MCNC: 9.9 MG/DL (ref 8.5–10.1)
CALCULATED P AXIS, ECG09: 15 DEGREES
CALCULATED R AXIS, ECG10: -85 DEGREES
CALCULATED T AXIS, ECG11: 84 DEGREES
CHLORIDE SERPL-SCNC: 104 MMOL/L (ref 97–108)
CO2 SERPL-SCNC: 24 MMOL/L (ref 21–32)
COLOR UR: ABNORMAL
COMMENT, HOLDF: NORMAL
COVID-19, XGCOVT: NORMAL
CREAT SERPL-MCNC: 1.73 MG/DL (ref 0.7–1.3)
D DIMER PPP FEU-MCNC: 2 MG/L FEU (ref 0–0.65)
DIAGNOSIS, 93000: NORMAL
DIFFERENTIAL METHOD BLD: ABNORMAL
EOSINOPHIL # BLD: 0 K/UL (ref 0–0.4)
EOSINOPHIL NFR BLD: 0 % (ref 0–7)
EPITH CASTS URNS QL MICRO: ABNORMAL /LPF
ERYTHROCYTE [DISTWIDTH] IN BLOOD BY AUTOMATED COUNT: 14.2 % (ref 11.5–14.5)
GLOBULIN SER CALC-MCNC: 3.8 G/DL (ref 2–4)
GLUCOSE BLD STRIP.AUTO-MCNC: 373 MG/DL (ref 65–100)
GLUCOSE SERPL-MCNC: 312 MG/DL (ref 65–100)
GLUCOSE UR STRIP.AUTO-MCNC: >1000 MG/DL
HCT VFR BLD AUTO: 52.2 % (ref 36.6–50.3)
HGB BLD-MCNC: 17.6 G/DL (ref 12.1–17)
HGB UR QL STRIP: NEGATIVE
HYALINE CASTS URNS QL MICRO: ABNORMAL /LPF (ref 0–5)
IMM GRANULOCYTES # BLD AUTO: 0.1 K/UL (ref 0–0.04)
IMM GRANULOCYTES NFR BLD AUTO: 0 % (ref 0–0.5)
KETONES UR QL STRIP.AUTO: 15 MG/DL
LACTATE SERPL-SCNC: 2 MMOL/L (ref 0.4–2)
LACTATE SERPL-SCNC: 2.2 MMOL/L (ref 0.4–2)
LACTATE SERPL-SCNC: 2.3 MMOL/L (ref 0.4–2)
LEUKOCYTE ESTERASE UR QL STRIP.AUTO: NEGATIVE
LYMPHOCYTES # BLD: 0.9 K/UL (ref 0.8–3.5)
LYMPHOCYTES NFR BLD: 5 % (ref 12–49)
MCH RBC QN AUTO: 29.9 PG (ref 26–34)
MCHC RBC AUTO-ENTMCNC: 33.7 G/DL (ref 30–36.5)
MCV RBC AUTO: 88.8 FL (ref 80–99)
MONOCYTES # BLD: 1 K/UL (ref 0–1)
MONOCYTES NFR BLD: 6 % (ref 5–13)
NEUTS SEG # BLD: 15.2 K/UL (ref 1.8–8)
NEUTS SEG NFR BLD: 89 % (ref 32–75)
NITRITE UR QL STRIP.AUTO: NEGATIVE
NRBC # BLD: 0 K/UL (ref 0–0.01)
NRBC BLD-RTO: 0 PER 100 WBC
P-R INTERVAL, ECG05: 156 MS
PH UR STRIP: 6 [PH] (ref 5–8)
PLATELET # BLD AUTO: 265 K/UL (ref 150–400)
PMV BLD AUTO: 9.2 FL (ref 8.9–12.9)
POTASSIUM SERPL-SCNC: 4.6 MMOL/L (ref 3.5–5.1)
PROCALCITONIN SERPL-MCNC: 0.41 NG/ML
PROT SERPL-MCNC: 7.4 G/DL (ref 6.4–8.2)
PROT UR STRIP-MCNC: ABNORMAL MG/DL
Q-T INTERVAL, ECG07: 376 MS
QRS DURATION, ECG06: 164 MS
QTC CALCULATION (BEZET), ECG08: 513 MS
RBC # BLD AUTO: 5.88 M/UL (ref 4.1–5.7)
RBC #/AREA URNS HPF: ABNORMAL /HPF (ref 0–5)
SAMPLES BEING HELD,HOLD: NORMAL
SERVICE CMNT-IMP: ABNORMAL
SODIUM SERPL-SCNC: 136 MMOL/L (ref 136–145)
SP GR UR REFRACTOMETRY: 1.02 (ref 1–1.03)
UR CULT HOLD, URHOLD: NORMAL
UROBILINOGEN UR QL STRIP.AUTO: 0.2 EU/DL (ref 0.2–1)
VENTRICULAR RATE, ECG03: 112 BPM
WBC # BLD AUTO: 17.2 K/UL (ref 4.1–11.1)
WBC URNS QL MICRO: ABNORMAL /HPF (ref 0–4)

## 2020-04-06 PROCEDURE — 74011000258 HC RX REV CODE- 258: Performed by: EMERGENCY MEDICINE

## 2020-04-06 PROCEDURE — 74176 CT ABD & PELVIS W/O CONTRAST: CPT

## 2020-04-06 PROCEDURE — 85379 FIBRIN DEGRADATION QUANT: CPT

## 2020-04-06 PROCEDURE — 36415 COLL VENOUS BLD VENIPUNCTURE: CPT

## 2020-04-06 PROCEDURE — 87635 SARS-COV-2 COVID-19 AMP PRB: CPT

## 2020-04-06 PROCEDURE — 77030040361 HC SLV COMPR DVT MDII -B

## 2020-04-06 PROCEDURE — 74011250636 HC RX REV CODE- 250/636: Performed by: NURSE PRACTITIONER

## 2020-04-06 PROCEDURE — 83605 ASSAY OF LACTIC ACID: CPT

## 2020-04-06 PROCEDURE — 77030008771 HC TU NG SALEM SUMP -A

## 2020-04-06 PROCEDURE — 65270000029 HC RM PRIVATE

## 2020-04-06 PROCEDURE — 93005 ELECTROCARDIOGRAM TRACING: CPT

## 2020-04-06 PROCEDURE — 80053 COMPREHEN METABOLIC PANEL: CPT

## 2020-04-06 PROCEDURE — 82962 GLUCOSE BLOOD TEST: CPT

## 2020-04-06 PROCEDURE — 74011250636 HC RX REV CODE- 250/636: Performed by: INTERNAL MEDICINE

## 2020-04-06 PROCEDURE — 96365 THER/PROPH/DIAG IV INF INIT: CPT

## 2020-04-06 PROCEDURE — 81001 URINALYSIS AUTO W/SCOPE: CPT

## 2020-04-06 PROCEDURE — 99285 EMERGENCY DEPT VISIT HI MDM: CPT

## 2020-04-06 PROCEDURE — 85025 COMPLETE CBC W/AUTO DIFF WBC: CPT

## 2020-04-06 PROCEDURE — 74018 RADEX ABDOMEN 1 VIEW: CPT

## 2020-04-06 PROCEDURE — 84145 PROCALCITONIN (PCT): CPT

## 2020-04-06 PROCEDURE — 74011250636 HC RX REV CODE- 250/636: Performed by: EMERGENCY MEDICINE

## 2020-04-06 PROCEDURE — 87040 BLOOD CULTURE FOR BACTERIA: CPT

## 2020-04-06 PROCEDURE — 96361 HYDRATE IV INFUSION ADD-ON: CPT

## 2020-04-06 RX ORDER — DEXTROSE, SODIUM CHLORIDE, SODIUM LACTATE, POTASSIUM CHLORIDE, AND CALCIUM CHLORIDE 5; .6; .31; .03; .02 G/100ML; G/100ML; G/100ML; G/100ML; G/100ML
75 INJECTION, SOLUTION INTRAVENOUS CONTINUOUS
Status: DISCONTINUED | OUTPATIENT
Start: 2020-04-06 | End: 2020-04-06

## 2020-04-06 RX ORDER — SODIUM CHLORIDE 0.9 % (FLUSH) 0.9 %
5-40 SYRINGE (ML) INJECTION AS NEEDED
Status: DISCONTINUED | OUTPATIENT
Start: 2020-04-06 | End: 2020-04-09 | Stop reason: HOSPADM

## 2020-04-06 RX ORDER — NALOXONE HYDROCHLORIDE 0.4 MG/ML
0.4 INJECTION, SOLUTION INTRAMUSCULAR; INTRAVENOUS; SUBCUTANEOUS AS NEEDED
Status: DISCONTINUED | OUTPATIENT
Start: 2020-04-06 | End: 2020-04-09 | Stop reason: HOSPADM

## 2020-04-06 RX ORDER — SODIUM CHLORIDE 0.9 % (FLUSH) 0.9 %
5-40 SYRINGE (ML) INJECTION EVERY 8 HOURS
Status: DISCONTINUED | OUTPATIENT
Start: 2020-04-06 | End: 2020-04-09 | Stop reason: HOSPADM

## 2020-04-06 RX ORDER — SODIUM CHLORIDE 9 MG/ML
125 INJECTION, SOLUTION INTRAVENOUS CONTINUOUS
Status: DISCONTINUED | OUTPATIENT
Start: 2020-04-06 | End: 2020-04-09

## 2020-04-06 RX ORDER — HYDROMORPHONE HYDROCHLORIDE 1 MG/ML
0.5 INJECTION, SOLUTION INTRAMUSCULAR; INTRAVENOUS; SUBCUTANEOUS
Status: DISCONTINUED | OUTPATIENT
Start: 2020-04-06 | End: 2020-04-09 | Stop reason: HOSPADM

## 2020-04-06 RX ORDER — ONDANSETRON 2 MG/ML
4 INJECTION INTRAMUSCULAR; INTRAVENOUS
Status: DISCONTINUED | OUTPATIENT
Start: 2020-04-06 | End: 2020-04-09 | Stop reason: HOSPADM

## 2020-04-06 RX ADMIN — SODIUM CHLORIDE 125 ML/HR: 900 INJECTION, SOLUTION INTRAVENOUS at 17:52

## 2020-04-06 RX ADMIN — SODIUM CHLORIDE 125 ML/HR: 900 INJECTION, SOLUTION INTRAVENOUS at 22:44

## 2020-04-06 RX ADMIN — AZITHROMYCIN MONOHYDRATE 500 MG: 500 INJECTION, POWDER, LYOPHILIZED, FOR SOLUTION INTRAVENOUS at 19:05

## 2020-04-06 RX ADMIN — SODIUM CHLORIDE, SODIUM LACTATE, POTASSIUM CHLORIDE, CALCIUM CHLORIDE, AND DEXTROSE MONOHYDRATE 75 ML/HR: 600; 310; 30; 20; 5 INJECTION, SOLUTION INTRAVENOUS at 20:32

## 2020-04-06 RX ADMIN — Medication 5 ML: at 22:00

## 2020-04-06 RX ADMIN — CEFTRIAXONE SODIUM 1 G: 1 INJECTION, POWDER, FOR SOLUTION INTRAMUSCULAR; INTRAVENOUS at 15:32

## 2020-04-06 RX ADMIN — SODIUM CHLORIDE 1000 ML: 900 INJECTION, SOLUTION INTRAVENOUS at 13:12

## 2020-04-06 NOTE — ED PROVIDER NOTES
HPI .  Patient appears quite healthy for somebody 80years old. He walks independently without a walker or cane. His memory is better than most of the [de-identified]year-old's he knows. Patient has diabetes type 2, stage III renal disease, and obstructive sleep apnea. He had a small bowel obstruction for a ruptured small bowel diverticulum December 2017. He has not had any other abdominal surgeries. Yesterday patient developed lower abdominal pain and pressure. He is not passing gas or having any bowel movements. He has had some mild nausea. He tried to make himself vomit without success. He then vomited spontaneously today. Nothing specifically is increasing or decreasing the pain at this time. Patient denies any urinary symptoms. He denies urinary urgency.     Past Medical History:   Diagnosis Date    Arthritis     Calculus of kidney     Chronic kidney disease     slightly increased creatinine    GERD (gastroesophageal reflux disease)     Glaucoma     Pneumonia     prostate cancer 2000    seed radiation, cryosurgery    Unspecified sleep apnea     uses CPAP       Past Surgical History:   Procedure Laterality Date    ENDOSCOPY, COLON, DIAGNOSTIC  2010    HX HEENT      uvulectomy    HX OTHER SURGICAL  12/28/2017    Exploratory Lap with small bowel [id number]. Laurance Session Shindel    HX PACEMAKER  09 01 15    HX PROSTATECTOMY      brachytherapy, cryosurgery    HX TONSILLECTOMY      HX UROLOGICAL      vasectomy    NC TRABECULOPLASTY BY LASER SURGERY           Family History:   Problem Relation Age of Onset    Heart Disease Father     Cancer Sister         breast/lung    Other Mother         encephalitis    Cancer Maternal Aunt         breast/lower extremity - 2 maternal aunts    Cancer Other         vaginal    No Known Problems Daughter        Social History     Socioeconomic History    Marital status:      Spouse name: Not on file    Number of children: Not on file    Years of education: Not on file    Highest education level: Not on file   Occupational History    Not on file   Social Needs    Financial resource strain: Not on file    Food insecurity     Worry: Not on file     Inability: Not on file    Transportation needs     Medical: Not on file     Non-medical: Not on file   Tobacco Use    Smoking status: Never Smoker    Smokeless tobacco: Never Used   Substance and Sexual Activity    Alcohol use: Yes     Alcohol/week: 6.0 standard drinks     Types: 2 Glasses of wine, 4 Standard drinks or equivalent per week    Drug use: No    Sexual activity: Not Currently     Partners: Female   Lifestyle    Physical activity     Days per week: Not on file     Minutes per session: Not on file    Stress: Not on file   Relationships    Social connections     Talks on phone: Not on file     Gets together: Not on file     Attends Judaism service: Not on file     Active member of club or organization: Not on file     Attends meetings of clubs or organizations: Not on file     Relationship status: Not on file    Intimate partner violence     Fear of current or ex partner: Not on file     Emotionally abused: Not on file     Physically abused: Not on file     Forced sexual activity: Not on file   Other Topics Concern    Not on file   Social History Narrative    Not on file         ALLERGIES: Pcn [penicillins] and Venom-honey bee    Review of Systems   Constitutional: Negative for activity change and appetite change. HENT: Negative for congestion and rhinorrhea. Eyes: Negative for redness. Cardiovascular: Negative for chest pain. Gastrointestinal: Positive for abdominal distention and abdominal pain. Genitourinary:        No change in baseline hesitancy and decreased stream   Musculoskeletal: Negative for arthralgias. Neurological: Negative for seizures, speech difficulty and weakness. Psychiatric/Behavioral: Negative for agitation and behavioral problems.  The patient is not nervous/anxious. Vitals:    04/06/20 1252   BP: (!) 153/99   Pulse: (!) 112   Resp: 22   Temp: 98.7 °F (37.1 °C)   SpO2: 95%   Weight: 86.2 kg (190 lb)   Height: 5' 9\" (1.753 m)            Physical Exam  Vitals signs and nursing note reviewed. Constitutional:       Appearance: He is well-developed. HENT:      Head: Normocephalic and atraumatic. Eyes:      Pupils: Pupils are equal, round, and reactive to light. Neck:      Musculoskeletal: Normal range of motion and neck supple. Cardiovascular:      Rate and Rhythm: Regular rhythm. Heart sounds: Normal heart sounds. No murmur. No friction rub. No gallop. Comments: Pacer right upper chest wall; pulse rate 110  Pulmonary:      Effort: Pulmonary effort is normal. No respiratory distress. Breath sounds: No wheezing or rales. Abdominal:      Palpations: Abdomen is soft. Tenderness: There is no abdominal tenderness. There is no rebound. Comments: Bilateral lower abdominal tenderness; well-healed midline abdominal scar   Musculoskeletal: Normal range of motion. General: No tenderness. Skin:     Findings: No erythema. Neurological:      Mental Status: He is alert. Cranial Nerves: No cranial nerve deficit. Comments: Motor; symmetric   Psychiatric:         Behavior: Behavior normal.          MDM       Procedures      ED EKG interpretation:  Rhythm: paced; and regular . Rate (approx.): 110; Axis: left axis deviation; P wave: normal; QRS interval: prolonged; ST/T wave: non-specific changes; in  Lead: ; Other findings: . This EKG was interpreted by Ector Stringer MD,ED Provider. 12:56 PM        Note: As expected from the history and physical patient has a small bowel obstruction. Unexpectedly he has a groundglass pulmonary infiltrate without pulmonary symptoms. His glucose, BUN, and creatinine are higher than baseline. Patient is already received a liter normal saline.   Rocephin has been ordered to cover him for a possible bacterial pneumonia. Patient will need Covid like isolation in the hospital . Shashank Landaverde MD  2:37 PM         Hospitalist Harlan Text for Admission  4:16 PM    ED Room Number: ER08/08  Patient Name and age:  Ashu Night 80 y.o.  male  Working Diagnosis:   1. SBO (small bowel obstruction) (HCC)    2. Pulmonary infiltrate    3. Hyperglycemia    4.  Azotemia      Readmission: no  Isolation Requirements:  yes  Recommended Level of Care:  med/surg  Code Status:  FULL  Other: Surgery has seen patient; he is 80years old; CT shows groundglass infiltrate consistent with Covid 19  Department:[facility] Adult ED - 21  telemetry what out text not

## 2020-04-06 NOTE — ED NOTES
Provided pt with mouth swabs per request. Currently resting comfortably. Call bell within reach. Monitor x3.  Denies needs at this time

## 2020-04-06 NOTE — CONSULTS
[facility] at [facility]  Emergency Department Consultation    Admit Date: 4/6/2020    Reason for Consultation: SBO    CC: abdominal pain    HPI:  Geanie Kehr is a 80 y.o. male who is known to our service from December 2017 when he underwent ex laparotomy with small bowel resection with Dr. Subha Duarte for a perforated jejunal diverticulum. He has been in good health up until last night when he began having pain across his lower abdomen. He later became nauseated and vomited his dinner. Pain persisted throughout the night along with bloating and weakness. He is unable to pass flatus or move his bowels. Last BM was 2 days ago. No unintentional weight loss. His GI doctor told him that he does not perform colonoscopies on people over [de-identified]years old. No history of bowel obstruction. He lives independently with his wife. PMH: sleep apnea, uses CPAP at night and diabetes controlled with diet. WBC 17.2, Hgb 17.6. Cr 1.73, glucose 312, T Bili 1.1, lactic acid 2.3.     CT = distal SBO, ground glass opacifications of the RLL. He is being tested for Covid-19. Started on ceftriaxone.         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    RONNY on CPAP 09/23/2015    Glaucoma 11/17/2014    Heart block 08/06/2014    DM (diabetes mellitus) type II controlled with renal manifestation ([facility] [address] 08/19/2013    Peripheral neuropathy 12/09/2011    History of prostate cancer 12/09/2011     Past Medical History:   Diagnosis Date    Arthritis     Calculus of kidney     Chronic kidney disease     slightly increased creatinine    GERD (gastroesophageal reflux disease)     Glaucoma     Pneumonia     prostate cancer 2000    seed radiation, cryosurgery    Unspecified sleep apnea     uses CPAP      Past Surgical History:   Procedure Laterality Date    ENDOSCOPY, COLON, DIAGNOSTIC  2010    HX HEENT      uvulectomy    HX OTHER SURGICAL  12/28/2017    Exploratory Lap with small bowel [id number]. Perfecto Paz Cassandra    HX PACEMAKER  09 01 15    HX PROSTATECTOMY      brachytherapy, cryosurgery    HX TONSILLECTOMY      HX UROLOGICAL      vasectomy    UT TRABECULOPLASTY BY LASER SURGERY        Social History     Tobacco Use    Smoking status: Never Smoker    Smokeless tobacco: Never Used   Substance Use Topics    Alcohol use: Yes     Alcohol/week: 6.0 standard drinks     Types: 2 Glasses of wine, 4 Standard drinks or equivalent per week      Family History   Problem Relation Age of Onset    Heart Disease Father     Cancer Sister         breast/lung    Other Mother         encephalitis    Cancer Maternal Aunt         breast/lower extremity - 2 maternal aunts    Cancer Other         vaginal    No Known Problems Daughter       Prior to Admission medications    Not on File     Allergies   Allergen Reactions    Pcn [Penicillins] Hives     Reaction was in 1950's following a PCN shot    Venom-Honey Bee Other (comments)     anaphylaxis          Subjective:     Review of Systems:    A comprehensive review of systems was negative except for that written in the History of Present Illness. Objective:     Blood pressure 144/82, pulse (!) 103, temperature 98.6 °F (37 °C), resp. rate 28, height 5' 9\" (1.753 m), weight 86.2 kg (190 lb), SpO2 96 %. Recent Results (from the past 24 hour(s))   EKG, 12 LEAD, INITIAL    Collection Time: 04/06/20 12:52 PM   Result Value Ref Range    Ventricular Rate 112 BPM    Atrial Rate 112 BPM    P-R Interval 156 ms    QRS Duration 164 ms    Q-T Interval 376 ms    QTC Calculation (Bezet) 513 ms    Calculated P Axis 15 degrees    Calculated R Axis -85 degrees    Calculated T Axis 84 degrees    Diagnosis       Atrial-sensed ventricular-paced rhythm  When compared with ECG of 28-DEC-2017 19:24,  Vent.  rate has increased BY  28 BPM     SAMPLES BEING HELD    Collection Time: 04/06/20  1:06 PM   Result Value Ref Range    SAMPLES BEING HELD 1red,1blu     COMMENT        Add-on orders for these samples will be processed based on acceptable specimen integrity and analyte stability, which may vary by analyte. CBC WITH AUTOMATED DIFF    Collection Time: 04/06/20  1:06 PM   Result Value Ref Range    WBC 17.2 (H) 4.1 - 11.1 K/uL    RBC 5.88 (H) 4.10 - 5.70 M/uL    HGB 17.6 (H) 12.1 - 17.0 g/dL    HCT 52.2 (H) 36.6 - 50.3 %    MCV 88.8 80.0 - 99.0 FL    MCH 29.9 26.0 - 34.0 PG    MCHC 33.7 30.0 - 36.5 g/dL    RDW 14.2 11.5 - 14.5 %    PLATELET 538 895 - 273 K/uL    MPV 9.2 8.9 - 12.9 FL    NRBC 0.0 0  WBC    ABSOLUTE NRBC 0.00 0.00 - 0.01 K/uL    NEUTROPHILS 89 (H) 32 - 75 %    LYMPHOCYTES 5 (L) 12 - 49 %    MONOCYTES 6 5 - 13 %    EOSINOPHILS 0 0 - 7 %    BASOPHILS 0 0 - 1 %    IMMATURE GRANULOCYTES 0 0.0 - 0.5 %    ABS. NEUTROPHILS 15.2 (H) 1.8 - 8.0 K/UL    ABS. LYMPHOCYTES 0.9 0.8 - 3.5 K/UL    ABS. MONOCYTES 1.0 0.0 - 1.0 K/UL    ABS. EOSINOPHILS 0.0 0.0 - 0.4 K/UL    ABS. BASOPHILS 0.1 0.0 - 0.1 K/UL    ABS. IMM. GRANS. 0.1 (H) 0.00 - 0.04 K/UL    DF AUTOMATED     METABOLIC PANEL, COMPREHENSIVE    Collection Time: 04/06/20  1:06 PM   Result Value Ref Range    Sodium 136 136 - 145 mmol/L    Potassium 4.6 3.5 - 5.1 mmol/L    Chloride 104 97 - 108 mmol/L    CO2 24 21 - 32 mmol/L    Anion gap 8 5 - 15 mmol/L    Glucose 312 (H) 65 - 100 mg/dL    BUN 23 (H) 6 - 20 MG/DL    Creatinine 1.73 (H) 0.70 - 1.30 MG/DL    BUN/Creatinine ratio 13 12 - 20      GFR est AA 46 (L) >60 ml/min/1.73m2    GFR est non-AA 38 (L) >60 ml/min/1.73m2    Calcium 9.9 8.5 - 10.1 MG/DL    Bilirubin, total 1.1 (H) 0.2 - 1.0 MG/DL    ALT (SGPT) 24 12 - 78 U/L    AST (SGOT) 18 15 - 37 U/L    Alk.  phosphatase 64 45 - 117 U/L    Protein, total 7.4 6.4 - 8.2 g/dL    Albumin 3.6 3.5 - 5.0 g/dL    Globulin 3.8 2.0 - 4.0 g/dL    A-G Ratio 0.9 (L) 1.1 - 2.2     URINALYSIS W/MICROSCOPIC    Collection Time: 04/06/20  1:08 PM Result Value Ref Range    Color YELLOW/STRAW      Appearance CLEAR CLEAR      Specific gravity 1.023 1.003 - 1.030      pH (UA) 6.0 5.0 - 8.0      Protein TRACE (A) NEG mg/dL    Glucose >1,000 (A) NEG mg/dL    Ketone 15 (A) NEG mg/dL    Bilirubin NEGATIVE  NEG      Blood NEGATIVE  NEG      Urobilinogen 0.2 0.2 - 1.0 EU/dL    Nitrites NEGATIVE  NEG      Leukocyte Esterase NEGATIVE  NEG      WBC 0-4 0 - 4 /hpf    RBC 0-5 0 - 5 /hpf    Epithelial cells FEW FEW /lpf    Bacteria NEGATIVE  NEG /hpf    Hyaline cast 0-2 0 - 5 /lpf   URINE CULTURE HOLD SAMPLE    Collection Time: 04/06/20  1:08 PM   Result Value Ref Range    Urine culture hold        Urine on hold in Microbiology dept for 2 days. If unpreserved urine is submitted, it cannot be used for addtional testing after 24 hours, recollection will be required. LACTIC ACID    Collection Time: 04/06/20  1:08 PM   Result Value Ref Range    Lactic acid 2.3 (HH) 0.4 - 2.0 MMOL/L     ___________    CT A/P 4/6/20:  BILIARY TREE: Gallbladder is within normal limits. CBD is not dilated. SPLEEN: within normal limits. PANCREAS: No focal abnormality. ADRENALS: Unremarkable. KIDNEYS/URETERS: Round lesions within both kidneys. The largest in the lower  pole may represent a cyst measuring 6.8 Hounsfield units. It measures 8.6 cm. No  hydronephrosis  STOMACH: Unremarkable. SMALL BOWEL: Distal small bowel obstruction which may be due to adhesion in the  right lower quadrant. There is a small bowel anastomosis which is distal to the  obstruction. COLON: Not distended. Few scattered left-sided diverticula. APPENDIX: Unremarkable. PERITONEUM: No ascites or pneumoperitoneum. RETROPERITONEUM: There are vascular calcifications without aneurysm. No  pathologic adenopathy  REPRODUCTIVE ORGANS: Brachytherapy seeds within a nonenlarged prostate  URINARY BLADDER: No mass or calculus. BONES: Degenerative changes of the lower lumbar spine.   ABDOMINAL WALL: Left lower abdominal wall hernia between the vastus lateralis  and rectus abdominis. It contains fat. The mouth of the hernia measures 2.8 cm. ADDITIONAL COMMENTS: N/A     IMPRESSION  IMPRESSION:     1. Distal small bowel obstruction favored to be due to adhesions  2. Round glass opacifications at the right base nonspecific but can be seen with  inflammatory or infectious etiologies including viral pneumonia such as COVID  3. Left lower quadrant hernia containing fat    __________  Physical Exam:     General:  Alert, cooperative, coughing frequently, no acute distress, appears stated age. Eyes:   EOMs intact. Sclera clear. Throat: Lips, mucosa, and tongue normal.   Neck: Supple, symmetrical, trachea midline. Lungs:   Clear to auscultation bilaterally. Heart:  Regular rate and rhythm. Abdomen:   Hypoactive BS, distended, soft, NT. Well healed abdominal scar. Extremities: Extremities normal, atraumatic, no cyanosis or edema. Skin: Skin color, texture, turgor normal. No rashes or lesions. Assessment:   SBO    Hyperglycemia    CKD    Ground glass opacity RLL      Plan:     Recommend Hospitalist admission  No acute surgical indication at present  NG decompression  IVF at 125/hr  NPO with ice chips  Nausea and pain medication  KUB and labs in am    Total time spent with patient: 22 minutes. Signed By: Aroldo Boland NP     April 6, 2020        I have independently examined the patient and have reviewed the chart. I agree with the above plan. Patient presents with cramping abdominal discomfort that began last evening. He has had some nausea and vomiting and denies any bowel movements or flatus since that began. He thinks his last flatus was 2 days ago and last bowel movement was small one last night. The patient denies any cough, fevers or sick contacts. He has a history of a exploratory laparotomy in December 2017 for perforated small bowel diverticulum. He states the symptoms are different from that.   Work-up in the emergency room demonstrates a significant leukocytosis, but he does appear hemoconcentrated with a hemoglobin of 17.6 and an elevated BUN and creatinine. CT imaging is suggestive of a distal small bowel obstruction. There is also showed groundglass opacities in the lungs concerning for interstitial lung disease as can be seen with Covid19. Covid testing has been sent off by the ER and he has been placed on proper precautions. Repeat lactic acid is improved and normalized at 2. I do not think he has any threatened or ischemic intestine. He did have a small fat-containing hernia on CT which did not appear to be contributing to his small bowel obstruction. I have recommended initial nonoperative management with NG tube decompression, bowel rest and IV fluids. Given his age, medical comorbidities and concern for possible Covid-19, I have asked the ER to discuss potential admission with the hospitalist service. We will plan to follow along for management of his small bowel obstruction.     Alex Tony MD  4/6/2020  5:07 PM

## 2020-04-06 NOTE — PROGRESS NOTES
TRANSFER - IN REPORT:    Verbal report received from AVI Mckee(name) on Mariaelena Singh  being received from ED(unit) for routine progression of care      Report consisted of patients Situation, Background, Assessment and   Recommendations(SBAR). Information from the following report(s) SBAR, Kardex and Recent Results was reviewed with the receiving nurse. Opportunity for questions and clarification was provided. Assessment completed upon patients arrival to unit and care assumed. [id number]  Patient has two maintenance fluid orders. Paged Dr. Kurtis Francis to see what fluid order she wants. 1939  Bedside shift change report given to Julio Casper RN (oncoming nurse) by [employer], RN (offgoing nurse). Report included the following information SBAR, Kardex and Recent Results.

## 2020-04-06 NOTE — ED NOTES
pts wife, Fabiola Dejesus, updated on phone regarding plan of care. Best call back # [phone]. Informed Griselda of no visitor policy. She verbalized understanding.

## 2020-04-06 NOTE — ED TRIAGE NOTES
Pt arrvies via EMS from home with c/o generalized weakness x 1 day. Pt also reports hiccups for 13 hrs and constipation with abdominal discomfort. No fever, cough or SOB. Denies pain at this time.

## 2020-04-06 NOTE — H&P
[address] [facility]  Hospitalist Group  History and Physical    Primary Care Provider: Juan Pablo Michael MD  Date of Service:  4/6/2020    Subjective:     Daryl Wilkerson is a 80 y.o. male who presents he has a history of diabetes type 2 stage III renal disease and obstructive sleep apnea on CPAP also December 2017 when he underwent ex laparotomy with small bowel resection with Dr. Arelis Echevarria for a perforated jejunal diverticulum. He came to the ER today because he was symptomatic with abdominal pain and not able to have a BM and lot of hiccups since Monday. He noticed that even by just walking within the room he was getting short of breath and winded. He was not short of breath on sitting. He felt as if something is obstructing he he then started vomiting this morning and that is why he decided to come to the ER. He is denying any shortness of breath any cough any fever. No diarrhea. Nobody sick in the family. Lives with his wife. Review of Systems:    A comprehensive review of systems was negative except for that written in the History of Present Illness.      Past Medical History:   Diagnosis Date    Arthritis     Calculus of kidney     Chronic kidney disease     slightly increased creatinine    GERD (gastroesophageal reflux disease)     Glaucoma     Pneumonia     prostate cancer 2000    seed radiation, cryosurgery    Unspecified sleep apnea     uses CPAP      Past Surgical History:   Procedure Laterality Date    ENDOSCOPY, COLON, DIAGNOSTIC  2010    HX HEENT      uvulectomy    HX OTHER SURGICAL  12/28/2017    Exploratory Lap with small bowel [id number]. [facility]    HX PACEMAKER  09 01 14    HX PROSTATECTOMY      brachytherapy, cryosurgery    HX TONSILLECTOMY      HX UROLOGICAL      vasectomy    RI TRABECULOPLASTY BY LASER SURGERY       Prior to Admission medications    Not on File     Allergies   Allergen Reactions    Pcn [Penicillins] Hives     Reaction was in 1950's following a PCN shot    [facility] Other (comments)     anaphylaxis      Family History   Problem Relation Age of Onset    Heart Disease Father     Cancer Sister         breast/lung    Other Mother         encephalitis    Cancer Maternal Aunt         breast/lower extremity - 2 maternal aunts    Cancer Other         vaginal    No Known Problems Daughter         SOCIAL HISTORY:  Patient resides at Home. Patient ambulates independently . Smoking history:none   Alcohol history:         Objective:       Physical Exam:   Visit Vitals  /84   Pulse (!) 104   Temp 98.6 °F (37 °C)   Resp 28   Ht 5' 9\" (1.753 m)   Wt 86.2 kg (190 lb)   SpO2 95%   BMI 28.06 kg/m²     General:  Alert, cooperative, no distress, appears stated age. Ausculation and palpation avoided to preserve the scarcely available PPE   Head:  Normocephalic, without obvious abnormality, atraumatic. Eyes:  Conjunctivae/corneas clear. PERRL,   Ears:   has mask on            Neck: Supple, symmetrical,        Lungs:   No tachypnea , accessory muscle use , no auscultation done not coughing or visible resp distress    Chest wall:  No tenderness or deformity. Heart:  Regular rate and rhythm, S1, S2 normal, no murmur, click, rub or gallop. Abdomen:   distented            xtremities: Extremities normal, atraumatic, no cyanosis or edema. Pulses: 2+ and symmetric all extremities. Skin: Skin color, texture, turgor normal. No rashes or lesions       Neurologic: CNII-XII intact. Moving all extremities      Cap refill: Brisk, less than 3 seconds  Pulses: 2+, symmetric in all extremities    ECG:  normal EKG, normal sinus rhythm, unchanged from previous tracings     Data Review: All diagnostic labs and studies have been reviewed. Chest x-ray was negative for infiltrate, effusion, pneumothorax, or wide mediastinum. Assessment:     Active Problems:    SBO (small bowel obstruction) ([address] (4/6/2020)        Plan:     1.   Small bowel obstruction  CT scan evidence of small bowel obstruction. Seen by surgery there is no plan of any bowel surgery at this point of time. He will be kept n.p.o. NG tube will be inserted and an abdominal x-ray to be obtained tomorrow morning. #2 covered suspicion   given viral pneumonia findings in the lower lungs. Cover test already ordered by the ER doctor    #3 CKD stage III  Start him on IV fluids.     #4 obstructive sleep apnea  Can use his CPAP      DVT prophylaxis with SCDs        FUNCTIONAL STATUS PRIOR TO HOSPITALIZATION (including history of recent falls):     Signed By: Anabelle Simmons MD     April 6, 2020

## 2020-04-06 NOTE — ROUTINE PROCESS
TRANSFER - OUT REPORT: 
 
Verbal report given to [employer] RN(name) on Jaquelin Yin  being transferred to (unit) for routine progression of care Report consisted of patients Situation, Background, Assessment and  
Recommendations(SBAR). Information from the following report(s) SBAR, ED Summary, [facility] and Recent Results was reviewed with the receiving nurse. Lines:  
Peripheral IV 04/06/20 Left Antecubital (Active) Site Assessment Clean, dry, & intact 4/6/2020 12:57 PM  
Phlebitis Assessment 0 4/6/2020 12:57 PM  
Infiltration Assessment 0 4/6/2020 12:57 PM  
Dressing Status Clean, dry, & intact 4/6/2020 12:57 PM  
Dressing Type Transparent 4/6/2020 12:57 PM  
Hub Color/Line Status Patent; Flushed;Pink 4/6/2020 12:57 PM  
Action Taken Blood drawn 4/6/2020 12:57 PM  
   
Peripheral IV 04/06/20 Right Forearm (Active) Site Assessment Clean, dry, & intact 4/6/2020 12:58 PM  
Phlebitis Assessment 0 4/6/2020 12:58 PM  
Infiltration Assessment 0 4/6/2020 12:58 PM  
Dressing Status Clean, dry, & intact 4/6/2020 12:58 PM  
Dressing Type Transparent 4/6/2020 12:58 PM  
Hub Color/Line Status Patent; Flushed;Pink 4/6/2020 12:58 PM  
Action Taken Blood drawn 4/6/2020 12:58 PM  
  
 
Opportunity for questions and clarification was provided. Patient transported with: 
Transportation

## 2020-04-07 LAB
ALBUMIN SERPL-MCNC: 2.8 G/DL (ref 3.5–5)
ALBUMIN/GLOB SERPL: 0.9 {RATIO} (ref 1.1–2.2)
ALP SERPL-CCNC: 48 U/L (ref 45–117)
ALT SERPL-CCNC: 18 U/L (ref 12–78)
ANION GAP SERPL CALC-SCNC: 8 MMOL/L (ref 5–15)
AST SERPL-CCNC: 18 U/L (ref 15–37)
BASOPHILS # BLD: 0.1 K/UL (ref 0–0.1)
BASOPHILS NFR BLD: 1 % (ref 0–1)
BILIRUB SERPL-MCNC: 0.9 MG/DL (ref 0.2–1)
BUN SERPL-MCNC: 28 MG/DL (ref 6–20)
BUN/CREAT SERPL: 19 (ref 12–20)
CALCIUM SERPL-MCNC: 9.2 MG/DL (ref 8.5–10.1)
CHLORIDE SERPL-SCNC: 110 MMOL/L (ref 97–108)
CO2 SERPL-SCNC: 21 MMOL/L (ref 21–32)
CREAT SERPL-MCNC: 1.49 MG/DL (ref 0.7–1.3)
DIFFERENTIAL METHOD BLD: ABNORMAL
EOSINOPHIL # BLD: 0 K/UL (ref 0–0.4)
EOSINOPHIL NFR BLD: 0 % (ref 0–7)
ERYTHROCYTE [DISTWIDTH] IN BLOOD BY AUTOMATED COUNT: 14 % (ref 11.5–14.5)
GLOBULIN SER CALC-MCNC: 3.2 G/DL (ref 2–4)
GLUCOSE BLD STRIP.AUTO-MCNC: 150 MG/DL (ref 65–100)
GLUCOSE BLD STRIP.AUTO-MCNC: 157 MG/DL (ref 65–100)
GLUCOSE BLD STRIP.AUTO-MCNC: 170 MG/DL (ref 65–100)
GLUCOSE BLD STRIP.AUTO-MCNC: 178 MG/DL (ref 65–100)
GLUCOSE BLD STRIP.AUTO-MCNC: 300 MG/DL (ref 65–100)
GLUCOSE SERPL-MCNC: 341 MG/DL (ref 65–100)
HCT VFR BLD AUTO: 44.4 % (ref 36.6–50.3)
HGB BLD-MCNC: 14.8 G/DL (ref 12.1–17)
IMM GRANULOCYTES # BLD AUTO: 0 K/UL
IMM GRANULOCYTES NFR BLD AUTO: 0 %
LACTATE SERPL-SCNC: 1.6 MMOL/L (ref 0.4–2)
LYMPHOCYTES # BLD: 0.4 K/UL (ref 0.8–3.5)
LYMPHOCYTES NFR BLD: 8 % (ref 12–49)
MCH RBC QN AUTO: 29.7 PG (ref 26–34)
MCHC RBC AUTO-ENTMCNC: 33.3 G/DL (ref 30–36.5)
MCV RBC AUTO: 89.2 FL (ref 80–99)
MONOCYTES # BLD: 0.7 K/UL (ref 0–1)
MONOCYTES NFR BLD: 13 % (ref 5–13)
NEUTS BAND NFR BLD MANUAL: 4 % (ref 0–6)
NEUTS SEG # BLD: 4.3 K/UL (ref 1.8–8)
NEUTS SEG NFR BLD: 74 % (ref 32–75)
NRBC # BLD: 0 K/UL (ref 0–0.01)
NRBC BLD-RTO: 0 PER 100 WBC
PLATELET # BLD AUTO: 189 K/UL (ref 150–400)
PMV BLD AUTO: 9.4 FL (ref 8.9–12.9)
POTASSIUM SERPL-SCNC: 4.3 MMOL/L (ref 3.5–5.1)
PROT SERPL-MCNC: 6 G/DL (ref 6.4–8.2)
RBC # BLD AUTO: 4.98 M/UL (ref 4.1–5.7)
RBC MORPH BLD: ABNORMAL
RBC MORPH BLD: ABNORMAL
SERVICE CMNT-IMP: ABNORMAL
SODIUM SERPL-SCNC: 139 MMOL/L (ref 136–145)
WBC # BLD AUTO: 5.5 K/UL (ref 4.1–11.1)

## 2020-04-07 PROCEDURE — 85025 COMPLETE CBC W/AUTO DIFF WBC: CPT

## 2020-04-07 PROCEDURE — 80053 COMPREHEN METABOLIC PANEL: CPT

## 2020-04-07 PROCEDURE — 74011000258 HC RX REV CODE- 258: Performed by: INTERNAL MEDICINE

## 2020-04-07 PROCEDURE — 82962 GLUCOSE BLOOD TEST: CPT

## 2020-04-07 PROCEDURE — 77030027138 HC INCENT SPIROMETER -A

## 2020-04-07 PROCEDURE — 77030019905 HC CATH URETH INTMIT MDII -A

## 2020-04-07 PROCEDURE — 83605 ASSAY OF LACTIC ACID: CPT

## 2020-04-07 PROCEDURE — 74011250637 HC RX REV CODE- 250/637: Performed by: SURGERY

## 2020-04-07 PROCEDURE — 36415 COLL VENOUS BLD VENIPUNCTURE: CPT

## 2020-04-07 PROCEDURE — 74011250636 HC RX REV CODE- 250/636: Performed by: INTERNAL MEDICINE

## 2020-04-07 PROCEDURE — 74011636637 HC RX REV CODE- 636/637: Performed by: INTERNAL MEDICINE

## 2020-04-07 PROCEDURE — 65270000029 HC RM PRIVATE

## 2020-04-07 RX ORDER — INSULIN LISPRO 100 [IU]/ML
INJECTION, SOLUTION INTRAVENOUS; SUBCUTANEOUS EVERY 6 HOURS
Status: DISCONTINUED | OUTPATIENT
Start: 2020-04-07 | End: 2020-04-08

## 2020-04-07 RX ORDER — DEXTROSE MONOHYDRATE 100 MG/ML
0-250 INJECTION, SOLUTION INTRAVENOUS AS NEEDED
Status: DISCONTINUED | OUTPATIENT
Start: 2020-04-07 | End: 2020-04-09 | Stop reason: HOSPADM

## 2020-04-07 RX ORDER — INSULIN LISPRO 100 [IU]/ML
8 INJECTION, SOLUTION INTRAVENOUS; SUBCUTANEOUS ONCE
Status: COMPLETED | OUTPATIENT
Start: 2020-04-07 | End: 2020-04-07

## 2020-04-07 RX ORDER — MAGNESIUM SULFATE 100 %
4 CRYSTALS MISCELLANEOUS AS NEEDED
Status: DISCONTINUED | OUTPATIENT
Start: 2020-04-07 | End: 2020-04-09 | Stop reason: HOSPADM

## 2020-04-07 RX ADMIN — AZITHROMYCIN MONOHYDRATE 500 MG: 500 INJECTION, POWDER, LYOPHILIZED, FOR SOLUTION INTRAVENOUS at 19:42

## 2020-04-07 RX ADMIN — CEFTRIAXONE 1 G: 1 INJECTION, POWDER, FOR SOLUTION INTRAMUSCULAR; INTRAVENOUS at 14:37

## 2020-04-07 RX ADMIN — SODIUM CHLORIDE 125 ML/HR: 900 INJECTION, SOLUTION INTRAVENOUS at 07:04

## 2020-04-07 RX ADMIN — INSULIN LISPRO 8 UNITS: 100 INJECTION, SOLUTION INTRAVENOUS; SUBCUTANEOUS at 07:31

## 2020-04-07 RX ADMIN — BENZOCAINE AND MENTHOL 1 LOZENGE: 15; 3.6 LOZENGE ORAL at 12:27

## 2020-04-07 RX ADMIN — INSULIN LISPRO 2 UNITS: 100 INJECTION, SOLUTION INTRAVENOUS; SUBCUTANEOUS at 18:10

## 2020-04-07 RX ADMIN — PHENOL 1 SPRAY: 1.5 LIQUID ORAL at 16:44

## 2020-04-07 RX ADMIN — Medication 5 ML: at 06:52

## 2020-04-07 RX ADMIN — SODIUM CHLORIDE 125 ML/HR: 900 INJECTION, SOLUTION INTRAVENOUS at 16:39

## 2020-04-07 NOTE — PROGRESS NOTES
[facility]      Subjective     No acute events overnight. States he started passing flatus. No longer has abdominal pain/cramps, bloating or n/v. Complains of sore throat from NG tube. Objective     Patient Vitals for the past 24 hrs:   Temp Pulse Resp BP SpO2   04/07/20 0830 97.8 °F (36.6 °C) 86 20 121/77 98 %   04/07/20 0434 97.7 °F (36.5 °C) 94 20 114/69 94 %   04/06/20 2025 98.3 °F (36.8 °C) 100 20 138/86 93 %   04/06/20 1826 99 °F (37.2 °C) 100 21 132/83 96 %   04/06/20 1615 98.1 °F (36.7 °C) (!) 106 25 158/79 95 %   04/06/20 1600  (!) 104 28 148/84 95 %   04/06/20 1545  (!) 103 28 144/82 96 %   04/06/20 1500 98.6 °F (37 °C) (!) 106 (!) 31 135/81 96 %   04/06/20 1400 98.7 °F (37.1 °C) (!) 105 24 158/83 98 %   04/06/20 1300  (!) 115 19 173/69 93 %   04/06/20 1252 98.7 °F (37.1 °C) (!) 112 22 (!) 153/99 95 %       Date 04/06/20 0700 - 04/07/20 0659 04/07/20 0700 - 04/08/20 0659   Shift 9105-4059 8179-5866 24 Hour Total 6832-3620 6510-0433 24 Hour Total   INTAKE   I.V.(mL/kg/hr) 1000(1)  1000(0.5)        Volume (sodium chloride 0.9 % bolus infusion 1,000 mL) 1000  1000      Shift Total(mL/kg) 1000(11.6)  1000(11.6)      OUTPUT   Urine(mL/kg/hr)           Urine Occurrence(s)  4 x 4 x      Emesis/NG output 200 700 900        Output (ml) (Nasogastric Tube 04/06/20) 200 700 900      Shift Total(mL/kg) 200(2.3) 700(8.1) 900(10.4)       -700 100      Weight (kg) 86.2 86.2 86.2 86.2 86.2 86.2       PE  GEN - Awake, alert, communicating appropriately.   NAD  Abd - soft, NT, ND    Labs  Recent Results (from the past 24 hour(s))   EKG, 12 LEAD, INITIAL    Collection Time: 04/06/20 12:52 PM   Result Value Ref Range    Ventricular Rate 112 BPM    Atrial Rate 112 BPM    P-R Interval 156 ms    QRS Duration 164 ms    Q-T Interval 376 ms    QTC Calculation (Bezet) 513 ms    Calculated P Axis 15 degrees    Calculated R Axis -85 degrees Calculated T Axis 84 degrees    Diagnosis       Atrial-sensed ventricular-paced rhythm  When compared with ECG of 28-DEC-2017 19:24,  Vent. rate has increased BY  28 BPM  Confirmed by Irene Roblero MD, Efraín Stone ([id number]) on 4/6/2020 4:47:43 PM     SAMPLES BEING HELD    Collection Time: 04/06/20  1:06 PM   Result Value Ref Range    SAMPLES BEING HELD 1red,1blu     COMMENT        Add-on orders for these samples will be processed based on acceptable specimen integrity and analyte stability, which may vary by analyte. CBC WITH AUTOMATED DIFF    Collection Time: 04/06/20  1:06 PM   Result Value Ref Range    WBC 17.2 (H) 4.1 - 11.1 K/uL    RBC 5.88 (H) 4.10 - 5.70 M/uL    HGB 17.6 (H) 12.1 - 17.0 g/dL    HCT 52.2 (H) 36.6 - 50.3 %    MCV 88.8 80.0 - 99.0 FL    MCH 29.9 26.0 - 34.0 PG    MCHC 33.7 30.0 - 36.5 g/dL    RDW 14.2 11.5 - 14.5 %    PLATELET 451 687 - 665 K/uL    MPV 9.2 8.9 - 12.9 FL    NRBC 0.0 0  WBC    ABSOLUTE NRBC 0.00 0.00 - 0.01 K/uL    NEUTROPHILS 89 (H) 32 - 75 %    LYMPHOCYTES 5 (L) 12 - 49 %    MONOCYTES 6 5 - 13 %    EOSINOPHILS 0 0 - 7 %    BASOPHILS 0 0 - 1 %    IMMATURE GRANULOCYTES 0 0.0 - 0.5 %    ABS. NEUTROPHILS 15.2 (H) 1.8 - 8.0 K/UL    ABS. LYMPHOCYTES 0.9 0.8 - 3.5 K/UL    ABS. MONOCYTES 1.0 0.0 - 1.0 K/UL    ABS. EOSINOPHILS 0.0 0.0 - 0.4 K/UL    ABS. BASOPHILS 0.1 0.0 - 0.1 K/UL    ABS. IMM.  GRANS. 0.1 (H) 0.00 - 0.04 K/UL    DF AUTOMATED     METABOLIC PANEL, COMPREHENSIVE    Collection Time: 04/06/20  1:06 PM   Result Value Ref Range    Sodium 136 136 - 145 mmol/L    Potassium 4.6 3.5 - 5.1 mmol/L    Chloride 104 97 - 108 mmol/L    CO2 24 21 - 32 mmol/L    Anion gap 8 5 - 15 mmol/L    Glucose 312 (H) 65 - 100 mg/dL    BUN 23 (H) 6 - 20 MG/DL    Creatinine 1.73 (H) 0.70 - 1.30 MG/DL    BUN/Creatinine ratio 13 12 - 20      GFR est AA 46 (L) >60 ml/min/1.73m2    GFR est non-AA 38 (L) >60 ml/min/1.73m2    Calcium 9.9 8.5 - 10.1 MG/DL    Bilirubin, total 1.1 (H) 0.2 - 1.0 MG/DL    ALT (SGPT) 24 12 - 78 U/L    AST (SGOT) 18 15 - 37 U/L    Alk. phosphatase 64 45 - 117 U/L    Protein, total 7.4 6.4 - 8.2 g/dL    Albumin 3.6 3.5 - 5.0 g/dL    Globulin 3.8 2.0 - 4.0 g/dL    A-G Ratio 0.9 (L) 1.1 - 2.2     CULTURE, BLOOD, PAIRED    Collection Time: 04/06/20  1:06 PM   Result Value Ref Range    Special Requests: NO SPECIAL REQUESTS      Culture result: NO GROWTH AFTER 16 HOURS     URINALYSIS W/MICROSCOPIC    Collection Time: 04/06/20  1:08 PM   Result Value Ref Range    Color YELLOW/STRAW      Appearance CLEAR CLEAR      Specific gravity 1.023 1.003 - 1.030      pH (UA) 6.0 5.0 - 8.0      Protein TRACE (A) NEG mg/dL    Glucose >1,000 (A) NEG mg/dL    Ketone 15 (A) NEG mg/dL    Bilirubin NEGATIVE  NEG      Blood NEGATIVE  NEG      Urobilinogen 0.2 0.2 - 1.0 EU/dL    Nitrites NEGATIVE  NEG      Leukocyte Esterase NEGATIVE  NEG      WBC 0-4 0 - 4 /hpf    RBC 0-5 0 - 5 /hpf    Epithelial cells FEW FEW /lpf    Bacteria NEGATIVE  NEG /hpf    Hyaline cast 0-2 0 - 5 /lpf   URINE CULTURE HOLD SAMPLE    Collection Time: 04/06/20  1:08 PM   Result Value Ref Range    Urine culture hold        Urine on hold in Microbiology dept for 2 days. If unpreserved urine is submitted, it cannot be used for addtional testing after 24 hours, recollection will be required.    LACTIC ACID    Collection Time: 04/06/20  1:08 PM   Result Value Ref Range    Lactic acid 2.3 (HH) 0.4 - 2.0 MMOL/L   SARS-COV-2    Collection Time: 04/06/20  3:29 PM   Result Value Ref Range    COVID-19 PENDING NEG   LACTIC ACID    Collection Time: 04/06/20  4:23 PM   Result Value Ref Range    Lactic acid 2.0 0.4 - 2.0 MMOL/L   PROCALCITONIN    Collection Time: 04/06/20  6:32 PM   Result Value Ref Range    Procalcitonin 0.41 ng/mL   D DIMER    Collection Time: 04/06/20  6:32 PM   Result Value Ref Range    D-dimer 2.00 (H) 0.00 - 0.65 mg/L FEU   GLUCOSE, POC    Collection Time: 04/06/20  9:27 PM   Result Value Ref Range    Glucose (POC) 373 (H) 65 - 100 mg/dL Performed by Jyotsna DELGADO)    LACTIC ACID    Collection Time: 04/06/20 10:51 PM   Result Value Ref Range    Lactic acid 2.2 (HH) 0.4 - 2.0 MMOL/L   CBC WITH AUTOMATED DIFF    Collection Time: 04/07/20  4:40 AM   Result Value Ref Range    WBC 5.5 4.1 - 11.1 K/uL    RBC 4.98 4.10 - 5.70 M/uL    HGB 14.8 12.1 - 17.0 g/dL    HCT 44.4 36.6 - 50.3 %    MCV 89.2 80.0 - 99.0 FL    MCH 29.7 26.0 - 34.0 PG    MCHC 33.3 30.0 - 36.5 g/dL    RDW 14.0 11.5 - 14.5 %    PLATELET 955 293 - 465 K/uL    MPV 9.4 8.9 - 12.9 FL    NRBC 0.0 0  WBC    ABSOLUTE NRBC 0.00 0.00 - 0.01 K/uL    NEUTROPHILS 74 32 - 75 %    BAND NEUTROPHILS 4 0 - 6 %    LYMPHOCYTES 8 (L) 12 - 49 %    MONOCYTES 13 5 - 13 %    EOSINOPHILS 0 0 - 7 %    BASOPHILS 1 0 - 1 %    IMMATURE GRANULOCYTES 0 %    ABS. NEUTROPHILS 4.3 1.8 - 8.0 K/UL    ABS. LYMPHOCYTES 0.4 (L) 0.8 - 3.5 K/UL    ABS. MONOCYTES 0.7 0.0 - 1.0 K/UL    ABS. EOSINOPHILS 0.0 0.0 - 0.4 K/UL    ABS. BASOPHILS 0.1 0.0 - 0.1 K/UL    ABS. IMM. GRANS. 0.0 K/UL    DF MANUAL      RBC COMMENTS NORMOCYTIC, NORMOCHROMIC      RBC COMMENTS ATYPICAL LYMPHOCYTES PRESENT     METABOLIC PANEL, COMPREHENSIVE    Collection Time: 04/07/20  4:40 AM   Result Value Ref Range    Sodium 139 136 - 145 mmol/L    Potassium 4.3 3.5 - 5.1 mmol/L    Chloride 110 (H) 97 - 108 mmol/L    CO2 21 21 - 32 mmol/L    Anion gap 8 5 - 15 mmol/L    Glucose 341 (H) 65 - 100 mg/dL    BUN 28 (H) 6 - 20 MG/DL    Creatinine 1.49 (H) 0.70 - 1.30 MG/DL    BUN/Creatinine ratio 19 12 - 20      GFR est AA 54 (L) >60 ml/min/1.73m2    GFR est non-AA 45 (L) >60 ml/min/1.73m2    Calcium 9.2 8.5 - 10.1 MG/DL    Bilirubin, total 0.9 0.2 - 1.0 MG/DL    ALT (SGPT) 18 12 - 78 U/L    AST (SGOT) 18 15 - 37 U/L    Alk.  phosphatase 48 45 - 117 U/L    Protein, total 6.0 (L) 6.4 - 8.2 g/dL    Albumin 2.8 (L) 3.5 - 5.0 g/dL    Globulin 3.2 2.0 - 4.0 g/dL    A-G Ratio 0.9 (L) 1.1 - 2.2     LACTIC ACID    Collection Time: 04/07/20  4:46 AM   Result Value Ref Range    Lactic acid 1.6 0.4 - 2.0 MMOL/L   GLUCOSE, POC    Collection Time: 04/07/20  7:06 AM   Result Value Ref Range    Glucose (POC) 300 (H) 65 - 100 mg/dL    Performed by Raisa Barnard is a 80 y. o.yr old male with SBO presumed secondary to adhesions. This appears to be resolving with non-operative management. Plan     - NG clamp trial today. Will check residual in 6 hours and if this is low, the NG can be removed and start a clear liquid diet. If high, will place back to suction.   - Chloraseptic spray and cepacol lozenges for sore throat  - Appreciate care per primary team of his other medical problems.       Almas Leung MD  4/7/2020  11:08 AM

## 2020-04-07 NOTE — ROUTINE PROCESS
2212: Paged on call hospitalist.  
 
2214: Aristeo Early NP notified pt on D5 LR @ 75, , and D-dimer 2. Placed order to d/c current fluids, restart NS, and bilateral duplex in am.  
 
0005: Paged on call hospitalist in regards to lactic acid 2.2. 
 
0010: Aristeo Early NP notified. Telephone order to repeat lactic acid in 6 hours.

## 2020-04-07 NOTE — ROUTINE PROCESS
0715: Paged Dr. Huffman President in regards to pt . 
 
0716: Dr. Huffman President notified and gave telephone order for 8 units of humalog give now.

## 2020-04-07 NOTE — PROGRESS NOTES
[facility]                                                                                          Hospitalist Progress Note  Leonardo Hemphill MD  Answering service: [phone] from in house phone        Date of Service:  2020  NAME:  Jm Redmond  :  1932  MRN:  [id number]      Admission Summary:     Jm Redmond is a 80 y.o. 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    He came to the ER today because he was symptomatic with abdominal pain and not able to have a BM and lot of hiccups since Monday. He noticed that even by just walking within the room he was getting short of breath and winded. He was not short of breath on sitting. He felt as if something is obstructing he he then started vomiting this morning and that is why he decided to come to the ER. He is denying any shortness of breath any cough any fever. No diarrhea. Nobody sick in the family. Lives with his     Interval history / Subjective:       2020 :  No pain,   abd quiet  900 cc's out from NG tube last pm to am rounds       Assessment & Plan:       1. Small bowel obstruction  CT scan evidence of small bowel obstruction. Seen by surgery there is no plan of any bowel surgery at this point of time. He will be kept n.p.o. NG tube will be inserted and an abdominal x-ray to be obtained tomorrow morning.; 900 cc's out ng wise last night 2020     #2 covered suspicion   given viral pneumonia findings in the lower lungs. Cover test already ordered by the ER doctor    #3 CKD stage III  Start him on IV fluids.   Lab Results   Component Value Date/Time    Creatinine 1.49 (H) 2020 04:40 AM        #4 obstructive sleep apnea  Can use his CPAP  Code status: full   DVT prophylaxis: SCD's     Care Plan discussed with: Patient/Family and Nurse  Anticipated Disposition: Home w/Family  Anticipated Discharge: Greater than 48 hours     Hospital Problems  Date Reviewed: 1/15/2020          Codes Class Noted POA    SBO (small bowel obstruction) ([facility] [address] ICD-10-CM: Q75.775  ICD-9-CM: 560.9  4/6/2020 Unknown                Review of Systems:   A comprehensive review of systems was negative except for that written in the HPI. Vital Signs:    Last 24hrs VS reviewed since prior progress note. Most recent are:  Visit Vitals  /77 (BP 1 Location: Left arm, BP Patient Position: At rest)   Pulse 86   Temp 97.8 °F (36.6 °C)   Resp 20   Ht 5' 9\" (1.753 m)   Wt 86.2 kg (190 lb)   SpO2 98%   BMI 28.06 kg/m²         Intake/Output Summary (Last 24 hours) at 4/7/2020 1037  Last data filed at 4/6/2020 2053  Gross per 24 hour   Intake 1000 ml   Output 900 ml   Net 100 ml        Physical Examination:             Constitutional:  No acute distress, cooperative, pleasant    ENT:  Oral mucosa moist, oropharynx benign. Resp:  CTA bilaterally. No wheezing/rhonchi/rales. No accessory muscle use   CV:  Regular rhythm, normal rate, no murmurs, gallops, rubs    GI:  Soft, non distended, non tender. Reduced  bowel sounds,     Musculoskeletal:  No edema, warm, 2+ pulses throughout    Neurologic:  Moves all extremities. AAOx3, CN II-XII reviewed     Psych:  Good insight, Not anxious nor agitated.        Data Review:    Review and/or order of clinical lab test      Labs:     Recent Labs     04/07/20  0440 04/06/20  1306   WBC 5.5 17.2*   HGB 14.8 17.6*   HCT 44.4 52.2*    265     Recent Labs     04/07/20  0440 04/06/20  1306    136   K 4.3 4.6   * 104   CO2 21 24   BUN 28* 23*   CREA 1.49* 1.73*   * 312*   CA 9.2 9.9     Recent Labs     04/07/20  0440 04/06/20  1306   SGOT 18 18   ALT 18 24   AP 48 64   TBILI 0.9 1.1*   TP 6.0* 7.4   ALB 2.8* 3.6   GLOB 3.2 3.8     No results for input(s): INR, PTP, APTT, INREXT in the last 72 hours. No results for input(s): FE, TIBC, PSAT, FERR in the last 72 hours. No results found for: FOL, RBCF   No results for input(s): PH, PCO2, PO2 in the last 72 hours. No results for input(s): CPK, CKNDX, TROIQ in the last 72 hours.     No lab exists for component: CPKMB  Lab Results   Component Value Date/Time    Cholesterol, total 170 01/15/2020 01:04 PM    HDL Cholesterol 51 01/15/2020 01:04 PM    LDL, calculated 89 01/15/2020 01:04 PM    Triglyceride 148 01/15/2020 01:04 PM    CHOL/HDL Ratio 3.4 09/21/2009 09:12 AM     Lab Results   Component Value Date/Time    Glucose (POC) 300 (H) 04/07/2020 07:06 AM    Glucose (POC) 373 (H) 04/06/2020 09:27 PM    Glucose (POC) 147 (H) 12/28/2017 11:40 PM     Lab Results   Component Value Date/Time    Color YELLOW/STRAW 04/06/2020 01:08 PM    Appearance CLEAR 04/06/2020 01:08 PM    Specific gravity 1.023 04/06/2020 01:08 PM    pH (UA) 6.0 04/06/2020 01:08 PM    Protein TRACE (A) 04/06/2020 01:08 PM    Glucose >1,000 (A) 04/06/2020 01:08 PM    Ketone 15 (A) 04/06/2020 01:08 PM    Bilirubin NEGATIVE  04/06/2020 01:08 PM    Urobilinogen 0.2 04/06/2020 01:08 PM    Nitrites NEGATIVE  04/06/2020 01:08 PM    Leukocyte Esterase NEGATIVE  04/06/2020 01:08 PM    Epithelial cells FEW 04/06/2020 01:08 PM    Bacteria NEGATIVE  04/06/2020 01:08 PM    WBC 0-4 04/06/2020 01:08 PM    RBC 0-5 04/06/2020 01:08 PM         Medications Reviewed:     Current Facility-Administered Medications   Medication Dose Route Frequency    0.9% sodium chloride infusion  125 mL/hr IntraVENous CONTINUOUS    sodium chloride (NS) flush 5-40 mL  5-40 mL IntraVENous Q8H    sodium chloride (NS) flush 5-40 mL  5-40 mL IntraVENous PRN    HYDROmorphone (PF) (DILAUDID) injection 0.5 mg  0.5 mg IntraVENous Q6H PRN    ondansetron (ZOFRAN) injection 4 mg  4 mg IntraVENous Q4H PRN    naloxone (NARCAN) injection 0.4 mg  0.4 mg IntraVENous PRN    cefTRIAXone (ROCEPHIN) 1 g in 0.9% sodium chloride (MBP/ADV) 50 mL  1 g IntraVENous Q24H    azithromycin (ZITHROMAX) 500 mg in 0.9% sodium chloride (MBP/ADV) 250 mL  500 mg IntraVENous Q24H     ______________________________________________________________________  EXPECTED LENGTH OF STAY: 4d 19h  ACTUAL LENGTH OF STAY:          1                 Moo Limon MD

## 2020-04-07 NOTE — PROGRESS NOTES
PAKO: Patient lives with his wife and he is independent without the use of DME. Noted plans for NG clamp trial today. Family will transport patient home via car at discharge. Care Management Interventions  PCP Verified by CM: Lina Hansen MD)  Mode of Transport at Discharge: Other (see comment)(family/car)  Transition of Care Consult (CM Consult): Discharge Planning  Discharge Durable Medical Equipment: No(patient does not use any DME)  Physical Therapy Consult: No  Occupational Therapy Consult: No  Speech Therapy Consult: No  Current Support Network: Lives with Spouse, Own Home  Confirm Follow Up Transport: Family  Discharge Location  Discharge Placement: Home     Reason for Admission: Patient admitted with SBO. No plans for surgery. NGT was placed                   RUR Score:  8%                   Plan for utilizing home health:  No home health needs at this time. Patient has used [facility] in the past for HHPT. PCP: First and Last name:  Refugio Price MD   Name of Practice:[facility]    Are you a current patient: Yes/No: Yes   Approximate date of last visit: 1/15/20                    Current Advanced Directive/Advance Care Plan: not on file. Wife is Deja Oneal #[phone]. Transition of Care Plan:   Home with wife when medically stable. CM spoke with patient and completed CM assessment. Patient lives with his wife and is independent without the use of DME. Confirmed demographics on face sheet. Patient stated that wife will transport him home via car at discharge. No additional needs expressed at this time. CM will be available if needs arise.     Gabrielle Phillips, DAVIDW/CRM

## 2020-04-07 NOTE — PROGRESS NOTES
Bedside shift change report given to [address] [place] [address] (oncoming nurse) by Timur Mcdonald (offgoing nurse). Report included the following information SBAR, Kardex, Intake/Output and MAR.

## 2020-04-08 ENCOUNTER — APPOINTMENT (OUTPATIENT)
Dept: GENERAL RADIOLOGY | Age: 85
DRG: 388 | End: 2020-04-08
Attending: SURGERY
Payer: MEDICARE

## 2020-04-08 LAB
COMMENT, HOLDF: NORMAL
GLUCOSE BLD STRIP.AUTO-MCNC: 121 MG/DL (ref 65–100)
GLUCOSE BLD STRIP.AUTO-MCNC: 186 MG/DL (ref 65–100)
SAMPLES BEING HELD,HOLD: NORMAL
SERVICE CMNT-IMP: ABNORMAL
SERVICE CMNT-IMP: ABNORMAL

## 2020-04-08 PROCEDURE — 82962 GLUCOSE BLOOD TEST: CPT

## 2020-04-08 PROCEDURE — 74011000258 HC RX REV CODE- 258: Performed by: INTERNAL MEDICINE

## 2020-04-08 PROCEDURE — 36415 COLL VENOUS BLD VENIPUNCTURE: CPT

## 2020-04-08 PROCEDURE — 74011250636 HC RX REV CODE- 250/636: Performed by: INTERNAL MEDICINE

## 2020-04-08 PROCEDURE — 65270000029 HC RM PRIVATE

## 2020-04-08 PROCEDURE — 74011636637 HC RX REV CODE- 636/637: Performed by: INTERNAL MEDICINE

## 2020-04-08 PROCEDURE — 74011250637 HC RX REV CODE- 250/637: Performed by: SURGERY

## 2020-04-08 PROCEDURE — 74018 RADEX ABDOMEN 1 VIEW: CPT

## 2020-04-08 RX ORDER — INSULIN LISPRO 100 [IU]/ML
INJECTION, SOLUTION INTRAVENOUS; SUBCUTANEOUS
Status: DISCONTINUED | OUTPATIENT
Start: 2020-04-08 | End: 2020-04-09 | Stop reason: HOSPADM

## 2020-04-08 RX ORDER — POLYETHYLENE GLYCOL 3350 17 G/17G
17 POWDER, FOR SOLUTION ORAL DAILY
Status: DISCONTINUED | OUTPATIENT
Start: 2020-04-08 | End: 2020-04-09 | Stop reason: HOSPADM

## 2020-04-08 RX ORDER — DEXTROSE MONOHYDRATE 100 MG/ML
0-250 INJECTION, SOLUTION INTRAVENOUS AS NEEDED
Status: DISCONTINUED | OUTPATIENT
Start: 2020-04-08 | End: 2020-04-08 | Stop reason: SDUPTHER

## 2020-04-08 RX ORDER — MAGNESIUM SULFATE 100 %
4 CRYSTALS MISCELLANEOUS AS NEEDED
Status: DISCONTINUED | OUTPATIENT
Start: 2020-04-08 | End: 2020-04-08 | Stop reason: SDUPTHER

## 2020-04-08 RX ADMIN — POLYETHYLENE GLYCOL 3350 17 G: 17 POWDER, FOR SOLUTION ORAL at 14:39

## 2020-04-08 RX ADMIN — Medication 10 ML: at 17:06

## 2020-04-08 RX ADMIN — CEFTRIAXONE 1 G: 1 INJECTION, POWDER, FOR SOLUTION INTRAMUSCULAR; INTRAVENOUS at 14:38

## 2020-04-08 RX ADMIN — INSULIN LISPRO 2 UNITS: 100 INJECTION, SOLUTION INTRAVENOUS; SUBCUTANEOUS at 17:07

## 2020-04-08 RX ADMIN — AZITHROMYCIN MONOHYDRATE 500 MG: 500 INJECTION, POWDER, LYOPHILIZED, FOR SOLUTION INTRAVENOUS at 19:34

## 2020-04-08 RX ADMIN — SODIUM CHLORIDE 125 ML/HR: 900 INJECTION, SOLUTION INTRAVENOUS at 22:00

## 2020-04-08 NOTE — PROGRESS NOTES
[facility]                                                                                          Hospitalist Progress Note  Tresa Jones MD  Answering service: [phone] OR [phone] from in house phone        Date of Service:  2020  NAME:  Jaycee Carmichael  :  1932  MRN:  [id number]      Admission Summary:     Jaycee Carmichael is a 80 y.o. male who presents he has a history of diabetes type 2 stage III renal disease and obstructive sleep apnea on CPAP also 2017 when he underwent ex laparotomy with small bowel resection with Dr. Leeanne Harley for a perforated jejunal diverticulum.    He came to the ER today because he was symptomatic with abdominal pain and not able to have a BM and lot of hiccups since Monday. He noticed that even by just walking within the room he was getting short of breath and winded. He was not short of breath on sitting. He felt as if something is obstructing he he then started vomiting this morning and that is why he decided to come to the ER. He is denying any shortness of breath any cough any fever. No diarrhea. Nobody sick in the family. Lives with his     Interval history / Subjective:       2020 :  Patient eating no distress abdomen soft passing gas. Assessment & Plan:       1. Small bowel obstruction  CT scan evidence of small bowel obstruction. Seen by surgery there is no plan of any bowel surgery at this point of time. He will be kept n.p.o. NG tube will be inserted and an abdominal x-ray to be obtained tomorrow morning.; 900 cc's out ng wise last night 2020   NG tube out as of 4 8 patient eating having improved function in general function    #2 covered suspicion   given viral pneumonia findings in the lower lungs. Cover test already ordered by the ER doctor    #3 CKD stage III  Start him on IV fluids.   Lab Results   Component Value Date/Time    Creatinine 1.49 (H) 2020 04:40 AM        #4 obstructive sleep apnea  Can use his CPAP  Code status: full   DVT prophylaxis: 310Dixie Tirado discussed with: Patient/Family and Nurse  Anticipated Disposition: Home w/Family  Anticipated Discharge: Greater than 48 hours     Hospital Problems  Date Reviewed: 1/15/2020          Codes Class Noted POA    SBO (small bowel obstruction) ([facility] [address] ICD-10-CM: A13.962  ICD-9-CM: 560.9  4/6/2020 Unknown                Review of Systems:   A comprehensive review of systems was negative except for that written in the HPI. Vital Signs:    Last 24hrs VS reviewed since prior progress note. Most recent are:  Visit Vitals  /76 (BP 1 Location: Right arm, BP Patient Position: At rest)   Pulse 87   Temp 97.7 °F (36.5 °C)   Resp 16   Ht 5' 9\" (1.753 m)   Wt 86.2 kg (190 lb)   SpO2 96%   BMI 28.06 kg/m²       No intake or output data in the 24 hours ending 04/08/20 1038     Physical Examination:             Constitutional:  No acute distress, cooperative, pleasant    ENT:  Oral mucosa moist, oropharynx benign. Resp:  CTA bilaterally. No wheezing/rhonchi/rales. No accessory muscle use   CV:  Regular rhythm, normal rate, no murmurs, gallops, rubs    GI:  Soft, non distended, non tender. Reduced  bowel sounds,     Musculoskeletal:  No edema, warm, 2+ pulses throughout    Neurologic:  Moves all extremities. AAOx3, CN II-XII reviewed     Psych:  Good insight, Not anxious nor agitated.        Data Review:    Review and/or order of clinical lab test      Labs:     Recent Labs     04/07/20  0440 04/06/20  1306   WBC 5.5 17.2*   HGB 14.8 17.6*   HCT 44.4 52.2*    265     Recent Labs     04/07/20  0440 04/06/20  1306    136   K 4.3 4.6   * 104   CO2 21 24   BUN 28* 23*   CREA 1.49* 1.73*   * 312*   CA 9.2 9.9     Recent Labs     04/07/20  0440 04/06/20  1306   SGOT 18 18   ALT 18 24   AP 48 64   TBILI 0.9 1.1*   TP 6.0* 7.4   ALB 2.8* 3.6   GLOB 3.2 3.8     No results for input(s): INR, PTP, APTT, INREXT, INREXT in the last 72 hours. No results for input(s): FE, TIBC, PSAT, FERR in the last 72 hours. No results found for: FOL, RBCF   No results for input(s): PH, PCO2, PO2 in the last 72 hours. No results for input(s): CPK, CKNDX, TROIQ in the last 72 hours.     No lab exists for component: CPKMB  Lab Results   Component Value Date/Time    Cholesterol, total 170 01/15/2020 01:04 PM    HDL Cholesterol 51 01/15/2020 01:04 PM    LDL, calculated 89 01/15/2020 01:04 PM    Triglyceride 148 01/15/2020 01:04 PM    CHOL/HDL Ratio 3.4 09/21/2009 09:12 AM     Lab Results   Component Value Date/Time    Glucose (POC) 121 (H) 04/08/2020 06:22 AM    Glucose (POC) 150 (H) 04/07/2020 09:10 PM    Glucose (POC) 178 (H) 04/07/2020 05:51 PM    Glucose (POC) 170 (H) 04/07/2020 04:33 PM    Glucose (POC) 157 (H) 04/07/2020 11:36 AM     Lab Results   Component Value Date/Time    Color YELLOW/STRAW 04/06/2020 01:08 PM    Appearance CLEAR 04/06/2020 01:08 PM    Specific gravity 1.023 04/06/2020 01:08 PM    pH (UA) 6.0 04/06/2020 01:08 PM    Protein TRACE (A) 04/06/2020 01:08 PM    Glucose >1,000 (A) 04/06/2020 01:08 PM    Ketone 15 (A) 04/06/2020 01:08 PM    Bilirubin NEGATIVE  04/06/2020 01:08 PM    Urobilinogen 0.2 04/06/2020 01:08 PM    Nitrites NEGATIVE  04/06/2020 01:08 PM    Leukocyte Esterase NEGATIVE  04/06/2020 01:08 PM    Epithelial cells FEW 04/06/2020 01:08 PM    Bacteria NEGATIVE  04/06/2020 01:08 PM    WBC 0-4 04/06/2020 01:08 PM    RBC 0-5 04/06/2020 01:08 PM         Medications Reviewed:     Current Facility-Administered Medications   Medication Dose Route Frequency    insulin lispro (HUMALOG) injection   SubCUTAneous ACB&D    glucose chewable tablet 16 g  4 Tab Oral PRN    glucagon (GLUCAGEN) injection 1 mg  1 mg IntraMUSCular PRN    dextrose 10% infusion 0-250 mL  0-250 mL IntraVENous PRN    benzocaine-menthoL (CEPACOL) lozenge 1 Lozenge  1 Lozenge Mucous Membrane PRN    phenol throat spray (CHLORASEPTIC) 1 Spray  1 Spray Oral PRN    glucose chewable tablet 16 g  4 Tab Oral PRN    glucagon (GLUCAGEN) injection 1 mg  1 mg IntraMUSCular PRN    dextrose 10% infusion 0-250 mL  0-250 mL IntraVENous PRN    0.9% sodium chloride infusion  125 mL/hr IntraVENous CONTINUOUS    sodium chloride (NS) flush 5-40 mL  5-40 mL IntraVENous Q8H    sodium chloride (NS) flush 5-40 mL  5-40 mL IntraVENous PRN    HYDROmorphone (PF) (DILAUDID) injection 0.5 mg  0.5 mg IntraVENous Q6H PRN    ondansetron (ZOFRAN) injection 4 mg  4 mg IntraVENous Q4H PRN    naloxone (NARCAN) injection 0.4 mg  0.4 mg IntraVENous PRN    cefTRIAXone (ROCEPHIN) 1 g in 0.9% sodium chloride (MBP/ADV) 50 mL  1 g IntraVENous Q24H    azithromycin (ZITHROMAX) 500 mg in 0.9% sodium chloride (MBP/ADV) 250 mL  500 mg IntraVENous Q24H     ______________________________________________________________________  EXPECTED LENGTH OF STAY: 4d 19h  ACTUAL LENGTH OF STAY:          2                 Moo Limon MD

## 2020-04-08 NOTE — CDMP QUERY
Query 1 of 1 Patient admitted with SBO and viral pneumonia and being ruled out for COVID-19. Pt noted to have 2 or more SIRS criteria (HR, RR, WBC, L Acid). If possible, please document in progress notes and d/c summary if you are evaluating and/or treating any of the following: 
 
? Sepsis, POA suspected or probable causative organism (please specify) ? No sepsis ? SIRS of non-infectious origin w/o acute organ dysfunction ? Other Explanation of clinical findings ? Clinically Undetermined (no explanation for clinical findings) The medical record reflects the following: 
 
   Risk Factors: viral pna; 
 
   Clinical Indicators: WBC 17.5; HR > 90, RR > 20; L Acid 2.3; Procal 0.41; Treatment: Zithromax 500mg IV daily; Rocephin 1g IV daily Thank you, Daxa Adler, RN, BSN, [facility] Clinical  
[facility] AT [place] 
[phone]

## 2020-04-08 NOTE — ROUTINE PROCESS
Bedside shift change report given to Alicia Sanders (oncoming nurse) by Jacob Maxwell (offgoing nurse). Report included the following information SBAR.

## 2020-04-08 NOTE — PROGRESS NOTES
1414:  RN notified Dr. Nayely Carballo patient has been experiencing belching and intermittent hiccups. Per Dr. Nayely Carballo a KUB would be ordered along with medication to help with hiccups. Will continue to monitor patient. 2001:  Bedside shift change report given to Talya Alvarado RN (oncoming nurse) by Devi Razo RN (offgoing nurse). Report included the following information SBAR, Kardex, Intake/Output, MAR and Recent Results.

## 2020-04-08 NOTE — PROGRESS NOTES
[facility]      Subjective     No acute events overnight. Patient passing copious flatus but no BM yet. No n/v. Denies feeling bloated. Still has some hiccups. Tolerating liquids. Denies SOB. Objective     Patient Vitals for the past 24 hrs:   Temp Pulse Resp BP SpO2   04/08/20 0833 97.7 °F (36.5 °C) 87 16 156/76 96 %   04/08/20 0247 98 °F (36.7 °C) 79 16 138/74 95 %   04/07/20 2021 98.2 °F (36.8 °C) 82 16 138/83 96 %   04/07/20 1422 97.7 °F (36.5 °C) 85 18 145/76 95 %           PE  GEN - Awake, alert, communicating appropriately. NAD  Abd - soft, NT, ND    Labs  Recent Results (from the past 24 hour(s))   GLUCOSE, POC    Collection Time: 04/07/20  4:33 PM   Result Value Ref Range    Glucose (POC) 170 (H) 65 - 100 mg/dL    Performed by Jaymie Strong    GLUCOSE, POC    Collection Time: 04/07/20  5:51 PM   Result Value Ref Range    Glucose (POC) 178 (H) 65 - 100 mg/dL    Performed by Jaymie Strong    GLUCOSE, POC    Collection Time: 04/07/20  9:10 PM   Result Value Ref Range    Glucose (POC) 150 (H) 65 - 100 mg/dL    Performed by [address]    Collection Time: 04/08/20  2:35 AM   Result Value Ref Range    SAMPLES BEING HELD lav,pst     COMMENT        Add-on orders for these samples will be processed based on acceptable specimen integrity and analyte stability, which may vary by analyte. GLUCOSE, POC    Collection Time: 04/08/20  6:22 AM   Result Value Ref Range    Glucose (POC) 121 (H) 65 - 100 mg/dL    Performed by Todd Salinas is a 80 y. o.yr old male with SBO presumed secondary to adhesions. This appears to be resolving with non-operative management. Plan     - KUB today  - Advanced to diabetic diet  - Cannot give thorazine for hiccups due to toxicity with azithromycin. - Will give miralax today for bowel regimen.     - Appreciate care per primary team of his other medical problems.       Karry Gaucher, MD  4/8/2020  2:16 PM

## 2020-04-08 NOTE — PROGRESS NOTES
1810:  RN hooked patient NG-tube back up to suction to check residual.  Residual was minimal.  Per Dr. Darshan Javed NG-tube to be removed at this time and patient to be placed on clear liquid diet. 2004:  Bedside shift change report given to Terry Sharp RN (oncoming nurse) by Jose Barcenas RN (offgoing nurse). Report included the following information SBAR, Kardex, Intake/Output, MAR and Recent Results.

## 2020-04-09 ENCOUNTER — APPOINTMENT (OUTPATIENT)
Dept: VASCULAR SURGERY | Age: 85
DRG: 388 | End: 2020-04-09
Attending: NURSE PRACTITIONER
Payer: MEDICARE

## 2020-04-09 VITALS
RESPIRATION RATE: 18 BRPM | HEART RATE: 77 BPM | SYSTOLIC BLOOD PRESSURE: 132 MMHG | OXYGEN SATURATION: 96 % | BODY MASS INDEX: 28.14 KG/M2 | DIASTOLIC BLOOD PRESSURE: 71 MMHG | TEMPERATURE: 98 F | HEIGHT: 69 IN | WEIGHT: 190 LBS

## 2020-04-09 LAB
GLUCOSE BLD STRIP.AUTO-MCNC: 147 MG/DL (ref 65–100)
SERVICE CMNT-IMP: ABNORMAL

## 2020-04-09 PROCEDURE — 74011636637 HC RX REV CODE- 636/637: Performed by: INTERNAL MEDICINE

## 2020-04-09 PROCEDURE — 82962 GLUCOSE BLOOD TEST: CPT

## 2020-04-09 PROCEDURE — 74011250637 HC RX REV CODE- 250/637: Performed by: SURGERY

## 2020-04-09 RX ORDER — LEVOFLOXACIN 500 MG/1
500 TABLET, FILM COATED ORAL DAILY
Qty: 5 TAB | Refills: 0 | Status: SHIPPED | OUTPATIENT
Start: 2020-04-09 | End: 2020-10-05 | Stop reason: ALTCHOICE

## 2020-04-09 RX ORDER — GLIPIZIDE 5 MG/1
5 TABLET ORAL 2 TIMES DAILY WITH MEALS
Qty: 60 TAB | Refills: 1 | Status: SHIPPED | OUTPATIENT
Start: 2020-04-09 | End: 2020-04-21

## 2020-04-09 RX ORDER — METFORMIN HYDROCHLORIDE 500 MG/1
500 TABLET ORAL 2 TIMES DAILY WITH MEALS
Qty: 60 TAB | Refills: 1 | Status: SHIPPED | OUTPATIENT
Start: 2020-04-09 | End: 2020-06-08 | Stop reason: SDUPTHER

## 2020-04-09 RX ADMIN — INSULIN LISPRO 2 UNITS: 100 INJECTION, SOLUTION INTRAVENOUS; SUBCUTANEOUS at 06:31

## 2020-04-09 RX ADMIN — POLYETHYLENE GLYCOL 3350 17 G: 17 POWDER, FOR SOLUTION ORAL at 08:31

## 2020-04-09 RX ADMIN — BENZOCAINE AND MENTHOL 1 LOZENGE: 15; 3.6 LOZENGE ORAL at 06:31

## 2020-04-09 NOTE — PROGRESS NOTES
Noted discharge orders. CM spoke with patient via phone. Patient is aware of discharge today and stated that his wife will transport him home via car. CM discussed IM letter with patient via phone. RN will deliver letter with discharge instructions.     TOAN Guerrero/MADINA

## 2020-04-09 NOTE — PROGRESS NOTES
Sent Dr. Freeman Diaz a message that it looks like infection prevention removed COVID infection banner, but I am waiting to confirm this with them.

## 2020-04-09 NOTE — ROUTINE PROCESS
Per Dr. Peres Mode, remove pt's droplet plus isolation because the patient's COVID test came back negative.

## 2020-04-09 NOTE — DISCHARGE INSTRUCTIONS
Monitor your diet due to recent partial small bowel obstruction- recommend eating soft foods that are easy to digest- avoid foods that are difficult to digest- nuts, corn, cabbage  Monitor your blood pressure - goal less than 140/90- follow up with your primary care MD for elevated blood pressure  For Diabetes- diabetic diet- exercise to promote weight loss- take glipizide and metformin as directed  Continue antibiotics for pneumonia for 5 more days  Follow up with urology regarding CT finding of large left kidney cyst- 7cm; recommend repeat Ultrasound for monitoring

## 2020-04-09 NOTE — PROGRESS NOTES
I have reviewed discharge instructions with the patient. The patient verbalized understanding. Pt received hard rxs.

## 2020-04-09 NOTE — DISCHARGE SUMMARY
Discharge Summary       PATIENT ID: Cuauhtemoc Gomez  MRN: [id number]   YOB: 1932    DATE OF ADMISSION: 4/6/2020 12:48 PM    DATE OF DISCHARGE: 4/9/20 1pm   PRIMARY CARE PROVIDER: Elsa Xiong MD     ATTENDING PHYSICIAN:ARIS  DISCHARGING PROVIDER: Taniya Camarillo MD    To contact this individual call [phone] and ask the  to page. If unavailable ask to be transferred the Adult Hospitalist Department. CONSULTATIONS: IP CONSULT TO GENERAL SURGERY    PROCEDURES/SURGERIES: * No surgery found *    ADMITTING [address] [place] [address] COURSE:   Ap Cruz a 80 y. o. 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    He came to the ER today because he was symptomatic with abdominal pain and not able to have a BM and lot of hiccups since Monday. Sita Bethany noticed that even by just walking within the room he was getting short of breath and winded.  He was not short of breath on sitting.  He felt as if something is obstructing he he then started vomiting this morning and that is why he decided to come to the ER. He is denying any shortness of breath any cough any fever.  No diarrhea. Nobody sick in the family    DISCHARGE DIAGNOSES / PLAN:      1. Small bowel obstruction- resolving  CT scan on admission showing evidence of small bowel obstruction. Seen by surgery there is no plan of any bowel surgery at this point of time.   NG tube out   Patient tolerating current diet- cleared for DC from surgical standpoint     #2 RLL pneumonia- COVID negative- likely aspiration- treated inpatient with IV ceftriaxone and azithromycin- home on 5 more days PO levaquin     #3 CKD stage III- stable at baseline     #4 obstructive sleep apnea  cont CPAP  5- DM type 2- mild hyperglycemia- home on glipizide and metformin  6- large cyst on left kidney- have pt's urologist monitor  7. Discussed fat hernia in abdominal wall with patient as an incidental finding on CT scan- no tx required  8. Patient did have elevated lactate and leukocytosis meeting SIRS criteria on admission but I do not feel that this was due to infection- I believe the lactate was due to unability to tolerate PO intake and leukocytosis was likely a stress response related to pain and bowel obstruction     ADDITIONAL CARE RECOMMENDATIONS:   Monitor your diet due to recent partial small bowel obstruction- recommend eating soft foods that are easy to digest- avoid foods that are difficult to digest- nuts, corn, cabbage  Monitor your blood pressure - goal less than 140/90- follow up with your primary care MD for elevated blood pressure  For Diabetes- diabetic diet- exercise to promote weight loss- take glipizide and metformin as directed  Continue antibiotics for pneumonia for 5 more days   Follow up with urology regarding CT finding of large left kidney cyst- 7cm; recommend repeat Ultrasound for monitoring    PENDING TEST RESULTS:   At the time of discharge the following test results are still pending: none    FOLLOW UP APPOINTMENTS:    Follow-up Information     Follow up With Specialties Details Why Contact Info    Elsa Xiong MD Internal Medicine   [address] [facility]  [address] [facility]  [phone]               DIET:diabetic    ACTIVITY: Activity as tolerated    WOUND CARE: NA    EQUIPMENT needed: none      DISCHARGE MEDICATIONS:  Current Discharge Medication List      START taking these medications    Details   metFORMIN (GLUCOPHAGE) 500 mg tablet Take 1 Tab by mouth two (2) times daily (with meals). Qty: 60 Tab, Refills: 1      levoFLOXacin (Levaquin) 500 mg tablet Take 1 Tab by mouth daily. Start in AM tomorrow  Qty: 5 Tab, Refills: 0      glipiZIDE (GLUCOTROL) 5 mg tablet Take 1 Tab by mouth two (2) times daily (with meals).  Take with breakfast and dinner- hold if you are skipping the meal/ or have nausea/vomiting  Qty: 60 Tab, Refills: 1               NOTIFY YOUR PHYSICIAN FOR ANY OF THE FOLLOWING:   Fever over 101 degrees for 24 hours. Chest pain, shortness of breath, fever, chills, nausea, vomiting, diarrhea, change in mentation, falling, weakness, bleeding. Severe pain or pain not relieved by medications. Or, any other signs or symptoms that you may have questions about. DISPOSITION:    Home With:   OT  PT  HH  RN       Long term SNF/Inpatient Rehab   X Independent living    Hospice    Other:       PATIENT CONDITION AT DISCHARGE:     Functional status    Poor     Deconditioned    X Independent      Cognition   X  Lucid     Forgetful     Dementia      Catheters/lines (plus indication)    Kunz     PICC     PEG    X None      Code status   X  Full code     DNR      PHYSICAL EXAMINATION AT DISCHARGE:  Patient Vitals for the past 24 hrs:   Temp Pulse Resp BP SpO2   04/09/20 0830 98 °F (36.7 °C) 77 18 132/71 96 %   04/09/20 0624 98.4 °F (36.9 °C) 77 18 161/75 95 %   04/08/20 2018 98.7 °F (37.1 °C) 82 18 143/77 96 %   04/08/20 1445 97.7 °F (36.5 °C) 86 17 148/78 96 %     General:          Alert, cooperative, no distress, appears stated age. HEENT:           Atraumatic, anicteric sclerae, pink conjunctivae                          No oral ulcers, mucosa moist, throat clear, dentition fair  Neck:               Supple, symmetrical  Lungs:             Clear to auscultation bilaterally. No Wheezing or Rhonchi. No rales. Chest wall:      No tenderness  No Accessory muscle use. Heart:              Regular  rhythm,  No  murmur   No edema  Abdomen:        mild distension, non tender. Bowel sounds normal  Extremities:     No cyanosis. No clubbing,                            Skin turgor normal, Capillary refill normal  Skin:                Not pale. Not Jaundiced  No rashes   Psych:             Not anxious or agitated.   Neurologic:      Alert, moves all extremities, answers questions appropriately and responds to commands       CHRONIC MEDICAL DIAGNOSES:  Problem List as of 4/9/2020 Date Reviewed: 1/15/2020          Codes Class Noted - Resolved    SBO (small bowel obstruction) ([facility] ICD-10-CM: U11.778  ICD-9-CM: 560.9  4/6/2020 - Present        Colon cancer screening (Chronic) ICD-10-CM: Z12.11  ICD-9-CM: V76.51  1/17/2019 - Present    Overview Signed 1/17/2019 10:50 AM by Rustam Barrientos MD     3/26/12 Dr. Monica Galloway             Perforated diverticulum of small intestine ICD-10-CM: K57.00  ICD-9-CM: 562.00  12/28/2017 - Present    Overview Addendum 1/17/2019 10:47 AM by Rustam Barrientos MD     12/28/17 S/p small bowel resection. Dr. Myles Moctezuma. RONNY on CPAP ICD-10-CM: G47.33, Z99.89  ICD-9-CM: 327.23, V46.8  9/23/2015 - Present        Glaucoma ICD-10-CM: H40.9  ICD-9-CM: 365.9  11/17/2014 - Present    Overview Signed 1/17/2019 10:45 AM by Rustam Barrientos MD     S/p laser treatment. Dr. Maryellen perez ICD-10-CM: I45.9  ICD-9-CM: 426.9  8/6/2014 - Present    Overview Signed 8/6/2014  9:04 PM by Tony Richey      Medtronic dual chamberpacemaker placed at [facility] Dr. [facility]             DM (diabetes mellitus) type II controlled with renal manifestation [facility]) ICD-10-CM: E11.29  ICD-9-CM: 250.40  8/19/2013 - Present    Overview Signed 1/17/2019 10:44 AM by Rustam Barrientos MD     Diet controlled. Has bilateral feet neuropathy             Peripheral neuropathy ICD-10-CM: G62.9  ICD-9-CM: 356.9  12/9/2011 - Present    Overview Signed 1/17/2019 10:43 AM by Rustam Barrientos MD     Feet bilateral             History of prostate cancer ICD-10-CM: Z85.46  ICD-9-CM: V10.46  12/9/2011 - Present    Overview Addendum 7/21/2014  8:52 AM by Adelita Valera, s/p cryosurgery, and his PSA  And he declines  Hormonal therapy.  Implanted irradiated seeds             RESOLVED: Type 2 diabetes mellitus with diabetic neuropathy ([facility] ICD-10-CM: E11.40  ICD-9-CM: 250.60, 357.2  3/6/2018 - 1/17/2019        RESOLVED: CKD (chronic kidney disease) stage 3, GFR 30-59 ml/min ([facility]) ICD-10-CM: N18.3  ICD-9-CM: 585.3  8/19/2013 - 10/3/2017        RESOLVED: Injury of right Achilles tendon ICD-10-CM: S86.001A  ICD-9-CM: 959.7  8/14/2013 - 5/25/2016    Overview Addendum 7/21/2014  8:55 AM by Adonis Alexandre     He chose not to have it repaired. Hx of rupture.  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

## 2020-04-09 NOTE — PROGRESS NOTES
Bedside and Verbal shift change report given to [address] (oncoming nurse) by Roxi Valerio RN (offgoing nurse). Report included the following information SBAR, Kardex and MAR.

## 2020-04-09 NOTE — PROGRESS NOTES
[facility]      Subjective     No acute events overnight. Patient states he had a normal bowel movement this morning. No n/v. His hiccups have resolved and he continues to tolerate a diet. He is passing copious flatus as well. Objective     Patient Vitals for the past 24 hrs:   Temp Pulse Resp BP SpO2   04/09/20 0830 98 °F (36.7 °C) 77 18 132/71 96 %   04/09/20 0624 98.4 °F (36.9 °C) 77 18 161/75 95 %   04/08/20 2018 98.7 °F (37.1 °C) 82 18 143/77 96 %   04/08/20 1445 97.7 °F (36.5 °C) 86 17 148/78 96 %       Date 04/08/20 0700 - 04/09/20 0659 04/09/20 0700 - 04/10/20 0659   Shift 7207-6143 3612-3448 24 Hour Total 8151-1579 1887-5213 24 Hour Total   INTAKE   Shift Total(mL/kg)         OUTPUT   Stool           Stool Occurrence(s) 1 x  1 x      Shift Total(mL/kg)         NET         Weight (kg) 86.2 86.2 86.2 86.2 86.2 86.2       PE  GEN - Awake, alert, communicating appropriately. NAD  Abd - soft, NT, ND    Labs  Recent Results (from the past 24 hour(s))   GLUCOSE, POC    Collection Time: 04/08/20  5:03 PM   Result Value Ref Range    Glucose (POC) 186 (H) 65 - 100 mg/dL    Performed by Aubrey Miller    GLUCOSE, POC    Collection Time: 04/09/20  6:18 AM   Result Value Ref Range    Glucose (POC) 147 (H) 65 - 100 mg/dL    Performed by Fabien Rucker is a 80 y. o.yr old male with SBO presumed secondary to adhesions. This appears to be resolving with non-operative management. Plan     - SBO appears to be resolved. [facility] for d/c home from surgical standpoint.    - f/u with virtual/telehealth visit in 2-3 weeks. - Appreciate care per primary team of his other medical problems.       Lissette Bucriaga MD  4/9/2020  12:19 PM

## 2020-04-10 ENCOUNTER — PATIENT MESSAGE (OUTPATIENT)
Dept: CASE MANAGEMENT | Age: 85
End: 2020-04-10

## 2020-04-10 ENCOUNTER — TELEPHONE (OUTPATIENT)
Dept: FAMILY MEDICINE CLINIC | Age: 85
End: 2020-04-10

## 2020-04-10 ENCOUNTER — TELEPHONE (OUTPATIENT)
Dept: CASE MANAGEMENT | Age: 85
End: 2020-04-10

## 2020-04-10 NOTE — TELEPHONE ENCOUNTER
Called patient as referred by Radha Devine NP for discharge med review and reconciliation  Patient confirms taking his new medications as prescribed. Reviewed instructions, what medications were for and how to take them. Pt denies having and adverse effects. Knows importance of completing his antibiotic course. Advised pt to follow up with PCP also regarding his diabetes and if he should be checking his blood sugars at some point. Patient verbalized understanding of information presented. Answered all of the patient's questions.    Toñito Fox, PHARMD, CDE

## 2020-04-10 NOTE — TELEPHONE ENCOUNTER
4/10/20 10:42 AM    COVID-19 Screening Initial Follow-up Note    Patient contacted regarding COVID-19  risk. Care Transition Nurse/ Ambulatory Care Manager contacted the family by telephone to perform post discharge assessment. Verified name and  with family as identifiers. Provided introduction to self, and explanation of the CTN/ACM role, and reason for call due to risk factors for infection and/or exposure to COVID-19. Symptoms reviewed with family who verbalized the following symptoms: fatigue, cough, shortness of breath--wife reports he was diagnosed with aspiration PNA and she thinks the cough and SOB are related to this, fast breathing--when he needs to void and is afraid he can't make it to the [facility] in time. This is a new issue and she plans to contact his urologist next week to find out their recommendation on getting it evaluated. She states he needs F/U anyway due to his renal status, per hospitalist.       Due to no new or worsening symptoms encounter was not routed to provider for escalation. Patient has following risk factors of: pneumonia--has had this several times, per wife. CTN/ACM reviewed discharge instructions, medical action plan and red flags such as increased shortness of breath, increasing fever and signs of decompensation with family who verbalized understanding. Discussed exposure protocols and quarantine with CDC Guidelines What to do if you are sick with coronavirus disease 2019 Family who was given an opportunity for questions and concerns. The family agrees to contact the [facility] exposure line [phone], local [facility] department [facility]  [phone] and PCP office for questions related to their healthcare. CTN/ACM provided contact information for future reference. Has active MyChart acct and the information above will be sent through this.     Did not review and educate family on any new and changed medications related to discharge diagnosis, but did request a pharmacist's D/C med rec with patient and family via CC message to the pool. Patient/family/caregiver given information for [employer] and agrees to enroll yes  Patient's preferred e-mail:  [email or web address]  Patient's preferred phone number: [phone]  Based on Loop alert triggers, patient will be contacted by nurse care manager for worsening symptoms. Plan for follow-up call in 5-7 days based on severity of symptoms and risk factors.

## 2020-04-10 NOTE — TELEPHONE ENCOUNTER
----- Message from Peter Cuenca NP sent at 4/10/2020 11:27 AM EDT -----  Regarding: Could you kindly perform D/C med rec? I'll try to do better, but once again I forgot to inform that you will be calling.   Thank you SO MUCH for providing this service to our patients :)

## 2020-04-11 LAB
BACTERIA SPEC CULT: NORMAL
SERVICE CMNT-IMP: NORMAL

## 2020-04-14 ENCOUNTER — OFFICE VISIT (OUTPATIENT)
Dept: INTERNAL MEDICINE CLINIC | Age: 85
End: 2020-04-14

## 2020-04-14 DIAGNOSIS — K56.609 SMALL BOWEL OBSTRUCTION (HCC): Primary | ICD-10-CM

## 2020-04-14 DIAGNOSIS — E11.9 CONTROLLED TYPE 2 DIABETES MELLITUS WITHOUT COMPLICATION, WITHOUT LONG-TERM CURRENT USE OF INSULIN (HCC): ICD-10-CM

## 2020-04-14 DIAGNOSIS — N18.30 CKD (CHRONIC KIDNEY DISEASE) STAGE 3, GFR 30-59 ML/MIN (HCC): ICD-10-CM

## 2020-04-14 DIAGNOSIS — J18.9 PNEUMONIA OF RIGHT LOWER LOBE DUE TO INFECTIOUS ORGANISM: ICD-10-CM

## 2020-04-14 LAB
SARS-COV-2, COV2: NOT DETECTED
SPECIMEN SOURCE, FCOV2M: NORMAL

## 2020-04-14 RX ORDER — INSULIN PUMP SYRINGE, 3 ML
EACH MISCELLANEOUS
Qty: 1 KIT | Refills: 0 | Status: SHIPPED | OUTPATIENT
Start: 2020-04-14

## 2020-04-14 NOTE — PROGRESS NOTES
Missy Morton is a 80 y.o. male who was seen by synchronous (real-time) audio-video technology on 4/14/2020. He confirmed that, for purposes of billing, this is a virtual visit with his provider for which we will submit a claim for reimbursement to his insurance company. He is aware that he will be responsible for any copays, coinsurance amounts or other amounts not covered by his insurance company. Do you accept - YES    This visit was completed was completed virtually using D[employer] Me      Subjective:   Missy Morton was seen for follow up of his diabetes. Patient was hospitalized 4/6/2020-4/9/2020 for small bowel obstruction likely due to adhesions. He is feeling much better since being discharged, but he is concerned about his diabetes. The diet he states that was recommended to him while he is still healing from the SBO is not very friendly for diabetes. Prior to admission, his diabetes was controlled through diet. He was discharged with metformin and glipizide to take daily. He needs a glucometer to check his glucose at home. His renal function is impaired slightly. GFR 54 upon discharge. He was placed on metformin 500mg bid and glipizide 5mg bid with meals. He was noted also to have incidentally a left renal cyst and a left lower quadrant fatty hernia. Both asymptomatic. He was also diagnosed with right lower lobe pneumonia and was sent home on levaquin for 7 days,. Prior to Admission medications    Medication Sig Start Date End Date Taking? Authorizing Provider   Blood-Glucose Meter monitoring kit Use as directed  icd10- 4/14/20  Yes Bairon Birch MD   metFORMIN (GLUCOPHAGE) 500 mg tablet Take 1 Tab by mouth two (2) times daily (with meals). 4/9/20  Yes Franco Knott MD   glipiZIDE (GLUCOTROL) 5 mg tablet Take 1 Tab by mouth two (2) times daily (with meals).  Take with breakfast and dinner- hold if you are skipping the meal/ or have nausea/vomiting 4/9/20  Yes Raeford Castleman MD SAROJ   levoFLOXacin (Levaquin) 500 mg tablet Take 1 Tab by mouth daily. 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 60 Tab 1    glipiZIDE (GLUCOTROL) 5 mg tablet Take 1 Tab by mouth two (2) times daily (with meals). Take with breakfast and dinner- hold if you are skipping the meal/ or have nausea/vomiting 60 Tab 1    levoFLOXacin (Levaquin) 500 mg tablet Take 1 Tab by mouth daily. Start in AM tomorrow 5 Tab 0          ROS - per HPI      Objective:     General: alert, cooperative, no distress   Mental  status: pe mental status_general use: normal mood, behavior, speech, dress, motor activity, and thought processes, able to follow commands   Eyes: EOM intact, normal sclera   Mouth: not examined   Neck: no visualized mass   Resp: PULM - obs findings: normal effort and no respiratory distress   Neuro: neuro - obs: no gross deficits   Musculoskeletal: normal ROM of neck   Skin: skin exam: no discoloration or lesions of concern on visible areas   Psychiatric: normal affect, no hallucinations       Assessment & Plan:   1.  Small bowel obstruction (HCC)  -patient tolerating eating  -continue to maintain low fiber diet for next few weeks as he continues to heal    2. Controlled type 2 diabetes mellitus without complication, without long-term current use of insulin ([facility])  -eating more carbohydrates which is causing his glucose to increase  -patient given glucometer and instructed on ideal times to check his glucose.  -will meet again in 1 week to evaluate glucose readings and adjust medications  -will have him take his metformin 500mg bid. This is likely max dose as he has renal impairment.    -hold on glipizide with concerns of hypoglycemia as he isn't eating his 'normal' diet. 3. CKD (chronic kidney disease) stage 3, GFR 30-59 ml/min ([facility])  -will check labs at his visit in the fall. 4. Pneumonia of right lower lobe due to infectious organism [facility])  -complete course of his levaquin given upon discharge. follow up virtual visit in 1 week for glucose readings. Orders Placed This Encounter    Blood-Glucose Meter monitoring kit     Sig: Use as directed  icd10-     Dispense:  1 Kit     Refill:  0           CPT Codes 45579-13939 for Established Patients may apply to this Telehealth Visit  Time-based coding, delete if not needed: I spent at least 25 minutes with this established patient, and >50% of the time was spent counseling and/or coordinating care regarding his current medical concerns as noted above    Due to this being a TeleHealth evaluation, many elements of the physical examination are unable to be assessed. Pursuant to the emergency declaration under the [facility], [facility] waiver authority and the [employer] and [employer] Act, this Virtual  Visit was conducted, with patient's consent, to reduce the patient's risk of exposure to COVID-19 and provide continuity of care for an established patient. Services were provided through a video synchronous discussion virtually to substitute for in-person clinic visit.       We discussed the expected course, resolution and complications of the diagnosis(es) in detail. Medication risks, benefits, costs, interactions, and alternatives were discussed as indicated. I advised him to contact the office if his condition worsens, changes or fails to improve as anticipated. He expressed understanding with the diagnosis(es) and plan.      Jericho Rose MD

## 2020-04-15 NOTE — ADT AUTH CERT NOTES
COVID negative by Jessica Alonso RN  
 
   
Review Entered Review Status 4/7/2020 12:14 In Primary  
   
Criteria Review Is the illness suspected to be related to the Coronavirus (COVID-19)? Yes Has the member been tested for the COVID-19? Yes If Yes, what are the results of the COVID-19? Negative What is the severity of the members condition (i.e. Isolation, Ventilator use)? Isolation

## 2020-04-16 ENCOUNTER — TELEPHONE (OUTPATIENT)
Dept: INTERNAL MEDICINE CLINIC | Age: 85
End: 2020-04-16

## 2020-04-16 NOTE — TELEPHONE ENCOUNTER
Patient states [address] faxed over a form to be filled out, so that Medicare can pay for his glucose meter, he is supposed to start checking his glucose asap, so that he can have the readings for his TM appt on 04/21, he needs to have that form faxed back to [facility].

## 2020-04-17 ENCOUNTER — TELEPHONE (OUTPATIENT)
Dept: CASE MANAGEMENT | Age: 85
End: 2020-04-17

## 2020-04-17 RX ORDER — LANCING DEVICE
EACH MISCELLANEOUS
Status: CANCELLED | OUTPATIENT
Start: 2020-04-17

## 2020-04-17 RX ORDER — LANCETS
EACH MISCELLANEOUS
Qty: 1 EACH | Status: CANCELLED | OUTPATIENT
Start: 2020-04-17

## 2020-04-17 RX ORDER — LANCING DEVICE
EACH MISCELLANEOUS
COMMUNITY
End: 2021-06-15 | Stop reason: SDUPTHER

## 2020-04-17 RX ORDER — LANCETS
EACH MISCELLANEOUS
COMMUNITY
End: 2020-04-17 | Stop reason: SDUPTHER

## 2020-04-17 NOTE — TELEPHONE ENCOUNTER
Pt would like to confirm once this is filled, since it has taken so long, will there be enough info documented for Dr Maxi Sierra for his appt Tuesday 4/21?

## 2020-04-17 NOTE — TELEPHONE ENCOUNTER
Liliya contacted his [facility]. They just needed a clarification and separate prescriptions for the parts of his glucometer. [facility] to his [facility].

## 2020-04-17 NOTE — TELEPHONE ENCOUNTER
4/17/20 3:30 PM     Called to check in with  and Mrs. Mccrary, as promised last week. Since our conversation, they have had some tele-visits with Dr. Anyi Logan and Mrs. Alex Goetz is reassured by these. Their main concern is getting the Medicare-approved blood glucose monitoring equipment sent to them--this is coming along, though--and adhering to the proper post-SBO diet. Per Mrs. Alex Goetz, Dr. Anyi Logan advises this first before learning the best diabetes diet for him. In the meanwhile, Mrs. Alex Goetz has purchased Glucerna. Advised that dietitians may be also involved in tele-visits and to ask Dr. Anyi Logan about this when she feels he is ready to transition to a diabetic diet. Mrs. Alex Goetz is not sure if Mr. Alex Goetz has started using [employer] yet or not, but she will ask him.   I have checked and he has been assigned in [facility] to the [facility] team.

## 2020-04-17 NOTE — TELEPHONE ENCOUNTER
4/17/2020 - left message on his mobile voicemail that I have not received anything from [employer]. Please make sure they have my correct fax number. Number left for patient.

## 2020-04-20 RX ORDER — LANCING DEVICE
EACH MISCELLANEOUS
Qty: 1 EACH | Refills: 0 | Status: SHIPPED | OUTPATIENT
Start: 2020-04-20

## 2020-04-20 RX ORDER — LANCETS
EACH MISCELLANEOUS
Qty: 100 EACH | Refills: 1 | Status: SHIPPED | OUTPATIENT
Start: 2020-04-20 | End: 2021-05-08 | Stop reason: SDUPTHER

## 2020-04-21 ENCOUNTER — VIRTUAL VISIT (OUTPATIENT)
Dept: INTERNAL MEDICINE CLINIC | Age: 85
End: 2020-04-21

## 2020-04-21 DIAGNOSIS — E11.9 CONTROLLED TYPE 2 DIABETES MELLITUS WITHOUT COMPLICATION, WITHOUT LONG-TERM CURRENT USE OF INSULIN (HCC): Primary | ICD-10-CM

## 2020-04-21 DIAGNOSIS — K56.609 SMALL BOWEL OBSTRUCTION (HCC): ICD-10-CM

## 2020-04-21 NOTE — PROGRESS NOTES
Roman Salas is a 80 y.o. male who was seen by synchronous (real-time) audio-video technology on 4/21/2020. He confirmed that, for purposes of billing, this is a virtual visit with his provider for which we will submit a claim for reimbursement to his insurance company. He is aware that he will be responsible for any copays, coinsurance amounts or other amounts not covered by his insurance company. Do you accept - YES    This visit was completed was completed virtually using [employer]      Subjective:   Roman Salas was seen for follow up of his diabetes mellitus and recent SBO. He is having regular bowel movements and his eating without any nausea or vomiting. He was started on metformin after his discharge due to uncontrolled glucose. He is taking his metformin 500mg bid with meals. His diabetes mellitus was diet controlled prior to his SBO admission. The blood glucose tests performed after receipt of the meter are as follows:    Saturday 4/18/20 - 157 after lunch   Sunday 4/19/20 - 124 after dinner   Monday 4/20/20 - 143 - after breakfast    Tuesday 4/21/20 - 154 - before breakfast    He is requesting assistance with diabetic nutritional counseling. Prior to Admission medications    Medication Sig Start Date End Date Taking? Authorizing Provider   glucose blood VI test strips (ASCENSIA AUTODISC VI, ONE TOUCH ULTRA TEST VI) strip Use to test blood sugars once daily DX:E11.9 4/20/20  Yes Isadora Claros MD   lancets misc Use to test blood sugars once daily DX:E11.9 4/20/20  Yes Alexis Colón MD   Lancing Device misc Use to test blood sugars once daily DX:E11.9 4/20/20  Yes Isadora Claros MD   Lancing Device misc by Does Not Apply route. Yes Provider, Historical   Blood-Glucose Meter monitoring kit Use as directed  icd10- 4/14/20  Yes Isadora Claros MD   metFORMIN (GLUCOPHAGE) 500 mg tablet Take 1 Tab by mouth two (2) times daily (with meals).  4/9/20  Yes Cece Maciel MD   glipiZIDE (GLUCOTROL) 5 mg tablet Take 1 Tab by mouth two (2) times daily (with meals). Take with breakfast and dinner- hold if you are skipping the meal/ or have nausea/vomiting 4/9/20  Yes Shreya Tirado MD   lancing device (SOFT TOUCH LANCET DEVICE) by Does Not Apply route. Provider, Historical   levoFLOXacin (Levaquin) 500 mg tablet Take 1 Tab by mouth daily. 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    Colon cancer screening Z12.11    SBO (small bowel obstruction) (HCC) K56.609          ROS - per HPI      Objective:     General: alert, cooperative, no distress   Mental  status: pe mental status_general use: normal mood, behavior, speech, dress, motor activity, and thought processes, able to follow commands   Eyes: EOM intact, normal sclera   Mouth: not examined   Neck: no visualized mass   Resp: PULM - obs findings: normal effort and no respiratory distress   Neuro: neuro - obs: no gross deficits   Musculoskeletal: normal ROM of neck   Skin: skin exam: no discoloration or lesions of concern on visible areas   Psychiatric: normal affect, no hallucinations       Assessment & Plan:   Diagnoses and all orders for this visit:    1. Controlled type 2 diabetes mellitus without complication, without long-term current use of insulin ([address]    2. Small bowel obstruction (HCC)      Plan:  -referral to [facility] telehealth medical nutrition for nutritional counseling. Need to perform one on one via telehealth due to COVID-19 pandemic and quarantine. -continue metformin 500mg bid. -has office visit in July, 2020. Need to check labs at that time. need to monitor renal function and will get a hemoglobin A1c.  -SBO has resolved. Patient may resume diabetes mellitus diet. follow up as scheduled in July, 2020. Orders Placed This Encounter    REFERRAL TO DIABETES EDUCATION - [facility]     Standing Status:   Standing     Number of Occurrences:   5     Standing Expiration Date:   4/21/2021     Referral Priority:   Routine     Referral Type:   Consultation     Referral Reason:   Specialty Services Required     Number of Visits Requested:   1        CPT Codes [id number] for Established Patients may apply to this Telehealth Visit  Time-based coding, delete if not needed: I spent at least 15 minutes with this established patient, and >50% of the time was spent counseling and/or coordinating care regarding diabetes and recent SBO    Due to this being a TeleHealth evaluation, many elements of the physical examination are unable to be assessed. Pursuant to the emergency declaration under the [address] [place] [address], [facility] waiver authority and the [employer] and [employer] Act, this Virtual  Visit was conducted, with patient's consent, to reduce the patient's risk of exposure to COVID-19 and provide continuity of care for an established patient. Services were provided through a video synchronous discussion virtually to substitute for in-person clinic visit. We discussed the expected course, resolution and complications of the diagnosis(es) in detail. Medication risks, benefits, costs, interactions, and alternatives were discussed as indicated. I advised him to contact the office if his condition worsens, changes or fails to improve as anticipated. He expressed understanding with the diagnosis(es) and plan.      Venus Tamez MD

## 2020-04-27 ENCOUNTER — VIRTUAL VISIT (OUTPATIENT)
Dept: DIABETES SERVICES | Age: 85
End: 2020-04-27

## 2020-04-27 DIAGNOSIS — E11.9 DIABETES MELLITUS TYPE 2, DIET-CONTROLLED (HCC): ICD-10-CM

## 2020-04-27 NOTE — PROGRESS NOTES
[facility] Program for Diabetes Health  Diabetes Self-Management Education   Pre-program Assessment    Reason for Referral: Diabetes Self Management Education  Referral Source: No ref. provider found    Metric Patient responses (4/27/2020)   A1c  (Goal: 7%)   Recent value:   Lab Results   Component Value Date/Time    Hemoglobin A1c 8.0 (H) 01/15/2020 01:04 PM    Hemoglobin A1c (POC) 6.5 05/25/2016 08:32 AM        Healthy Eating     24-hour Dietary Recall:  Breakfast: 1 slice of bread (homemade whole wheat) with boiled egg, yogurt with fruit, coffee  Lunch: tuna sandwich with fruit, water to drink  Dinner: tortilla soup (2 cups) with whole wheat bread and water  Snacks: rarely  Beverages: water, coffee, V8 juice  Alcohol: wine 1-2 times a week    Stage of change: action     Being Active     Physical Activity Vital Sign:  How many days during the past week have you performed physical activity where your heart beats faster and your breathing is harder than normal for 30 minutes or more?  0 days    How many days in a typical week do you perform activity such as this?  0 days    Stage of change: action       Monitoring Do you monitor your blood sugar? Yes    How often do you monitor? 1x/day    What are the range of readings? 123mg/dL before meals -163 after meals      Do you know your last A1c measurement? No    Do you know the meaning of the A1c? No    Stage of change: action       Taking Medications Medication Management:  Do you understand what your diabetes medications do? No    Can you afford your diabetes medications? Yes    How often do you miss doses of your diabetes medications? Never    Current dosing:   Key Antihyperglycemic Medications             metFORMIN (GLUCOPHAGE) 500 mg tablet Take 1 Tab by mouth two (2) times daily (with meals). Blood Pressure Management:  Key ACE/ARB Medications     Patient is on no ACE or ARB meds.           Lipid Management:  Key Antihyperlipidemia Meds     The patient is on no antihyperlipidemia meds. Clot Prevention:  Key Anti-Platelet Anticoagulant Meds     The patient is on no antiplatelet meds or anticoagulants. Stage of change: action      Healthy Coping Diabetes Skills, Confidence and Preparedness Index: Total score: 4.5  Skills: 3.0  Confidence: 4.6  Preparedness: 6.7    Stage of change: action       Reducing Risks Vaccines:  Influenza:   Immunization History   Administered Date(s) Administered    Influenza High Dose Vaccine PF 10/15/2015, 09/13/2017, 10/27/2018    Influenza Vaccine 09/01/2013, 10/24/2014    Influenza Vaccine (Tri) Adjuvanted 10/10/2019       Pneumococcal:   Immunization History   Administered Date(s) Administered    (RETIRED) Pneumococcal Vaccine (Unspecified Type) 11/28/2012    Pneumococcal Vaccine (Unspecified Type) 10/13/2014        Hepatitis: There is no immunization history for the selected administration types on file for this patient. Examinations:  Diabetic Foot and Eye Exam HM Status   Topic Date Due    Eye Exam  08/22/2020        Dental exam: completed on 10/1/2019    Heart Protection:  BP Readings from Last 2 Encounters:   04/09/20 132/71   01/15/20 120/78        Lab Results   Component Value Date/Time    LDL, calculated 89 01/15/2020 01:04 PM        Kidney Protection:  Lab Results   Component Value Date/Time    Microalbumin/Creat ratio (mg/g creat) 12 01/04/2018 05:31 PM    Microalb/Creat ratio (ug/mg creat.) 17.0 01/15/2020 01:04 PM    Microalbumin,urine random 1.54 01/04/2018 05:31 PM       Patient-reported diabetes complications:  nephropathy     Problem Solving Hypoglycemia Management:  What are signs and symptoms of hypoglycemia that you experience? Pt reported being unaware of s/s of hypoglycemia    How do you prevent hypoglycemia? Pt reported being unaware of how to prevent hypoglycemia    How do you treat hypoglycemia?  Pt reported being unaware of how to treat hypoglycemia    Hyperglycemia Management:  What are signs and symptoms of hyperglycemia that you experience? Pt reported being unaware of s/s of hyperglycemia    How can you prevent hyperglycemia? Pt reported being unaware of how to prevent hyperglycemia    Sick Day Management:  What do you do differently on sick days? Pt reported being unaware of self-management on sick days    Pattern Management:  Do you notice blood glucose patterns when you look at the readings in your meter or logbook? No    How do you use the blood glucose readings from your meter or logbook? Pt reported being unaware of pattern management skills   Note: Content derived from the American Association of Diabetes Educators' Diabetes Education Curriculum: A Guide to Successful Self-Management (2nd edition)      Recommendations: Address diabetes self-care behaviors through education and support in 8-hour AADE7 Core Curriculum class series on reducing risks, healthy eating, being active, monitoring, taking medications, healthy coping and problem solving. Follow up with provider on the following: none at this time    Diabetes Self-Management Education Follow-up Visit: 5/4/2020      [facility] for Telehealth:  Jose Rockwell is a 80 y.o. male being evaluated by a Virtual Visit (video visit) encounter to address concerns as mentioned above. A caregiver was present when appropriate. Due to this being a TeleHealth encounter ([facility]), evaluation of the following organ systems was limited: Vitals/Constitutional/EENT/Resp/CV/GI//MS/Neuro/Skin/Heme-Lymph-Imm. Pursuant to the emergency declaration under the [address] [place] [address], [address] [place] [address] and the [employer] and [employer] Act, this Virtual Visit was conducted with patient's (and/or legal guardian's) consent, to reduce the risk of exposure to COVID-19 and provide necessary medical care.       Services were provided through a video synchronous discussion virtually to substitute for in-person encounter. --Manny Hopkins RD on 4/27/2020 at 11:17 AM    An electronic signature was used to authenticate this note.   I was in the office for the appointment and time spent: 29 minutes

## 2020-05-04 ENCOUNTER — VIRTUAL VISIT (OUTPATIENT)
Dept: DIABETES SERVICES | Age: 85
End: 2020-05-04

## 2020-05-04 DIAGNOSIS — E11.22 CONTROLLED TYPE 2 DIABETES MELLITUS WITH STAGE 3 CHRONIC KIDNEY DISEASE, WITHOUT LONG-TERM CURRENT USE OF INSULIN (HCC): ICD-10-CM

## 2020-05-04 DIAGNOSIS — N18.30 CONTROLLED TYPE 2 DIABETES MELLITUS WITH STAGE 3 CHRONIC KIDNEY DISEASE, WITHOUT LONG-TERM CURRENT USE OF INSULIN (HCC): ICD-10-CM

## 2020-05-04 NOTE — PROGRESS NOTES
[facility] Program for Diabetes Health  Diabetes Self-Management Education   Education and Goal Plan for Session #1    AADE7 Self-Care Behaviors:  Behavior Education Completed (5/4/2020)   Healthy Eating   - Impact of food on blood glucose levels  - Food sources of carbohydrates  - Meal size and portions  - Balanced plate  - Controlling portions of carbohydrates  - Alcohol and diabetes     Being Active   - Effects of exercise on blood glucose     Monitoring     - ADA blood glucose targets  - Frequency of monitoring  - A1c interpretation  - Monitoring blood glucose trends per meter   Taking Medications - Medication review   Healthy Coping - Coping skills  - Stress management  - Obtaining support   Reducing Risks - Prevention of influenza  - Foot care and foot exam  - Dilated eye exam  - Prevention of pneumonia  - Prevention of hepatitis B  - Dental care and dental exam  - Hgb A1c target  - Long-term complications   Problem Solving - Hyperglycemia signs/symptoms  - Hyperglycemia prevention   Note: Content derived from the American Association of Diabetes Educators' Diabetes Education Curriculum: A Guide to Successful Self-Management (2nd edition)      Diabetes Educator Recommendations:  - Continue addressing diabetes self-care behaviors through education and support in AADE7 Curriculum individual sessions on reducing risks, healthy eating, being active, monitoring, taking medications, healthy coping and problem solving.  - Continue practicing knowledge and skills relating to reducing risks to improve diabetes self-management.   - Patient's Identified SMART Goal(s): not set today- to determine for next session  - Pt to follow up with provider on the following: none at this time    Diabetes Self-Management Education Follow-up Visit: May 11, 2020      [address] [facility] for Telehealth:  Wapello Neighbor is a 80 y.o. male being evaluated by a Virtual Visit (video visit) encounter to address concerns as mentioned above. A caregiver was present when appropriate. Due to this being a TeleHealth encounter (During [facility]-43 public health emergency), evaluation of the following organ systems was limited: Vitals/Constitutional/EENT/Resp/CV/GI//MS/Neuro/Skin/Heme-Lymph-Imm. Pursuant to the emergency declaration under the [address] [place] [address], [address] [place] [address] and the [employer] and [employer] Act, this Virtual Visit was conducted with patient's (and/or legal guardian's) consent, to reduce the risk of exposure to COVID-19 and provide necessary medical care. Services were provided through a video synchronous discussion virtually to substitute for in-person encounter. --Margot Payne RD on 5/4/2020 at 2:08 PM    An electronic signature was used to authenticate this note.  I was in the office for the appointment and time spent: 62 minutes

## 2020-05-11 ENCOUNTER — VIRTUAL VISIT (OUTPATIENT)
Dept: DIABETES SERVICES | Age: 85
End: 2020-05-11

## 2020-05-11 DIAGNOSIS — E11.21 CONTROLLED TYPE 2 DIABETES MELLITUS WITH DIABETIC NEPHROPATHY, WITHOUT LONG-TERM CURRENT USE OF INSULIN (HCC): ICD-10-CM

## 2020-05-11 NOTE — PROGRESS NOTES
[employer]  Diabetes Self-Management Education   Education and Goal Plan for Session #2    AADE7 Self-Care Behaviors:  Behavior Education Completed (5/11/2020)   Healthy Eating   - Impact of food on blood glucose levels  - Food sources of carbohydrates  - Meal size and portions  - Reading food labels  - Balanced plate  - Meal and snack timing  - Carbohydrate counting   - Importance of a variety of foods  - Heart healthy fats  - Reducing sodium  - Importance of not skipping meals  - Eating breakfast  - Controlling portions of carbohydrates  - Comparing food choices  - Alcohol and diabetes  - Meal consistency     Being Active   Not addressed during this session. Monitoring     - ADA blood glucose targets  - Glucometer teaching  - Frequency of monitoring  - Blood glucose schedule  - A1c interpretation  - Monitoring blood glucose trends per meter  - Alternative site testing  - Glucose monitoring technique  - Disposal of used lancets or needles  - Logging blood glucose results   Taking Medications Education delivered in previous session. Healthy Coping Not addressed during this session. Reducing Risks - Hgb A1c target   Problem Solving - Hypoglycemia signs/symptoms  - Hypoglycemia prevention   Note: Content derived from the American Association of Diabetes Educators' Diabetes Education Curriculum: A Guide to Successful Self-Management (2nd edition)      Diabetes Educator Recommendations:  - Continue addressing diabetes self-care behaviors through education and support in AADE7 Curriculum individual sessions on reducing risks, healthy eating, being active, monitoring, taking medications, healthy coping and problem solving.  - Continue practicing knowledge and skills relating to healthy eating and monitoring to improve diabetes self-management. - Patient's Identified SMART Goal(s): - Practice building a balanced meal for each meal at least 21 times over the next week.   - Pt to follow up with provider on the following: target A1c    Diabetes Self-Management Education Follow-up Visit: 1 week      [facility] for Telehealth:  Abigail Meek is a 80 y.o. male being evaluated by a Virtual Visit (video visit) encounter to address concerns as mentioned above. A caregiver was present when appropriate. Due to this being a TeleHealth encounter (During [facility]THJK-15 public [facility] emergency), evaluation of the following organ systems was limited: Vitals/Constitutional/EENT/Resp/CV/GI//MS/Neuro/Skin/Heme-Lymph-Imm. Pursuant to the emergency declaration under the [address] [place] [address], [address] [place] [address] authority and the [employer] and [employer] Act, this Virtual Visit was conducted with patient's (and/or legal guardian's) consent, to reduce the risk of exposure to COVID-19 and provide necessary medical care. Services were provided through a video synchronous discussion virtually to substitute for in-person encounter. --Laine Pelaez RD on 5/11/2020 at 11:38 AM    An electronic signature was used to authenticate this note.  I was in the office for the appointment and time spent: 60 minutes

## 2020-05-12 ENCOUNTER — TELEPHONE (OUTPATIENT)
Dept: INTERNAL MEDICINE CLINIC | Age: 85
End: 2020-05-12

## 2020-05-19 ENCOUNTER — VIRTUAL VISIT (OUTPATIENT)
Dept: DIABETES SERVICES | Age: 85
End: 2020-05-19

## 2020-05-19 DIAGNOSIS — E11.21 CONTROLLED TYPE 2 DIABETES MELLITUS WITH DIABETIC NEPHROPATHY, WITHOUT LONG-TERM CURRENT USE OF INSULIN (HCC): ICD-10-CM

## 2020-05-19 NOTE — PROGRESS NOTES
[address] [place] [address] for Diabetes Health  Diabetes Self-Management Education   Education and Goal Plan for Session #3    AADE7 Self-Care Behaviors:  Behavior Education Completed (5/19/2020)   Healthy Eating   - Impact of food on blood glucose levels  - Meal and snack timing  - healthy snack ideas     Being Active   - Effects of exercise on blood glucose  - Physical activity and insulin  - Goals for exercise  - Types of exercise     Monitoring     Education delivered in previous session. Taking Medications - Medication review  - Medication schedule  - Insulin: Rapid-acting and long-acting  - Using pens  - Using vial and syringe  - Site rotation   Healthy Coping - Obtaining support   Reducing Risks Not addressed during this session. Problem Solving Not addressed during this session. Note: Content derived from the American Association of Diabetes Educators' Diabetes Education Curriculum: A Guide to Successful Self-Management (2nd edition)      Diabetes Educator Recommendations:  - Continue addressing diabetes self-care behaviors through education and support in AADE7 Curriculum individual sessions on reducing risks, healthy eating, being active, monitoring, taking medications, healthy coping and problem solving.  - Continue practicing knowledge and skills relating to being active and medications to improve diabetes self-management. - Patient's Identified SMART Goal(s): - exercise by walking twice a day for 15 minutes each time, 4 days out of the next week  - Pt to follow up with provider on the following: none at this time    Diabetes Self-Management Education Follow-up Visit: 1 week      [facility] for Telehealth:    Roman Salas is a 80 y.o. male being evaluated through a synchronous (real-time) audio-video technology platform, as a substitution for an in-person encounter, to address the healthcare issues mentioned above. A caregiver was present when appropriate.  The patient and/or his healthcare decision maker, is aware that this patient-initiated, Telehealth encounter on 5/19/2020 is a billable service, with coverage as determined by his insurance carrier. He is aware that he may receive a bill and has provided verbal consent to proceed: Yes. This teleHealth encounter occurred during the COVID-19 pandemic and public health emergency. Evaluation of the following organ systems was limited: Vitals/Constitutional/EENT/Resp/CV/GI//MS/Neuro/Skin/Heme-Lymph-Imm. Pursuant to the emergency declaration under the [address] [place] [address], [address] [place] [address] authority and the [employer] and [employer] Act, this Virtual Visit was conducted with patient's (and/or legal guardian's) consent, to reduce the risk of exposure to COVID-19 and provide necessary medical care. --Francisca Caro RD on 5/19/2020 at 11:29 AM    An electronic signature was used to authenticate this note.  I was in the office for the appointment and time spent: 54 minutes

## 2020-05-26 ENCOUNTER — VIRTUAL VISIT (OUTPATIENT)
Dept: DIABETES SERVICES | Age: 85
End: 2020-05-26

## 2020-05-26 DIAGNOSIS — Z79.4 CONTROLLED TYPE 2 DIABETES MELLITUS WITH OTHER DIABETIC KIDNEY COMPLICATION, WITH LONG-TERM CURRENT USE OF INSULIN (HCC): ICD-10-CM

## 2020-05-26 DIAGNOSIS — E11.29 CONTROLLED TYPE 2 DIABETES MELLITUS WITH OTHER DIABETIC KIDNEY COMPLICATION, WITH LONG-TERM CURRENT USE OF INSULIN (HCC): ICD-10-CM

## 2020-05-26 NOTE — PROGRESS NOTES
[employer]  Diabetes Self-Management Education   Education and Goal Plan for Session #4    AADE7 Self-Care Behaviors:  Behavior Education Completed (5/26/2020)   Healthy Eating   - Impact of food on blood glucose levels  - Meal and snack timing  - Importance of a variety of foods  Education delivered in previous session. Being Active   - Physical activity and insulin  - Goals for exercise     Monitoring     - ADA blood glucose targets  - Frequency of monitoring  - Monitoring blood glucose trends per meter  - Logging blood glucose results   Taking Medications - Medication review   Healthy Coping - Emotions in response to diagnosis  - Coping skills  - Stress management  - Obtaining support   Reducing Risks Education delivered in previous session. Problem Solving - Hypoglycemia signs/symptoms  - Hypoglycemia prevention  - Hypoglycemia treatment: Rule of 15  - Hyperglycemia signs/symptoms  - Hyperglycemia prevention  - Sick day management   Note: Content derived from the American Association of Diabetes Educators' Diabetes Education Curriculum: A Guide to Successful Self-Management (2nd edition)      Diabetes Educator Recommendations:  - Continue addressing diabetes self-care behaviors through education and support in AADE7 Curriculum individual sessions on reducing risks, healthy eating, being active, monitoring, taking medications, healthy coping and problem solving.  - Continue practicing knowledge and skills relating to healthy coping and problem solving to improve diabetes self-management. - Patient's Identified SMART Goal(s): - Increase physical activity by walking for 30 minutes 7 times over the next week.   - Pt to follow up with provider on the following: none at this time    Diabetes Self-Management Education Follow-up Visit: 6 weeks      [facility] Adaptations for Telehealth:    Daryl Wilkerson is a 80 y.o. male being evaluated through a synchronous (real-time) audio-video technology platform, as a substitution for an in-person encounter, to address the healthcare issues mentioned above. I was in the office. The patient was at home. A caregiver was present when appropriate. The patient and/or his healthcare decision maker, is aware that this patient-initiated, Telehealth encounter on 5/26/2020 is a billable service, with coverage as determined by his insurance carrier. He is aware that he may receive a bill and has provided verbal consent to proceed: Yes. This teleHealth encounter occurred during the COVID-19 pandemic and public health emergency. Evaluation of the following organ systems was limited: Vitals/Constitutional/EENT/Resp/CV/GI//MS/Neuro/Skin/Heme-Lymph-Imm. Pursuant to the emergency declaration under the [address] [place] [address], [address] [place] [address] authority and the [employer] and [employer] Act, this Virtual Visit was conducted with patient's (and/or legal guardian's) consent, to reduce the risk of exposure to COVID-19 and provide necessary medical care. --Derek Sandoval RD on 5/26/2020 at 10:12 AM    An electronic signature was used to authenticate this note.  I was in the office for the appointment and time spent: 56 minutes

## 2020-06-08 ENCOUNTER — PATIENT MESSAGE (OUTPATIENT)
Dept: INTERNAL MEDICINE CLINIC | Age: 85
End: 2020-06-08

## 2020-06-08 RX ORDER — METFORMIN HYDROCHLORIDE 500 MG/1
500 TABLET ORAL 2 TIMES DAILY WITH MEALS
Qty: 60 TAB | Refills: 1 | OUTPATIENT
Start: 2020-06-08

## 2020-06-08 RX ORDER — METFORMIN HYDROCHLORIDE 500 MG/1
500 TABLET ORAL 2 TIMES DAILY WITH MEALS
Qty: 180 TAB | Refills: 1 | Status: SHIPPED | OUTPATIENT
Start: 2020-06-08 | End: 2020-11-30 | Stop reason: SDUPTHER

## 2020-06-08 NOTE — TELEPHONE ENCOUNTER
From: Maribel Bonner  To: Suzy Cano MD  Sent: 6/8/2020 10:51 AM EDT  Subject: Prescription Question    Dear Dr. Sukhjinder Loera:   I am having trouble getting my Metformin 500MG prescription refilled. [facility] at [address] [place] [facility] [phone] indicated that they have contacted your office for a refill. I understand that there is a recall on some of the Metformin at this time, but the Pharmacist indicated that it did not apply this formulation. They have offered to give me an emergency refill since I am down to two pills. Please ask you office to get in touch with [facility] to have the prescription refilled.   Thank you,  Juliano Hernandez Patient : Chance Rutledge Age: 29 year old Sex: male   MRN: [id number] Encounter Date: 9/9/2018      History     Chief Complaint   Patient presents with   • Drug Overdose     9/9/2018  8:08 PM Chance Rutledge is a 30 year old male with hx of EtOH abuse and PTSD who presents to the ED for evaluation of s/p possible accidental overdose. EMS was contacted by pt's friend after pt became unresponsive just pta. EMS states pt was unresponsive when they arrived on scene and regained consciousness after narcan was administered in the field.  Pt admits to drinking vodka today and states he had just taken his gabapentin, sertraline, and several other of his medications that he can not remember the names of just prior to unresponsive episode. Pt denies using any illicit drug pta. Pt does admit to consuming approx 750ml vodka daily over the past few months. Pt c/o current headache which he states is not unusual due to his drinking but denies having any other associated sx or complaints at this time.       There are no further complaints or modifying factors at this time.    I reviewed the patients medical history in [facility]    PCP: No primary care provider on file.              No Known Allergies    Prior to Admission Medications    GABAPENTIN (NEURONTIN) 300 MG CAPSULE    Take 300 mg by mouth 3 times daily.    SERTRALINE (ZOLOFT) 100 MG TABLET    Take 100 mg by mouth daily.       New Prescriptions    No medications on file       Past Medical History:   Diagnosis Date   • Depression    • PTSD (post-traumatic stress disorder)        No past surgical history on file.    No family history on file.    Social History   Substance Use Topics   • Smoking status: Current Every Day Smoker   • Smokeless tobacco: Never Used      Comment: vap daily   • Alcohol use Yes      Comment: 750 ml vodka daily       Review of Systems   Constitutional: Negative for activity change, appetite change, chills, fatigue and fever.        Reports possible  accidental overdose/unpresonsive episode.    HENT: Negative for ear pain, rhinorrhea, sinus pressure and sore throat.    Respiratory: Negative for chest tightness and shortness of breath.    Cardiovascular: Negative for chest pain and palpitations.   Gastrointestinal: Negative for abdominal pain, diarrhea, nausea and vomiting.   Genitourinary: Negative for dysuria, flank pain and hematuria.   Musculoskeletal: Negative for back pain, gait problem, neck pain and neck stiffness.   Skin: Negative for color change, pallor, rash and wound.   Neurological: Positive for headaches. Negative for dizziness, weakness, light-headedness and numbness.   Hematological: Negative for adenopathy. Does not bruise/bleed easily.   Psychiatric/Behavioral: Negative for agitation, confusion and suicidal ideas. The patient is not nervous/anxious and is not hyperactive.        Physical Exam     ED Triage Vitals [09/09/18 1954]   ED Triage Vitals Group      Temp 97.9 °F (36.6 °C)      Pulse 122      Resp 20      BP (!) 142/97      SpO2 97 %      EtCO2 mmHg       Height 6' 1\" (1.854 m)      Weight 191 lb 12.8 oz (87 kg)      Weight Scale Used ED Actual       Physical Exam   Constitutional: He is oriented to person, place, and time. He appears well-developed and well-nourished.  Non-toxic appearance. He does not have a sickly appearance. No distress.   HENT:   Head: Normocephalic and atraumatic.   Nose: Nose normal.   Mouth/Throat: Oropharynx is clear and moist and mucous membranes are normal. Mucous membranes are not dry.   Eyes: Pupils are equal, round, and reactive to light. Conjunctivae and EOM are normal.   Mild nystagmus.    Neck: Trachea normal, normal range of motion and full passive range of motion without pain. Neck supple.   Cardiovascular: Regular rhythm, normal heart sounds, intact distal pulses and normal pulses.  Tachycardia present.    Pulmonary/Chest: Effort normal and breath sounds normal. No accessory muscle usage. No  tachypnea. No respiratory distress. He has no wheezes. He has no rales. He exhibits no tenderness.   Abdominal: Soft. Normal appearance and bowel sounds are normal. He exhibits no distension. There is no tenderness. There is no rebound and no guarding.   Musculoskeletal: Normal range of motion. He exhibits no edema or tenderness.   Neurological: He is alert and oriented to person, place, and time. He is not disoriented. No cranial nerve deficit. He exhibits normal muscle tone.   Skin: Skin is warm, dry and intact. No ecchymosis and no rash noted. He is not diaphoretic. No cyanosis or erythema. No pallor.   Psychiatric: He has a normal mood and affect. His behavior is normal. He expresses no suicidal plans and no homicidal plans.   Nursing note and vitals reviewed.      ED Course     Procedures    Lab Results     Results for orders placed or performed during the hospital encounter of 09/09/18   CBC & Auto Differential   Result Value Ref Range    WBC 7.5 4.2 - 11.0 K/mcL    RBC 4.68 4.50 - 5.90 mil/mcL    HGB 14.2 13.0 - 17.0 g/dL    HCT 43.8 39.0 - 51.0 %    MCV 93.6 78.0 - 100.0 fl    MCH 30.3 26.0 - 34.0 pg    MCHC 32.4 32.0 - 36.5 g/dL    RDW-CV 12.7 11.0 - 15.0 %     140 - 450 K/mcL    DIFF TYPE AUTOMATED DIFFERENTIAL     Neutrophil 58 %    LYMPH 31 %    MONO 10 %    EOSIN 1 %    BASO 0 %    Absolute Neutrophil 4.4 1.8 - 7.7 K/mcL    Absolute Lymph 2.4 1.0 - 4.8 K/mcL    Absolute Mono 0.7 0.3 - 0.9 K/mcL    Absolute Eos 0.1 0.1 - 0.5 K/mcL    Absolute Baso 0.0 0.0 - 0.3 K/mcL   Comprehensive Metabolic Panel   Result Value Ref Range    Sodium 137 135 - 145 mmol/L    Potassium 3.8 3.4 - 5.1 mmol/L    Chloride 96 (L) 98 - 107 mmol/L    Carbon Dioxide 29 21 - 32 mmol/L    Anion Gap 16 10 - 20 mmol/L    Glucose 192 (H) 65 - 99 mg/dL    BUN 14 6 - 20 mg/dL    Creatinine 1.12 0.67 - 1.17 mg/dL    GFR Estimate,  >90     GFR Estimate, Non  88     BUN/Creatinine Ratio 13 7 - 25     CALCIUM 9.4 8.4 - 10.2 mg/dL    TOTAL BILIRUBIN 0.7 0.2 - 1.0 mg/dL    AST/SGOT 62 (H) <38 Units/L    ALT/SGPT 58 <79 Units/L    ALK PHOSPHATASE 79 45 - 117 Units/L    TOTAL PROTEIN 8.0 6.4 - 8.2 g/dL    Albumin 4.0 3.6 - 5.1 g/dL    GLOBULIN 4.0 2.0 - 4.0 g/dL    A/G Ratio, Serum 1.0 1.0 - 2.4   Magnesium Level   Result Value Ref Range    MAGNESIUM 1.8 1.7 - 2.4 mg/dL   Troponin I Ultra Sensitive   Result Value Ref Range    TROPONIN I <0.02 <0.05 ng/mL   Alcohol Level Serum   Result Value Ref Range    Alcohol Ethyl 19 (A) None detected. mg/dL      I reviewed the patients laboratory studies.   Labs Reviewed   COMPREHENSIVE METABOLIC PANEL - Abnormal; Notable for the following:        Result Value    Chloride 96 (*)     Glucose 192 (*)     AST/SGOT 62 (*)     All other components within normal limits   ALCOHOL LEVEL SERUM - Abnormal; Notable for the following:     Alcohol Ethyl 19 (*)     All other components within normal limits   CBC & AUTO DIFFERENTIAL   MAGNESIUM LEVEL   TROPONIN I ULTRA SENSITIVE       EKG Results   ED Preliminary EKG Interpretation  Time of EK  Time of interpretation:   Interpretation: No STEMI. EKG obtained and independently interpreted. EKG shows sinus tachycardia with a rate of 116, normal axis, no acute ischemic changes.    EKG interpreted by ED physician  Dr. Joya, awaiting Cardiology      Radiology Results     Imaging Results    None         ED Medication Orders     Start Ordered     Status Ordering Provider    18  sodium chloride (NORMAL SALINE) 0.9 % bolus 1,000 mL  ONCE      Last MAR action:  New Bag SAFIA JOYA    18  acetaminophen (TYLENOL) tablet 650 mg  ONCE      Last MAR action:  Given SAFIA JOYA    18  ondansetron (ZOFRAN) injection 4 mg  ONCE      Last MAR action:  Held SAFIA JOYA    18  sodium chloride (NORMAL SALINE) 0.9 % bolus  1,000 mL  ONCE      Last MAR action:  Completed SAFIA JOYA          [facility]  Number of Diagnoses or Management Options  Alcohol abuse:   Altered mental status, unspecified altered mental status type:   Medication side effect:     2110: Rechecked with pt. Pt updated on results thus far. Pt is still tachycardic but states he is feeling better at this time. He declines treatment/detox for his alcohol abuse. I suspect his altered mental status was secondary to etoh/plus prescription medication. He was observed and stabilized.   2210 He is noted to have persistent tachycardia after 2L NS. I suspect an element of dehydration, but also withdrawal/anxiety given his heavy alcohol consumption. He declines admission and is requesting to return home. He is tolerating oral intake and is steady on his feet.   Plan discharge to home.    Clinical Impression     ED Diagnosis   1. Altered mental status, unspecified altered mental status type     2. Alcohol abuse     3. Medication side effect       Diagnoses that have been ruled out:   None   Diagnoses that are still under consideration:   None   Final diagnoses:   Altered mental status, unspecified altered mental status type   Alcohol abuse   Medication side effect       Disposition        Discharge 9/9/2018 10:14 PM  Chance Rutledge discharge to home/self care.      ------------------------------------------------------------------------------------------------------------     On 9/9/2018, Nnuo GILMORE scribed the services personally performed by Dr. Joya    I, Dr. Joya,  attest that the documentation recorded by the scribe accurately and completely reflects the service(s) I personally performed and the decisions made by me.                   Safia Joya MD  09/09/18 2216       Safia Joya MD  09/09/18 2216

## 2020-06-11 ENCOUNTER — DOCUMENTATION ONLY (OUTPATIENT)
Dept: SLEEP MEDICINE | Age: 85
End: 2020-06-11

## 2020-06-16 ENCOUNTER — VIRTUAL VISIT (OUTPATIENT)
Dept: SLEEP MEDICINE | Age: 85
End: 2020-06-16

## 2020-06-16 ENCOUNTER — DOCUMENTATION ONLY (OUTPATIENT)
Dept: SLEEP MEDICINE | Age: 85
End: 2020-06-16

## 2020-06-16 DIAGNOSIS — E11.40 TYPE 2 DIABETES MELLITUS WITH DIABETIC NEUROPATHY, WITHOUT LONG-TERM CURRENT USE OF INSULIN (HCC): ICD-10-CM

## 2020-06-16 DIAGNOSIS — G47.33 OBSTRUCTIVE SLEEP APNEA (ADULT) (PEDIATRIC): Primary | ICD-10-CM

## 2020-06-16 NOTE — PATIENT INSTRUCTIONS
[address] [place]. [address] [facility], [address] Tel.  [phone] Fax. 100 [facility] [address] [place], [address] Tel.  [phone] Fax. [phone] [address] [facility] Kinsey Basilio Tel.  [phone] Fax. [phone] PROPER SLEEP HYGIENE What to avoid · Do not have drinks with caffeine, such as coffee or black tea, for 8 hours before bed. · Do not smoke or use other types of tobacco near bedtime. Nicotine is a stimulant and can keep you awake. · Avoid drinking alcohol late in the evening, because it can cause you to wake in the middle of the night. · Do not eat a big meal close to bedtime. If you are hungry, eat a light snack. · Do not drink a lot of water close to bedtime, because the need to urinate may wake you up during the night. · Do not read or watch TV in bed. Use the bed only for sleeping and sexual activity. What to try · Go to bed at the same time every night, and wake up at the same time every morning. Do not take naps during the day. · Keep your bedroom quiet, dark, and cool. · Get regular exercise, but not within 3 to 4 hours of your bedtime. Tatia Dayhoff · Sleep on a comfortable pillow and mattress. · If watching the clock makes you anxious, turn it facing away from you so you cannot see the time. · If you worry when you lie down, start a worry book. Well before bedtime, write down your worries, and then set the book and your concerns aside. · Try meditation or other relaxation techniques before you go to bed. · If you cannot fall asleep, get up and go to another room until you feel sleepy. Do something relaxing. Repeat your bedtime routine before you go to bed again. · Make your house quiet and calm about an hour before bedtime. Turn down the lights, turn off the TV, log off the computer, and turn down the volume on music. This can help you relax after a busy day. Drowsy Driving The [facility] cites drowsiness as a causing factor in more than 549,159 police reported crashes annually, resulting in 76,000 injuries and 1,500 deaths. Other surveys suggest 55% of people polled have driven while drowsy in the past year, 23% had fallen asleep but not crashed, 3% crashed, and 2% had and accident due to drowsy driving. Who is at risk? Young Drivers: One study of drowsy driving accidents states that 55% of the drivers were under 25 years. Of those, 75% were male. Shift Workers and Travelers: People who work overnight or travel across time zones frequently are at higher risk of experiencing Circadian Rhythm Disorders. They are trying to work and function when their body is programed to sleep. Sleep Deprived: Lack of sleep has a serious impact on your ability to pay attention or focus on a task. Consistently getting less than the average of 8 hours your body needs creates partial or cumulative sleep deprivation. Untreated Sleep Disorders: Sleep Apnea, Narcolepsy, R.L.S., and other sleep disorders (untreated) prevent a person from getting enough restful sleep. This leads to excessive daytime sleepiness and increases the risk for drowsy driving accidents by up to 7 times. Medications / Alcohol: Even over the counter medications can cause drowsiness. Medications that impair a drivers attention should have a warning label. Alcohol naturally makes you sleepy and on its own can cause accidents. Combined with excessive drowsiness its effects are amplified. Signs of Drowsy Driving: * You don't remember driving the last few miles * You may drift out of your kandis * You are unable to focus and your thoughts wander * You may yawn more often than normal 
 * You have difficulty keeping your eyes open / nodding off * Missing traffic signs, speeding, or tailgating Prevention-  
Good sleep hygiene, lifestyle and behavioral choices have the most impact on drowsy driving. There is no substitute for sleep and the average person requires 8 hours nightly. If you find yourself driving drowsy, stop and sleep. Consider the sleep hygiene tips provided during your visit as well. Medication Refill Policy: Refills for all medications require 1 week advance notice. Please have your pharmacy fax a refill request. We are unable to fax, or call in \"controled substance\" medications and you will need to pick these prescriptions up from our office. [employer]hart Activation Thank you for requesting access to Shutl. Please follow the instructions below to securely access and download your online medical record. Shutl allows you to send messages to your doctor, view your test results, renew your prescriptions, schedule appointments, and more. How Do I Sign Up? 1. In your internet browser, go to [email or web address]. 2. Click on the First Time User? Click Here link in the Sign In box. You will see the New Member Sign Up page. 3. Enter your Shutl Access Code exactly as it appears below. You will not need to use this code after youve completed the sign-up process. If you do not sign up before the expiration date, you must request a new code. Shutl Access Code: Activation code not generated Current Shutl Status: Active (This is the date your Shutl access code will ) 4. Enter the last four digits of your Social Security Number (xxxx) and Date of Birth (mm/dd/yyyy) as indicated and click Submit. You will be taken to the next sign-up page. 5. Create a [employer] ID. This will be your Shutl login ID and cannot be changed, so think of one that is secure and easy to remember. 6. Create a Shutl password. You can change your password at any time. 7. Enter your Password Reset Question and Answer. This can be used at a later time if you forget your password. 8. Enter your e-mail address.  You will receive e-mail notification when new information is available in Ztory. 9. Click Sign Up. You can now view and download portions of your medical record. 10. Click the Download Summary menu link to download a portable copy of your medical information. Additional Information If you have questions, please call [phone]. Remember, Ztory is NOT to be used for urgent needs. For medical emergencies, dial 911.

## 2020-07-14 ENCOUNTER — VIRTUAL VISIT (OUTPATIENT)
Dept: DIABETES SERVICES | Age: 85
End: 2020-07-14

## 2020-07-14 DIAGNOSIS — E11.29 CONTROLLED TYPE 2 DIABETES MELLITUS WITH OTHER DIABETIC KIDNEY COMPLICATION, WITHOUT LONG-TERM CURRENT USE OF INSULIN (HCC): ICD-10-CM

## 2020-07-14 NOTE — PROGRESS NOTES
[facility] Program for Diabetes Health  Diabetes Self-Management Education  Post-program Evaluation    Ari Odonnell completed 6 hours of diabetes self-management education using the AADE7 Curriculum. Patient's SMART goals and behavior change:  [x]Healthy Eating: Behavior improved  [x]Being Active: Behavior improved  [x]Monitoring: Behavior was maintained  [x]Taking Medications: Behavior was maintained  [x]Healthy Coping: Behavior was maintained  [x]Reducing Risks: Behavior improved  [x]Problem Solving: Behavior improved    Evaluation of Patient's Progress:  Metric Patient's responses (7/14/2020) Comparison to Pre-program   A1c  (Goal: 7%)   Recent value:   Lab Results   Component Value Date/Time    Hemoglobin A1c 8.0 (H) 01/15/2020 01:04 PM    Hemoglobin A1c (POC) 6.5 05/25/2016 08:32 AM        unknown   Healthy Eating     Mr Ginny Bernardo has made changes to his diet including incorporating CHO at each meal, as well as balancing his meals with fat and protein. He does state that he is hungry at varying times during the day. Discussed the importance of protein and fat with his meals to help with satiety. Also discussed healthy choices for snacks- to send patient a handout for snacks via USPS mail.   Will also mail patient another list of CHO containing foods with the balanced plate technique to help reiterate information taught during his classes    Stage of change: Action    Improved in choosing balanced meals Improved consistency of meals Improved consistency of CHO at meals   Being Active     Physical Activity Vital Sign:  How many days during the past week have you performed physical activity where your heart beats faster and your breathing is harder than normal for 30 minutes or more?  7 days   How many days in a typical week do you perform activity such as this?  7 days    Stage of change: Maintenance    Improved frequency of physical activity  Improved duration of physical activity   Monitoring Do you monitor your blood sugar? Yes    How often do you monitor? 1x/day    What are the range of readings? 170-180 mg/dL  post prandial       Do you know your last A1c measurement? Yes    Do you know the meaning of the A1c? Yes    Stage of change: Action    Fewer high readings   Taking Medications Medication Management:  Do you understand what your diabetes medications do? Yes    Can you afford your diabetes medications? Yes    How often do you miss doses of your diabetes medications? Never    Current dosing:   Key Antihyperglycemic Medications             metFORMIN (GLUCOPHAGE) 500 mg tablet Take 1 Tab by mouth two (2) times daily (with meals). Blood Pressure Management:  Key ACE/ARB Medications     Patient is on no ACE or ARB meds. Lipid Management:  Key Antihyperlipidemia Meds     The patient is on no antihyperlipidemia meds. Clot Prevention:  Key Anti-Platelet Anticoagulant Meds     The patient is on no antiplatelet meds or anticoagulants. Stage of change: Action    Taking all medications as recommended   Healthy Coping Diabetes Skills, Confidence and Preparedness Index:   Total score: 5.7  Skills: 4.9  Confidence: 5.7  Preparedness: 6.8    Stage of change: Action    unable to evaluate as patient did not take survey at beginning of classes- patient reports that he has learned a lot during his sessions   Reducing Risks Vaccines:  Influenza:   Immunization History   Administered Date(s) Administered    Influenza High Dose Vaccine PF 10/15/2015, 09/13/2017, 10/27/2018    Influenza Vaccine 09/01/2013, 10/24/2014    Influenza Vaccine (Tri) Adjuvanted 10/10/2019       Pneumococcal:   Immunization History   Administered Date(s) Administered    (RETIRED) Pneumococcal Vaccine (Unspecified Type) 11/28/2012    Pneumococcal Vaccine (Unspecified Type) 10/13/2014        Hepatitis: There is no immunization history for the selected administration types on file for this patient. Examinations:  Diabetic Foot and Eye Exam HM Status   Topic Date Due    Eye Exam  08/22/2020            Heart Protection:  BP Readings from Last 2 Encounters:   04/09/20 132/71   01/15/20 120/78        Lab Results   Component Value Date/Time    LDL, calculated 89 01/15/2020 01:04 PM        Kidney Protection:  Lab Results   Component Value Date/Time    Microalbumin/Creat ratio (mg/g creat) 12 01/04/2018 05:31 PM    Microalb/Creat ratio (ug/mg creat.) 17.0 01/15/2020 01:04 PM    Microalbumin,urine random 1.54 01/04/2018 05:31 PM       Patient-reported diabetes complications:  none    Stage of change: Action  Up-to-date with vaccines   Problem Solving Hypoglycemia Management:  What are signs and symptoms of hypoglycemia that you experience? none    How do you prevent hypoglycemia? Consistent meals/snack times and Take medications as instructed    How do you treat hypoglycemia? Rule of 15    Hyperglycemia Management:  What are signs and symptoms of hyperglycemia that you experience? Intense hunger    How can you prevent hyperglycemia? Take medication as instructed, Focus on carbohydrate counting/meal planning, Maintain a healthy weight, Engage in regular physically active    Sick Day Management:  What do you do differently on sick days? Stay hydrated, Check blood glucose every 2-4 hours    Pattern Management:  Do you notice blood glucose patterns when you look at the readings in your meter or logbook? Yes    How do you use the blood glucose readings from your meter or logbook?  Understand how body responds to meals    Stage of change: Action  Improved understanding of hyperglycemia management  Improved understanding of hypoglycemia management  Improved understanding of sick day management  Improved understanding of pattern management   Note: Content derived from the American Association of Diabetes Educators' Diabetes Education Curriculum: A Guide to Successful Self-Management (2nd edition)       Diabetes Educator Recommendations:     - Patient may benefit from follow-up diabetes education sessions yearly to help with continued success and maintenance           Discussed with patient follow-up appointment: 1 year      Ashleigh Sanabria Emergency Adaptations for Telehealth:    Lei Renteria is a 80 y.o. male being evaluated through a synchronous (real-time) audio-video technology platform, as a substitution for an in-person encounter, to address the healthcare issues mentioned above. A caregiver was present when appropriate. I was at home. The patient was at home. The patient and/or his healthcare decision maker, is aware that this patient-initiated, Telehealth encounter on 7/14/2020 is a billable service, with coverage as determined by his insurance carrier. He is aware that he may receive a bill and has provided verbal consent to proceed: Yes. This telehealth encounter occurred during the COVID-19 pandemic and public health emergency. Evaluation of the following organ systems was limited: Vitals/Constitutional/EENT/Resp/CV/GI//MS/Neuro/Skin/Heme-Lymph-Imm. Pursuant to the emergency declaration under the [address] [place] [address], [address] [place] [address] authority and the [employer] and [employer] Act, this Virtual Visit was conducted with patient's (and/or legal guardian's) consent, to reduce the risk of exposure to COVID-19 and provide necessary medical care. --Antwan David RD on 7/14/2020 at 10:35 AM    An electronic signature was used to authenticate this note. I was in the office for the appointment and time spent: 24  minutes    --------------------------------------------------         Diabetes Skills, Confidence & Preparedness Index (SCPI) ©  Thank you for completing the Skills, Confidence & Preparedness Index! Below are your scores. All scales and questions are out of 7.   If you would like these results emailed, please enter your email address along with some identifying patient information. Overall SCPI score: 5.7 Skills Score: 4.9  Low: Healthy Eating(Q1),Blood Sugar Monitoring(Q4),Problem Solving(Q6),Healthy Coping(Q7) Confidence Score: 5.7  Low: Healthy Eating(Q1),Problem KURKXKM(I3) Preparedness Score: 6.8  Low: Healthy Coping(Q3)   Healthy Eating Score: 5.5  Low: Skills(Q1) Taking Medication Score: 5.0  Low: Skills(Q2) Blood Sugar Monitoring Score: 5.8  Low: HVJKAB(B0) Reducing Risks Score: 6.0  Low: Skills(Q5)   Problem Solving Score: 5.3  Low: Skills(Q6) Healthy Coping Score: 5.3  Low: LJJGMR(N7) Being Active Score: 6.5  Low: Confidence(Q5)     Skills/Knowledge Questions   1. I know how to plan meals that have the best balance between carbohydrates, proteins and vegetables. 4   2. I know how my diabetes medications (pills, injectables and/or insulin) work in my body. 5   3. I know when to check my blood sugar if I want to see how my body responded to a meal. 6   4. I know when to check my blood sugars to determine if my medication or insulin doses are correct. 4   5. I know what to do to prevent a low blood sugar when I exercise (either before, during, or after). 5   6. When I am sick, I know what to do differently with my diabetes management. 4   7. I know how stress can affect my diabetes management. 4   8. When I look at my blood sugars over a given week, I can explain what my blood sugar pattern is. 6   9. I know what my target levels are for A1c, blood pressure and cholesterol. 6   Confidence Questions   1. I am confident that I can plan balanced meals and snacks. 5   2. I am confident that I can manage my stress. 6   3. I am confident that I can prevent a low blood sugar during or after exercise. 6   4. I am confident that the next time I eat out, I will be able to choose foods that best keep my blood sugars in target. 6   5. I am confident I can include exercise into my schedule. 6   6.  I am confident that I can use my daily blood sugars to adjust my diet, my activity, and/or my insulin. 6   7. When something out of my normal routine happens, I am confident that I can problem-solve and keep my diabetes on track. 5   Preparedness Questions   1. Within the next month, I will begin to eat more balanced meals and snacks. 8   2. Within the next month, I will choose an exercise activity and I will start fitting it into my schedule. 8   3. Within the next month, I will make a list of stress management options that work for me. 6   4. Within the next month, I will consistently plan ahead to prevent low blood sugars. 8   5. Within the next month, I will start adjusting my insulin doses on my own. 0   6. Within the next month, I will begin making changes to my diabetes management based on my daily blood sugars (eg - eating, activity and/or insulin). 8   7. Within the next month, I will begin making changes to my diabetes management to meet my overall goals (eg - eating, activity and/or insulin).  7

## 2020-09-30 NOTE — PROGRESS NOTES
General Surgery Daily Progress Note    Admit Date: 2017  Post-Operative Day: 5 Days Post-Op from Procedure(s):  LAPAROTOMY EXPLORATORY SMALL BOWEL RESECTION      Subjective:     Last 24 hrs: Pt doesn't have abd pain but feels need for BM. No appetite. Appears tachypneic. Voiding. Objective:     Blood pressure 153/74, pulse 89, temperature 98.3 °F (36.8 °C), resp. rate 18, height 5' 9\" (1.753 m), weight 210 lb 8.6 oz (95.5 kg), SpO2 97 %. Temp (24hrs), Av.1 °F (36.7 °C), Min:97.5 °F (36.4 °C), Max:98.6 °F (37 °C)      _____________________  Physical Exam:     Alert and Oriented, x3, in no acute distress. Cardiovascular: RRR - paced, 1+ peripheral edema  Lungs: few crackles heard in L base  Abdomen: distended but soft, +BS, incision is clean w/ staples      Assessment:   Active Problems:    Perforated diverticulum of small intestine (2017)            Plan:     Cont bowel regimen  OOB  D/c IVF  Lasix 20mg IV once  Cont diet  Gi/dvt proph  Cont abx    Data Review:    Recent Labs      18   0231  17   0432   WBC  6.4  6.9   HGB  11.4*  11.0*   HCT  34.6*  33.1*   PLT  318  234     Recent Labs      18   0231  18   0350  17   0432   NA  144  144  142   K  3.8  3.7  4.0   CL  111*  111*  110*   CO2  24  23  22   GLU  161*  162*  174*   BUN  24*  24*  24*   CREA  1.73*  1.69*  1.65*   CA  8.2*  8.6  8.5   MG   --    --   1.7   PHOS   --    --   2.2*     No results for input(s): AML, LPSE in the last 72 hours.         ______________________  Medications:    Current Facility-Administered Medications   Medication Dose Route Frequency    bisacodyl (DULCOLAX) tablet 5 mg  5 mg Oral DAILY PRN    famotidine (PEPCID) tablet 20 mg  20 mg Oral DAILY    hydrALAZINE (APRESOLINE) 20 mg/mL injection 20 mg  20 mg IntraVENous Q6H PRN    docusate sodium (COLACE) capsule 100 mg  100 mg Oral BID    sodium chloride (NS) flush 5-10 mL  5-10 mL IntraVENous Q8H    sodium chloride (NS) flush 5-10 mL  5-10 mL IntraVENous PRN    lactated Ringers infusion  50 mL/hr IntraVENous CONTINUOUS    acetaminophen (TYLENOL) tablet 650 mg  650 mg Oral Q6H PRN    ondansetron (ZOFRAN) injection 4 mg  4 mg IntraVENous Q4H PRN    enoxaparin (LOVENOX) injection 40 mg  40 mg SubCUTAneous Q24H    HYDROmorphone (PF) (DILAUDID) injection 0.5-1 mg  0.5-1 mg IntraVENous Q2H PRN    levoFLOXacin (LEVAQUIN) 750 mg in D5W IVPB  750 mg IntraVENous Q48H    metroNIDAZOLE (FLAGYL) IVPB premix 500 mg  500 mg IntraVENous Q8H       Moe Knapp NP  1/2/2018      ADDENDUM:  Franciso Ahumada, MD  Pt seen and examined. No acute surgical issues. RN reported pt is retaining urine and able to produce only trickle amount. Pt reported no nausea or vomiting. Pt passing flatus but just small BM. Creatinine rising a bit. Will place fung and monitor renal function. Continue antibiotic and bowel regiment. Dry/Warm

## 2020-10-05 ENCOUNTER — OFFICE VISIT (OUTPATIENT)
Dept: INTERNAL MEDICINE CLINIC | Age: 85
End: 2020-10-05
Payer: MEDICARE

## 2020-10-05 VITALS
BODY MASS INDEX: 27.47 KG/M2 | RESPIRATION RATE: 16 BRPM | SYSTOLIC BLOOD PRESSURE: 110 MMHG | HEART RATE: 87 BPM | OXYGEN SATURATION: 97 % | WEIGHT: 186 LBS | DIASTOLIC BLOOD PRESSURE: 60 MMHG | TEMPERATURE: 97.3 F

## 2020-10-05 DIAGNOSIS — Z13.31 SCREENING FOR DEPRESSION: ICD-10-CM

## 2020-10-05 DIAGNOSIS — E11.9 CONTROLLED TYPE 2 DIABETES MELLITUS WITHOUT COMPLICATION, WITHOUT LONG-TERM CURRENT USE OF INSULIN (HCC): ICD-10-CM

## 2020-10-05 DIAGNOSIS — Z00.00 MEDICARE ANNUAL WELLNESS VISIT, SUBSEQUENT: Primary | ICD-10-CM

## 2020-10-05 DIAGNOSIS — Z23 NEEDS FLU SHOT: ICD-10-CM

## 2020-10-05 PROCEDURE — G0444 DEPRESSION SCREEN ANNUAL: HCPCS | Performed by: INTERNAL MEDICINE

## 2020-10-05 PROCEDURE — G0008 ADMIN INFLUENZA VIRUS VAC: HCPCS

## 2020-10-05 PROCEDURE — G0439 PPPS, SUBSEQ VISIT: HCPCS | Performed by: INTERNAL MEDICINE

## 2020-10-05 PROCEDURE — G8510 SCR DEP NEG, NO PLAN REQD: HCPCS | Performed by: INTERNAL MEDICINE

## 2020-10-05 PROCEDURE — G8427 DOCREV CUR MEDS BY ELIG CLIN: HCPCS | Performed by: INTERNAL MEDICINE

## 2020-10-05 PROCEDURE — 1101F PT FALLS ASSESS-DOCD LE1/YR: CPT | Performed by: INTERNAL MEDICINE

## 2020-10-05 PROCEDURE — 99213 OFFICE O/P EST LOW 20 MIN: CPT | Performed by: INTERNAL MEDICINE

## 2020-10-05 PROCEDURE — G8536 NO DOC ELDER MAL SCRN: HCPCS | Performed by: INTERNAL MEDICINE

## 2020-10-05 PROCEDURE — 3052F HG A1C>EQUAL 8.0%<EQUAL 9.0%: CPT | Performed by: INTERNAL MEDICINE

## 2020-10-05 PROCEDURE — 90694 VACC AIIV4 NO PRSRV 0.5ML IM: CPT

## 2020-10-05 PROCEDURE — G8419 CALC BMI OUT NRM PARAM NOF/U: HCPCS | Performed by: INTERNAL MEDICINE

## 2020-10-05 NOTE — PROGRESS NOTES
Verified name and birth date for privacy precautions. Chart reviewed in preparation for today's visit.      Chief Complaint   Patient presents with    Diabetes          Health Maintenance Due   Topic    Medicare Yearly Exam     GLAUCOMA SCREENING Q2Y     Eye Exam Retinal or Dilated     Flu Vaccine (1)         Wt Readings from Last 3 Encounters:   10/05/20 186 lb (84.4 kg)   04/06/20 190 lb (86.2 kg)   01/15/20 197 lb (89.4 kg)     Temp Readings from Last 3 Encounters:   10/05/20 97.3 °F (36.3 °C) (Temporal)   04/09/20 98 °F (36.7 °C)   01/15/20 97.4 °F (36.3 °C) (Oral)     BP Readings from Last 3 Encounters:   10/05/20 110/60   04/09/20 132/71   01/15/20 120/78     Pulse Readings from Last 3 Encounters:   10/05/20 87   04/09/20 77   01/15/20 77         Learning Assessment:  :     Learning Assessment 1/15/2020 1/17/2019 10/3/2017 11/9/2016 5/5/2015 2/21/2014   PRIMARY LEARNER Patient Patient Patient Patient Patient Patient   HIGHEST LEVEL OF EDUCATION - PRIMARY LEARNER  > 4 YEARS OF COLLEGE > 4 YEARS OF COLLEGE > 4 YEARS OF COLLEGE - > 4 YEARS OF COLLEGE > 4 YEARS OF COLLEGE   BARRIERS PRIMARY LEARNER NONE NONE NONE - NONE NONE   CO-LEARNER CAREGIVER No - No - No No   PRIMARY LANGUAGE ENGLISH ENGLISH ENGLISH ENGLISH ENGLISH ENGLISH    NEED - - - - No No   LEARNER PREFERENCE PRIMARY READING DEMONSTRATION DEMONSTRATION DEMONSTRATION READING DEMONSTRATION     - - - - DEMONSTRATION READING     - - - - - LISTENING   LEARNING SPECIAL TOPICS - - - - no no   ANSWERED BY patient  patient patient patient patient patient   RELATIONSHIP SELF SELF SELF SELF SELF SELF   ASSESSMENT COMMENT - - - - n/a -       Depression Screening:  :     3 most recent PHQ Screens 10/5/2020   Little interest or pleasure in doing things Not at all   Feeling down, depressed, irritable, or hopeless Not at all   Total Score PHQ 2 0

## 2020-10-05 NOTE — PROGRESS NOTES
Subjective:      Stanley Napoles is a 80 y.o. male who presents today for follow up of his diabetes mellitus type 2. He follows with Dr. Lashon Soni for his sleep apnea  Has been seeing [facility] for nutritional counseling for his diabetes mellitus . Glucose running in range of 123-200. Avg. Is in the 140's. He is due for his Medicare Wellness Exam     Walking up and down his driveway 10 times. - about 15 minutes of walking. Eye exam - every 6 months. Dr. Ly Vera. [facility]. 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 180 Tab 1    glucose blood VI test strips (ASCENSIA AUTODISC VI, ONE TOUCH ULTRA TEST VI) strip Use to test blood sugars once daily DX:E11.9 100 Strip 3    lancets misc Use to test blood sugars once daily DX:E11.9 100 Each 1    Lancing Device misc Use to test blood sugars once daily DX:E11.9 1 Each 0    lancing device (SOFT TOUCH LANCET DEVICE) by Does Not Apply route.  Lancing Device misc by Does Not Apply route.  Blood-Glucose Meter monitoring kit Use as directed  icd10- 1 Kit 0        Review of Systems    Pertinent items are noted in HPI. Objective:      Wt Readings from Last 3 Encounters:   10/05/20 186 lb (84.4 kg)   04/06/20 190 lb (86.2 kg)   01/15/20 197 lb (89.4 kg)     BP Readings from Last 3 Encounters:   10/05/20 110/60   04/09/20 132/71   01/15/20 120/78     Visit Vitals  /60 (BP 1 Location: Left arm, BP Patient Position: Sitting)   Pulse 87   Temp 97.3 °F (36.3 °C) (Temporal)   Resp 16   Wt 186 lb (84.4 kg)   SpO2 97%   BMI 27.47 kg/m²     General appearance: alert, cooperative, no distress, appears stated age  Head: Normocephalic, without obvious abnormality, atraumatic  Neck: supple, symmetrical, trachea midline, no adenopathy, no carotid bruit and no JVD  Lungs: clear to auscultation bilaterally  Heart: regular rate and rhythm, S1, S2 normal, no murmur, click, rub or gallop  Abdomen: soft, non-tender. Bowel sounds normal. No masses,  no organomegaly  Extremities: no edema    Assessment/Plan:     1. Controlled type 2 diabetes mellitus without complication, without long-term current use of insulin ([facility])  -glucose averaging in the 140's. Check labs at this time    - CBC WITH AUTOMATED DIFF  - METABOLIC PANEL, COMPREHENSIVE  - HEMOGLOBIN A1C WITH EAG  - MICROALBUMIN, UR, RAND W/ MICROALB/CREAT RATIO    2. Medicare annual wellness visit, subsequent  -completed today  - DEPRESSION SCREEN ANNUAL    3. Screening for depression    - DEPRESSION SCREEN ANNUAL    4. Needs flu shot  - Patient received flu vaccine today without any complications.     - FLU (FLUAD QUAD INFLUENZA VACCINE,QUAD,ADJUVANTED)     Orders Placed This Encounter    Depression Screen Annual    Influenza Vaccine, QUAD, 65 Yrs +  IM  (Fluad 23631 )    CBC WITH AUTOMATED DIFF    METABOLIC PANEL, COMPREHENSIVE    HEMOGLOBIN A1C WITH EAG    MICROALBUMIN, UR, RAND W/ MICROALB/CREAT RATIO      Follow-up Disposition:     Follow up 4 months      Return if symptoms worsen or fail to improve. Advised patient to call back or return to office if symptoms worsen/change/persist.     Discussed expected course/resolution/complications of diagnosis in detail with patient. Medication risks/benefits/costs/interactions/alternatives discussed with patient. Patient was given an after visit summary which includes diagnoses, current medications, & vitals. Patient expressed understanding with the diagnosis and plan. This is the Subsequent Medicare Annual Wellness Exam, performed 12 months or more after the Initial AWV or the last Subsequent AWV    I have reviewed the patient's medical history in detail and updated the computerized patient record. 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    Colon cancer screening Z12.11    SBO (small bowel obstruction) ([facility] [address] K56.609     Past Medical History:   Diagnosis Date    Arthritis     Calculus of kidney     Chronic kidney disease     slightly increased creatinine    GERD (gastroesophageal reflux disease)     Glaucoma     Pneumonia     prostate cancer 2000    seed radiation, cryosurgery    Unspecified sleep apnea     uses CPAP      Past Surgical History:   Procedure Laterality Date    ENDOSCOPY, COLON, DIAGNOSTIC  2010    HX HEENT      uvulectomy    HX OTHER SURGICAL  12/28/2017    Exploratory Lap with small bowel [id number]. Jeanne Trujillo    HX PACEMAKER  09 01 14    HX PROSTATECTOMY      brachytherapy, cryosurgery    HX TONSILLECTOMY      HX UROLOGICAL      vasectomy    OR TRABECULOPLASTY BY LASER SURGERY       Current Outpatient Medications   Medication Sig Dispense Refill    metFORMIN (GLUCOPHAGE) 500 mg tablet Take 1 Tab by mouth two (2) times daily (with meals).  180 Tab 1    glucose blood VI test strips (ASCENSIA AUTODISC VI, ONE TOUCH ULTRA TEST VI) strip Use to test blood sugars once daily DX:E11.9 100 Strip 3    lancets misc Use to test blood sugars once daily DX:E11.9 100 Each 1    Lancing Device misc Use to test blood sugars once daily DX:E11.9 1 Each 0    lancing device (SOFT TOUCH LANCET DEVICE) by Does Not Apply route.  Lancing Device misc by Does Not Apply route.  Blood-Glucose Meter monitoring kit Use as directed  icd10- 1 Kit 0     Allergies   Allergen Reactions    Pcn [Penicillins] Hives     Reaction was in 1950's following a PCN shot    Venom-Honey Bee Other (comments)     anaphylaxis       Family History   Problem Relation Age of Onset    Heart Disease Father     Cancer Sister         breast/lung    Other Mother         encephalitis    Cancer Maternal Aunt         breast/lower extremity - 2 maternal aunts    Cancer Other         vaginal    No Known Problems Daughter      Social History     Tobacco Use    Smoking status: Never Smoker    Smokeless tobacco: Never Used   Substance Use Topics    Alcohol use: Yes     Alcohol/week: 6.0 standard drinks     Types: 2 Glasses of wine, 4 Standard drinks or equivalent per week       Depression Risk Factor Screening:     3 most recent PHQ Screens 10/5/2020   Little interest or pleasure in doing things Not at all   Feeling down, depressed, irritable, or hopeless Not at all   Total Score PHQ 2 0       Alcohol Risk Screen   Do you average more than 1 drink per night or more than 7 drinks a week: No    In the past three months have you have had more than 4 drinks containing alcohol on one occasion: No        Functional Ability and Level of Safety:   Hearing: Hearing is good. Activities of Daily Living: The home contains: handrails and grab bars  Patient does total self care     Ambulation: with no difficulty     Fall Risk:  Fall Risk Assessment, last 12 mths 1/15/2020   Able to walk? Yes   Fall in past 12 months?  No   Fall with injury? -   Number of falls in past 12 months -   Fall Risk Score -     Abuse Screen:  Patient is not abused       Cognitive Screening   Has your family/caregiver stated any concerns about your memory: no         Patient Care Team   Patient Care Team:  Bernadine Ulloa MD as PCP - General (Internal Medicine)  Eldon Genia Garcia MD as PCP - [facility] Empaneled Provider  Sameer Nava MD (Ophthalmology)  Gilda Barkley MD (Urology)  Juan Meyers MD (Cardiology)  Kailyn Contreras MD as Surgeon (General Surgery)    Assessment/Plan   Education and counseling provided:  Are appropriate based on today's review and evaluation    Diagnoses and all orders for this visit:    1. Medicare annual wellness visit, subsequent  -     [facility]    2.  Controlled type 2 diabetes mellitus without complication, without long-term current use of insulin ([facility] [address]    3. Screening for depression  -     [address]

## 2020-10-05 NOTE — PATIENT INSTRUCTIONS
Medicare Wellness Visit, Male The best way to live healthy is to have a lifestyle where you eat a well-balanced diet, exercise regularly, limit alcohol use, and quit all forms of tobacco/nicotine, if applicable. Regular preventive services are another way to keep healthy. Preventive services (vaccines, screening tests, monitoring & exams) can help personalize your care plan, which helps you manage your own care. Screening tests can find health problems at the earliest stages, when they are easiest to treat. [facility] follows the current, evidence-based guidelines published by the [facility] Srini Skyler ([facility]) when recommending preventive services for our patients. Because we follow these guidelines, sometimes recommendations change over time as research supports it. (For example, a prostate screening blood test is no longer routinely recommended for men with no symptoms). Of course, you and your doctor may decide to screen more often for some diseases, based on your risk and co-morbidities (chronic disease you are already diagnosed with). Preventive services for you include: - Medicare offers their members a free annual wellness visit, which is time for you and your primary care provider to discuss and plan for your preventive service needs. Take advantage of this benefit every year! 
-All adults over age 72 should receive the recommended pneumonia vaccines. Current USPSTF guidelines recommend a series of two vaccines for the best pneumonia protection.  
-All adults should have a flu vaccine yearly and tetanus vaccine every 10 years. 
-All adults age 48 and older should receive the shingles vaccines (series of two vaccines).       
-All adults age 38-68 who are overweight should have a diabetes screening test once every three years.  
-Other screening tests & preventive services for persons with diabetes include: an eye exam to screen for diabetic retinopathy, a kidney function test, a foot exam, and stricter control over your cholesterol.  
-Cardiovascular screening for adults with routine risk involves an electrocardiogram (ECG) at intervals determined by the provider.  
-Colorectal cancer screening should be done for adults age 54-65 with no increased risk factors for colorectal cancer. There are a number of acceptable methods of screening for this type of cancer. Each test has its own benefits and drawbacks. Discuss with your provider what is most appropriate for you during your annual wellness visit. The different tests include: colonoscopy (considered the best screening method), a fecal occult blood test, a fecal DNA test, and sigmoidoscopy. 
-All adults born between [facility] should be screened once for Hepatitis C. 
-An Abdominal Aortic Aneurysm (AAA) Screening is recommended for men age 73-68 who has ever smoked in their lifetime. Here is a list of your current Health Maintenance items (your personalized list of preventive services) with a due date: 
Health Maintenance Due Topic Date Due  
 Annual Well Visit  01/18/2020  Glaucoma Screening   08/22/2020 [facility]  08/22/2020  Yearly Flu Vaccine (1) 09/01/2020 Vaccine Information Statement Influenza (Flu) Vaccine (Inactivated or Recombinant): What You Need to Know Many Vaccine Information Statements are available in [place] and other languages. See www.immunize.org/vis Hojas de información sobre vacunas están disponibles en español y en muchos otros idiomas. Visite www.immunize.org/vis 1. Why get vaccinated? Influenza vaccine can prevent influenza (flu). Flu is a contagious disease that spreads around the [place] every year, usually between October and May. Anyone can get the flu, but it is more dangerous for some people.  Infants and young children, people 72 years of age and older, pregnant women, and people with certain health conditions or a weakened immune system are at greatest risk of flu complications. Pneumonia, bronchitis, sinus infections and ear infections are examples of flu-related complications. If you have a medical condition, such as heart disease, cancer or diabetes, flu can make it worse. Flu can cause fever and chills, sore throat, muscle aches, fatigue, cough, headache, and runny or stuffy nose. Some people may have vomiting and diarrhea, though this is more common in children than adults. Each year thousands of people in the [facility] die from flu, and many more are hospitalized. Flu vaccine prevents millions of illnesses and flu-related visits to the doctor each year. 2. Influenza vaccines CDC recommends everyone 10months of age and older get vaccinated every flu season. Children 6 months through 6years of age may need 2 doses during a single flu season. Everyone else needs only 1 dose each flu season. It takes about 2 weeks for protection to develop after vaccination. There are many flu viruses, and they are always changing. Each year a new flu vaccine is made to protect against three or four viruses that are likely to cause disease in the upcoming flu season. Even when the vaccine doesnt exactly match these viruses, it may still provide some protection. Influenza vaccine does not cause flu. Influenza vaccine may be given at the same time as other vaccines. 3. Talk with your health care provider Tell your vaccine provider if the person getting the vaccine: 
 Has had an allergic reaction after a previous dose of influenza vaccine, or has any severe, life-threatening allergies.  Has ever had Guillain-Barré Syndrome (also called GBS). In some cases, your health care provider may decide to postpone influenza vaccination to a future visit. People with minor illnesses, such as a cold, may be vaccinated. People who are moderately or severely ill should usually wait until they recover before getting influenza vaccine. Your health care provider can give you more information. 4. Risks of a reaction  Soreness, redness, and swelling where shot is given, fever, muscle aches, and headache can happen after influenza vaccine.  There may be a very small increased risk of Guillain-Barré Syndrome (GBS) after inactivated influenza vaccine (the flu shot). Patrick hernandez who get the flu shot along with pneumococcal vaccine (PCV13), and/or DTaP vaccine at the same time might be slightly more likely to have a seizure caused by fever. Tell your health care provider if a child who is getting flu vaccine has ever had a seizure. People sometimes faint after medical procedures, including vaccination. Tell your provider if you feel dizzy or have vision changes or ringing in the ears. As with any medicine, there is a very remote chance of a vaccine causing a severe allergic reaction, other serious injury, or death. 5. What if there is a serious problem? An allergic reaction could occur after the vaccinated person leaves the clinic. If you see signs of a severe allergic reaction (hives, swelling of the face and throat, difficulty breathing, a fast heartbeat, dizziness, or weakness), call 9-1-1 and get the person to the nearest hospital. 
 
For other signs that concern you, call your health care provider. Adverse reactions should be reported to the Vaccine Adverse Event Reporting System (VAERS). Your health care provider will usually file this report, or you can do it yourself. Visit the VAERS website at www.vaers. hhs.gov or call 3-217.464.1668. VAERS is only for reporting reactions, and VAERS staff do not give medical advice.  
 
6. The National Vaccine Injury [employer] 
 
 The [employer] Injury Compensation Program (VICP) is a federal program that was created to compensate people who may have been injured by certain vaccines. Visit the VICP website at www.[facility].gov/vaccinecompensation or call 3-104.845.2477 to learn about the program and about filing a claim. There is a time limit to file a claim for compensation. 7. How can I learn more?  Ask your health care provider.  Call your local or state health department.  Contact the Centers for Disease Control and Prevention (CDC): 
- Call 3-678.999.2906 (1-800-CDC-INFO) or 
- Visit CDCs influenza website at www.cdc.gov/flu Vaccine Information Statement (Interim) Inactivated Influenza Vaccine 8/15/2019 
42 NITHIN Johnson 463XE-06 Department of Health and [employer] Centers for Disease Control and Prevention Office Use Only

## 2020-10-06 LAB
ALBUMIN SERPL-MCNC: 3.8 G/DL (ref 3.6–4.6)
ALBUMIN/CREAT UR: 12 MG/G CREAT (ref 0–29)
ALBUMIN/GLOB SERPL: 1.7 {RATIO} (ref 1.2–2.2)
ALP SERPL-CCNC: 53 IU/L (ref 39–117)
ALT SERPL-CCNC: 16 IU/L (ref 0–44)
AST SERPL-CCNC: 19 IU/L (ref 0–40)
BASOPHILS # BLD AUTO: 0.1 X10E3/UL (ref 0–0.2)
BASOPHILS NFR BLD AUTO: 1 %
BILIRUB SERPL-MCNC: 0.3 MG/DL (ref 0–1.2)
BUN SERPL-MCNC: 25 MG/DL (ref 8–27)
BUN/CREAT SERPL: 17 (ref 10–24)
CALCIUM SERPL-MCNC: 9.1 MG/DL (ref 8.6–10.2)
CHLORIDE SERPL-SCNC: 109 MMOL/L (ref 96–106)
CO2 SERPL-SCNC: 23 MMOL/L (ref 20–29)
CREAT SERPL-MCNC: 1.46 MG/DL (ref 0.76–1.27)
CREAT UR-MCNC: 102.8 MG/DL
EOSINOPHIL # BLD AUTO: 0.1 X10E3/UL (ref 0–0.4)
EOSINOPHIL NFR BLD AUTO: 2 %
ERYTHROCYTE [DISTWIDTH] IN BLOOD BY AUTOMATED COUNT: 13 % (ref 11.6–15.4)
EST. AVERAGE GLUCOSE BLD GHB EST-MCNC: 143 MG/DL
GLOBULIN SER CALC-MCNC: 2.3 G/DL (ref 1.5–4.5)
GLUCOSE SERPL-MCNC: 127 MG/DL (ref 65–99)
HBA1C MFR BLD: 6.6 % (ref 4.8–5.6)
HCT VFR BLD AUTO: 42.1 % (ref 37.5–51)
HGB BLD-MCNC: 13.8 G/DL (ref 13–17.7)
IMM GRANULOCYTES # BLD AUTO: 0 X10E3/UL (ref 0–0.1)
IMM GRANULOCYTES NFR BLD AUTO: 0 %
LYMPHOCYTES # BLD AUTO: 1.6 X10E3/UL (ref 0.7–3.1)
LYMPHOCYTES NFR BLD AUTO: 22 %
MCH RBC QN AUTO: 29.1 PG (ref 26.6–33)
MCHC RBC AUTO-ENTMCNC: 32.8 G/DL (ref 31.5–35.7)
MCV RBC AUTO: 89 FL (ref 79–97)
MICROALBUMIN UR-MCNC: 12.4 UG/ML
MONOCYTES # BLD AUTO: 0.5 X10E3/UL (ref 0.1–0.9)
MONOCYTES NFR BLD AUTO: 7 %
NEUTROPHILS # BLD AUTO: 5.1 X10E3/UL (ref 1.4–7)
NEUTROPHILS NFR BLD AUTO: 68 %
PLATELET # BLD AUTO: 259 X10E3/UL (ref 150–450)
POTASSIUM SERPL-SCNC: 5.3 MMOL/L (ref 3.5–5.2)
PROT SERPL-MCNC: 6.1 G/DL (ref 6–8.5)
RBC # BLD AUTO: 4.75 X10E6/UL (ref 4.14–5.8)
SODIUM SERPL-SCNC: 141 MMOL/L (ref 134–144)
WBC # BLD AUTO: 7.4 X10E3/UL (ref 3.4–10.8)

## 2020-11-10 RX ORDER — ATORVASTATIN CALCIUM 10 MG/1
10 TABLET, FILM COATED ORAL DAILY
Qty: 90 TAB | Refills: 1 | Status: SHIPPED | OUTPATIENT
Start: 2020-11-10 | End: 2021-05-06

## 2020-11-10 NOTE — TELEPHONE ENCOUNTER
I received a letter from Dr. Vishal Rai, patient's cardiologist.  He follows patient for his heart block. He had a a recent echocardiogram that showed an EF of 40-45%, sclerotic aortic valve. He also had a pharmacologic stress to better define his EF. This test showed normal EF, but a small area of reversibility. Dr. Alon Escalante noted that this is a low risk scan. He did recommend patient start Asa 81mg daily and also a statin. Sent lipitor 10mg daily to patient's pharmacy. Patient also notified via [employer]. Orders Placed This Encounter    atorvastatin (LIPITOR) 10 mg tablet     Sig: Take 1 Tab by mouth daily.      Dispense:  90 Tab     Refill:  1

## 2020-11-30 RX ORDER — METFORMIN HYDROCHLORIDE 500 MG/1
500 TABLET ORAL 2 TIMES DAILY WITH MEALS
Qty: 180 TAB | Refills: 1 | Status: SHIPPED | OUTPATIENT
Start: 2020-11-30 | End: 2021-06-02

## 2020-11-30 NOTE — TELEPHONE ENCOUNTER
Requested Prescriptions     Pending Prescriptions Disp Refills    metFORMIN (GLUCOPHAGE) 500 mg tablet 180 Tab 1     Sig: Take 1 Tab by mouth two (2) times daily (with meals).          [id number] - [address], [address] [place] [address] AT [address] [place] [address]  Phone: [phone]

## 2020-11-30 NOTE — TELEPHONE ENCOUNTER
Last OV 10/5/2020  Future Appointments   Date Time Provider Jenna Mcgregor   2/8/2021  9:40 AM Mariama Isaac MD [facility] KAYLIE HERNANDEZ [facility]B

## 2021-02-15 ENCOUNTER — OFFICE VISIT (OUTPATIENT)
Dept: INTERNAL MEDICINE CLINIC | Age: 86
End: 2021-02-15
Payer: MEDICARE

## 2021-02-15 VITALS
BODY MASS INDEX: 27.93 KG/M2 | TEMPERATURE: 97.5 F | HEIGHT: 69 IN | RESPIRATION RATE: 16 BRPM | SYSTOLIC BLOOD PRESSURE: 117 MMHG | OXYGEN SATURATION: 99 % | HEART RATE: 82 BPM | DIASTOLIC BLOOD PRESSURE: 61 MMHG | WEIGHT: 188.6 LBS

## 2021-02-15 DIAGNOSIS — Z79.899 ENCOUNTER FOR LONG-TERM (CURRENT) USE OF MEDICATIONS: ICD-10-CM

## 2021-02-15 DIAGNOSIS — E11.9 CONTROLLED TYPE 2 DIABETES MELLITUS WITHOUT COMPLICATION, WITHOUT LONG-TERM CURRENT USE OF INSULIN (HCC): Primary | ICD-10-CM

## 2021-02-15 DIAGNOSIS — E78.2 MIXED HYPERLIPIDEMIA: ICD-10-CM

## 2021-02-15 LAB
ALBUMIN SERPL-MCNC: 3.5 G/DL (ref 3.5–5)
ALBUMIN/GLOB SERPL: 1.2 {RATIO} (ref 1.1–2.2)
ALP SERPL-CCNC: 57 U/L (ref 45–117)
ALT SERPL-CCNC: 23 U/L (ref 12–78)
ANION GAP SERPL CALC-SCNC: 7 MMOL/L (ref 5–15)
AST SERPL-CCNC: 19 U/L (ref 15–37)
BASOPHILS # BLD: 0.1 K/UL (ref 0–0.1)
BASOPHILS NFR BLD: 1 % (ref 0–1)
BILIRUB SERPL-MCNC: 0.4 MG/DL (ref 0.2–1)
BUN SERPL-MCNC: 30 MG/DL (ref 6–20)
BUN/CREAT SERPL: 18 (ref 12–20)
CALCIUM SERPL-MCNC: 9 MG/DL (ref 8.5–10.1)
CHLORIDE SERPL-SCNC: 111 MMOL/L (ref 97–108)
CHOLEST SERPL-MCNC: 100 MG/DL
CO2 SERPL-SCNC: 24 MMOL/L (ref 21–32)
CREAT SERPL-MCNC: 1.67 MG/DL (ref 0.7–1.3)
CREAT UR-MCNC: 131 MG/DL
DIFFERENTIAL METHOD BLD: ABNORMAL
EOSINOPHIL # BLD: 0.1 K/UL (ref 0–0.4)
EOSINOPHIL NFR BLD: 1 % (ref 0–7)
ERYTHROCYTE [DISTWIDTH] IN BLOOD BY AUTOMATED COUNT: 13.9 % (ref 11.5–14.5)
EST. AVERAGE GLUCOSE BLD GHB EST-MCNC: 157 MG/DL
GLOBULIN SER CALC-MCNC: 2.9 G/DL (ref 2–4)
GLUCOSE SERPL-MCNC: 150 MG/DL (ref 65–100)
HBA1C MFR BLD: 7.1 % (ref 4–5.6)
HCT VFR BLD AUTO: 32.1 % (ref 36.6–50.3)
HDLC SERPL-MCNC: 49 MG/DL
HDLC SERPL: 2 {RATIO} (ref 0–5)
HGB BLD-MCNC: 9.6 G/DL (ref 12.1–17)
IMM GRANULOCYTES # BLD AUTO: 0 K/UL (ref 0–0.04)
IMM GRANULOCYTES NFR BLD AUTO: 0 % (ref 0–0.5)
LDLC SERPL CALC-MCNC: 30.8 MG/DL (ref 0–100)
LIPID PROFILE,FLP: NORMAL
LYMPHOCYTES # BLD: 1.6 K/UL (ref 0.8–3.5)
LYMPHOCYTES NFR BLD: 19 % (ref 12–49)
MCH RBC QN AUTO: 25.2 PG (ref 26–34)
MCHC RBC AUTO-ENTMCNC: 29.9 G/DL (ref 30–36.5)
MCV RBC AUTO: 84.3 FL (ref 80–99)
MICROALBUMIN UR-MCNC: 2.76 MG/DL
MICROALBUMIN/CREAT UR-RTO: 21 MG/G (ref 0–30)
MONOCYTES # BLD: 0.6 K/UL (ref 0–1)
MONOCYTES NFR BLD: 6 % (ref 5–13)
NEUTS SEG # BLD: 6.4 K/UL (ref 1.8–8)
NEUTS SEG NFR BLD: 73 % (ref 32–75)
NRBC # BLD: 0 K/UL (ref 0–0.01)
NRBC BLD-RTO: 0 PER 100 WBC
PLATELET # BLD AUTO: 356 K/UL (ref 150–400)
PMV BLD AUTO: 9.4 FL (ref 8.9–12.9)
POTASSIUM SERPL-SCNC: 5.2 MMOL/L (ref 3.5–5.1)
PROT SERPL-MCNC: 6.4 G/DL (ref 6.4–8.2)
RBC # BLD AUTO: 3.81 M/UL (ref 4.1–5.7)
SODIUM SERPL-SCNC: 142 MMOL/L (ref 136–145)
TRIGL SERPL-MCNC: 101 MG/DL (ref ?–150)
VLDLC SERPL CALC-MCNC: 20.2 MG/DL
WBC # BLD AUTO: 8.7 K/UL (ref 4.1–11.1)

## 2021-02-15 PROCEDURE — G8427 DOCREV CUR MEDS BY ELIG CLIN: HCPCS | Performed by: INTERNAL MEDICINE

## 2021-02-15 PROCEDURE — G8419 CALC BMI OUT NRM PARAM NOF/U: HCPCS | Performed by: INTERNAL MEDICINE

## 2021-02-15 PROCEDURE — G8510 SCR DEP NEG, NO PLAN REQD: HCPCS | Performed by: INTERNAL MEDICINE

## 2021-02-15 PROCEDURE — 1101F PT FALLS ASSESS-DOCD LE1/YR: CPT | Performed by: INTERNAL MEDICINE

## 2021-02-15 PROCEDURE — G8536 NO DOC ELDER MAL SCRN: HCPCS | Performed by: INTERNAL MEDICINE

## 2021-02-15 PROCEDURE — 99213 OFFICE O/P EST LOW 20 MIN: CPT | Performed by: INTERNAL MEDICINE

## 2021-02-15 RX ORDER — ASPIRIN 81 MG/1
81 TABLET ORAL DAILY
COMMUNITY

## 2021-02-15 NOTE — PROGRESS NOTES
Subjective:      Wiley Case is a 80 y.o. male who presents today for follow up of his diabetes mellitus type 2 and hyperlipidemia. Not checking glucose at home. Has increased flatulence. Some urinary incontinence. Compliant with use of his metformin. He averages 2-3 trips to the bathroom at night. Walking daily - up and down his driveway. 20 laps. 5 days per week - weather permitting. Consultants:  -Dr. Kodi Islas - sleep apnea  -diabetes treatment center - nutritional counseling prn  -Dr. Dory Craig - [facility] - yearly. -    Due for:  -eye - Dr. Susan Fajardo. [facility] eye. Couple months ago. Current Outpatient Medications   Medication Sig Dispense Refill    aspirin delayed-release 81 mg tablet Take 81 mg by mouth daily.  metFORMIN (GLUCOPHAGE) 500 mg tablet Take 1 Tab by mouth two (2) times daily (with meals). 180 Tab 1    atorvastatin (LIPITOR) 10 mg tablet Take 1 Tab by mouth daily. 90 Tab 1    glucose blood VI test strips (ASCENSIA AUTODISC VI, ONE TOUCH ULTRA TEST VI) strip Use to test blood sugars once daily DX:E11.9 100 Strip 3    lancets misc Use to test blood sugars once daily DX:E11.9 100 Each 1    Lancing Device misc Use to test blood sugars once daily DX:E11.9 1 Each 0    Lancing Device misc by Does Not Apply route.  Blood-Glucose Meter monitoring kit Use as directed  icd10- 1 Kit 0        Review of Systems    Pertinent items are noted in HPI. Objective:      Wt Readings from Last 3 Encounters:   10/05/20 186 lb (84.4 kg)   04/06/20 190 lb (86.2 kg)   01/15/20 197 lb (89.4 kg)     BP Readings from Last 3 Encounters:   10/05/20 110/60   04/09/20 132/71   01/15/20 120/78     Visit Vitals  /61   Pulse 82   Temp 97.5 °F (36.4 °C) (Temporal)   Resp 16   Ht 5' 9\" (1.753 m)   Wt 188 lb 9.6 oz (85.5 kg)   SpO2 99%   BMI 27.85 kg/m²     General appearance: alert, cooperative, no distress, appears stated age  Head: Normocephalic, without obvious abnormality, atraumatic  Neck: supple, symmetrical, trachea midline, no adenopathy, no carotid bruit and no JVD  Lungs: clear to auscultation bilaterally  Extremities: extremities normal, atraumatic, no cyanosis or edema  Pulses: 2+ and symmetric    Assessment/Plan:     1. Controlled type 2 diabetes mellitus without complication, without long-term current use of insulin ([facility] [address]  -check labs at this time. - MICROALBUMIN, UR, RAND W/ MICROALB/CREAT RATIO; Future  - HEMOGLOBIN A1C WITH EAG; Future  - LIPID PANEL; Future  - METABOLIC PANEL, COMPREHENSIVE; Future  - CBC WITH AUTOMATED DIFF; Future  - CBC WITH AUTOMATED DIFF  - METABOLIC PANEL, COMPREHENSIVE  - LIPID PANEL  - HEMOGLOBIN A1C WITH EAG  - MICROALBUMIN, UR, RAND W/ MICROALB/CREAT RATIO    2. Encounter for long-term (current) use of medications    3. hyperlipidemia   -lipid profile today.   -compliant with use of his atorvastatin. 4. History of prostate cancer  -continue per Dr. Thomas Ruiz. Follow-up Disposition:     Follow up 4 months      Return if symptoms worsen or fail to improve. Advised patient to call back or return to office if symptoms worsen/change/persist.     Discussed expected course/resolution/complications of diagnosis in detail with patient. Medication risks/benefits/costs/interactions/alternatives discussed with patient. Patient was given an after visit summary which includes diagnoses, current medications, & vitals. Patient expressed understanding with the diagnosis and plan.

## 2021-02-18 ENCOUNTER — TELEPHONE (OUTPATIENT)
Dept: INTERNAL MEDICINE CLINIC | Age: 86
End: 2021-02-18

## 2021-02-19 ENCOUNTER — TELEPHONE (OUTPATIENT)
Dept: INTERNAL MEDICINE CLINIC | Age: 86
End: 2021-02-19

## 2021-02-19 NOTE — TELEPHONE ENCOUNTER
2/19/2021. Spoke with patient. Patient' hgb has dropped significantly since his last labs done in October, 2020. He has not noted any hematochezia or melena. He has had some bright red blood with wiping due to some constipation lately. His last colonoscopy was done in 2012 with Dr. Werner Alvarado. He states that he has been feeling a little weaker and has had some intermittent lightheadedness with going from sitting to standing. Plan:  -recommended he start vitron - C 1 tablet daily. -recommended GI evaluation. Referred him to Dr. Daniel Gibbs. He will call for an appointment. He states understanding and agrees with plan.

## 2021-02-19 NOTE — LETTER
2/19/2021 3:30 PM 
 
 
Pito Zamora MD 
[facility] Fax: [phone] RE:    Saba Rosado YOB: 1932 Dear Dr. Thad Krause, 
 
I am referring my patient to you for evaluation of anemia. Mr. Kanu Wisdom is a very healthy 80year old gentleman who  I saw for his routine follow up on 2/15/21. On his labs it was noted that his hemoglobin has dropped from 13.8 to 9.6 just since October, 2020. His last screening colonoscopy was done in 2012. It was normal at that time. Please see his pertinent patient information below. Problem List:    
Patient Active Problem List  
 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    
Current Outpatient Medications Medication Sig  
 aspirin delayed-release 81 mg tablet Take 81 mg by mouth daily.  metFORMIN (GLUCOPHAGE) 500 mg tablet Take 1 Tab by mouth two (2) times daily (with meals).  atorvastatin (LIPITOR) 10 mg tablet Take 1 Tab by mouth daily.  glucose blood VI test strips (ASCENSIA AUTODISC VI, ONE TOUCH ULTRA TEST VI) strip Use to test blood sugars once daily DX:E11.9  
 lancets misc Use to test blood sugars once daily DX:E11.9  Lancing Device misc Use to test blood sugars once daily DX:E11.9  Lancing Device misc by Does Not Apply route. Results for orders placed or performed in visit on 02/15/21 CBC WITH AUTOMATED DIFF Result Value Ref Range WBC 8.7 4.1 - 11.1 K/uL  
 RBC 3.81 (L) 4.10 - 5.70 M/uL HGB 9.6 (L) 12.1 - 17.0 g/dL HCT 32.1 (L) 36.6 - 50.3 %  MCV 84.3 80.0 - 99.0 FL  
 MCH 25.2 (L) 26.0 - 34.0 PG  
 MCHC 29.9 (L) 30.0 - 36.5 g/dL  
 RDW 13.9 11.5 - 14.5 % PLATELET 107 624 - 640 K/uL MPV 9.4 8.9 - 12.9 FL  
 NRBC 0.0 0  WBC ABSOLUTE NRBC 0.00 0.00 - 0.01 K/uL NEUTROPHILS 73 32 - 75 % LYMPHOCYTES 19 12 - 49 % MONOCYTES 6 5 - 13 % EOSINOPHILS 1 0 - 7 % BASOPHILS 1 0 - 1 % IMMATURE GRANULOCYTES 0 0.0 - 0.5 % ABS. NEUTROPHILS 6.4 1.8 - 8.0 K/UL  
 ABS. LYMPHOCYTES 1.6 0.8 - 3.5 K/UL  
 ABS. MONOCYTES 0.6 0.0 - 1.0 K/UL  
 ABS. EOSINOPHILS 0.1 0.0 - 0.4 K/UL  
 ABS. BASOPHILS 0.1 0.0 - 0.1 K/UL  
 ABS. IMM. GRANS. 0.0 0.00 - 0.04 K/UL  
 DF AUTOMATED METABOLIC PANEL, COMPREHENSIVE Result Value Ref Range Sodium 142 136 - 145 mmol/L Potassium 5.2 (H) 3.5 - 5.1 mmol/L Chloride 111 (H) 97 - 108 mmol/L  
 CO2 24 21 - 32 mmol/L Anion gap 7 5 - 15 mmol/L Glucose 150 (H) 65 - 100 mg/dL BUN 30 (H) 6 - 20 MG/DL Creatinine 1.67 (H) 0.70 - 1.30 MG/DL  
 BUN/Creatinine ratio 18 12 - 20 GFR est AA 47 (L) >60 ml/min/1.73m2 GFR est non-AA 39 (L) >60 ml/min/1.73m2 Calcium 9.0 8.5 - 10.1 MG/DL Bilirubin, total 0.4 0.2 - 1.0 MG/DL  
 ALT (SGPT) 23 12 - 78 U/L  
 AST (SGOT) 19 15 - 37 U/L Alk. phosphatase 57 45 - 117 U/L Protein, total 6.4 6.4 - 8.2 g/dL Albumin 3.5 3.5 - 5.0 g/dL Globulin 2.9 2.0 - 4.0 g/dL A-G Ratio 1.2 1.1 - 2.2 LIPID PANEL Result Value Ref Range LIPID PROFILE Cholesterol, total 100 <200 MG/DL Triglyceride 101 <150 MG/DL  
 HDL Cholesterol 49 MG/DL  
 LDL, calculated 30.8 0 - 100 MG/DL VLDL, calculated 20.2 MG/DL  
 CHOL/HDL Ratio 2.0 0.0 - 5.0 HEMOGLOBIN A1C WITH EAG Result Value Ref Range Hemoglobin A1c 7.1 (H) 4.0 - 5.6 % Est. average glucose 157 mg/dL MICROALBUMIN, UR, RAND W/ MICROALB/CREAT RATIO Result Value Ref Range Microalbumin,urine random 2.76 MG/DL Creatinine, urine 131.00 mg/dL Microalbumin/Creat ratio (mg/g creat) 21 0 - 30 mg/g I appreciate your assistance in Mr. Ruby Stearns care  and look forward to your findings and recommendations. Sincerely, Yumiko Ga MD

## 2021-02-22 ENCOUNTER — TELEPHONE (OUTPATIENT)
Dept: INTERNAL MEDICINE CLINIC | Age: 86
End: 2021-02-22

## 2021-02-22 NOTE — TELEPHONE ENCOUNTER
mauricio patel [phone]     fax [phone] att: Tonie Monae     Received request to schedule this pt , but did not receive demographics

## 2021-03-06 ENCOUNTER — IMMUNIZATION (OUTPATIENT)
Dept: FAMILY MEDICINE CLINIC | Age: 86
End: 2021-03-06

## 2021-03-06 DIAGNOSIS — Z23 ENCOUNTER FOR IMMUNIZATION: Primary | ICD-10-CM

## 2021-03-06 PROCEDURE — 0001A COVID-19, MRNA, LNP-S, PF, 30MCG/0.3ML DOSE(PFIZER): CPT

## 2021-03-06 PROCEDURE — 91300 COVID-19, MRNA, LNP-S, PF, 30MCG/0.3ML DOSE(PFIZER): CPT

## 2021-03-27 ENCOUNTER — IMMUNIZATION (OUTPATIENT)
Dept: FAMILY MEDICINE CLINIC | Age: 86
End: 2021-03-27
Payer: MEDICARE

## 2021-03-27 DIAGNOSIS — Z23 ENCOUNTER FOR IMMUNIZATION: Primary | ICD-10-CM

## 2021-03-27 PROCEDURE — 91300 COVID-19, MRNA, LNP-S, PF, 30MCG/0.3ML DOSE(PFIZER): CPT

## 2021-04-13 ENCOUNTER — TELEPHONE (OUTPATIENT)
Dept: INTERNAL MEDICINE CLINIC | Age: 86
End: 2021-04-13

## 2021-04-13 DIAGNOSIS — D50.8 OTHER IRON DEFICIENCY ANEMIA: Primary | ICD-10-CM

## 2021-04-13 NOTE — TELEPHONE ENCOUNTER
Patient wants Dr. Alexander Josephine to know he has talked to Dr. Robby Gallego (GI) who wants to order more bloodwork in a month.

## 2021-04-14 NOTE — TELEPHONE ENCOUNTER
Spoke with patient 4/14/2021.  -patient has been on iron supplement since 2/2021 due to anemia. He has seen Dr. Nathan Krause. She requested that he have a follow up CBC. If he is still anemic, she will do a colonoscopy. If he is no longer anemic, she may not need to do this procedure - he reports. I have not received any communication from Dr. Nathan Krause yet. I will mail patient a lab slip to have his CBC and iron levels rechecked in about 1 month. He states understanding and agrees with plan.

## 2021-05-10 RX ORDER — LANCETS
EACH MISCELLANEOUS
Qty: 100 EACH | Refills: 1 | Status: SHIPPED | OUTPATIENT
Start: 2021-05-10 | End: 2021-11-30

## 2021-05-10 NOTE — TELEPHONE ENCOUNTER
Last OV 2/15/2021  Future Appointments   Date Time Provider Jenna Mcgregor   6/15/2021  8:00 AM Vonnie Ceballos MD [facility]

## 2021-05-10 NOTE — TELEPHONE ENCOUNTER
Last OV 2/15/2021  Future Appointments   Date Time Provider Jenna Mcgregor   6/15/2021  8:00 AM Steve Saavedra MD [facility]

## 2021-05-26 NOTE — ED NOTES
Caller: Kp Hernandez    Relationship: Self    Best call back number: [phone]    Medication needed:   Requested Prescriptions     Pending Prescriptions Disp Refills   • HYDROcodone-acetaminophen (NORCO)  MG per tablet 120 tablet 0     Sig: Take 1 tablet by mouth Every 6 (Six) Hours As Needed for Moderate Pain .       When do you need the refill by: ASAP        Does the patient have less than a 3 day supply:  [x] Yes  [] No    What is the patient's preferred pharmacy:  [address] ([facility]), [place] - [address] [facility] [phone] [facility] [phone] FX           Wife of patient updated on patient room status and room assignment. No

## 2021-06-15 ENCOUNTER — OFFICE VISIT (OUTPATIENT)
Dept: INTERNAL MEDICINE CLINIC | Age: 86
End: 2021-06-15
Payer: MEDICARE

## 2021-06-15 VITALS
TEMPERATURE: 97.3 F | DIASTOLIC BLOOD PRESSURE: 81 MMHG | HEART RATE: 87 BPM | HEIGHT: 69 IN | OXYGEN SATURATION: 99 % | SYSTOLIC BLOOD PRESSURE: 118 MMHG | WEIGHT: 192 LBS | BODY MASS INDEX: 28.44 KG/M2 | RESPIRATION RATE: 16 BRPM

## 2021-06-15 DIAGNOSIS — E11.9 CONTROLLED TYPE 2 DIABETES MELLITUS WITHOUT COMPLICATION, WITHOUT LONG-TERM CURRENT USE OF INSULIN (HCC): Primary | ICD-10-CM

## 2021-06-15 DIAGNOSIS — E78.2 MIXED HYPERLIPIDEMIA: ICD-10-CM

## 2021-06-15 DIAGNOSIS — Z79.899 ENCOUNTER FOR LONG-TERM (CURRENT) USE OF MEDICATIONS: ICD-10-CM

## 2021-06-15 PROBLEM — Z87.19 HISTORY OF SMALL BOWEL OBSTRUCTION: Status: ACTIVE | Noted: 2020-04-06

## 2021-06-15 LAB
ALBUMIN SERPL-MCNC: 3.5 G/DL (ref 3.5–5)
ALBUMIN/GLOB SERPL: 1.3 {RATIO} (ref 1.1–2.2)
ALP SERPL-CCNC: 60 U/L (ref 45–117)
ALT SERPL-CCNC: 26 U/L (ref 12–78)
ANION GAP SERPL CALC-SCNC: 7 MMOL/L (ref 5–15)
AST SERPL-CCNC: 20 U/L (ref 15–37)
BILIRUB SERPL-MCNC: 0.3 MG/DL (ref 0.2–1)
BUN SERPL-MCNC: 20 MG/DL (ref 6–20)
BUN/CREAT SERPL: 16 (ref 12–20)
CALCIUM SERPL-MCNC: 9.2 MG/DL (ref 8.5–10.1)
CHLORIDE SERPL-SCNC: 110 MMOL/L (ref 97–108)
CO2 SERPL-SCNC: 24 MMOL/L (ref 21–32)
CREAT SERPL-MCNC: 1.29 MG/DL (ref 0.7–1.3)
CREAT UR-MCNC: 115 MG/DL
EST. AVERAGE GLUCOSE BLD GHB EST-MCNC: 151 MG/DL
GLOBULIN SER CALC-MCNC: 2.8 G/DL (ref 2–4)
GLUCOSE SERPL-MCNC: 162 MG/DL (ref 65–100)
HBA1C MFR BLD: 6.9 % (ref 4–5.6)
MICROALBUMIN UR-MCNC: 2.13 MG/DL
MICROALBUMIN/CREAT UR-RTO: 19 MG/G (ref 0–30)
POTASSIUM SERPL-SCNC: 5.1 MMOL/L (ref 3.5–5.1)
PROT SERPL-MCNC: 6.3 G/DL (ref 6.4–8.2)
SODIUM SERPL-SCNC: 141 MMOL/L (ref 136–145)

## 2021-06-15 PROCEDURE — G8427 DOCREV CUR MEDS BY ELIG CLIN: HCPCS | Performed by: INTERNAL MEDICINE

## 2021-06-15 PROCEDURE — 99213 OFFICE O/P EST LOW 20 MIN: CPT | Performed by: INTERNAL MEDICINE

## 2021-06-15 PROCEDURE — G8510 SCR DEP NEG, NO PLAN REQD: HCPCS | Performed by: INTERNAL MEDICINE

## 2021-06-15 PROCEDURE — G8419 CALC BMI OUT NRM PARAM NOF/U: HCPCS | Performed by: INTERNAL MEDICINE

## 2021-06-15 PROCEDURE — G8536 NO DOC ELDER MAL SCRN: HCPCS | Performed by: INTERNAL MEDICINE

## 2021-06-15 PROCEDURE — 1101F PT FALLS ASSESS-DOCD LE1/YR: CPT | Performed by: INTERNAL MEDICINE

## 2021-06-15 RX ORDER — IRON,CARBONYL/ASCORBIC ACID 65MG-125MG
TABLET, DELAYED RELEASE (ENTERIC COATED) ORAL
COMMUNITY
End: 2021-12-06

## 2021-06-15 NOTE — PROGRESS NOTES
Verified name and birth date for privacy precautions. Chart reviewed in preparation for today's visit. Chief Complaint   Patient presents with    Diabetes          Health Maintenance Due   Topic    Shingrix Vaccine Age 50> (1 of 2)    Eye Exam Retinal or Dilated          Wt Readings from Last 3 Encounters:   06/15/21 192 lb (87.1 kg)   02/15/21 188 lb 9.6 oz (85.5 kg)   10/05/20 186 lb (84.4 kg)     Temp Readings from Last 3 Encounters:   06/15/21 97.3 °F (36.3 °C) (Temporal)   02/15/21 97.5 °F (36.4 °C) (Temporal)   10/05/20 97.3 °F (36.3 °C) (Temporal)     BP Readings from Last 3 Encounters:   06/15/21 118/81   02/15/21 117/61   10/05/20 110/60     Pulse Readings from Last 3 Encounters:   06/15/21 87   02/15/21 82   10/05/20 87         Learning Assessment:  :     Learning Assessment 1/15/2020 1/17/2019 10/3/2017 11/9/2016 5/5/2015 2/21/2014   PRIMARY LEARNER Patient Patient Patient Patient Patient Patient   HIGHEST LEVEL OF EDUCATION - PRIMARY LEARNER  > 4 YEARS OF COLLEGE > 4 YEARS OF COLLEGE > 4 YEARS OF COLLEGE - > 4 YEARS OF COLLEGE > 4 YEARS OF COLLEGE   BARRIERS PRIMARY LEARNER NONE NONE NONE - NONE NONE   CO-LEARNER CAREGIVER No - No - No No   PRIMARY LANGUAGE ENGLISH ENGLISH ENGLISH ENGLISH ENGLISH ENGLISH    NEED - - - - No No   LEARNER PREFERENCE PRIMARY READING DEMONSTRATION DEMONSTRATION DEMONSTRATION READING DEMONSTRATION     - - - - DEMONSTRATION READING     - - - - - LISTENING   LEARNING SPECIAL TOPICS - - - - no no   ANSWERED BY patient  patient patient patient patient patient   RELATIONSHIP SELF SELF SELF SELF SELF SELF   ASSESSMENT COMMENT - - - - n/a -       Depression Screening:  :     3 most recent PHQ Screens 6/15/2021   Little interest or pleasure in doing things Not at all   Feeling down, depressed, irritable, or hopeless Not at all   Total Score PHQ 2 0       Fall Risk Assessment:  :     Fall Risk Assessment, last 12 mths 6/15/2021   Able to walk?  Yes   Fall in past 15 months? 0   Do you feel unsteady? 0   Are you worried about falling 0   Number of falls in past 12 months -   Fall with injury? -       Abuse Screening:  :     Abuse Screening Questionnaire 6/15/2021 1/15/2020 7/8/2019 1/17/2019 10/3/2017 5/5/2015 2/21/2014   Do you ever feel afraid of your partner? N N N N N N N   Are you in a relationship with someone who physically or mentally threatens you? N N N N N N N   Is it safe for you to go home?  Vaughn Gauthier

## 2021-06-15 NOTE — PROGRESS NOTES
Assessment/Plan:     1. Controlled type 2 diabetes mellitus without complication, without long-term current use of insulin (HCC)  -glucose appears controlled. -working on maintaining diabetes mellitus eit.   -check labs     - METABOLIC PANEL, COMPREHENSIVE; Future  - HEMOGLOBIN A1C WITH EAG; Future  - MICROALBUMIN, UR, RAND W/ MICROALB/CREAT RATIO; Future  - MICROALBUMIN, UR, RAND W/ MICROALB/CREAT RATIO  - HEMOGLOBIN A1C WITH EAG  - METABOLIC PANEL, COMPREHENSIVE    2. Mixed hyperlipidemia  -last fasting lipid panel done 2/2021. No adjustments needed at this time. 3. Encounter for long-term (current) use of medications    Orders Placed This Encounter    METABOLIC PANEL, COMPREHENSIVE     Standing Status:   Future     Number of Occurrences:   1     Standing Expiration Date:   6/15/2022    HEMOGLOBIN A1C WITH EAG     Standing Status:   Future     Number of Occurrences:   1     Standing Expiration Date:   6/15/2022    MICROALBUMIN, UR, RAND W/ MICROALB/CREAT RATIO     Standing Status:   Future     Number of Occurrences:   1     Standing Expiration Date:   6/15/2022    iron,carbonyl-vitamin C (Vitron-C) 65 mg iron- 125 mg TbEC     Sig: Take  by mouth. Follow-up Disposition:     Follow up 4 months          Disclaimer:  Return if symptoms worsen or fail to improve. Advised patient to call back or return to office if symptoms worsen/change/persist.     Discussed expected course/resolution/complications of diagnosis in detail with patient. Medication risks/benefits/costs/interactions/alternatives discussed with patient. Patient was given an after visit summary which includes diagnoses, current medications, & vitals. Patient expressed understanding with the diagnosis and plan. Subjective:      Ada Lo is a 80 y.o. male who presents today for follow up of his diabetes mellitus type 2, hyperlipidemia and anemia    Since last visit:  -glucose averaging around 150.  -no bowel changes. Lots of flatulence.  -no longer anemic.   -pacemaker check every 6 months with his cardiologist.  No problems. Patient Care Team:  -Dr. Parker Gallego - RONNY- using cpap  -Dr. Diana Martinez - urology. - last month  -Dr. Gavi Hughes - cardiology- heart block. Has pacemaker. Current Outpatient Medications   Medication Sig Dispense Refill    iron,carbonyl-vitamin C (Vitron-C) 65 mg iron- 125 mg TbEC Take  by mouth.  metFORMIN (GLUCOPHAGE) 500 mg tablet TAKE 1 TABLET BY MOUTH TWICE DAILY WITH MEALS 180 Tablet 1    atorvastatin (LIPITOR) 10 mg tablet TAKE 1 TABLET BY MOUTH DAILY 90 Tab 1    aspirin delayed-release 81 mg tablet Take 81 mg by mouth daily.  glucose blood VI test strips (ASCENSIA AUTODISC VI, ONE TOUCH ULTRA TEST VI) strip Use to test blood sugars once daily DX:E11.9 100 Strip 3    lancets misc Use to test blood sugars once daily DX:E11.9 100 Each 1    Lancing Device misc Use to test blood sugars once daily DX:E11.9 1 Each 0    Blood-Glucose Meter monitoring kit Use as directed  icd10- 1 Kit 0        Review of Systems    Pertinent items are noted in HPI. Objective:      Wt Readings from Last 3 Encounters:   06/15/21 192 lb (87.1 kg)   02/15/21 188 lb 9.6 oz (85.5 kg)   10/05/20 186 lb (84.4 kg)     BP Readings from Last 3 Encounters:   06/15/21 118/81   02/15/21 117/61   10/05/20 110/60     Visit Vitals  /81 (BP 1 Location: Left arm, BP Patient Position: Sitting, BP Cuff Size: Large adult)   Pulse 87   Temp 97.3 °F (36.3 °C) (Temporal)   Resp 16   Ht 5' 9\" (1.753 m) Comment: obtained at previosu visit   Wt 192 lb (87.1 kg)   SpO2 99%   BMI 28.35 kg/m²     General appearance: alert, cooperative, no distress, appears stated age  Head: Normocephalic, without obvious abnormality, atraumatic  Neck: supple, symmetrical, trachea midline, no adenopathy, no carotid bruit and no JVD  Lungs: clear to auscultation bilaterally  Heart: regular rate and rhythm, S1, S2 normal, no murmur, click, rub or gallop  Extremities: no edema

## 2021-07-23 PROBLEM — Z01.00 ROUTINE EYE EXAM: Chronic | Status: ACTIVE | Noted: 2021-07-23

## 2021-10-07 ENCOUNTER — OFFICE VISIT (OUTPATIENT)
Dept: INTERNAL MEDICINE CLINIC | Age: 86
End: 2021-10-07
Payer: MEDICARE

## 2021-10-07 VITALS
TEMPERATURE: 97.3 F | OXYGEN SATURATION: 99 % | BODY MASS INDEX: 27.99 KG/M2 | DIASTOLIC BLOOD PRESSURE: 74 MMHG | SYSTOLIC BLOOD PRESSURE: 113 MMHG | RESPIRATION RATE: 16 BRPM | WEIGHT: 189 LBS | HEIGHT: 69 IN | HEART RATE: 73 BPM

## 2021-10-07 DIAGNOSIS — E78.2 MIXED HYPERLIPIDEMIA: ICD-10-CM

## 2021-10-07 DIAGNOSIS — Z23 NEEDS FLU SHOT: ICD-10-CM

## 2021-10-07 DIAGNOSIS — Z79.899 ENCOUNTER FOR LONG-TERM (CURRENT) USE OF MEDICATIONS: ICD-10-CM

## 2021-10-07 DIAGNOSIS — E11.9 CONTROLLED TYPE 2 DIABETES MELLITUS WITHOUT COMPLICATION, WITHOUT LONG-TERM CURRENT USE OF INSULIN (HCC): ICD-10-CM

## 2021-10-07 DIAGNOSIS — Z00.00 MEDICARE ANNUAL WELLNESS VISIT, SUBSEQUENT: Primary | ICD-10-CM

## 2021-10-07 DIAGNOSIS — Z13.31 SCREENING FOR DEPRESSION: ICD-10-CM

## 2021-10-07 LAB
ALBUMIN SERPL-MCNC: 3.3 G/DL (ref 3.5–5)
ALBUMIN/GLOB SERPL: 1.1 {RATIO} (ref 1.1–2.2)
ALP SERPL-CCNC: 65 U/L (ref 45–117)
ALT SERPL-CCNC: 24 U/L (ref 12–78)
ANION GAP SERPL CALC-SCNC: 2 MMOL/L (ref 5–15)
AST SERPL-CCNC: 20 U/L (ref 15–37)
BASOPHILS # BLD: 0.1 K/UL (ref 0–0.1)
BASOPHILS NFR BLD: 2 % (ref 0–1)
BILIRUB SERPL-MCNC: 0.6 MG/DL (ref 0.2–1)
BUN SERPL-MCNC: 23 MG/DL (ref 6–20)
BUN/CREAT SERPL: 17 (ref 12–20)
CALCIUM SERPL-MCNC: 9.1 MG/DL (ref 8.5–10.1)
CHLORIDE SERPL-SCNC: 111 MMOL/L (ref 97–108)
CO2 SERPL-SCNC: 26 MMOL/L (ref 21–32)
CREAT SERPL-MCNC: 1.39 MG/DL (ref 0.7–1.3)
DIFFERENTIAL METHOD BLD: ABNORMAL
EOSINOPHIL # BLD: 0.1 K/UL (ref 0–0.4)
EOSINOPHIL NFR BLD: 2 % (ref 0–7)
ERYTHROCYTE [DISTWIDTH] IN BLOOD BY AUTOMATED COUNT: 13.6 % (ref 11.5–14.5)
EST. AVERAGE GLUCOSE BLD GHB EST-MCNC: 169 MG/DL
GLOBULIN SER CALC-MCNC: 3.1 G/DL (ref 2–4)
GLUCOSE SERPL-MCNC: 127 MG/DL (ref 65–100)
HBA1C MFR BLD: 7.5 % (ref 4–5.6)
HCT VFR BLD AUTO: 40.6 % (ref 36.6–50.3)
HGB BLD-MCNC: 12.9 G/DL (ref 12.1–17)
IMM GRANULOCYTES # BLD AUTO: 0 K/UL (ref 0–0.04)
IMM GRANULOCYTES NFR BLD AUTO: 0 % (ref 0–0.5)
LYMPHOCYTES # BLD: 1.7 K/UL (ref 0.8–3.5)
LYMPHOCYTES NFR BLD: 23 % (ref 12–49)
MCH RBC QN AUTO: 28.6 PG (ref 26–34)
MCHC RBC AUTO-ENTMCNC: 31.8 G/DL (ref 30–36.5)
MCV RBC AUTO: 90 FL (ref 80–99)
MONOCYTES # BLD: 0.6 K/UL (ref 0–1)
MONOCYTES NFR BLD: 8 % (ref 5–13)
NEUTS SEG # BLD: 4.8 K/UL (ref 1.8–8)
NEUTS SEG NFR BLD: 65 % (ref 32–75)
NRBC # BLD: 0 K/UL (ref 0–0.01)
NRBC BLD-RTO: 0 PER 100 WBC
PLATELET # BLD AUTO: 264 K/UL (ref 150–400)
PMV BLD AUTO: 9.6 FL (ref 8.9–12.9)
POTASSIUM SERPL-SCNC: 5.5 MMOL/L (ref 3.5–5.1)
PROT SERPL-MCNC: 6.4 G/DL (ref 6.4–8.2)
RBC # BLD AUTO: 4.51 M/UL (ref 4.1–5.7)
SODIUM SERPL-SCNC: 139 MMOL/L (ref 136–145)
WBC # BLD AUTO: 7.3 K/UL (ref 4.1–11.1)

## 2021-10-07 PROCEDURE — G8419 CALC BMI OUT NRM PARAM NOF/U: HCPCS | Performed by: INTERNAL MEDICINE

## 2021-10-07 PROCEDURE — G0439 PPPS, SUBSEQ VISIT: HCPCS | Performed by: INTERNAL MEDICINE

## 2021-10-07 PROCEDURE — G8427 DOCREV CUR MEDS BY ELIG CLIN: HCPCS | Performed by: INTERNAL MEDICINE

## 2021-10-07 PROCEDURE — 90694 VACC AIIV4 NO PRSRV 0.5ML IM: CPT | Performed by: INTERNAL MEDICINE

## 2021-10-07 PROCEDURE — G8536 NO DOC ELDER MAL SCRN: HCPCS | Performed by: INTERNAL MEDICINE

## 2021-10-07 PROCEDURE — 1101F PT FALLS ASSESS-DOCD LE1/YR: CPT | Performed by: INTERNAL MEDICINE

## 2021-10-07 PROCEDURE — G0008 ADMIN INFLUENZA VIRUS VAC: HCPCS | Performed by: INTERNAL MEDICINE

## 2021-10-07 PROCEDURE — G8510 SCR DEP NEG, NO PLAN REQD: HCPCS | Performed by: INTERNAL MEDICINE

## 2021-10-07 RX ORDER — SILODOSIN 8 MG/1
8 CAPSULE ORAL
COMMUNITY
End: 2022-03-15

## 2021-10-07 NOTE — PATIENT INSTRUCTIONS
Medicare Wellness Visit, Male    The best way to live healthy is to have a lifestyle where you eat a well-balanced diet, exercise regularly, limit alcohol use, and quit all forms of tobacco/nicotine, if applicable. Regular preventive services are another way to keep healthy. Preventive services (vaccines, screening tests, monitoring & exams) can help personalize your care plan, which helps you manage your own care. Screening tests can find health problems at the earliest stages, when they are easiest to treat. [facility] follows the current, evidence-based guidelines published by the [facility] Srini Skyler ([facility]) when recommending preventive services for our patients. Because we follow these guidelines, sometimes recommendations change over time as research supports it. (For example, a prostate screening blood test is no longer routinely recommended for men with no symptoms). Of course, you and your doctor may decide to screen more often for some diseases, based on your risk and co-morbidities (chronic disease you are already diagnosed with). Preventive services for you include:  - Medicare offers their members a free annual wellness visit, which is time for you and your primary care provider to discuss and plan for your preventive service needs. Take advantage of this benefit every year!  -All adults over age 72 should receive the recommended pneumonia vaccines. Current USPSTF guidelines recommend a series of two vaccines for the best pneumonia protection.   -All adults should have a flu vaccine yearly and tetanus vaccine every 10 years.  -All adults age 48 and older should receive the shingles vaccines (series of two vaccines).        -All adults age 38-68 who are overweight should have a diabetes screening test once every three years.   -Other screening tests & preventive services for persons with diabetes include: an eye exam to screen for diabetic retinopathy, a kidney function test, a foot exam, and stricter control over your cholesterol.   -Cardiovascular screening for adults with routine risk involves an electrocardiogram (ECG) at intervals determined by the provider.   -Colorectal cancer screening should be done for adults age 54-65 with no increased risk factors for colorectal cancer. There are a number of acceptable methods of screening for this type of cancer. Each test has its own benefits and drawbacks. Discuss with your provider what is most appropriate for you during your annual wellness visit. The different tests include: colonoscopy (considered the best screening method), a fecal occult blood test, a fecal DNA test, and sigmoidoscopy.  -All adults born between [facility] should be screened once for Hepatitis C.  -An Abdominal Aortic Aneurysm (AAA) Screening is recommended for men age 73-68 who has ever smoked in their lifetime. Here is a list of your current Health Maintenance items (your personalized list of preventive services) with a due date:  Health Maintenance Due   Topic Date Due    Shingles Vaccine (1 of 2) Never done    Eye Exam  08/22/2020    Yearly Flu Vaccine (1) 09/01/2021     Vaccine Information Statement    Influenza (Flu) Vaccine (Inactivated or Recombinant): What You Need to Know    Many vaccine information statements are available in [place] and other languages. See www.immunize.org/vis. Hojas de información sobre vacunas están disponibles en español y en muchos otros idiomas. Visite www.immunize.org/vis. 1. Why get vaccinated? Influenza vaccine can prevent influenza (flu). Flu is a contagious disease that spreads around the [place] every year, usually between October and May. Anyone can get the flu, but it is more dangerous for some people.  Infants and young children, people 72 years and older, pregnant people, and people with certain health conditions or a weakened immune system are at greatest risk of flu complications. Pneumonia, bronchitis, sinus infections, and ear infections are examples of flu-related complications. If you have a medical condition, such as heart disease, cancer, or diabetes, flu can make it worse. Flu can cause fever and chills, sore throat, muscle aches, fatigue, cough, headache, and runny or stuffy nose. Some people may have vomiting and diarrhea, though this is more common in children than adults. In an average year, thousands of people in the [facility] die from flu, and many more are hospitalized. Flu vaccine prevents millions of illnesses and flu-related visits to the doctor each year. 2. Influenza vaccines     CDC recommends everyone 6 months and older get vaccinated every flu season. Children 6 months through 6years of age may need 2 doses during a single flu season. Everyone else needs only 1 dose each flu season. It takes about 2 weeks for protection to develop after vaccination. There are many flu viruses, and they are always changing. Each year a new flu vaccine is made to protect against the influenza viruses believed to be likely to cause disease in the upcoming flu season. Even when the vaccine doesnt exactly match these viruses, it may still provide some protection. Influenza vaccine does not cause flu. Influenza vaccine may be given at the same time as other vaccines. 3. Talk with your health care provider    Tell your vaccination provider if the person getting the vaccine:   Has had an allergic reaction after a previous dose of influenza vaccine, or has any severe, life-threatening allergies    Has ever had Guillain-Barré Syndrome (also called GBS)    In some cases, your health care provider may decide to postpone influenza vaccination until a future visit. Influenza vaccine can be administered at any time during pregnancy.  People who are or will be pregnant during influenza season should receive inactivated influenza vaccine. People with minor illnesses, such as a cold, may be vaccinated. People who are moderately or severely ill should usually wait until they recover before getting influenza vaccine. Your health care provider can give you more information. 4. Risks of a vaccine reaction     Soreness, redness, and swelling where the shot is given, fever, muscle aches, and headache can happen after influenza vaccination.  There may be a very small increased risk of Guillain-Barré Syndrome (GBS) after inactivated influenza vaccine (the flu shot). Marla Claudio children who get the flu shot along with pneumococcal vaccine (PCV13) and/or DTaP vaccine at the same time might be slightly more likely to have a seizure caused by fever. Tell your health care provider if a child who is getting flu vaccine has ever had a seizure. People sometimes faint after medical procedures, including vaccination. Tell your provider if you feel dizzy or have vision changes or ringing in the ears. As with any medicine, there is a very remote chance of a vaccine causing a severe allergic reaction, other serious injury, or death. 5. What if there is a serious problem? An allergic reaction could occur after the vaccinated person leaves the clinic. If you see signs of a severe allergic reaction (hives, swelling of the face and throat, difficulty breathing, a fast heartbeat, dizziness, or weakness), call 9-1-1 and get the person to the nearest hospital.    For other signs that concern you, call your health care provider. Adverse reactions should be reported to the Vaccine Adverse Event Reporting System (VAERS). Your health care provider will usually file this report, or you can do it yourself. Visit the VAERS website at www.vaers. hhs.gov or call 1-139.624.1440. VAERS is only for reporting reactions, and VAERS staff members do not give medical advice.     6. The National Vaccine Injury Compensation Program    The [employer] Vaccine Injury Compensation Program (VICP) is a federal program that was created to compensate people who may have been injured by certain vaccines. Claims regarding alleged injury or death due to vaccination have a time limit for filing, which may be as short as two years. Visit the VICP website at www.[facility].gov/vaccinecompensation or call 6-983.156.1445 to learn about the program and about filing a claim. 7. How can I learn more?  Ask your health care provider.  Call your local or state health department.  Visit the website of the Food and Drug Administration (FDA) for vaccine package inserts and additional information at www.fda.gov/vaccines-blood-biologics/vaccines.  Contact the Centers for Disease Control and Prevention (CDC):  - Call 3-103.708.5542 (1-800-CDC-INFO) or  - Visit CDCs influenza website at www.cdc.gov/flu. Vaccine Information Statement   Inactivated Influenza Vaccine   8/6/2021  42 NITHIN Suero [id number]   Department of Health and Human Services  Centers for Disease Control and Prevention    Office Use Only

## 2021-10-07 NOTE — PROGRESS NOTES
Assessment/Plan:     1. Controlled type 2 diabetes mellitus without complication, without long-term current use of insulin ([address]  -home readings show that glucose appears to be in good control. Check labs today. - CBC WITH AUTOMATED DIFF; Future  - METABOLIC PANEL, COMPREHENSIVE; Future  - HEMOGLOBIN A1C WITH EAG; Future  - HEMOGLOBIN A1C WITH EAG  - METABOLIC PANEL, COMPREHENSIVE  - CBC WITH AUTOMATED DIFF    2. Mixed hyperlipidemia      3. Encounter for long-term (current) use of medications      4. Medicare annual wellness visit, subsequent  -completed today.   -[facility] decision maker reviewed with patient and updated. - DEPRESSION SCREEN ANNUAL    5. Screening for depression    - DEPRESSION SCREEN ANNUAL    6. Needs flu shot  - Patient received flu vaccine today without any complications.     - FLU (FLUAD QUAD INFLUENZA VACCINE,QUAD,ADJUVANTED)     Orders Placed This Encounter    ANNUAL DEPRESSION SCREEN 8-15 MIN    Influenza Vaccine, QUAD, 65 Yrs +  IM  (Fluad [id number] )    CBC WITH AUTOMATED DIFF     Standing Status:   Future     Number of Occurrences:   1     Standing Expiration Date:   12/4/1829    METABOLIC PANEL, COMPREHENSIVE     Standing Status:   Future     Number of Occurrences:   1     Standing Expiration Date:   10/7/2022    HEMOGLOBIN A1C WITH EAG     Standing Status:   Future     Number of Occurrences:   1     Standing Expiration Date:   10/7/2022    silodosin (Rapaflo) 8 mg capsule     Sig: Take 8 mg by mouth daily (with breakfast). Follow-up Disposition:     Follow up 4 months                Subjective:      Daryl Wilkerson is a 80 y.o. male who presents today for follow up of his diabetes mellitus type 2, hyperlipidemia     Since last visit 6/15/2021:  -started on rapaflo by his urologist about 3 weeks ago.   Helping his nocturia and urinary hesitancy during the day.     -homle glucose readings are in the range of 130-160    -last eye exam done - last week.     -stopped iron pill as his anemia had resolved. Patient Care Team:  -Dr. Otto Barkley. [facility] - [facility]- using cpap  -Dr. Alex Villarreal - urology. - last month  -Dr. Yamilet Parkinson - cardiology- heart block. Has pacemaker.           Objective: Wt Readings from Last 3 Encounters:   10/07/21 189 lb (85.7 kg)   06/15/21 192 lb (87.1 kg)   02/15/21 188 lb 9.6 oz (85.5 kg)     BP Readings from Last 3 Encounters:   10/07/21 113/74   06/15/21 118/81   02/15/21 117/61     Visit Vitals  /74   Pulse 73   Temp 97.3 °F (36.3 °C) (Temporal)   Resp 16   Ht 5' 9\" (1.753 m)   Wt 189 lb (85.7 kg)   SpO2 99%   BMI 27.91 kg/m²     General appearance: alert, cooperative, no distress, appears stated age  Neck: supple, symmetrical, trachea midline, no adenopathy, thyroid: not enlarged, symmetric, no tenderness/mass/nodules, no carotid bruit and no JVD  Lungs: clear to auscultation bilaterally  Heart: regular rate and rhythm, S1, S2 normal, no murmur, click, rub or gallop  Extremities: extremities normal, atraumatic, no cyanosis or edema              Disclaimer:  Return if symptoms worsen or fail to improve. Advised patient to call back or return to office if symptoms worsen/change/persist.     Discussed expected course/resolution/complications of diagnosis in detail with patient. Medication risks/benefits/costs/interactions/alternatives discussed with patient. Patient was given an after visit summary which includes diagnoses, current medications, & vitals. Patient expressed understanding with the diagnosis and plan. This is the Subsequent Medicare Annual Wellness Exam, performed 12 months or more after the Initial AWV or the last Subsequent AWV    I have reviewed the patient's medical history in detail and updated the computerized patient record. Assessment/Plan   Education and counseling provided:  Are appropriate based on today's review and evaluation    1.  Medicare annual wellness visit, subsequent  - DEPRESSION SCREEN ANNUAL  2. Controlled type 2 diabetes mellitus without complication, without long-term current use of insulin ([facility] [address]  3. Mixed hyperlipidemia  4. Encounter for long-term (current) use of medications  5. Type 2 diabetes mellitus with chronic kidney disease, without long-term current use of insulin, unspecified CKD stage ([facility] [address]  6. Screening for depression  -     DEPRESSION SCREEN ANNUAL       Depression Risk Factor Screening     3 most recent PHQ Screens 10/7/2021   Little interest or pleasure in doing things Not at all   Feeling down, depressed, irritable, or hopeless Not at all   Total Score PHQ 2 0       Alcohol Risk Screen    Do you average more than 1 drink per night or more than 7 drinks a week: No    In the past three months have you have had more than 4 drinks containing alcohol on one occasion: No        Functional Ability and Level of Safety    Hearing: Hearing is good. Activities of Daily Living: The home contains: handrails and grab bars  Patient does total self care      Ambulation: with no difficulty     Fall Risk:  Fall Risk Assessment, last 12 mths 10/7/2021   Able to walk? Yes   Fall in past 12 months? 0   Do you feel unsteady? 0   Are you worried about falling 0   Number of falls in past 12 months -   Fall with injury?  -      Abuse Screen:  Patient is not abused       Cognitive Screening    Has your family/caregiver stated any concerns about your memory: no         Health Maintenance Due     Health Maintenance Due   Topic Date Due    Shingrix Vaccine Age 49> (1 of 2) Never done    Eye Exam Retinal or Dilated  08/22/2020    Flu Vaccine (1) 09/01/2021       Patient Care Team   Patient Care Team:  Hansa Richards MD as PCP - General (Internal Medicine)  Hansa Richards MD as PCP - [facility]  Nola Mills MD (Ophthalmology)  James Mckeon MD (Urology)  Michael Loera MD as Surgeon (General Surgery)    History     Patient Active Problem List   Diagnosis Code    Peripheral neuropathy G62.9    History of prostate cancer Z85.46    DM (diabetes mellitus) type II controlled with renal manifestation (HCC) E11.29    Heart block I45.9    Glaucoma H40.9    RONNY on CPAP G47.33, Z99.89    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    Colon cancer screening Z12.11    History of small bowel obstruction Z87.19    Routine eye exam Z01.00     Past Medical History:   Diagnosis Date    Arthritis     Calculus of kidney     Chronic kidney disease     slightly increased creatinine    GERD (gastroesophageal reflux disease)     Glaucoma     Pneumonia     prostate cancer 2000    seed radiation, cryosurgery    Unspecified sleep apnea     uses CPAP      Past Surgical History:   Procedure Laterality Date    ENDOSCOPY, COLON, DIAGNOSTIC  2010    HX HEENT      uvulectomy    HX OTHER SURGICAL  12/28/2017    Exploratory Lap with small bowel [id number]-VlDr. Esau Trujillo    HX PACEMAKER  09 01 14    HX PROSTATECTOMY      brachytherapy, cryosurgery    HX TONSILLECTOMY      HX UROLOGICAL      vasectomy    [place] TRABECULOPLASTY BY LASER SURGERY       Current Outpatient Medications   Medication Sig Dispense Refill    metFORMIN (GLUCOPHAGE) 500 mg tablet TAKE 1 TABLET BY MOUTH TWICE DAILY WITH MEALS 180 Tablet 1    glucose blood VI test strips (ASCENSIA AUTODISC VI, ONE TOUCH ULTRA TEST VI) strip Use to test blood sugars once daily DX:E11.9 100 Strip 3    lancets misc Use to test blood sugars once daily DX:E11.9 100 Each 1    atorvastatin (LIPITOR) 10 mg tablet TAKE 1 TABLET BY MOUTH DAILY 90 Tab 1    aspirin delayed-release 81 mg tablet Take 81 mg by mouth daily.  Lancing Device misc Use to test blood sugars once daily DX:E11.9 1 Each 0    Blood-Glucose Meter monitoring kit Use as directed  icd10- 1 Kit 0    iron,carbonyl-vitamin C (Vitron-C) 65 mg iron- 125 mg TbEC Take  by mouth.  (Patient not taking: Reported on 10/7/2021)       Allergies   Allergen Reactions    Pcn [Penicillins] Hives     Reaction was in 65's following a PCN shot    Venom-Honey Bee Other (comments)     anaphylaxis       Family History   Problem Relation Age of Onset    Heart Disease Father     Cancer Sister         breast/lung    Other Mother         encephalitis    Cancer Maternal Aunt         breast/lower extremity - 2 maternal aunts    Cancer Other         vaginal    No Known Problems Daughter      Social History     Tobacco Use    Smoking status: Never Smoker    Smokeless tobacco: Never Used   Substance Use Topics    Alcohol use:  Yes     Alcohol/week: 6.0 standard drinks     Types: 2 Glasses of wine, 4 Standard drinks or equivalent per week         Yumiko Ga MD

## 2021-10-10 NOTE — PROGRESS NOTES
Hemoglobin A1c slightly elevated. Watch diet and increase water intake.  Pt notified via [employer] 10/10/2021

## 2021-10-29 RX ORDER — ATORVASTATIN CALCIUM 10 MG/1
TABLET, FILM COATED ORAL
Qty: 90 TABLET | Refills: 1 | Status: SHIPPED | OUTPATIENT
Start: 2021-10-29 | End: 2022-05-24

## 2021-11-27 ENCOUNTER — HOSPITAL ENCOUNTER (OUTPATIENT)
Age: 86
Setting detail: OBSERVATION
Discharge: HOME | End: 2021-11-29
Attending: STUDENT IN AN ORGANIZED HEALTH CARE EDUCATION/TRAINING PROGRAM | Admitting: STUDENT IN AN ORGANIZED HEALTH CARE EDUCATION/TRAINING PROGRAM
Payer: MEDICARE

## 2021-11-27 ENCOUNTER — APPOINTMENT (OUTPATIENT)
Dept: CT IMAGING | Age: 86
End: 2021-11-27
Attending: STUDENT IN AN ORGANIZED HEALTH CARE EDUCATION/TRAINING PROGRAM
Payer: MEDICARE

## 2021-11-27 DIAGNOSIS — R53.81 MALAISE AND FATIGUE: ICD-10-CM

## 2021-11-27 DIAGNOSIS — R33.9 URINARY RETENTION: ICD-10-CM

## 2021-11-27 DIAGNOSIS — N17.9 AKI (ACUTE KIDNEY INJURY) (HCC): Primary | ICD-10-CM

## 2021-11-27 DIAGNOSIS — R53.83 MALAISE AND FATIGUE: ICD-10-CM

## 2021-11-27 PROBLEM — K59.00 CONSTIPATION: Status: ACTIVE | Noted: 2021-11-27

## 2021-11-27 PROBLEM — N32.0 BLADDER OUTLET OBSTRUCTION: Status: ACTIVE | Noted: 2021-11-27

## 2021-11-27 PROBLEM — N39.0 UTI (URINARY TRACT INFECTION): Status: ACTIVE | Noted: 2021-11-27

## 2021-11-27 LAB
ALBUMIN SERPL-MCNC: 3.4 G/DL (ref 3.5–5)
ALBUMIN/GLOB SERPL: 0.8 {RATIO} (ref 1.1–2.2)
ALP SERPL-CCNC: 68 U/L (ref 45–117)
ALT SERPL-CCNC: 28 U/L (ref 12–78)
ANION GAP SERPL CALC-SCNC: 6 MMOL/L (ref 5–15)
APPEARANCE UR: CLEAR
AST SERPL-CCNC: 38 U/L (ref 15–37)
BACTERIA URNS QL MICRO: NEGATIVE /HPF
BASOPHILS # BLD: 0 K/UL (ref 0–0.1)
BASOPHILS NFR BLD: 0 % (ref 0–1)
BILIRUB SERPL-MCNC: 1 MG/DL (ref 0.2–1)
BILIRUB UR QL: NEGATIVE
BUN SERPL-MCNC: 40 MG/DL (ref 6–20)
BUN/CREAT SERPL: 13 (ref 12–20)
CALCIUM SERPL-MCNC: 9.4 MG/DL (ref 8.5–10.1)
CHLORIDE SERPL-SCNC: 107 MMOL/L (ref 97–108)
CO2 SERPL-SCNC: 20 MMOL/L (ref 21–32)
COLOR UR: ABNORMAL
COMMENT, HOLDF: NORMAL
CREAT SERPL-MCNC: 3.07 MG/DL (ref 0.7–1.3)
DIFFERENTIAL METHOD BLD: ABNORMAL
EOSINOPHIL # BLD: 0 K/UL (ref 0–0.4)
EOSINOPHIL NFR BLD: 0 % (ref 0–7)
EPITH CASTS URNS QL MICRO: ABNORMAL /LPF
ERYTHROCYTE [DISTWIDTH] IN BLOOD BY AUTOMATED COUNT: 14.2 % (ref 11.5–14.5)
GLOBULIN SER CALC-MCNC: 4.5 G/DL (ref 2–4)
GLUCOSE BLD STRIP.AUTO-MCNC: 174 MG/DL (ref 65–117)
GLUCOSE BLD STRIP.AUTO-MCNC: 176 MG/DL (ref 65–117)
GLUCOSE BLD STRIP.AUTO-MCNC: 254 MG/DL (ref 65–117)
GLUCOSE SERPL-MCNC: 198 MG/DL (ref 65–100)
GLUCOSE UR STRIP.AUTO-MCNC: 100 MG/DL
HCT VFR BLD AUTO: 38 % (ref 36.6–50.3)
HGB BLD-MCNC: 12.3 G/DL (ref 12.1–17)
HGB UR QL STRIP: ABNORMAL
HYALINE CASTS URNS QL MICRO: ABNORMAL /LPF (ref 0–5)
IMM GRANULOCYTES # BLD AUTO: 0.1 K/UL (ref 0–0.04)
IMM GRANULOCYTES NFR BLD AUTO: 1 % (ref 0–0.5)
KETONES UR QL STRIP.AUTO: ABNORMAL MG/DL
LEUKOCYTE ESTERASE UR QL STRIP.AUTO: NEGATIVE
LYMPHOCYTES # BLD: 0.8 K/UL (ref 0.8–3.5)
LYMPHOCYTES NFR BLD: 5 % (ref 12–49)
MCH RBC QN AUTO: 28.1 PG (ref 26–34)
MCHC RBC AUTO-ENTMCNC: 32.4 G/DL (ref 30–36.5)
MCV RBC AUTO: 87 FL (ref 80–99)
MONOCYTES # BLD: 1.3 K/UL (ref 0–1)
MONOCYTES NFR BLD: 8 % (ref 5–13)
NEUTS SEG # BLD: 13.3 K/UL (ref 1.8–8)
NEUTS SEG NFR BLD: 86 % (ref 32–75)
NITRITE UR QL STRIP.AUTO: NEGATIVE
NRBC # BLD: 0 K/UL (ref 0–0.01)
NRBC BLD-RTO: 0 PER 100 WBC
PH UR STRIP: 5 [PH] (ref 5–8)
PLATELET # BLD AUTO: 287 K/UL (ref 150–400)
POTASSIUM SERPL-SCNC: 5.1 MMOL/L (ref 3.5–5.1)
PROT SERPL-MCNC: 7.9 G/DL (ref 6.4–8.2)
PROT UR STRIP-MCNC: NEGATIVE MG/DL
RBC # BLD AUTO: 4.37 M/UL (ref 4.1–5.7)
RBC #/AREA URNS HPF: ABNORMAL /HPF (ref 0–5)
SAMPLES BEING HELD,HOLD: NORMAL
SERVICE CMNT-IMP: ABNORMAL
SODIUM SERPL-SCNC: 133 MMOL/L (ref 136–145)
SP GR UR REFRACTOMETRY: 1.01 (ref 1–1.03)
UR CULT HOLD, URHOLD: NORMAL
UROBILINOGEN UR QL STRIP.AUTO: 0.2 EU/DL (ref 0.2–1)
WBC # BLD AUTO: 15.5 K/UL (ref 4.1–11.1)
WBC URNS QL MICRO: ABNORMAL /HPF (ref 0–4)

## 2021-11-27 PROCEDURE — 80053 COMPREHEN METABOLIC PANEL: CPT

## 2021-11-27 PROCEDURE — 74011000250 HC RX REV CODE- 250: Performed by: STUDENT IN AN ORGANIZED HEALTH CARE EDUCATION/TRAINING PROGRAM

## 2021-11-27 PROCEDURE — 82962 GLUCOSE BLOOD TEST: CPT

## 2021-11-27 PROCEDURE — 81001 URINALYSIS AUTO W/SCOPE: CPT

## 2021-11-27 PROCEDURE — 74011250636 HC RX REV CODE- 250/636: Performed by: STUDENT IN AN ORGANIZED HEALTH CARE EDUCATION/TRAINING PROGRAM

## 2021-11-27 PROCEDURE — 85025 COMPLETE CBC W/AUTO DIFF WBC: CPT

## 2021-11-27 PROCEDURE — 36415 COLL VENOUS BLD VENIPUNCTURE: CPT

## 2021-11-27 PROCEDURE — 51702 INSERT TEMP BLADDER CATH: CPT

## 2021-11-27 PROCEDURE — G0378 HOSPITAL OBSERVATION PER HR: HCPCS

## 2021-11-27 PROCEDURE — 74176 CT ABD & PELVIS W/O CONTRAST: CPT

## 2021-11-27 PROCEDURE — 74011636637 HC RX REV CODE- 636/637: Performed by: STUDENT IN AN ORGANIZED HEALTH CARE EDUCATION/TRAINING PROGRAM

## 2021-11-27 PROCEDURE — 96374 THER/PROPH/DIAG INJ IV PUSH: CPT

## 2021-11-27 PROCEDURE — 99283 EMERGENCY DEPT VISIT LOW MDM: CPT

## 2021-11-27 RX ORDER — POLYETHYLENE GLYCOL 3350 17 G/17G
17 POWDER, FOR SOLUTION ORAL DAILY
Status: DISCONTINUED | OUTPATIENT
Start: 2021-11-28 | End: 2021-11-28

## 2021-11-27 RX ORDER — MAGNESIUM SULFATE 100 %
4 CRYSTALS MISCELLANEOUS AS NEEDED
Status: DISCONTINUED | OUTPATIENT
Start: 2021-11-27 | End: 2021-11-29 | Stop reason: HOSPADM

## 2021-11-27 RX ORDER — DEXTROSE 50 % IN WATER (D50W) INTRAVENOUS SYRINGE
12.5-25 AS NEEDED
Status: DISCONTINUED | OUTPATIENT
Start: 2021-11-27 | End: 2021-11-29 | Stop reason: HOSPADM

## 2021-11-27 RX ORDER — SODIUM CHLORIDE 0.9 % (FLUSH) 0.9 %
5-40 SYRINGE (ML) INJECTION EVERY 8 HOURS
Status: DISCONTINUED | OUTPATIENT
Start: 2021-11-27 | End: 2021-11-29 | Stop reason: HOSPADM

## 2021-11-27 RX ORDER — ENOXAPARIN SODIUM 100 MG/ML
30 INJECTION SUBCUTANEOUS DAILY
Status: DISCONTINUED | OUTPATIENT
Start: 2021-11-28 | End: 2021-11-29 | Stop reason: HOSPADM

## 2021-11-27 RX ORDER — ONDANSETRON 2 MG/ML
4 INJECTION INTRAMUSCULAR; INTRAVENOUS
Status: DISCONTINUED | OUTPATIENT
Start: 2021-11-27 | End: 2021-11-29 | Stop reason: HOSPADM

## 2021-11-27 RX ORDER — MORPHINE SULFATE 2 MG/ML
1 INJECTION, SOLUTION INTRAMUSCULAR; INTRAVENOUS
Status: DISCONTINUED | OUTPATIENT
Start: 2021-11-27 | End: 2021-11-29 | Stop reason: HOSPADM

## 2021-11-27 RX ORDER — ACETAMINOPHEN 325 MG/1
650 TABLET ORAL
Status: DISCONTINUED | OUTPATIENT
Start: 2021-11-27 | End: 2021-11-29 | Stop reason: HOSPADM

## 2021-11-27 RX ORDER — ASPIRIN 81 MG/1
81 TABLET ORAL DAILY
Status: DISCONTINUED | OUTPATIENT
Start: 2021-11-28 | End: 2021-11-29 | Stop reason: HOSPADM

## 2021-11-27 RX ORDER — TAMSULOSIN HYDROCHLORIDE 0.4 MG/1
0.4 CAPSULE ORAL DAILY
Status: DISCONTINUED | OUTPATIENT
Start: 2021-11-28 | End: 2021-11-29 | Stop reason: HOSPADM

## 2021-11-27 RX ORDER — INSULIN LISPRO 100 [IU]/ML
INJECTION, SOLUTION INTRAVENOUS; SUBCUTANEOUS
Status: DISCONTINUED | OUTPATIENT
Start: 2021-11-27 | End: 2021-11-29 | Stop reason: HOSPADM

## 2021-11-27 RX ORDER — SODIUM CHLORIDE 0.9 % (FLUSH) 0.9 %
5-40 SYRINGE (ML) INJECTION AS NEEDED
Status: DISCONTINUED | OUTPATIENT
Start: 2021-11-27 | End: 2021-11-29 | Stop reason: HOSPADM

## 2021-11-27 RX ORDER — ACETAMINOPHEN 650 MG/1
650 SUPPOSITORY RECTAL
Status: DISCONTINUED | OUTPATIENT
Start: 2021-11-27 | End: 2021-11-29 | Stop reason: HOSPADM

## 2021-11-27 RX ORDER — ATORVASTATIN CALCIUM 10 MG/1
10 TABLET, FILM COATED ORAL DAILY
Status: DISCONTINUED | OUTPATIENT
Start: 2021-11-28 | End: 2021-11-29 | Stop reason: HOSPADM

## 2021-11-27 RX ORDER — ONDANSETRON 4 MG/1
4 TABLET, ORALLY DISINTEGRATING ORAL
Status: DISCONTINUED | OUTPATIENT
Start: 2021-11-27 | End: 2021-11-29 | Stop reason: HOSPADM

## 2021-11-27 RX ADMIN — CEFTRIAXONE SODIUM 1 G: 1 INJECTION, POWDER, FOR SOLUTION INTRAMUSCULAR; INTRAVENOUS at 13:29

## 2021-11-27 RX ADMIN — SODIUM CHLORIDE 1000 ML: 9 INJECTION, SOLUTION INTRAVENOUS at 11:50

## 2021-11-27 RX ADMIN — Medication 10 ML: at 22:24

## 2021-11-27 RX ADMIN — Medication 10 ML: at 14:00

## 2021-11-27 RX ADMIN — INSULIN LISPRO 3 UNITS: 100 INJECTION, SOLUTION INTRAVENOUS; SUBCUTANEOUS at 17:00

## 2021-11-27 NOTE — ED NOTES
Pt ambulatory to ED accompanied by wife with c/o abdominal pain, urinary retention and constipation onset 3 days ago. Pt reports hx of SBO with resection 2017 and SBO in 2020. Reports hx of urinary retention and takes Flomax.

## 2021-11-27 NOTE — DISCHARGE INSTRUCTIONS
Patient Education        Constipation: Care Instructions  Your Care Instructions     Constipation means that you have a hard time passing stools (bowel movements). People pass stools from 3 times a day to once every 3 days. What is normal for you may be different. Constipation may occur with pain in the rectum and cramping. The pain may get worse when you try to pass stools. Sometimes there are small amounts of bright red blood on toilet paper or the surface of stools. This is because of enlarged veins near the rectum (hemorrhoids). A few changes in your diet and lifestyle may help you avoid ongoing constipation. Your doctor may also prescribe medicine to help loosen your stool. Some medicines can cause constipation. These include pain medicines and antidepressants. Tell your doctor about all the medicines you take. Your doctor may want to make a medicine change to ease your symptoms. Follow-up care is a key part of your treatment and safety. Be sure to make and go to all appointments, and call your doctor if you are having problems. It's also a good idea to know your test results and keep a list of the medicines you take. How can you care for yourself at home? · Drink plenty of fluids. If you have kidney, heart, or liver disease and have to limit fluids, talk with your doctor before you increase the amount of fluids you drink. · Include high-fiber foods in your diet each day. These include fruits, vegetables, beans, and whole grains. · Get at least 30 minutes of exercise on most days of the week. Walking is a good choice. You also may want to do other activities, such as running, swimming, cycling, or playing tennis or team sports. · Take a fiber supplement, such as Citrucel or Metamucil, every day. Read and follow all instructions on the label. · Schedule time each day for a bowel movement. A daily routine may help. Take your time having your bowel movement.   · Support your feet with a small step stool when you sit on the toilet. This helps flex your hips and places your pelvis in a squatting position. · Your doctor may recommend an over-the-counter laxative to relieve your constipation. Examples are Milk of Magnesia and MiraLax. Read and follow all instructions on the label. Do not use laxatives on a long-term basis. When should you call for help? Call your doctor now or seek immediate medical care if:    · You have new or worse belly pain.     · You have new or worse nausea or vomiting.     · You have blood in your stools. Watch closely for changes in your health, and be sure to contact your doctor if:    · Your constipation is getting worse.     · You do not get better as expected. Where can you learn more? Go to http://www.dudley.com/  Enter P343 in the search box to learn more about \"Constipation: Care Instructions. \"  Current as of: July 1, 2021               Content Version: 13.0  © 2006-2021 [employer]. Care instructions adapted under license by [employer] (which disclaims liability or warranty for this information). If you have questions about a medical condition or this instruction, always ask your healthcare professional. [address] any warranty or liability for your use of this information.

## 2021-11-27 NOTE — ED NOTES
Pt is extremely difficult to place fung, unsucessful. Attempted once by primary nurse roseanne CÁRDENAS then attempted by charge nurse Johnson CÁRDENAS.

## 2021-11-27 NOTE — CONSULTS
Urology Consult    Subjective:     Date of Consultation:  November 27, 2021    Referring Physician: Kat Montanez MD    Reason for Consultation:  retention    Patient Name: Tomas Trujillo  MRN: [id number]    History of Present Illness:   Patient is a 80 y.o.  male who is being seen for urinary retention. Radha Farrar He was admitted to the hospital for Urinary retention [R33.9]  Bladder outlet obstruction [N32.0]  OG (acute kidney injury) ([address] [N17.9]. Consult received 12:55pm    Followed by Dr Jesse Patel for hx of pros CA, brachytherapy, cryotherapy. Slowly rising PSA. He had an office cysto a couple of months ago with 14 fr urethral stricture dilated with scope. Admitted with abd pain, dribbling urine.  ER staff unable to place fung x 2 per notes    Afeb  WBC   15.5  Hgb   12.3  U/A  0-4   WBC,   RBC  Cr 3.0  (base 1.3)      CT shows diffuse bilat renal atrophy, distended bladder, punctate R renal stone, punctate possible stone non obst in dilated distal left ureter, bilat cysts    Past Medical History:   Diagnosis Date    Arthritis     Calculus of kidney     Chronic kidney disease     slightly increased creatinine    GERD (gastroesophageal reflux disease)     Glaucoma     Pneumonia     prostate cancer 2000    seed radiation, cryosurgery    Unspecified sleep apnea     uses CPAP      Past Surgical History:   Procedure Laterality Date    ENDOSCOPY, COLON, DIAGNOSTIC  2010    HX HEENT      uvulectomy    HX OTHER SURGICAL  12/28/2017    Exploratory Lap with small bowel [id number]. Rubia Trujillo    HX PACEMAKER  09 01 15    HX PROSTATECTOMY      brachytherapy, cryosurgery    HX TONSILLECTOMY      HX UROLOGICAL      vasectomy    [place] TRABECULOPLASTY BY LASER SURGERY        Family History   Problem Relation Age of Onset    Heart Disease Father     Cancer Sister         breast/lung    Other Mother         encephalitis    Cancer Maternal Aunt         breast/lower extremity - 2 maternal aunts    Cancer Other vaginal    No Known Problems Daughter       Social History     Tobacco Use    Smoking status: Never Smoker    Smokeless tobacco: Never Used   Substance Use Topics    Alcohol use: Yes     Alcohol/week: 6.0 standard drinks     Types: 2 Glasses of wine, 4 Standard drinks or equivalent per week     Allergies   Allergen Reactions    Pcn [Penicillins] Hives     Reaction was in 1950's following a PCN shot    Venom-Honey Bee Other (comments)     anaphylaxis      Prior to Admission medications    Medication Sig Start Date End Date Taking? Authorizing Provider   atorvastatin (LIPITOR) 10 mg tablet TAKE 1 TABLET BY MOUTH DAILY 10/29/21   Melissa Nichols MD   silodosin (Rapaflo) 8 mg capsule Take 8 mg by mouth daily (with breakfast). Provider, Historical   iron,carbonyl-vitamin C (Vitron-C) 65 mg iron- 125 mg TbEC Take  by mouth. Patient not taking: Reported on 10/7/2021    Provider, Historical   metFORMIN (GLUCOPHAGE) 500 mg tablet TAKE 1 TABLET BY MOUTH TWICE DAILY WITH MEALS 6/2/21   Wilbert Colón MD   glucose blood VI test strips (ASCENSIA AUTODISC VI, ONE TOUCH ULTRA TEST VI) strip Use to test blood sugars once daily DX:E11.9 5/10/21   Melissa Nichols MD   lancets misc Use to test blood sugars once daily DX:E11.9 5/10/21   Melissa Nichols MD   aspirin delayed-release 81 mg tablet Take 81 mg by mouth daily.     Provider, Historical   Lancing Device misc Use to test blood sugars once daily DX:E11.9 4/20/20   Melissa Nichols MD   Blood-Glucose Meter monitoring kit Use as directed  icd10- 4/14/20   Melissa Nichols MD             Objective:     Data Review (Labs):    Recent Labs     11/27/21  0929   WBC 15.5*   HGB 12.3      *   K 5.1   CREA 3.07*   BUN 40*       Patient Vitals for the past 8 hrs:   BP Temp Pulse Resp SpO2 Height Weight   11/27/21 1303 (!) 153/80 98.8 °F (37.1 °C) 95 -- 100 % -- --   11/27/21 1152 (!) 142/72 -- 90 23 99 % -- --   11/27/21 1129 (!) 141/80 -- 100 (!) 34 99 % -- --   11/27/21 0826 98/61 97.9 °F (36.6 °C) (!) 102 18 96 % 5' 9\" (1.753 m) 85.7 kg (189 lb)     Temp (24hrs), Av.4 °F (36.9 °C), Min:97.9 °F (36.6 °C), Max:98.8 °F (37.1 °C)      Intake and Output:   No intake/output data recorded. Physical Exam:            General:    alert, cooperative, no distress, appears stated age                     Skin:  Normal.                 Abdomen[de-identified]  + SP fullness       Assessment:     Active Problems:    History of prostate cancer (2011)      Overview: , s/p cryosurgery, and his PSA  And he declines  Hormonal       therapy. Implanted irradiated seeds      RONNY on CPAP (2015)      Urinary retention (2021)      OG (acute kidney injury) ([facility] [address] (2021)      Bladder outlet obstruction (2021)      UTI (urinary tract infection) (2021)      Constipation (2021)        ~ 12 to 14 fr urethral stricture ---able to place 12 fr fung with clear urine drainage. Plan:     Leave fung for now. Follow labs.      Signed By: Yung Ramos MD                         2021

## 2021-11-27 NOTE — ROUTINE PROCESS
TRANSFER - OUT REPORT:    Verbal report given to [address] (name) on Jaycee Carmichael  being transferred t[address] (unit) for routine progression of care       Report consisted of patients Situation, Background, Assessment and   Recommendations(SBAR). Information from the following report(s) SBAR, ED Summary and MAR was reviewed with the receiving nurse. Lines:   Peripheral IV 11/27/21 Right Antecubital (Active)        Opportunity for questions and clarification was provided.       Patient transported with:   [employer]

## 2021-11-27 NOTE — ED PROVIDER NOTES
Myles Barrientos is a 80 y.o. male with past medical history notable for prostate cancer status post seed implantation, cryosurgery followed by Dr. Soniya Santo, urinary retention being followed by urology presently, renal calculi, CKD (mild) presenting with constipation and inability to urinate. He states that he had 1 nonwatery, nonmelenic bowel movement 3 days ago in the evening and since then has not had any bowel movements produced. However he has not had vomiting, nausea, abdominal distention. He has not had any GI bleeding. He states that he has been urinating only small quantities of urine at a time, of the past 2 weeks he has been urinating less and less and this is most notable over the last week in particular. He denies dysuria, opaque or bloody urine or any clots visualized. No lightheadedness or fainting recently but today felt very fatigued and exhausted.            Past Medical History:   Diagnosis Date    Arthritis     Calculus of kidney     Chronic kidney disease     slightly increased creatinine    GERD (gastroesophageal reflux disease)     Glaucoma     Pneumonia     prostate cancer 2000    seed radiation, cryosurgery    Unspecified sleep apnea     uses CPAP       Past Surgical History:   Procedure Laterality Date    ENDOSCOPY, COLON, DIAGNOSTIC  2010    HX HEENT      uvulectomy    HX OTHER SURGICAL  12/28/2017    Exploratory Lap with small bowel [id number]. Laure Trujillo    HX PACEMAKER  09 01 15    HX PROSTATECTOMY      brachytherapy, cryosurgery    HX TONSILLECTOMY      HX UROLOGICAL      vasectomy    ID TRABECULOPLASTY BY LASER SURGERY           Family History:   Problem Relation Age of Onset    Heart Disease Father     Cancer Sister         breast/lung    Other Mother         encephalitis    Cancer Maternal Aunt         breast/lower extremity - 2 maternal aunts    Cancer Other         vaginal    No Known Problems Daughter        Social History     Socioeconomic History  Marital status:      Spouse name: Not on file    Number of children: Not on file    Years of education: Not on file    Highest education level: Not on file   Occupational History    Not on file   Tobacco Use    Smoking status: Never Smoker    Smokeless tobacco: Never Used   Vaping Use    Vaping Use: Never used   Substance and Sexual Activity    Alcohol use: Yes     Alcohol/week: 6.0 standard drinks     Types: 2 Glasses of wine, 4 Standard drinks or equivalent per week    Drug use: No    Sexual activity: Not Currently     Partners: Female   Other Topics Concern    Not on file   Social History Narrative    Not on file     Social Determinants of Health     Financial Resource Strain:     Difficulty of Paying Living Expenses: Not on file   Food Insecurity:     Worried About Running Out of Food in the Last Year: Not on file    Villa of Food in the Last Year: Not on file   Transportation Needs:     Lack of Transportation (Medical): Not on file    Lack of Transportation (Non-Medical):  Not on file   Physical Activity:     Days of Exercise per Week: Not on file    Minutes of Exercise per Session: Not on file   Stress:     Feeling of Stress : Not on file   Social Connections:     Frequency of Communication with Friends and Family: Not on file    Frequency of Social Gatherings with Friends and Family: Not on file    Attends Islam Services: Not on file    Active Member of [address] [place] [address] or Organizations: Not on file    Attends Club or Organization Meetings: Not on file    Marital Status: Not on file   Intimate Partner Violence:     Fear of Current or Ex-Partner: Not on file    Emotionally Abused: Not on file    Physically Abused: Not on file    Sexually Abused: Not on file   Housing Stability:     Unable to Pay for Housing in the Last Year: Not on file    Number of [place] in the Last Year: Not on file    Unstable Housing in the Last Year: Not on file         ALLERGIES: Tommy [penicillins] and Venom-honey bee    Review of Systems   Constitutional: Positive for fatigue. Negative for chills and fever. HENT: Negative for ear pain, sore throat and trouble swallowing. Eyes: Negative for visual disturbance. Respiratory: Negative for cough and shortness of breath. Cardiovascular: Negative for chest pain. Gastrointestinal: Positive for constipation. Negative for abdominal pain, nausea and vomiting. Genitourinary: Positive for difficulty urinating. Negative for dysuria. Musculoskeletal: Negative for back pain. Skin: Negative for rash. Neurological: Negative for light-headedness and headaches. Psychiatric/Behavioral: Negative for confusion. All other systems reviewed and are negative. Vitals:    11/27/21 0826   BP: 98/61   Pulse: (!) 102   Resp: 18   Temp: 97.9 °F (36.6 °C)   SpO2: 96%   Weight: 85.7 kg (189 lb)   Height: 5' 9\" (1.753 m)            Physical Exam  Constitutional:       General: He is not in acute distress. Appearance: He is not toxic-appearing. HENT:      Head: Normocephalic and atraumatic. Mouth/Throat:      Mouth: Mucous membranes are moist.   Eyes:      Extraocular Movements: Extraocular movements intact. Cardiovascular:      Rate and Rhythm: Normal rate and regular rhythm. Heart sounds: Normal heart sounds. Pulmonary:      Effort: Pulmonary effort is normal. No respiratory distress. Breath sounds: Normal breath sounds. Abdominal:      General: There is distension. Palpations: Abdomen is soft. There is no mass. Tenderness: There is abdominal tenderness. There is no right CVA tenderness, left CVA tenderness or guarding. Hernia: No hernia is present. Comments: Mild tenderness over palpable urinary bladder, superpubic area   Musculoskeletal:      Cervical back: Normal range of motion. Right lower leg: No edema. Left lower leg: No edema.    Skin:     Capillary Refill: Capillary refill takes less than 2 seconds. Neurological:      General: No focal deficit present. Mental Status: He is alert and oriented to person, place, and time. Psychiatric:         Mood and Affect: Mood normal.          [facility]     MEDICAL DECISION MAKIN y.o. male presents with Constipation, Urinary Retention, and Fatigue    Differential diagnosis includes but not limited to: UTI, urinary retention due to prostate hypertrophy, cancer progression, bladder atony, also consider extrinsic compression from retained stool amongst other etiologies. LABORATORY TESTS:  Labs Reviewed   CBC WITH AUTOMATED DIFF - Abnormal; Notable for the following components:       Result Value    WBC 15.5 (*)     NEUTROPHILS 86 (*)     LYMPHOCYTES 5 (*)     IMMATURE GRANULOCYTES 1 (*)     ABS. NEUTROPHILS 13.3 (*)     ABS. MONOCYTES 1.3 (*)     ABS. IMM. GRANS. 0.1 (*)     All other components within normal limits   METABOLIC PANEL, COMPREHENSIVE - Abnormal; Notable for the following components:    Sodium 133 (*)     CO2 20 (*)     Glucose 198 (*)     BUN 40 (*)     Creatinine 3.07 (*)     GFR est AA 23 (*)     GFR est non-AA 19 (*)     AST (SGOT) 38 (*)     Albumin 3.4 (*)     Globulin 4.5 (*)     A-G Ratio 0.8 (*)     All other components within normal limits   URINALYSIS W/MICROSCOPIC - Abnormal; Notable for the following components:    Glucose 100 (*)     Ketone TRACE (*)     Blood LARGE (*)     All other components within normal limits   URINE CULTURE HOLD SAMPLE   SAMPLES BEING HELD       IMAGING RESULTS:  CT ABD PELV WO CONT   Final Result   Urinary bladder distention with mild bilateral hydroureter and   hydronephrosis likely reflecting bladder outlet obstruction. A nonocclusive   calculus is seen dependently within the lumen of the distal left ureter and   punctate calculi noted in the upper pole collecting system of the right kidney. Bilateral renal atrophy and additional incidentals as above.           MEDICATIONS GIVEN:  Medications sodium chloride 0.9 % bolus infusion 1,000 mL (has no administration in time range)       PROGRESS NOTE:   11:06 AM Patient's symptoms have improved after treatment    EKG:  Reviewed     CONSULTS:  Hospitalist Consult: [address] for Admission  11:06 AM    ED Room Number: ER06/06  Patient Name and age:  Lenora Luna 80 y.o.  male  Working Diagnosis:   1. OG (acute kidney injury) ([facility] [address]    2. Urinary retention    3. Malaise and fatigue        COVID-19 Suspicion:  no  Sepsis present:  no  Reassessment needed: no  Code Status:  Full Code  Readmission: no  Isolation Requirements:  yes  Recommended Level of Care:  med/surg  Department:[facility] Adult ED - 21   Other: 72-year-old with history of prostate cancer status post radioactive seed implantation, developing gradually worsening urinary retention per family and patient, followed by urology however has developed nearly complete urinary retention, greater than 1000 cc in bladder. However he is also complaining of fatigue and has significant OG, creatinine greater than 3, plan for urinary decompression, fluid resuscitation, urology consultation, may need bowel regimen as well.  uro consult placed:  11:06 AM     IMPRESSION:  1. OG (acute kidney injury) ([facility] [address]    2. Urinary retention    3. Malaise and fatigue        PLAN:  - Admit to hospitalist    Total critical care time spent exclusive of procedures:  36 minutes    Jonathan Weeks MD          Please note that this dictation was completed with [employer], the computer voice recognition software. Quite often unanticipated grammatical, syntax, homophones, and other interpretive errors are inadvertently transcribed by the computer software. Please disregard these errors. Please excuse any errors that have escaped final proofreading.   Procedures

## 2021-11-27 NOTE — H&P
[address] [facility] Adult  Hospitalist Group  History and Physical    Primary Care Provider: Aditi Ferreira MD  Date of Service:  11/27/2021    Chief Complaint: Urinary retention and constipation    Subjective:     Lenora Luna is a 80 y.o. male with  past medical history of arthritis, kidney stone, CKD, GERD, type 2 diabetes, prostate cancer status post seed radiation and cryosurgery, BPH being followed by urology, sleep apnea on home CPAP who comes in for the above. The patient has a history as stated previously of prostate cancer status post seed therapy and cryotherapy. Patient is followed by urology for BPH. He reports that he has been having worsening urinary retention for a few months with worsening for about the last 2 weeks, but that around the last day or so he has been unable to void except for dribbling with gravity. He says that he has not seen any blood in the urine, pyuria, dark urine, or clots. He reports feeling very uncomfortable and having pain in the area. He reports swelling of the bladder area and suprapubic pain. He denies fever, chills, palpitations, dizziness. He does report worsening fatigue and that he has not been able to have a bowel movement as of 3 days ago. He denies blood in his last bowel movement as well. Patient has found no relief without voiding. In the ER he was found to have about 1000 mL in bladder. CT abdomen showed bilateral hydronephrosis due to bladder outlet obstruction. Patient was unable to be catheterized in the ER due to the obstruction. Urology was consulted stat from the ER. The patient's blood work also shows an OG likely post renal due to obstruction. Review of Systems:    A comprehensive review of systems was negative except for that written in the History of Present Illness.      Past Medical History:   Diagnosis Date    Arthritis     Calculus of kidney     Chronic kidney disease     slightly increased creatinine    GERD (gastroesophageal reflux disease)     Glaucoma     Pneumonia     prostate cancer 2000    seed radiation, cryosurgery    Unspecified sleep apnea     uses CPAP      Past Surgical History:   Procedure Laterality Date    ENDOSCOPY, COLON, DIAGNOSTIC  2010    HX HEENT      uvulectomy    HX OTHER SURGICAL  12/28/2017    Exploratory Lap with small bowel [id number]. Laurance Session Shindel    HX PACEMAKER  09 01 15    HX PROSTATECTOMY      brachytherapy, cryosurgery    HX TONSILLECTOMY      HX UROLOGICAL      vasectomy    HI TRABECULOPLASTY BY LASER SURGERY       Prior to Admission medications    Medication Sig Start Date End Date Taking? Authorizing Provider   atorvastatin (LIPITOR) 10 mg tablet TAKE 1 TABLET BY MOUTH DAILY 10/29/21   Isadora Claros MD   silodosin (Rapaflo) 8 mg capsule Take 8 mg by mouth daily (with breakfast). Provider, Historical   iron,carbonyl-vitamin C (Vitron-C) 65 mg iron- 125 mg TbEC Take  by mouth. Patient not taking: Reported on 10/7/2021    Provider, Historical   metFORMIN (GLUCOPHAGE) 500 mg tablet TAKE 1 TABLET BY MOUTH TWICE DAILY WITH MEALS 6/2/21   Alexis Colón MD   glucose blood VI test strips (ASCENSIA AUTODISC VI, ONE TOUCH ULTRA TEST VI) strip Use to test blood sugars once daily DX:E11.9 5/10/21   Isadora Claros MD   lancets misc Use to test blood sugars once daily DX:E11.9 5/10/21   Isadora Claros MD   aspirin delayed-release 81 mg tablet Take 81 mg by mouth daily.     Provider, Historical   Lancing Device misc Use to test blood sugars once daily DX:E11.9 4/20/20   Isadora Claros MD   Blood-Glucose Meter monitoring kit Use as directed  icd10- 4/14/20   Isadora Claros MD     Allergies   Allergen Reactions    Pcn [Penicillins] Hives     Reaction was in 65's following a PCN shot    Venom-Honey Bee Other (comments)     anaphylaxis      Family History   Problem Relation Age of Onset    Heart Disease Father     Cancer Sister         breast/lung    Other Mother         encephalitis    Cancer Maternal Aunt         breast/lower extremity - 2 maternal aunts    Cancer Other         vaginal    No Known Problems Daughter         SOCIAL HISTORY:  Patient resides at Home. Patient ambulates with independently. Smoking history: never  Alcohol history: None        Objective:       Physical Exam:   Physical Exam  Constitutional:       General: He is not in acute distress. Appearance: Normal appearance. He is not ill-appearing. HENT:      Head: Normocephalic and atraumatic. Nose: Nose normal. No congestion or rhinorrhea. Mouth/Throat:      Mouth: Mucous membranes are moist.      Pharynx: Oropharynx is clear. No oropharyngeal exudate. Eyes:      General: No scleral icterus. Extraocular Movements: Extraocular movements intact. Pupils: Pupils are equal, round, and reactive to light. Cardiovascular:      Rate and Rhythm: Normal rate and regular rhythm. Pulses: Normal pulses. Heart sounds: Normal heart sounds. No murmur heard. No friction rub. No gallop. Pulmonary:      Effort: Pulmonary effort is normal. No respiratory distress. Breath sounds: Normal breath sounds. No wheezing or rales. Abdominal:      General: There is distension (Suprapubic extending into the lower abdomen). Palpations: Abdomen is soft. Tenderness: There is abdominal tenderness (Suprapubic). There is right CVA tenderness and left CVA tenderness. Musculoskeletal:         General: No swelling or deformity. Normal range of motion. Cervical back: Normal range of motion. No rigidity or tenderness. Right lower leg: No edema. Left lower leg: No edema. Skin:     General: Skin is warm and dry. Capillary Refill: Capillary refill takes less than 2 seconds. Coloration: Skin is not jaundiced or pale. Findings: No bruising or erythema. Neurological:      General: No focal deficit present. Mental Status: He is alert and oriented to person, place, and time.       Cranial Nerves: No cranial nerve deficit. Psychiatric:         Mood and Affect: Mood normal.         Behavior: Behavior normal.         Thought Content: Thought content normal.         Judgment: Judgment normal.           Data Review: All diagnostic labs and studies have been reviewed. CT Results  (Last 48 hours)               11/27/21 1007  CT ABD PELV WO CONT Final result    Impression:  Urinary bladder distention with mild bilateral hydroureter and   hydronephrosis likely reflecting bladder outlet obstruction. A nonocclusive   calculus is seen dependently within the lumen of the distal left ureter and   punctate calculi noted in the upper pole collecting system of the right kidney. Bilateral renal atrophy and additional incidentals as above. Narrative:  EXAM: CT ABD PELV WO CONT       INDICATION: History of small bowel obstruction, constipation/obstipation. Also   inability to urinate. COMPARISON: CT 4/6/2020. CONTRAST:  None. TECHNIQUE:    Thin axial images were obtained through the abdomen and pelvis. Coronal and   sagittal reformats were generated. Oral contrast was not administered. CT dose   reduction was achieved through use of a standardized protocol tailored for this   examination and automatic exposure control for dose modulation. The absence of intravenous contrast material reduces the sensitivity for   evaluation of the vasculature and solid organs. FINDINGS:    LOWER THORAX: No significant abnormality in the incidentally imaged lower chest.   LIVER: No mass. BILIARY TREE: Gallbladder is within normal limits. CBD is not dilated. SPLEEN: within normal limits. PANCREAS: No focal abnormality. ADRENALS: Unremarkable. KIDNEYS/URETERS: There is diffuse cortical atrophy with mild bilateral   hydronephrosis and hydroureter extending to the pelvis with a 2 mm stone   dependently within the lumen of distended distal left ureter.  Additional   punctate calculi are noted within the upper pole collecting system of the right   kidney. No other urinary tract calculi identified. No significant change in   bilateral renal cysts with no evident solid mass. STOMACH: Small hiatal hernia. SMALL BOWEL: No dilatation or wall thickening. COLON: No dilatation or wall thickening. APPENDIX: Unremarkable. PERITONEUM: No ascites or pneumoperitoneum. RETROPERITONEUM: Atherosclerotic calcification without aneurysm. No enlarged   lymphadenopathy. REPRODUCTIVE ORGANS: Prostate therapy seeds. Seminal vesicles appear   unremarkable. URINARY BLADDER: Distended with no evident mass or calculus. BONES: Degenerative spine change. No acute fracture or aggressive lesion. ABDOMINAL WALL: No mass or hernia. ADDITIONAL COMMENTS: N/A                 Assessment:     Active Problems:    History of prostate cancer (12/9/2011)      Overview: , s/p cryosurgery, and his PSA  And he declines  Hormonal       therapy.  Implanted irradiated seeds      RONNY on CPAP (9/23/2015)      Urinary retention (11/27/2021)      OG (acute kidney injury) ([address] (11/27/2021)      Bladder outlet obstruction (11/27/2021)      UTI (urinary tract infection) (11/27/2021)      Constipation (11/27/2021)      Type 2 diabetes    Plan:      Bladder outlet obstruction (11/27/2021)    Secondary to BPH  Urology consult placed  N.p.o. for neurology consult  Continue to monitor and manage pain      History of prostate cancer (12/9/2011)  Urology consult placed  Continue to follow      RONNY on CPAP (9/23/2015)  Use home CPAP      Urinary retention (11/27/2021)  As per bladder outlet obstruction      Fatigue  Likely multifactorial due to OG on CKD and urinary retention  We will continue to monitor      OG (acute kidney injury) ([address] (11/27/2021)  On CKD  This OG is likely post renal due to bladder outlet obstruction  Patient is given fluids in the ER  Should improve once the obstruction is relieved  Continue to monitor with daily labs      UTI (urinary tract infection) (11/27/2021)  Ruling out  Starting ceftriaxone 1 g IV every 24 hours for now  Patient has penicillin allergy but has tolerated cephalosporins before      Constipation (11/27/2021)  This is also likely due to the obstruction  CT abdomen pelvis did not show any intestinal obstruction  Scheduled MiraLAX  Should improve once bladder obstruction is relieved      BPH  Urology is on board  We will resume home medications once no longer n.p.o. Type 2 diabetes  On Metformin at home, which I will not restart while hospitalized  Short acting sliding scale insulin started for now as the patient's blood sugar will likely improve after this acute event passes and we may be able to have him off of insulin. Hypoglycemic protocol ordered  We will start basal insulin if necessary    Blood pressure is also slightly elevated, but this is likely due to pain/discomfort from acute condition.       FUNCTIONAL STATUS PRIOR TO HOSPITALIZATION Ambulates Independently (including history of recent falls): None      Signed By: Akilah Pacheco MD     November 27, 2021

## 2021-11-28 LAB
ANION GAP SERPL CALC-SCNC: 6 MMOL/L (ref 5–15)
BASOPHILS # BLD: 0.1 K/UL (ref 0–0.1)
BASOPHILS NFR BLD: 1 % (ref 0–1)
BUN SERPL-MCNC: 33 MG/DL (ref 6–20)
BUN/CREAT SERPL: 19 (ref 12–20)
CALCIUM SERPL-MCNC: 8.4 MG/DL (ref 8.5–10.1)
CHLORIDE SERPL-SCNC: 110 MMOL/L (ref 97–108)
CO2 SERPL-SCNC: 22 MMOL/L (ref 21–32)
CREAT SERPL-MCNC: 1.76 MG/DL (ref 0.7–1.3)
DIFFERENTIAL METHOD BLD: ABNORMAL
EOSINOPHIL # BLD: 0.1 K/UL (ref 0–0.4)
EOSINOPHIL NFR BLD: 1 % (ref 0–7)
ERYTHROCYTE [DISTWIDTH] IN BLOOD BY AUTOMATED COUNT: 14.3 % (ref 11.5–14.5)
GLUCOSE BLD STRIP.AUTO-MCNC: 149 MG/DL (ref 65–117)
GLUCOSE BLD STRIP.AUTO-MCNC: 154 MG/DL (ref 65–117)
GLUCOSE BLD STRIP.AUTO-MCNC: 161 MG/DL (ref 65–117)
GLUCOSE SERPL-MCNC: 143 MG/DL (ref 65–100)
HCT VFR BLD AUTO: 31.4 % (ref 36.6–50.3)
HGB BLD-MCNC: 10.2 G/DL (ref 12.1–17)
IMM GRANULOCYTES # BLD AUTO: 0.1 K/UL (ref 0–0.04)
IMM GRANULOCYTES NFR BLD AUTO: 1 % (ref 0–0.5)
LYMPHOCYTES # BLD: 1.4 K/UL (ref 0.8–3.5)
LYMPHOCYTES NFR BLD: 14 % (ref 12–49)
MCH RBC QN AUTO: 28.2 PG (ref 26–34)
MCHC RBC AUTO-ENTMCNC: 32.5 G/DL (ref 30–36.5)
MCV RBC AUTO: 86.7 FL (ref 80–99)
MONOCYTES # BLD: 1.3 K/UL (ref 0–1)
MONOCYTES NFR BLD: 12 % (ref 5–13)
NEUTS SEG # BLD: 7.5 K/UL (ref 1.8–8)
NEUTS SEG NFR BLD: 71 % (ref 32–75)
NRBC # BLD: 0 K/UL (ref 0–0.01)
NRBC BLD-RTO: 0 PER 100 WBC
PLATELET # BLD AUTO: 249 K/UL (ref 150–400)
PMV BLD AUTO: 8.9 FL (ref 8.9–12.9)
POTASSIUM SERPL-SCNC: 4.4 MMOL/L (ref 3.5–5.1)
RBC # BLD AUTO: 3.62 M/UL (ref 4.1–5.7)
SERVICE CMNT-IMP: ABNORMAL
SODIUM SERPL-SCNC: 138 MMOL/L (ref 136–145)
WBC # BLD AUTO: 10.4 K/UL (ref 4.1–11.1)

## 2021-11-28 PROCEDURE — 96372 THER/PROPH/DIAG INJ SC/IM: CPT

## 2021-11-28 PROCEDURE — 82962 GLUCOSE BLOOD TEST: CPT

## 2021-11-28 PROCEDURE — 80048 BASIC METABOLIC PNL TOTAL CA: CPT

## 2021-11-28 PROCEDURE — 74011250637 HC RX REV CODE- 250/637: Performed by: STUDENT IN AN ORGANIZED HEALTH CARE EDUCATION/TRAINING PROGRAM

## 2021-11-28 PROCEDURE — G0378 HOSPITAL OBSERVATION PER HR: HCPCS

## 2021-11-28 PROCEDURE — 74011000250 HC RX REV CODE- 250: Performed by: STUDENT IN AN ORGANIZED HEALTH CARE EDUCATION/TRAINING PROGRAM

## 2021-11-28 PROCEDURE — 96376 TX/PRO/DX INJ SAME DRUG ADON: CPT

## 2021-11-28 PROCEDURE — 85025 COMPLETE CBC W/AUTO DIFF WBC: CPT

## 2021-11-28 PROCEDURE — 36415 COLL VENOUS BLD VENIPUNCTURE: CPT

## 2021-11-28 PROCEDURE — 74011250636 HC RX REV CODE- 250/636: Performed by: STUDENT IN AN ORGANIZED HEALTH CARE EDUCATION/TRAINING PROGRAM

## 2021-11-28 RX ORDER — SENNOSIDES 8.6 MG/1
1 TABLET ORAL DAILY
Status: DISCONTINUED | OUTPATIENT
Start: 2021-11-29 | End: 2021-11-29 | Stop reason: HOSPADM

## 2021-11-28 RX ORDER — DEXTROMETHORPHAN POLISTIREX 30 MG/5 ML
SUSPENSION, EXTENDED RELEASE 12 HR ORAL AS NEEDED
Status: DISCONTINUED | OUTPATIENT
Start: 2021-11-28 | End: 2021-11-29 | Stop reason: HOSPADM

## 2021-11-28 RX ORDER — POLYETHYLENE GLYCOL 3350 17 G/17G
17 POWDER, FOR SOLUTION ORAL DAILY
Status: DISCONTINUED | OUTPATIENT
Start: 2021-11-29 | End: 2021-11-29 | Stop reason: HOSPADM

## 2021-11-28 RX ORDER — SENNOSIDES 8.6 MG/1
2 TABLET ORAL DAILY
Status: DISCONTINUED | OUTPATIENT
Start: 2021-11-28 | End: 2021-11-28

## 2021-11-28 RX ORDER — POLYETHYLENE GLYCOL 3350 17 G/17G
17 POWDER, FOR SOLUTION ORAL 2 TIMES DAILY
Status: DISCONTINUED | OUTPATIENT
Start: 2021-11-28 | End: 2021-11-28

## 2021-11-28 RX ADMIN — ENOXAPARIN SODIUM 30 MG: 100 INJECTION SUBCUTANEOUS at 08:43

## 2021-11-28 RX ADMIN — CEFTRIAXONE SODIUM 1 G: 1 INJECTION, POWDER, FOR SOLUTION INTRAMUSCULAR; INTRAVENOUS at 13:03

## 2021-11-28 RX ADMIN — ATORVASTATIN CALCIUM 10 MG: 10 TABLET, FILM COATED ORAL at 08:43

## 2021-11-28 RX ADMIN — POLYETHYLENE GLYCOL 3350 17 G: 17 POWDER, FOR SOLUTION ORAL at 08:43

## 2021-11-28 RX ADMIN — ASPIRIN 81 MG: 81 TABLET, COATED ORAL at 08:43

## 2021-11-28 RX ADMIN — SENNOSIDES 17.2 MG: 8.6 TABLET, COATED ORAL at 11:13

## 2021-11-28 RX ADMIN — POLYETHYLENE GLYCOL 3350 17 G: 17 POWDER, FOR SOLUTION ORAL at 18:18

## 2021-11-28 RX ADMIN — TAMSULOSIN HYDROCHLORIDE 0.4 MG: 0.4 CAPSULE ORAL at 08:43

## 2021-11-28 RX ADMIN — POLYETHYLENE GLYCOL 3350 17 G: 17 POWDER, FOR SOLUTION ORAL at 16:21

## 2021-11-28 NOTE — PROGRESS NOTES
Pt arrived to unit from ED this afternoon. Kunz placed by urologist, pt reports significant relief after placement. Kunz is draining appropriately. Urine is clear and yellow.

## 2021-11-28 NOTE — PROGRESS NOTES
Transition of Care Plan  RUR N/A    Observation notice provided in writing to patient and/or caregiver as well as verbal explanation of the policy. Patients who are in outpatient status also receive the Observation notice. Patient has received notice and or patient representative has received via secure email, fax, or certified mail based on patient representative's preference. Signed letters placed in chart    Disposition  Home    Transportation  wife    Medical follow up  PCP    Contact  Wife Hollie Corado  [phone]    Cm met with patient in his room  He confirmed demographics, PCP and insurance. He has no hx of HH or Rehab  /SNF  Came to ER and admitted due to urinary retention. Has hx of prostate cancer-- followed by urology for BPH    Patient lives in his home with his second wife, Ramiro Gao. He has two adult children form his first wife and Ramiro Gao has 2 adult children. He has a CPAP at home   Self care and driving . He is a retired . Patient plans to return home and family will transport. Cm will follow for any transition of care needs. Therapy to evaluate and make recommendations.  CM will arrange home health if ordered-- patient has [facility]

## 2021-11-28 NOTE — PROGRESS NOTES
[facility] Adult  Hospitalist Group                                                                                          Hospitalist Progress Note  Lia Palmer MD  Answering service: [phone] OR 36 from in house phone        Date of Service:  2021  NAME:  Wiley Kirkpatrick  :  1932  MRN:  [id number]      Admission Summary:   Wiley Case is a 80 y.o. male with  past medical history of arthritis, kidney stone, CKD, GERD, type 2 diabetes, prostate cancer status post seed radiation and cryosurgery, BPH being followed by urology, sleep apnea on home CPAP who comes in for the above. The patient has a history as stated previously of prostate cancer status post seed therapy and cryotherapy. Patient is followed by urology for BPH. He reports that he has been having worsening urinary retention for a few months with worsening for about the last 2 weeks, but that around the last day or so he has been unable to void except for dribbling with gravity. He says that he has not seen any blood in the urine, pyuria, dark urine, or clots. He reports feeling very uncomfortable and having pain in the area. He reports swelling of the bladder area and suprapubic pain. He denies fever, chills, palpitations, dizziness. He does report worsening fatigue and that he has not been able to have a bowel movement as of 3 days ago. He denies blood in his last bowel movement as well. Patient has found no relief without voiding. Interval history / Subjective:   Patient followed up for bladder outlet obstruction with urinary obstruction bilateral hydronephrosis. Patient seen and examined by me this morning. Patient had a Kunz placed by urology. Patient is feeling a lot better. Patient still has not had a bowel movement and is on day 5.      Assessment & Plan:      Bladder outlet obstruction (2021)    Secondary to BPH  Urology consult placed  Kunz placed by urology  We will follow up outpatient       History of prostate cancer (12/9/2011)  Urology consult placed  Continue to follow       RONNY on CPAP (9/23/2015)  Use home CPAP       Urinary retention (11/27/2021)  As per bladder outlet obstruction  Resolved       Fatigue  Likely multifactorial due to OG on CKD and urinary retention  We will continue to monitor  Improved       OG (acute kidney injury) ([facility] [address] (11/27/2021)  On CKD  This OG is likely post renal due to bladder outlet obstruction  Patient's renal function has improved greatly overnight after the obstruction was relieved.       UTI (urinary tract infection) (11/27/2021)  Ruled out       Constipation (11/27/2021)  This is also likely due to the obstruction  CT abdomen pelvis did not show any intestinal obstruction  Scheduled MiraLAX  Should improve once bladder obstruction is relieved  Fleet enema ordered       BPH  Urology is on board  We will resume home medications once no longer n.p.o.     Type 2 diabetes  On Metformin at home, which I will not restart while hospitalized  Short acting sliding scale insulin started for now as the patient's blood sugar will likely improve after this acute event passes and we may be able to have him off of insulin. Hypoglycemic protocol ordered  We will start basal insulin if necessary  Stable    I will discharge the patient once he has had a bowel movement. Urology has cleared and will see him as outpatient.     Code status: Full  DVT prophylaxis: Lovenox    Care Plan discussed with: Patient/Family and Nurse  Anticipated Disposition: Home w/Family  Anticipated Discharge: Less than 24 hours     Hospital Problems  Date Reviewed: 10/7/2021          Codes Class Noted POA    Urinary retention ICD-10-CM: R33.9  ICD-9-CM: 788.20  11/27/2021 Unknown        OG (acute kidney injury) ([facility] [address] ICD-10-CM: N17.9  ICD-9-CM: 584.9  11/27/2021 Unknown        Bladder outlet obstruction ICD-10-CM: N32.0  ICD-9-CM: 596.0  11/27/2021 Unknown        UTI (urinary tract infection) ICD-10-CM: N39.0  ICD-9-CM: 599.0  11/27/2021 Unknown        Constipation ICD-10-CM: K59.00  ICD-9-CM: 564.00  11/27/2021 Unknown        RONNY on CPAP ICD-10-CM: G47.33, Z99.89  ICD-9-CM: 327.23, V46.8  9/23/2015 Yes        History of prostate cancer ICD-10-CM: Z85.46  ICD-9-CM: V10.46  12/9/2011 Yes    Overview Addendum 7/21/2014  8:52 AM by Jermain Law, s/p cryosurgery, and his PSA  And he declines  Hormonal therapy. Implanted irradiated seeds                     Review of Systems:   A comprehensive review of systems was negative except for that written in the HPI. Vital Signs:    Last 24hrs VS reviewed since prior progress note. Most recent are:  Visit Vitals  /73   Pulse 84   Temp 98.2 °F (36.8 °C)   Resp 16   Ht 5' 9\" (1.753 m)   Wt 85.7 kg (189 lb)   SpO2 98%   BMI 27.91 kg/m²         Intake/Output Summary (Last 24 hours) at 11/28/2021 1445  Last data filed at 11/28/2021 0725  Gross per 24 hour   Intake --   Output 1850 ml   Net -1850 ml        Physical Examination:     I had a face to face encounter with this patient and independently examined them on 11/28/2021 as outlined below:          Constitutional:  No acute distress, cooperative, pleasant. Kunz catheter placed   ENT:  Oral mucosa moist, oropharynx benign. Resp:  CTA bilaterally. No wheezing/rhonchi/rales. No accessory muscle use   CV:  Regular rhythm, normal rate, no murmurs, gallops, rubs    GI:  Soft, non distended, non tender. normoactive bowel sounds, no hepatosplenomegaly     Musculoskeletal:  No edema, warm, 2+ pulses throughout    Neurologic:  Moves all extremities.   AAOx3, CN II-XII reviewed            Data Review:    Review and/or order of clinical lab test  Review and/or order of tests in the radiology section of CPT      Labs:     Recent Labs     11/28/21  0433 11/27/21  0929   WBC 10.4 15.5*   HGB 10.2* 12.3   HCT 31.4* 38.0    287     Recent Labs     11/28/21  8283 11/27/21  0929    133*   K 4.4 5.1   * 107   CO2 22 20*   BUN 33* 40*   CREA 1.76* 3.07*   * 198*   CA 8.4* 9.4     Recent Labs     11/27/21  0929   ALT 28   AP 68   TBILI 1.0   TP 7.9   ALB 3.4*   GLOB 4.5*     No results for input(s): INR, PTP, APTT, INREXT in the last 72 hours. No results for input(s): FE, TIBC, PSAT, FERR in the last 72 hours. No results found for: FOL, RBCF   No results for input(s): PH, PCO2, PO2 in the last 72 hours. No results for input(s): CPK, CKNDX, TROIQ in the last 72 hours.     No lab exists for component: CPKMB  Lab Results   Component Value Date/Time    Cholesterol, total 100 02/15/2021 10:21 AM    HDL Cholesterol 49 02/15/2021 10:21 AM    LDL, calculated 30.8 02/15/2021 10:21 AM    Triglyceride 101 02/15/2021 10:21 AM    CHOL/HDL Ratio 2.0 02/15/2021 10:21 AM     Lab Results   Component Value Date/Time    Glucose (POC) 161 (H) 11/28/2021 11:09 AM    Glucose (POC) 154 (H) 11/28/2021 06:25 AM    Glucose (POC) 176 (H) 11/27/2021 09:11 PM    Glucose (POC) 254 (H) 11/27/2021 04:30 PM    Glucose (POC) 174 (H) 11/27/2021 12:26 PM     Lab Results   Component Value Date/Time    Color YELLOW/STRAW 11/27/2021 09:49 AM    Appearance CLEAR 11/27/2021 09:49 AM    Specific gravity 1.015 11/27/2021 09:49 AM    pH (UA) 5.0 11/27/2021 09:49 AM    Protein Negative 11/27/2021 09:49 AM    Glucose 100 (A) 11/27/2021 09:49 AM    Ketone TRACE (A) 11/27/2021 09:49 AM    Bilirubin Negative 11/27/2021 09:49 AM    Urobilinogen 0.2 11/27/2021 09:49 AM    Nitrites Negative 11/27/2021 09:49 AM    Leukocyte Esterase Negative 11/27/2021 09:49 AM    Epithelial cells FEW 11/27/2021 09:49 AM    Bacteria Negative 11/27/2021 09:49 AM    WBC 0-4 11/27/2021 09:49 AM    RBC  11/27/2021 09:49 AM         Medications Reviewed:     Current Facility-Administered Medications   Medication Dose Route Frequency    senna (SENOKOT) tablet 17.2 mg  2 Tablet Oral DAILY    polyethylene glycol (MIRALAX) packet 17 g  17 g Oral BID    sodium chloride (NS) flush 5-40 mL  5-40 mL IntraVENous Q8H    sodium chloride (NS) flush 5-40 mL  5-40 mL IntraVENous PRN    acetaminophen (TYLENOL) tablet 650 mg  650 mg Oral Q6H PRN    Or    acetaminophen (TYLENOL) suppository 650 mg  650 mg Rectal Q6H PRN    ondansetron (ZOFRAN ODT) tablet 4 mg  4 mg Oral Q8H PRN    Or    ondansetron (ZOFRAN) injection 4 mg  4 mg IntraVENous Q6H PRN    enoxaparin (LOVENOX) injection 30 mg  30 mg SubCUTAneous DAILY    aspirin delayed-release tablet 81 mg  81 mg Oral DAILY    atorvastatin (LIPITOR) tablet 10 mg  10 mg Oral DAILY    tamsulosin (FLOMAX) capsule 0.4 mg  0.4 mg Oral DAILY    insulin lispro (HUMALOG) injection   SubCUTAneous TIDAC    glucose chewable tablet 16 g  4 Tablet Oral PRN    dextrose (D50W) injection syrg 12.5-25 g  12.5-25 g IntraVENous PRN    glucagon (GLUCAGEN) injection 1 mg  1 mg IntraMUSCular PRN    morphine injection 1 mg  1 mg IntraVENous Q4H PRN     ______________________________________________________________________  EXPECTED LENGTH OF STAY: - - -  ACTUAL LENGTH OF STAY:          0                 Olivia Khan MD

## 2021-11-28 NOTE — PROGRESS NOTES
Pt states hasn't had a BM in 4 days. Seen by Urology this am and okay for discharge by him. Seen by Dr. Malik Sherman. Miralax given this am and this afternoon no results. Pt has be ambulating in halls x 2. Pt gait is unsteady and wobbly. Encouraged to use walker and when he does, he still missteps. Kunz catheter draining pink tinged urine with small bld \"flakes\". Mineral oil Fleets enema and 2 more doses of Miralax given without results. Pt states having some gas but no stool. Will continue to follow.

## 2021-11-28 NOTE — PROGRESS NOTES
Bedside and Verbal shift change report given to Martin Ellison RN (oncoming nurse) by Luciano Stearns and Julianna Calderon RN (offgoing nurse). Report included the following information SBAR, Kardex, ED Summary, [facility] and Recent Results.

## 2021-11-28 NOTE — PROGRESS NOTES
UROLOGY    He feels better  afeb  WBC   10.4  Cr down to 1.7  2100cc urine overnight---fung is clear      OK for DC to home from urology standpoint with fung    We can arrange office f/u and void trial  Please call if new issues.

## 2021-11-29 VITALS
RESPIRATION RATE: 16 BRPM | DIASTOLIC BLOOD PRESSURE: 71 MMHG | TEMPERATURE: 98.3 F | HEIGHT: 69 IN | WEIGHT: 189 LBS | BODY MASS INDEX: 27.99 KG/M2 | OXYGEN SATURATION: 94 % | HEART RATE: 84 BPM | SYSTOLIC BLOOD PRESSURE: 124 MMHG

## 2021-11-29 DIAGNOSIS — G47.33 OSA (OBSTRUCTIVE SLEEP APNEA): Primary | ICD-10-CM

## 2021-11-29 PROBLEM — N32.0 BLADDER OUTLET OBSTRUCTION: Status: RESOLVED | Noted: 2021-11-27 | Resolved: 2021-11-29

## 2021-11-29 PROBLEM — R33.9 URINARY RETENTION: Status: RESOLVED | Noted: 2021-11-27 | Resolved: 2021-11-29

## 2021-11-29 PROBLEM — N39.0 UTI (URINARY TRACT INFECTION): Status: RESOLVED | Noted: 2021-11-27 | Resolved: 2021-11-29

## 2021-11-29 PROBLEM — K59.00 CONSTIPATION: Status: RESOLVED | Noted: 2021-11-27 | Resolved: 2021-11-29

## 2021-11-29 LAB
BASOPHILS # BLD: 0 K/UL (ref 0–0.1)
BASOPHILS NFR BLD: 1 % (ref 0–1)
DIFFERENTIAL METHOD BLD: ABNORMAL
EOSINOPHIL # BLD: 0.2 K/UL (ref 0–0.4)
EOSINOPHIL NFR BLD: 3 % (ref 0–7)
ERYTHROCYTE [DISTWIDTH] IN BLOOD BY AUTOMATED COUNT: 14.2 % (ref 11.5–14.5)
HCT VFR BLD AUTO: 31.1 % (ref 36.6–50.3)
HGB BLD-MCNC: 10.2 G/DL (ref 12.1–17)
IMM GRANULOCYTES # BLD AUTO: 0 K/UL (ref 0–0.04)
IMM GRANULOCYTES NFR BLD AUTO: 1 % (ref 0–0.5)
LYMPHOCYTES # BLD: 1.3 K/UL (ref 0.8–3.5)
LYMPHOCYTES NFR BLD: 17 % (ref 12–49)
MCH RBC QN AUTO: 28.2 PG (ref 26–34)
MCHC RBC AUTO-ENTMCNC: 32.8 G/DL (ref 30–36.5)
MCV RBC AUTO: 85.9 FL (ref 80–99)
MONOCYTES # BLD: 0.6 K/UL (ref 0–1)
MONOCYTES NFR BLD: 8 % (ref 5–13)
NEUTS SEG # BLD: 5.5 K/UL (ref 1.8–8)
NEUTS SEG NFR BLD: 70 % (ref 32–75)
NRBC # BLD: 0 K/UL (ref 0–0.01)
NRBC BLD-RTO: 0 PER 100 WBC
PLATELET # BLD AUTO: 260 K/UL (ref 150–400)
PMV BLD AUTO: 9 FL (ref 8.9–12.9)
RBC # BLD AUTO: 3.62 M/UL (ref 4.1–5.7)
WBC # BLD AUTO: 7.8 K/UL (ref 4.1–11.1)

## 2021-11-29 PROCEDURE — 74011250636 HC RX REV CODE- 250/636: Performed by: STUDENT IN AN ORGANIZED HEALTH CARE EDUCATION/TRAINING PROGRAM

## 2021-11-29 PROCEDURE — 97161 PT EVAL LOW COMPLEX 20 MIN: CPT

## 2021-11-29 PROCEDURE — 36415 COLL VENOUS BLD VENIPUNCTURE: CPT

## 2021-11-29 PROCEDURE — 85025 COMPLETE CBC W/AUTO DIFF WBC: CPT

## 2021-11-29 PROCEDURE — 96372 THER/PROPH/DIAG INJ SC/IM: CPT

## 2021-11-29 PROCEDURE — G0378 HOSPITAL OBSERVATION PER HR: HCPCS

## 2021-11-29 PROCEDURE — 74011250637 HC RX REV CODE- 250/637: Performed by: STUDENT IN AN ORGANIZED HEALTH CARE EDUCATION/TRAINING PROGRAM

## 2021-11-29 PROCEDURE — 77030012865 HC BG URIN LEG MDII -A

## 2021-11-29 RX ADMIN — ENOXAPARIN SODIUM 30 MG: 100 INJECTION SUBCUTANEOUS at 08:14

## 2021-11-29 RX ADMIN — ATORVASTATIN CALCIUM 10 MG: 10 TABLET, FILM COATED ORAL at 08:14

## 2021-11-29 RX ADMIN — ASPIRIN 81 MG: 81 TABLET, COATED ORAL at 08:14

## 2021-11-29 RX ADMIN — POLYETHYLENE GLYCOL 3350 17 G: 17 POWDER, FOR SOLUTION ORAL at 08:14

## 2021-11-29 RX ADMIN — TAMSULOSIN HYDROCHLORIDE 0.4 MG: 0.4 CAPSULE ORAL at 08:14

## 2021-11-29 RX ADMIN — SENNOSIDES 8.6 MG: 8.6 TABLET, COATED ORAL at 08:14

## 2021-11-29 NOTE — PROGRESS NOTES
PHYSICAL THERAPY EVALUATION/DISCHARGE  Patient: Cody Bustamante (34 y.o. male)  Date: 11/29/2021  Primary Diagnosis: Urinary retention [R33.9]  Bladder outlet obstruction [N32.0]  OG (acute kidney injury) ([facility] [address] [N17.9]       Precautions:   Fall      ASSESSMENT  Based on the objective data described below, the patient presents with decreased generalized strength, balance, coordination, fatigue, and impaired functional mobility below his baseline. Pt reports at baseline he is independent and active. Pt denies any falls in the last year. Pt received sitting EOB and agreeable to participate with PT. Pt stated he has been in bed for 5 or 6 days and is weaker than his baseline. Pt stood with standby assist.  He initially ambulated with a steady gait though needed cues to slow down for safety. Pt cleared on full flight of stairs using both rails. Pt ascended/descended stairs slowly with caution and had no occurrence of loss of balance. Following stairs noted pt to be more fatigued and unsteady. Pt caught his toes on RLE x 2 and lurched forward due to accelerated speed requiring minimal assist to recover. Provided cues to stop and slow down several times while ambulating back to his room. Note pt was wearing loose fitting Crocs and peripheral neuropathy (wife reported) may have contributed to his loss of balance episodes. Pt used a rolling walker to ambulate a short distance and demonstrated improved balance and safety despite increased fatigue. Strongly recommended to pt and his wife that pt use a rolling walker at home until stronger and steadier. Recommend  PT.     Functional Outcome Measure: The patient scored 21/28 on the Tinetti outcome measure which is indicative of moderate fall risk.       Other factors to consider for discharge: decreased activity, in bed for 5 or 6 days per pt report, multiple stairs to enter home, bedroom upstairs, fall risk     Further skilled acute physical therapy is not indicated at this time. PLAN :  Recommendation for discharge: (in order for the patient to meet his/her long term goals)  Physical therapy at least 2 days/week in the home AND ensure assist and/or supervision for safety with mobility     This discharge recommendation:  Has been made in collaboration with the attending provider and/or case management    IF patient discharges home will need the following DME: pt's wife reports she can borrow a rolling walker and a SPC       SUBJECTIVE:   Patient stated I have been in the bed for 5 or 6 days.     OBJECTIVE DATA SUMMARY:   HISTORY:    Past Medical History:   Diagnosis Date    Arthritis     Calculus of kidney     Chronic kidney disease     slightly increased creatinine    GERD (gastroesophageal reflux disease)     Glaucoma     Pneumonia     prostate cancer 2000    seed radiation, cryosurgery    Unspecified sleep apnea     uses CPAP     Past Surgical History:   Procedure Laterality Date    ENDOSCOPY, COLON, DIAGNOSTIC  2010    HX HEENT      uvulectomy    HX OTHER SURGICAL  12/28/2017    Exploratory Lap with small bowel [id number]. Nisha Trujillo    HX PACEMAKER  09 01 15    HX PROSTATECTOMY      brachytherapy, cryosurgery    HX TONSILLECTOMY      HX UROLOGICAL      vasectomy    AR TRABECULOPLASTY BY LASER SURGERY         Prior level of function: independent and active, denies any falls in the last year  Personal factors and/or comorbidities impacting plan of care: multiple stairs to manage at home    [address]: Private residence  # Steps to Enter: 8  Rails to Enter: Yes  Hand Rails : Bilateral  One/Two Story Residence: Two story  # of Interior Steps: 15  Interior Rails: Left  Living Alone: No  Support Systems: Spouse/Significant Other  Patient Expects to be Discharged to[de-identified] [employer]  Current DME Used/Available at Home: None  Tub or Shower Type: Shower    EXAMINATION/PRESENTATION/DECISION MAKING:   Critical Behavior:     Orientation Level: Oriented X4  Cognition: Appropriate decision making, Appropriate for age attention/concentration, Follows commands  Safety/Judgement: Decreased awareness of need for safety         Range Of Motion:  AROM: Generally decreased, functional                       Strength:    Strength: Generally decreased, functional                    Tone & Sensation:   Tone: Normal              Sensation: Impaired, peripheral neuropathy                Coordination:  Coordination: generally decreased, functional       Functional Mobility:  Bed Mobility:              Transfers:  Sit to Stand: Stand-by assistance  Stand to Sit: Stand-by assistance                       Balance:   Sitting: Intact  Standing: Impaired  Standing - Static: Constant support; Good  Standing - Dynamic : Constant support;  Fair  Ambulation/Gait Training:  Distance (ft): 350 Feet (ft)  Assistive Device: Gait belt; Walker, rolling (trial with rolling walker improved gait stability, recommended that pt use a rolling walker at home until his strength and balance improves)  Ambulation - Level of Assistance: Minimal assistance; Assist x1        Gait Abnormalities: Decreased step clearance (flexed bilateral knees, mild trunk flexion, minor loss of balance x 2 due to catching his toe on floor with decreased step clearance, provided minimal assistance and cues to slow down), pt wearing loose fitting Crocs, advised pt to wear tennis shoes or lace shoes         Base of Support: Narrowed     Speed/Nia: Accelerated (provided cues to slow down for safety)              Stairs:  Number of Stairs Trained: 13  Stairs - Level of Assistance: Contact guard assistance; Assist X1   Rail Use: Both        Functional Measure:  Tinetti test:    Sitting Balance: 1  Arises: 2  Attempts to Rise: 2  Immediate Standing Balance: 2  Standing Balance: 1  Nudged: 1  Eyes Closed: 1  Turn 360 Degrees - Continuous/Discontinuous: 1  Turn 360 Degrees - Steady/Unsteady: 0  Sitting Down: 2  Balance Score: 13 Balance total score  Indication of Gait: 1  R Step Length/Height: 1  L Step Length/Height: 1  R Foot Clearance: 0  L Foot Clearance: 1  Step Symmetry: 1  Step Continuity: 1  Path: 1  Trunk: 1  Walking Time: 0  Gait Score: 8 Gait total score  Total Score: 21/28 Overall total score         Tinetti Tool Score Risk of Falls  <19 = High Fall Risk  19-24 = Moderate Fall Risk  25-28 = Low Fall Risk  Tinetti ME. Performance-Oriented Assessment of Mobility Problems in Elderly Patients. [facility]; [id number]. (Scoring Description: PT Bulletin Feb. 10, 1993)    Older adults: Jaya Ybarra et al, 2009; n = 1000 [facility] elderly evaluated with ABC, LYN, ADL, and IADL)  · Mean LYN score for males aged 69-68 years = 26.21(3.40)  · Mean LYN score for females age 69-68 years = 25.16(4.30)  · Mean LYN score for males over 80 years = 23.29(6.02)  · Mean LYN score for females over 80 years = 17.20(8.32)          Physical Therapy Evaluation Charge Determination   History Examination Presentation Decision-Making   MEDIUM  Complexity : 1-2 comorbidities / personal factors will impact the outcome/ POC  LOW Complexity : 1-2 Standardized tests and measures addressing body structure, function, activity limitation and / or participation in recreation  LOW Complexity : Stable, uncomplicated  LOW Complexity : FOTO score of       Based on the above components, the patient evaluation is determined to be of the following complexity level: LOW     Pain Rating:  none    Activity Tolerance:   Fair      After treatment patient left in no apparent distress:   Sitting in chair    COMMUNICATION/EDUCATION:   The patients plan of care was discussed with: Registered nurse. Fall prevention education was provided and the patient/caregiver indicated understanding. and Patient/family agree to work toward stated goals and plan of care.     Thank you for this referral.  Rere Hines   Time Calculation: 12 mins

## 2021-11-29 NOTE — PROGRESS NOTES
I have reviewed the charting of Art Hill RN and agree with her charting. Bedside and Verbal shift change report given to Rubio Sosa (oncoming nurse) by Joaquín Morrison RN (offgoing nurse). Report included the following information SBAR, Kardex, ED Summary, Intake/Output, MAR and Recent Results.

## 2021-11-29 NOTE — DISCHARGE SUMMARY
Discharge Summary       PATIENT ID: Cody Bustamante  MRN: [id number]   YOB: 1932    DATE OF ADMISSION: 11/27/2021  9:42 AM    DATE OF DISCHARGE: 11/29/21   PRIMARY CARE PROVIDER: Cherie Acosta MD     ATTENDING PHYSICIAN: Kadie Napoles MD  DISCHARGING PROVIDER: Kadie Napoles MD    To contact this individual call [phone] and ask the  to page. If unavailable ask to be transferred the Adult Hospitalist Department. CONSULTATIONS: IP CONSULT TO UROLOGY  IP CONSULT TO HOSPITALIST    PROCEDURES/SURGERIES: * No surgery found *    ADMITTING [facility] COURSE:   Cody Bustamante is a 80 y.o. male with  past medical history of arthritis, kidney stone, CKD, GERD, type 2 diabetes, prostate cancer status post seed radiation and cryosurgery, BPH being followed by urology, sleep apnea on home CPAP who comes in for the above. The patient has a history as stated previously of prostate cancer status post seed therapy and cryotherapy. Patient is followed by urology for BPH. He reports that he has been having worsening urinary retention for a few months with worsening for about the last 2 weeks, but that around the last day or so he has been unable to void except for dribbling with gravity. He says that he has not seen any blood in the urine, pyuria, dark urine, or clots. He reports feeling very uncomfortable and having pain in the area. He reports swelling of the bladder area and suprapubic pain. He denies fever, chills, palpitations, dizziness. He does report worsening fatigue and that he has not been able to have a bowel movement as of 3 days ago. He denies blood in his last bowel movement as well. Patient has found no relief without voiding.     Admission diagnoses  Bladder outlet obstruction  Bilateral hydronephrosis  OG  History of prostate cancer  RONNY on CPAP  Urinary retention  Fatigue  Rule out UTI  Constipation  BPH  Type 2 diabetes      Hospital course  Patient was admitted after bilateral hydroureter with hydronephrosis was found and patient was unable to be catheterized for urine. Patient had an OG due to the post renal obstruction. Urology was consulted and saw. Urology was able to place a Fung catheter to relieve the hydronephrosis. Patient improved and managed to urinate relieving more than 1000 mL. Urology determined that the patient can be followed up as outpatient and was deemed stable enough to go home with the Fung placed. The patient has had constipation for 5 days so he was treated with MiraLAX and then had senna added. Patient had a bowel movement in the morning of the day of discharge. Patient remained stable the whole time and improved as expected. Patient was discharged stable. DISCHARGE DIAGNOSES / PLAN:      1. Bladder outlet obstruction. Resolved. 2. Bilateral hydroureter with hydronephrosis. Resolved. 3. OG on CKD. Improved. 4. Constipation. Resolved. 5. Fatigue. Improved. 6. UTI. Ruled out. 7. Type 2 diabetes. Stable. 8. BPH. Stable. 9. RONNY on CPAP. Stable. 10. History of prostate cancer. Stable. PENDING TEST RESULTS:   At the time of discharge the following test results are still pending: None    FOLLOW UP APPOINTMENTS:    Follow-up Information     Follow up With Specialties Details Why Contact Info    Ha Medina MD Internal Medicine On 12/6/2021 Hospital follow up PCP appointment Monday, 12/6/21 at 1:20 p.m.  [address] [facility]  [address] [address] [facility] [address] [facility]      Kaye Dash MD Urology Call Call for appointment to manage fung catheter.  [address] [facility]  [address]  [phone]             ADDITIONAL CARE RECOMMENDATIONS: None    DIET: Regular Diet    ACTIVITY: Activity as tolerated    WOUND CARE: None    EQUIPMENT needed: None      DISCHARGE MEDICATIONS:  Discharge Medication List as of 11/29/2021  9:36 AM      CONTINUE these medications which have NOT CHANGED Details   atorvastatin (LIPITOR) 10 mg tablet TAKE 1 TABLET BY MOUTH DAILY, Normal, Disp-90 Tablet, R-1      silodosin (Rapaflo) 8 mg capsule Take 8 mg by mouth daily (with breakfast). , Historical Med      iron,carbonyl-vitamin C (Vitron-C) 65 mg iron- 125 mg TbEC Take  by mouth., Historical Med      metFORMIN (GLUCOPHAGE) 500 mg tablet TAKE 1 TABLET BY MOUTH TWICE DAILY WITH MEALS, Normal, Disp-180 Tablet, R-1      glucose blood VI test strips (ASCENSIA AUTODISC VI, ONE TOUCH ULTRA TEST VI) strip Use to test blood sugars once daily DX:E11.9, Normal, Disp-100 Strip, R-3      lancets misc Use to test blood sugars once daily DX:E11.9, Normal, Disp-100 Each, R-1      aspirin delayed-release 81 mg tablet Take 81 mg by mouth daily. , Historical Med      Lancing Device misc Use to test blood sugars once daily DX:E11.9, Normal, Disp-1 Each, R-0      Blood-Glucose Meter monitoring kit Use as directed  icd10-, Print, Disp-1 Kit, R-0               NOTIFY YOUR PHYSICIAN FOR ANY OF THE FOLLOWING:   Fever over 101 degrees for 24 hours. Chest pain, shortness of breath, fever, chills, nausea, vomiting, diarrhea, change in mentation, falling, weakness, bleeding. Severe pain or pain not relieved by medications. Or, any other signs or symptoms that you may have questions about. DISPOSITION:    Home With:   OT  PT  HH  RN       Long term SNF/Inpatient Rehab    Independent/assisted living    Hospice    Other:       PATIENT CONDITION AT DISCHARGE:     Functional status    Poor     Deconditioned    X Independent      Cognition   X  Lucid     Forgetful     Dementia      Catheters/lines (plus indication)   X Kunz     PICC     PEG     None      Code status   X  Full code     DNR      PHYSICAL EXAMINATION AT DISCHARGE:  Constitutional:  No acute distress, cooperative, pleasant. Kunz catheter placed   ENT:  Oral mucosa moist, oropharynx benign. Resp:  CTA bilaterally. No wheezing/rhonchi/rales.  No accessory muscle use   CV: Regular rhythm, normal rate, no murmurs, gallops, rubs    GI:  Soft, non distended, non tender. normoactive bowel sounds, no hepatosplenomegaly     Musculoskeletal:  No edema, warm, 2+ pulses throughout    Neurologic:  Moves all extremities. AAOx3, CN II-XII reviewed                                    CHRONIC MEDICAL DIAGNOSES:  Problem List as of 11/29/2021 Date Reviewed: 10/7/2021          Codes Class Noted - Resolved    OG (acute kidney injury) ([facility] [address] ICD-10-CM: N17.9  ICD-9-CM: 584.9  11/27/2021 - Present        Routine eye exam (Chronic) ICD-10-CM: Z01.00  ICD-9-CM: V72.0  7/23/2021 - Present    Overview Signed 7/23/2021  4:06 PM by Lawrance Lanes, MD     3/31/21 [facility]             History of small bowel obstruction ICD-10-CM: Z87.19  ICD-9-CM: V12.79  4/6/2020 - Present        Colon cancer screening (Chronic) ICD-10-CM: Z12.11  ICD-9-CM: V76.51  1/17/2019 - Present    Overview Signed 1/17/2019 10:50 AM by Lawrance Lanes, MD     3/26/12 Dr. Teresa Hargrove             Perforated diverticulum of small intestine ICD-10-CM: K57.00  ICD-9-CM: 562.00  12/28/2017 - Present    Overview Addendum 1/17/2019 10:47 AM by Lawrance Lanes, MD     12/28/17 S/p small bowel resection. Dr. Liana Huizar. RONNY on CPAP ICD-10-CM: G47.33, Z99.89  ICD-9-CM: 327.23, V46.8  9/23/2015 - Present        Glaucoma ICD-10-CM: H40.9  ICD-9-CM: 365.9  11/17/2014 - Present    Overview Signed 1/17/2019 10:45 AM by Lawrance Lanes, MD     S/p laser treatment.  Dr. Lynne perez ICD-10-CM: I45.9  ICD-9-CM: 426.9  8/6/2014 - Present    Overview Signed 8/6/2014  9:04 PM by Willye Kick      Medtronic dual chamberpacemaker placed at [facility] Dr[facility]             DM (diabetes mellitus) type II controlled with renal manifestation [facility]) ICD-10-CM: E11.29  ICD-9-CM: 250.40  8/19/2013 - Present    Overview Signed 1/17/2019 10:44 AM by Lawrance Lanes, MD     Diet controlled. Has bilateral feet neuropathy             Peripheral neuropathy ICD-10-CM: G62.9  ICD-9-CM: 356.9  12/9/2011 - Present    Overview Signed 1/17/2019 10:43 AM by Cherie Acosta MD     Feet bilateral             History of prostate cancer ICD-10-CM: Z85.46  ICD-9-CM: V10.46  12/9/2011 - Present    Overview Addendum 7/21/2014  8:52 AM by Tomasa Jain, s/p cryosurgery, and his PSA  And he declines  Hormonal therapy. Implanted irradiated seeds             RESOLVED: Urinary retention ICD-10-CM: R33.9  ICD-9-CM: 788.20  11/27/2021 - 11/29/2021        RESOLVED: Bladder outlet obstruction ICD-10-CM: N32.0  ICD-9-CM: 596.0  11/27/2021 - 11/29/2021        RESOLVED: UTI (urinary tract infection) ICD-10-CM: N39.0  ICD-9-CM: 599.0  11/27/2021 - 11/29/2021        RESOLVED: Constipation ICD-10-CM: K59.00  ICD-9-CM: 564.00  11/27/2021 - 11/29/2021        RESOLVED: Type 2 diabetes mellitus with diabetic neuropathy ([facility] ICD-10-CM: E11.40  ICD-9-CM: 250.60, 357.2  3/6/2018 - 1/17/2019        RESOLVED: CKD (chronic kidney disease) stage 3, GFR 30-59 ml/min ([facility]) ICD-10-CM: N18.30  ICD-9-CM: 585.3  8/19/2013 - 10/3/2017        RESOLVED: Injury of right Achilles tendon ICD-10-CM: S86.001A  ICD-9-CM: 959.7  8/14/2013 - 5/25/2016    Overview Addendum 7/21/2014  8:55 AM by Bryson Hernandez     He chose not to have it repaired. Hx of rupture.  2010             RESOLVED: Type II or unspecified type diabetes mellitus without mention of complication, not stated as uncontrolled ICD-10-CM: E11.9  ICD-9-CM: 250.00  8/14/2013 - 7/21/2014        RESOLVED: Proctitis, radiation ICD-10-CM: K62.7  ICD-9-CM: 569.49  12/9/2011 - 1/17/2019              Greater than 32 minutes were spent with the patient on counseling and coordination of care    Signed:   Kadie Napoles MD  11/29/2021  11:47 AM

## 2021-11-29 NOTE — PROGRESS NOTES
Patient's device is >11years old. Order placed. Orders Placed This Encounter    AMB SUPPLY ORDER     Primary Encounter Diagnosis: Obstructive Sleep Apnea  (G47.33)    ResMed Device with Heated Humidifer [id number] / [id number]. Positive Airway Pressure Therapy: Duration of need: 99 months. Set Pressure: 8-13 cmH2O     Nasal Cushion (Replace) 2 per month.  Nasal Interface Mask 1 every 3 months.  Headgear 1 every 6 months.  Filter(s) Disposable 2 per month.  Filter(s) Non-Disposable 1 every 6 months. [facility] for [employer] (Replace) 1 every 6 months.  Tubing with heating element 1 every 3 months. Perform Mask Fitting per patient preference and comfort - replace as above. JENNIFER Oswald, [facility] NPI: [id number]  Electronically signed.  11/29/21     JENNIFER Oswald, [facility]   Nurse Practitioner  [address]

## 2021-11-29 NOTE — PROGRESS NOTES
Occupational Therapy    Order's acknowledged, chart reviewed in prep for skilled OT evalualtion; however, pt and pt's wife verbalize no acute OT needs, with pt already having D/C plans to return home with assist of wife. Pt declined need for DME; however, per physical therapist, pt would benefit from [address] [place] [address] and pt's wife reports she will borrow at [address] [place] [address] from a friend for her  to use. Given pt declining need for OT evaluation, with good ADL/IADL assist available from wife and physical therapy following mobility/balance and activity tolerance deficits, OT to sign off.     Thank you,  Apryl Ann, OT

## 2021-11-29 NOTE — PROGRESS NOTES
Orders received, chart reviewed and patient evaluated by physical therapy. Pending progression with skilled acute physical therapy, recommend:  Physical therapy at least 2 days/week in the home       Full evaluation to follow.    Rere Lamas PT

## 2021-11-29 NOTE — PROGRESS NOTES
Reviewed discharge instructions with the patient and spouse. The patient and spouse verbalized understanding. Performed leg bag teaching. Ensured patient had all belongings prior to discharge.

## 2021-11-30 ENCOUNTER — DOCUMENTATION ONLY (OUTPATIENT)
Dept: SLEEP MEDICINE | Age: 86
End: 2021-11-30

## 2021-11-30 ENCOUNTER — PATIENT OUTREACH (OUTPATIENT)
Dept: CASE MANAGEMENT | Age: 86
End: 2021-11-30

## 2021-12-01 RX ORDER — POLYETHYLENE GLYCOL 3350 17 G/17G
17 POWDER, FOR SOLUTION ORAL DAILY
COMMUNITY
End: 2022-03-15

## 2021-12-01 NOTE — PROGRESS NOTES
Care Transitions Initial Call    Call within 2 business days of discharge: Yes     Patient: Kodak Hurd Patient : 1932 MRN: [id number]    Last Discharge [address]       Complaint Diagnosis Description Type Department Provider    21 Constipation; Urinary Retention; Fatigue OG (acute kidney injury) ([facility] [address] . .. ED to Hosp-Admission (Discharged) (ADMIT) Sunni Alvarado MD; Carolina Wu. .. Was this an external facility discharge? No     Challenges to be reviewed by the provider       - went home with a fung,saw urology on  and will see again on 12/15  - no med changes            Method of communication with provider : chart routing    Discussed COVID-19 related testing which was not done at this time. Advance Care Planning:   Does patient have an Advance Directive:not on file     Inpatient Readmission Risk score: No data recorded  Was this a readmission? no       Patients top risk factors for readmission: lack of knowledge about disease and multiple health system providers   Interventions to address risk factors: Obtained and reviewed discharge summary and/or continuity of care documents    Care Transition Nurse (CTN) contacted the patient by telephone to perform post hospital discharge assessment. Verified name and  with patient as identifiers. Provided introduction to self, and explanation of the CTN role. CTN reviewed discharge instructions, medical action plan and red flags with patient who verbalized understanding. Were discharge instructions available to patient? yes. Reviewed appropriate site of care based on symptoms and resources available to patient including: PCP, Specialist, Urgent Care Clinics and When to call 911. Patient given an opportunity to ask questions and does not have any further questions or concerns at this time. The patient agrees to contact the PCP office for questions related to their healthcare.      Medication reconciliation was performed with patient, who verbalizes understanding of administration of home medications. Advised obtaining a 90-day supply of all daily and as-needed medications. Referral to Pharm D needed: no     Home Health/Outpatient orders at discharge: none    Durable Medical Equipment ordered at [address]    Educated patient about risk for severe COVID-19 due to risk factors according to CDC guidelines. CTN reviewed discharge instructions, medical action plan and red flag symptoms with the patient who verbalized understanding. Discussed COVID vaccination status: yes. Education provided on COVID-19 vaccination as appropriate. Discussed exposure protocols and quarantine with CDC Guidelines. Patient was given an opportunity to verbalize any questions and concerns and agrees to contact CTN or health care provider for questions related to their healthcare. Was patient discharged with a pulse oximeter? no      Discussed follow-up appointments. If no appointment was previously scheduled, appointment scheduling offered: yes. Is follow up appointment scheduled within 7 days of discharge? yes. [facility] follow up appointment(s):   Future Appointments   Date Time Provider Jenna Mcgregor   12/6/2021  1:20 PM Jo Crouch MD [facility]     [facility] follow up appointment(s): patient saw urology on 12/1    Plan for follow-up call in 7-10 days based on severity of symptoms and risk factors. Plan for next call: symptom management-assess s/s   CTN provided contact information for future needs. Goals Addressed                 This Visit's Progress     Prevent complications post hospitalization. 12/01/21    - Spoke to patient today with his wife on speaker. He reports he is feeling well. He saw urology today and he will see urology again on 12/15 and PCP on 12/16   - patient still has fung in place, discussed proper cleaning techniques and patient will clean at minimum 1-2x daily.  Dicussed monitoring urine for color, odor and clarity. As well as keeping fung below the level of the bladder and monitoring for kinks. He will contact MD for abnormal changes. He states his urine is becoming more yellow since returning home. Patient/family will also monitor for change in mental status   - patient will take meds as prescribed. - patient will increase H20 intake to atleast 1000-1500mL daily, as of now he states he is just drinking about 8oz. - patient checks glucose daily and it was 162 today, he states that is a little high for him, usually he is in the 140's, will be keep record to take to his appt. - patient reports he is now taking miralax and he has had a normal BM today and yesterday, if not BM for 2 days or more he will contact MD  - Reviewed red flag s/s with patient, nausea, vomiting, inability to pass urine/stool, SOB, mental status change, chest pain, fever.   - declined a need for [facility]       Patient will contact Md/CTN if red flag s/s arise. CTN to f/u ~ 7-10 days.  AR

## 2021-12-05 NOTE — PROGRESS NOTES
Assessment/Plan:       ICD-10-CM ICD-9-CM    1. Urinary outflow obstruction  N13.9 599.60       Plan:  -obstruction relieved with Kunz Catheter in place. Patient scheduled for internal urethrotomy and kenalog injection. Follow-up Disposition:     follow up in 3 months         Disclaimer:  Return if symptoms worsen or fail to improve. Advised patient to call back or return to office if symptoms worsen/change/persist.     Discussed expected course/resolution/complications of diagnosis in detail with patient. Medication risks/benefits/costs/interactions/alternatives discussed with patient. Patient was given an after visit summary which includes diagnoses, current medications, & vitals. Patient expressed understanding with the diagnosis and plan. Subjective:      Cody Bustamante is a 80 y.o. male who presents today for transition of care. Admission date: 11/27/2021  Discharge date: 11/29/2021  Discharge Diagnosis: bladder outlet obstruction, hydronephrosis and OG  Hospital: [facility]  Date of Navigator contact: 11/30/2021    Hospital Course:  -was seen by urology during hospitalization. Catheter was placed by urology and was able to relieve hydronephrosis and OG. -has had follow up with urology, Dr. Kira Willoughby, on 11/30/2021. Continues to have Kunz Catheter in place. Will be scheduled for internal urethrotomy and kenalog injection.       Objective:       Visit Vitals  BP 99/64   Pulse 93   Temp 97.1 °F (36.2 °C) (Temporal)   Resp 16   Ht 5' 9\" (1.753 m)   Wt 188 lb (85.3 kg)   SpO2 97%   BMI 27.76 kg/m²     General appearance: alert, cooperative, no distress, appears stated age  Head: Normocephalic, without obvious abnormality, atraumatic  Lungs: clear to auscultation bilaterally  Heart: regular rate and rhythm

## 2021-12-06 ENCOUNTER — OFFICE VISIT (OUTPATIENT)
Dept: INTERNAL MEDICINE CLINIC | Age: 86
End: 2021-12-06
Payer: MEDICARE

## 2021-12-06 VITALS
HEIGHT: 69 IN | WEIGHT: 188 LBS | DIASTOLIC BLOOD PRESSURE: 64 MMHG | OXYGEN SATURATION: 97 % | BODY MASS INDEX: 27.85 KG/M2 | SYSTOLIC BLOOD PRESSURE: 99 MMHG | HEART RATE: 93 BPM | TEMPERATURE: 97.1 F | RESPIRATION RATE: 16 BRPM

## 2021-12-06 DIAGNOSIS — N13.9 URINARY OUTFLOW OBSTRUCTION: Primary | ICD-10-CM

## 2021-12-06 PROBLEM — R33.9 URINARY RETENTION WITH INCOMPLETE BLADDER EMPTYING: Status: RESOLVED | Noted: 2020-01-01 | Resolved: 2021-12-06

## 2021-12-06 PROBLEM — R33.9 URINARY RETENTION WITH INCOMPLETE BLADDER EMPTYING: Status: ACTIVE | Noted: 2020-01-01

## 2021-12-06 PROBLEM — N17.9 AKI (ACUTE KIDNEY INJURY) (HCC): Status: RESOLVED | Noted: 2021-11-27 | Resolved: 2021-12-06

## 2021-12-06 PROCEDURE — G8427 DOCREV CUR MEDS BY ELIG CLIN: HCPCS | Performed by: INTERNAL MEDICINE

## 2021-12-06 PROCEDURE — 99496 TRANSJ CARE MGMT HIGH F2F 7D: CPT | Performed by: INTERNAL MEDICINE

## 2021-12-29 ENCOUNTER — PATIENT OUTREACH (OUTPATIENT)
Dept: CASE MANAGEMENT | Age: 86
End: 2021-12-29

## 2021-12-30 NOTE — PROGRESS NOTES
Patient has graduated from the Transitions of Care Coordination  program on 12/30/21   Patient was not referred to the [facility] team for further management. Goals Addressed                 This Visit's Progress     COMPLETED: Prevent complications post hospitalization. 12/01/21    - Spoke to patient today with his wife on speaker. He reports he is feeling well. He saw urology today and he will see urology again on 12/15 and PCP on 12/16   - patient still has fung in place, discussed proper cleaning techniques and patient will clean at minimum 1-2x daily. Dicussed monitoring urine for color, odor and clarity. As well as keeping fung below the level of the bladder and monitoring for kinks. He will contact MD for abnormal changes. He states his urine is becoming more yellow since returning home. Patient/family will also monitor for change in mental status   - patient will take meds as prescribed. - patient will increase H20 intake to atleast 1000-1500mL daily, as of now he states he is just drinking about 8oz. - patient checks glucose daily and it was 162 today, he states that is a little high for him, usually he is in the 140's, will be keep record to take to his appt. - patient reports he is now taking miralax and he has had a normal BM today and yesterday, if not BM for 2 days or more he will contact MD  - Reviewed red flag s/s with patient, nausea, vomiting, inability to pass urine/stool, SOB, mental status change, chest pain, fever.   - declined a need for [facility]       Patient will contact Md/CTN if red flag s/s arise. CTN to f/u ~ 7-10 days. AR       12/30/21  Unable to reach patient for final PAKO call. Patient did attend PCP appt on 12/6. Episode resolved at this time.  AR              Patients upcoming visits:    Future Appointments   Date Time Provider Jenna Mcgregor   3/11/2022  9:00 AM Kaylyn Nolen MD [facility] KAYLIE HERNANDEZ AMB

## 2022-03-02 ENCOUNTER — OFFICE VISIT (OUTPATIENT)
Dept: SLEEP MEDICINE | Age: 87
End: 2022-03-02

## 2022-03-02 DIAGNOSIS — G47.33 OSA (OBSTRUCTIVE SLEEP APNEA): Primary | ICD-10-CM

## 2022-03-02 NOTE — PROGRESS NOTES
Reviewed patient DS2 and prescription matched the order in the system. Reviewed how to change the filter, water chamber, and tubing on the new device. Patient verbally understood.

## 2022-03-15 ENCOUNTER — OFFICE VISIT (OUTPATIENT)
Dept: INTERNAL MEDICINE CLINIC | Age: 87
End: 2022-03-15
Payer: MEDICARE

## 2022-03-15 VITALS
WEIGHT: 189 LBS | TEMPERATURE: 97.1 F | HEART RATE: 90 BPM | BODY MASS INDEX: 27.99 KG/M2 | SYSTOLIC BLOOD PRESSURE: 105 MMHG | OXYGEN SATURATION: 98 % | RESPIRATION RATE: 14 BRPM | HEIGHT: 69 IN | DIASTOLIC BLOOD PRESSURE: 69 MMHG

## 2022-03-15 DIAGNOSIS — N18.30 TYPE 2 DIABETES MELLITUS WITH STAGE 3 CHRONIC KIDNEY DISEASE, WITHOUT LONG-TERM CURRENT USE OF INSULIN, UNSPECIFIED WHETHER STAGE 3A OR 3B CKD (HCC): Primary | ICD-10-CM

## 2022-03-15 DIAGNOSIS — E11.22 TYPE 2 DIABETES MELLITUS WITH STAGE 3 CHRONIC KIDNEY DISEASE, WITHOUT LONG-TERM CURRENT USE OF INSULIN, UNSPECIFIED WHETHER STAGE 3A OR 3B CKD (HCC): ICD-10-CM

## 2022-03-15 DIAGNOSIS — E11.22 TYPE 2 DIABETES MELLITUS WITH STAGE 3 CHRONIC KIDNEY DISEASE, WITHOUT LONG-TERM CURRENT USE OF INSULIN, UNSPECIFIED WHETHER STAGE 3A OR 3B CKD (HCC): Primary | ICD-10-CM

## 2022-03-15 DIAGNOSIS — E78.2 MIXED HYPERLIPIDEMIA: ICD-10-CM

## 2022-03-15 DIAGNOSIS — N18.30 TYPE 2 DIABETES MELLITUS WITH STAGE 3 CHRONIC KIDNEY DISEASE, WITHOUT LONG-TERM CURRENT USE OF INSULIN, UNSPECIFIED WHETHER STAGE 3A OR 3B CKD (HCC): ICD-10-CM

## 2022-03-15 DIAGNOSIS — N13.9 URINARY OUTFLOW OBSTRUCTION: ICD-10-CM

## 2022-03-15 DIAGNOSIS — I45.9 HEART BLOCK: ICD-10-CM

## 2022-03-15 DIAGNOSIS — Z79.899 ENCOUNTER FOR LONG-TERM (CURRENT) USE OF MEDICATIONS: ICD-10-CM

## 2022-03-15 DIAGNOSIS — G47.33 OSA (OBSTRUCTIVE SLEEP APNEA): ICD-10-CM

## 2022-03-15 PROBLEM — K57.00 PERFORATED DIVERTICULUM OF SMALL INTESTINE: Status: RESOLVED | Noted: 2017-12-28 | Resolved: 2022-03-15

## 2022-03-15 PROBLEM — E11.40 TYPE 2 DIABETES MELLITUS WITH DIABETIC NEUROPATHY (HCC): Status: ACTIVE | Noted: 2022-03-15

## 2022-03-15 PROCEDURE — G8536 NO DOC ELDER MAL SCRN: HCPCS | Performed by: INTERNAL MEDICINE

## 2022-03-15 PROCEDURE — G8419 CALC BMI OUT NRM PARAM NOF/U: HCPCS | Performed by: INTERNAL MEDICINE

## 2022-03-15 PROCEDURE — 1101F PT FALLS ASSESS-DOCD LE1/YR: CPT | Performed by: INTERNAL MEDICINE

## 2022-03-15 PROCEDURE — G8510 SCR DEP NEG, NO PLAN REQD: HCPCS | Performed by: INTERNAL MEDICINE

## 2022-03-15 PROCEDURE — 99214 OFFICE O/P EST MOD 30 MIN: CPT | Performed by: INTERNAL MEDICINE

## 2022-03-15 PROCEDURE — G8427 DOCREV CUR MEDS BY ELIG CLIN: HCPCS | Performed by: INTERNAL MEDICINE

## 2022-03-15 NOTE — PROGRESS NOTES
Assessment/Plan:     1. Type 2 diabetes mellitus with stage 3 chronic kidney disease, without long-term current use of insulin, unspecified whether stage 3a or 3b CKD ([facility] [address]  -states glucose readings have been higher than his norm. Will order labs today. - CBC WITH AUTOMATED DIFF; Future  - METABOLIC PANEL, COMPREHENSIVE; Future  - HEMOGLOBIN A1C WITH EAG; Future  - MICROALBUMIN, UR, RAND W/ MICROALB/CREAT RATIO; Future    2. Mixed hyperlipidemia  -taking his atorvastatin 10mg daily    3. Encounter for long-term (current) use of medications      4. Urinary outflow obstruction  -working with his urologist.  Having urinary incontinence now    5. RONNY (obstructive sleep apnea)  -has been using his cpap machine nightly. 6. Heart block  -pacemaker in place. Follows with his cardiologist.  -heart rate normal today. Orders Placed This Encounter    CBC WITH AUTOMATED DIFF     Standing Status:   Future     Number of Occurrences:   1     Standing Expiration Date:   8/91/8558    METABOLIC PANEL, COMPREHENSIVE     Standing Status:   Future     Number of Occurrences:   1     Standing Expiration Date:   3/15/2023    HEMOGLOBIN A1C WITH EAG     Standing Status:   Future     Number of Occurrences:   1     Standing Expiration Date:   3/15/2023    MICROALBUMIN, UR, RAND W/ MICROALB/CREAT RATIO     Standing Status:   Future     Number of Occurrences:   1     Standing Expiration Date:   3/15/2023             Follow-up Disposition:       Follow up 4 months              Subjective:      Ayla Wu is a 80 y.o. male who presents today for follow up of his diabetes mellitus type 2 and  hyperlipidemia     Since last visit :  -diabetes mellitus - glucose readings have been a little higher than his norm. He has been very sedentary. -urinary obstruction- following with urologist. Now having more urinary incontinence  -RONNY- using his cpap nightly. No problems  -heart block- has pacemaker. -feeling more tired. Patient Care Team:  -Dr. Spring Nash [facility] - RONNY- using cpap  -Dr. Manav Baca - urology. - last month  -Dr. Yassine Ho - cardiology- heart block. Has pacemaker.          Objective: Wt Readings from Last 3 Encounters:   03/15/22 189 lb (85.7 kg)   12/06/21 188 lb (85.3 kg)   11/27/21 189 lb (85.7 kg)     BP Readings from Last 3 Encounters:   03/15/22 105/69   12/06/21 99/64   11/29/21 124/71     Visit Vitals  /69   Pulse 90   Temp 97.1 °F (36.2 °C) (Temporal)   Resp 14   Ht 5' 9\" (1.753 m)   Wt 189 lb (85.7 kg)   SpO2 98%   BMI 27.91 kg/m²     General appearance: alert, cooperative, no distress, appears stated age  Head: Normocephalic, without obvious abnormality, atraumatic  Neck: supple, symmetrical, trachea midline, no adenopathy, no carotid bruit and no JVD  Lungs: clear to auscultation bilaterally  Heart: regular rate and rhythm, S1, S2 normal, no murmur, click, rub or gallop  Abdomen: soft, non-tender. Bowel sounds normal. No masses,  no organomegaly  Extremities: no edema  Pulses: 2+ and symmetric              Disclaimer:  Return if symptoms worsen or fail to improve. Advised patient to call back or return to office if symptoms worsen/change/persist.     Discussed expected course/resolution/complications of diagnosis in detail with patient. Medication risks/benefits/costs/interactions/alternatives discussed with patient. Patient was given an after visit summary which includes diagnoses, current medications, & vitals. Patient expressed understanding with the diagnosis and plan.

## 2022-03-16 LAB
ALBUMIN SERPL-MCNC: 3.8 G/DL (ref 3.5–5)
ALBUMIN/GLOB SERPL: 1.3 {RATIO} (ref 1.1–2.2)
ALP SERPL-CCNC: 70 U/L (ref 45–117)
ALT SERPL-CCNC: 24 U/L (ref 12–78)
ANION GAP SERPL CALC-SCNC: 6 MMOL/L (ref 5–15)
AST SERPL-CCNC: 18 U/L (ref 15–37)
BASOPHILS # BLD: 0.1 K/UL (ref 0–0.1)
BASOPHILS NFR BLD: 1 % (ref 0–1)
BILIRUB SERPL-MCNC: 0.5 MG/DL (ref 0.2–1)
BUN SERPL-MCNC: 28 MG/DL (ref 6–20)
BUN/CREAT SERPL: 16 (ref 12–20)
CALCIUM SERPL-MCNC: 9.2 MG/DL (ref 8.5–10.1)
CHLORIDE SERPL-SCNC: 110 MMOL/L (ref 97–108)
CO2 SERPL-SCNC: 22 MMOL/L (ref 21–32)
CREAT SERPL-MCNC: 1.71 MG/DL (ref 0.7–1.3)
CREAT UR-MCNC: 135 MG/DL
DIFFERENTIAL METHOD BLD: ABNORMAL
EOSINOPHIL # BLD: 0.1 K/UL (ref 0–0.4)
EOSINOPHIL NFR BLD: 1 % (ref 0–7)
ERYTHROCYTE [DISTWIDTH] IN BLOOD BY AUTOMATED COUNT: 14.6 % (ref 11.5–14.5)
EST. AVERAGE GLUCOSE BLD GHB EST-MCNC: 197 MG/DL
GLOBULIN SER CALC-MCNC: 3 G/DL (ref 2–4)
GLUCOSE SERPL-MCNC: 163 MG/DL (ref 65–100)
HBA1C MFR BLD: 8.5 % (ref 4–5.6)
HCT VFR BLD AUTO: 32.8 % (ref 36.6–50.3)
HGB BLD-MCNC: 9.4 G/DL (ref 12.1–17)
IMM GRANULOCYTES # BLD AUTO: 0 K/UL (ref 0–0.04)
IMM GRANULOCYTES NFR BLD AUTO: 0 % (ref 0–0.5)
LYMPHOCYTES # BLD: 1.3 K/UL (ref 0.8–3.5)
LYMPHOCYTES NFR BLD: 18 % (ref 12–49)
MCH RBC QN AUTO: 23.3 PG (ref 26–34)
MCHC RBC AUTO-ENTMCNC: 28.7 G/DL (ref 30–36.5)
MCV RBC AUTO: 81.2 FL (ref 80–99)
MICROALBUMIN UR-MCNC: 7.73 MG/DL
MICROALBUMIN/CREAT UR-RTO: 57 MG/G (ref 0–30)
MONOCYTES # BLD: 0.4 K/UL (ref 0–1)
MONOCYTES NFR BLD: 6 % (ref 5–13)
NEUTS SEG # BLD: 5.5 K/UL (ref 1.8–8)
NEUTS SEG NFR BLD: 74 % (ref 32–75)
NRBC # BLD: 0 K/UL (ref 0–0.01)
NRBC BLD-RTO: 0 PER 100 WBC
PLATELET # BLD AUTO: 380 K/UL (ref 150–400)
PMV BLD AUTO: 9.2 FL (ref 8.9–12.9)
POTASSIUM SERPL-SCNC: 4.5 MMOL/L (ref 3.5–5.1)
PROT SERPL-MCNC: 6.8 G/DL (ref 6.4–8.2)
RBC # BLD AUTO: 4.04 M/UL (ref 4.1–5.7)
RBC MORPH BLD: ABNORMAL
SODIUM SERPL-SCNC: 138 MMOL/L (ref 136–145)
UR CULT HOLD, URHOLD: NORMAL
WBC # BLD AUTO: 7.4 K/UL (ref 4.1–11.1)

## 2022-03-19 PROBLEM — Z12.11 COLON CANCER SCREENING: Status: ACTIVE | Noted: 2019-01-17

## 2022-03-19 PROBLEM — Z87.19 HISTORY OF SMALL BOWEL OBSTRUCTION: Status: ACTIVE | Noted: 2020-04-06

## 2022-03-19 PROBLEM — Z01.00 ROUTINE EYE EXAM: Status: ACTIVE | Noted: 2021-07-23

## 2022-03-20 PROBLEM — K57.00 PERFORATED DIVERTICULUM OF SMALL INTESTINE: Status: RESOLVED | Noted: 2017-12-28 | Resolved: 2022-03-15

## 2022-03-24 PROBLEM — E11.22 TYPE 2 DIABETES MELLITUS WITH CHRONIC KIDNEY DISEASE (HCC): Status: ACTIVE | Noted: 2022-03-15

## 2022-03-24 PROBLEM — E11.40 TYPE 2 DIABETES MELLITUS WITH DIABETIC NEUROPATHY (HCC): Status: ACTIVE | Noted: 2022-03-15

## 2022-05-01 RX ORDER — CALCIUM CITRATE/VITAMIN D3 200MG-6.25
TABLET ORAL
Qty: 100 STRIP | Refills: 3 | Status: SHIPPED | OUTPATIENT
Start: 2022-05-01

## 2022-05-05 PROBLEM — Z01.00 ROUTINE EYE EXAM: Status: RESOLVED | Noted: 2021-07-23 | Resolved: 2022-05-05

## 2022-06-06 ENCOUNTER — PATIENT MESSAGE (OUTPATIENT)
Dept: INTERNAL MEDICINE CLINIC | Age: 87
End: 2022-06-06

## 2022-06-06 RX ORDER — METFORMIN HYDROCHLORIDE 500 MG/1
1000 TABLET, EXTENDED RELEASE ORAL 2 TIMES DAILY WITH MEALS
Qty: 120 TABLET | Refills: 5 | Status: SHIPPED | OUTPATIENT
Start: 2022-06-06

## 2022-06-06 NOTE — TELEPHONE ENCOUNTER
Glucose readings have increased with hormone treatment for prostate cancer. Recommended increase of metformin 500mg 2 tab bid. Orders Placed This Encounter    metFORMIN ER (GLUCOPHAGE XR) 500 mg tablet     Sig: Take 2 Tablets by mouth two (2) times daily (with meals).      Dispense:  120 Tablet     Refill:  5

## 2022-06-06 NOTE — TELEPHONE ENCOUNTER
From: Bartolome Contreras  To: Cyndi De La Cruz MD  Sent: 6/6/2022 4:18 PM EDT  Subject: Recent Uptick in [place] Schlatter     Dear Dr. Karrie Pinedo,  Because of a spike in my PSA levels, Dr. Jt Hennessy has started me on Hormone Therapy. I had my first shot on May 4th. My blood sugar levels have been in the 220-260 range in the past two weeks with the highest reading at 289 on this past Saturday. My readings prior to this were in 190's and low 210's. I have changed nothing else in my diet or routine and I am wondering if the HT is affecting the blood readings. I will be seeing Dr. Jt Hennessy for the second shot on this Wednesday. I wanted to let you know about the blood sugar levels and see what you suggest that I do.     Thank you,  Duane Oviedoe

## 2022-06-09 ENCOUNTER — PATIENT MESSAGE (OUTPATIENT)
Dept: INTERNAL MEDICINE CLINIC | Age: 87
End: 2022-06-09

## 2022-06-21 RX ORDER — GLIMEPIRIDE 1 MG/1
1 TABLET ORAL
Qty: 30 TABLET | Refills: 1 | Status: SHIPPED | OUTPATIENT
Start: 2022-06-21 | End: 2022-08-03 | Stop reason: DRUGHIGH

## 2022-06-21 NOTE — TELEPHONE ENCOUNTER
Glucose in the upper 200 range since starting hormone therapy with urology for rising psa and history of prostate cancer. He is taking metformin XR 1000mg bid. Added amaryl 1mg daily in the morning. Patient to report glucose readings in 1 week.

## 2022-07-15 ENCOUNTER — OFFICE VISIT (OUTPATIENT)
Dept: INTERNAL MEDICINE CLINIC | Age: 87
End: 2022-07-15
Payer: MEDICARE

## 2022-07-15 VITALS
BODY MASS INDEX: 28.14 KG/M2 | WEIGHT: 190 LBS | DIASTOLIC BLOOD PRESSURE: 77 MMHG | HEIGHT: 69 IN | OXYGEN SATURATION: 98 % | HEART RATE: 87 BPM | TEMPERATURE: 97.2 F | SYSTOLIC BLOOD PRESSURE: 120 MMHG | RESPIRATION RATE: 16 BRPM

## 2022-07-15 DIAGNOSIS — N18.30 TYPE 2 DIABETES MELLITUS WITH STAGE 3 CHRONIC KIDNEY DISEASE, WITHOUT LONG-TERM CURRENT USE OF INSULIN, UNSPECIFIED WHETHER STAGE 3A OR 3B CKD (HCC): Primary | ICD-10-CM

## 2022-07-15 DIAGNOSIS — I45.9 HEART BLOCK: ICD-10-CM

## 2022-07-15 DIAGNOSIS — N13.9 URINARY OUTFLOW OBSTRUCTION: ICD-10-CM

## 2022-07-15 DIAGNOSIS — Z79.899 ENCOUNTER FOR LONG-TERM (CURRENT) USE OF MEDICATIONS: ICD-10-CM

## 2022-07-15 DIAGNOSIS — E11.22 TYPE 2 DIABETES MELLITUS WITH STAGE 3 CHRONIC KIDNEY DISEASE, WITHOUT LONG-TERM CURRENT USE OF INSULIN, UNSPECIFIED WHETHER STAGE 3A OR 3B CKD (HCC): Primary | ICD-10-CM

## 2022-07-15 DIAGNOSIS — E78.2 MIXED HYPERLIPIDEMIA: ICD-10-CM

## 2022-07-15 LAB
ALBUMIN SERPL-MCNC: 3.4 G/DL (ref 3.5–5)
ALBUMIN/GLOB SERPL: 1.1 {RATIO} (ref 1.1–2.2)
ALP SERPL-CCNC: 64 U/L (ref 45–117)
ALT SERPL-CCNC: 22 U/L (ref 12–78)
ANION GAP SERPL CALC-SCNC: 6 MMOL/L (ref 5–15)
AST SERPL-CCNC: 19 U/L (ref 15–37)
BASOPHILS # BLD: 0.1 K/UL (ref 0–0.1)
BASOPHILS NFR BLD: 2 % (ref 0–1)
BILIRUB SERPL-MCNC: 0.3 MG/DL (ref 0.2–1)
BUN SERPL-MCNC: 22 MG/DL (ref 6–20)
BUN/CREAT SERPL: 14 (ref 12–20)
CALCIUM SERPL-MCNC: 9.7 MG/DL (ref 8.5–10.1)
CHLORIDE SERPL-SCNC: 109 MMOL/L (ref 97–108)
CHOLEST SERPL-MCNC: 98 MG/DL
CO2 SERPL-SCNC: 24 MMOL/L (ref 21–32)
CREAT SERPL-MCNC: 1.59 MG/DL (ref 0.7–1.3)
DIFFERENTIAL METHOD BLD: ABNORMAL
EOSINOPHIL # BLD: 0.1 K/UL (ref 0–0.4)
EOSINOPHIL NFR BLD: 2 % (ref 0–7)
ERYTHROCYTE [DISTWIDTH] IN BLOOD BY AUTOMATED COUNT: 18.4 % (ref 11.5–14.5)
EST. AVERAGE GLUCOSE BLD GHB EST-MCNC: 217 MG/DL
GLOBULIN SER CALC-MCNC: 3.2 G/DL (ref 2–4)
GLUCOSE SERPL-MCNC: 141 MG/DL (ref 65–100)
HBA1C MFR BLD: 9.2 % (ref 4–5.6)
HCT VFR BLD AUTO: 35.4 % (ref 36.6–50.3)
HDLC SERPL-MCNC: 50 MG/DL
HDLC SERPL: 2 {RATIO} (ref 0–5)
HGB BLD-MCNC: 11 G/DL (ref 12.1–17)
IMM GRANULOCYTES # BLD AUTO: 0 K/UL (ref 0–0.04)
IMM GRANULOCYTES NFR BLD AUTO: 0 % (ref 0–0.5)
LDLC SERPL CALC-MCNC: 18.6 MG/DL (ref 0–100)
LYMPHOCYTES # BLD: 1.5 K/UL (ref 0.8–3.5)
LYMPHOCYTES NFR BLD: 21 % (ref 12–49)
MCH RBC QN AUTO: 24.7 PG (ref 26–34)
MCHC RBC AUTO-ENTMCNC: 31.1 G/DL (ref 30–36.5)
MCV RBC AUTO: 79.4 FL (ref 80–99)
MONOCYTES # BLD: 0.6 K/UL (ref 0–1)
MONOCYTES NFR BLD: 8 % (ref 5–13)
NEUTS SEG # BLD: 4.7 K/UL (ref 1.8–8)
NEUTS SEG NFR BLD: 67 % (ref 32–75)
NRBC # BLD: 0 K/UL (ref 0–0.01)
NRBC BLD-RTO: 0 PER 100 WBC
PLATELET # BLD AUTO: 313 K/UL (ref 150–400)
PMV BLD AUTO: 8.9 FL (ref 8.9–12.9)
POTASSIUM SERPL-SCNC: 5.3 MMOL/L (ref 3.5–5.1)
PROT SERPL-MCNC: 6.6 G/DL (ref 6.4–8.2)
RBC # BLD AUTO: 4.46 M/UL (ref 4.1–5.7)
SODIUM SERPL-SCNC: 139 MMOL/L (ref 136–145)
TRIGL SERPL-MCNC: 147 MG/DL (ref ?–150)
VLDLC SERPL CALC-MCNC: 29.4 MG/DL
WBC # BLD AUTO: 7.1 K/UL (ref 4.1–11.1)

## 2022-07-15 PROCEDURE — G8510 SCR DEP NEG, NO PLAN REQD: HCPCS | Performed by: INTERNAL MEDICINE

## 2022-07-15 PROCEDURE — 99214 OFFICE O/P EST MOD 30 MIN: CPT | Performed by: INTERNAL MEDICINE

## 2022-07-15 PROCEDURE — 1101F PT FALLS ASSESS-DOCD LE1/YR: CPT | Performed by: INTERNAL MEDICINE

## 2022-07-15 PROCEDURE — G8427 DOCREV CUR MEDS BY ELIG CLIN: HCPCS | Performed by: INTERNAL MEDICINE

## 2022-07-15 PROCEDURE — G8536 NO DOC ELDER MAL SCRN: HCPCS | Performed by: INTERNAL MEDICINE

## 2022-07-15 PROCEDURE — G8417 CALC BMI ABV UP PARAM F/U: HCPCS | Performed by: INTERNAL MEDICINE

## 2022-07-15 NOTE — PROGRESS NOTES
Verified name and birth date for privacy precautions. Chart reviewed in preparation for today's visit. Chief Complaint   Patient presents with    Cholesterol Problem          Health Maintenance Due   Topic    Shingrix Vaccine Age 49> (1 of 2)    Lipid Screen          Wt Readings from Last 3 Encounters:   07/15/22 190 lb (86.2 kg)   03/15/22 189 lb (85.7 kg)   12/06/21 188 lb (85.3 kg)     Temp Readings from Last 3 Encounters:   07/15/22 97.2 °F (36.2 °C) (Temporal)   03/15/22 97.1 °F (36.2 °C) (Temporal)   12/06/21 97.1 °F (36.2 °C) (Temporal)     BP Readings from Last 3 Encounters:   07/15/22 120/77   03/15/22 105/69   12/06/21 99/64     Pulse Readings from Last 3 Encounters:   07/15/22 87   03/15/22 90   12/06/21 93         Learning Assessment:  :     Learning Assessment 1/15/2020 1/17/2019 10/3/2017 11/9/2016 5/5/2015 2/21/2014   PRIMARY LEARNER Patient Patient Patient Patient Patient Patient   HIGHEST LEVEL OF EDUCATION - PRIMARY LEARNER  > 4 YEARS OF COLLEGE > 4 YEARS OF COLLEGE > 4 YEARS OF COLLEGE - > 4 YEARS OF COLLEGE > 4 YEARS OF COLLEGE   BARRIERS PRIMARY LEARNER NONE NONE NONE - NONE NONE   CO-LEARNER CAREGIVER No - No - No No   PRIMARY LANGUAGE ENGLISH ENGLISH ENGLISH ENGLISH ENGLISH ENGLISH    NEED - - - - No No   LEARNER PREFERENCE PRIMARY READING DEMONSTRATION DEMONSTRATION DEMONSTRATION READING DEMONSTRATION     - - - - DEMONSTRATION READING     - - - - - LISTENING   LEARNING SPECIAL TOPICS - - - - no no   ANSWERED BY patient  patient patient patient patient patient   RELATIONSHIP SELF SELF SELF SELF SELF SELF   ASSESSMENT COMMENT - - - - n/a -       Depression Screening:  :     3 most recent PHQ Screens 7/15/2022   Little interest or pleasure in doing things Not at all   Feeling down, depressed, irritable, or hopeless Not at all   Total Score PHQ 2 0       Fall Risk Assessment:  :     Fall Risk Assessment, last 12 mths 7/15/2022   Able to walk?  Yes   Fall in past 12 months? 0 Do you feel unsteady? 0   Are you worried about falling 0   Number of falls in past 12 months -   Fall with injury? -       Abuse Screening:  :     Abuse Screening Questionnaire 7/15/2022 6/15/2021 1/15/2020 7/8/2019 1/17/2019 10/3/2017 5/5/2015   Do you ever feel afraid of your partner? N N N N N N N   Are you in a relationship with someone who physically or mentally threatens you? N N N N N N N   Is it safe for you to go home?  Abelardo Haines

## 2022-07-15 NOTE — PROGRESS NOTES
Assessment/Plan:     1. Type 2 diabetes mellitus with stage 3 chronic kidney disease, without long-term current use of insulin, unspecified whether stage 3a or 3b CKD ([facility] [address]  -encouraged regular walks  -maintaining diabetes mellitus diet.   -compliant with metformin xr 1000mg bid and amaryl 1mg daily.     - CBC WITH AUTOMATED DIFF; Future  - METABOLIC PANEL, COMPREHENSIVE; Future  - LIPID PANEL; Future  - HEMOGLOBIN A1C WITH EAG; Future  - HEMOGLOBIN A1C WITH EAG  - LIPID PANEL  - METABOLIC PANEL, COMPREHENSIVE  - CBC WITH AUTOMATED DIFF    2. Mixed hyperlipidemia  -taking atorvastatin 10mg daily  -need lipid profile today. - CBC WITH AUTOMATED DIFF; Future  - METABOLIC PANEL, COMPREHENSIVE; Future  - LIPID PANEL; Future  - LIPID PANEL  - METABOLIC PANEL, COMPREHENSIVE  - CBC WITH AUTOMATED DIFF    3. Urinary outflow obstruction  -following closely with urology    4. Heart block  -has pacemaker. Need new leads. 5. Encounter for long-term (current) use of medications    Orders Placed This Encounter    CBC WITH AUTOMATED DIFF     Standing Status:   Future     Number of Occurrences:   1     Standing Expiration Date:   9/51/0983    METABOLIC PANEL, COMPREHENSIVE     Standing Status:   Future     Number of Occurrences:   1     Standing Expiration Date:   7/15/2023    LIPID PANEL     Standing Status:   Future     Number of Occurrences:   1     Standing Expiration Date:   7/15/2023    HEMOGLOBIN A1C WITH EAG     Standing Status:   Future     Number of Occurrences:   1     Standing Expiration Date:   7/15/2023    iron bis-gly/FA/C/B12/Ca/succ (IRON-150 PO)     Sig: Take  by mouth.              Follow-up Disposition:     Follow up 4 months                Subjective:      Ashu Cheung is a 80 y.o. male who presents today for follow up of his diabetes mellitus type 2, CRI and hyperlipidemia     Since last visit :  -follows with cardiology for heart block  -following with urology for urinary obstruction    -needs new leads for pacemaker.    -glucose now in the 140's.    -walking a little a few days per week. Patient Care Team:  -Dr. Hguo Burger. Greg Montoya - RONNY- using cpap  -Dr. Nayla Mills - urology. - last month  -Dr. Lucille Herbert - cardiology- heart block. Has pacemaker. Objective: Wt Readings from Last 3 Encounters:   07/15/22 190 lb (86.2 kg)   03/15/22 189 lb (85.7 kg)   12/06/21 188 lb (85.3 kg)     BP Readings from Last 3 Encounters:   07/15/22 120/77   03/15/22 105/69   12/06/21 99/64     Visit Vitals  /77 (BP 1 Location: Left arm, BP Patient Position: Sitting, BP Cuff Size: Adult)   Pulse 87   Temp 97.2 °F (36.2 °C) (Temporal)   Resp 16   Ht 5' 9\" (1.753 m)   Wt 190 lb (86.2 kg)   SpO2 98%   BMI 28.06 kg/m²     General appearance: alert, cooperative, no distress, appears stated age  Head: Normocephalic, without obvious abnormality, atraumatic  Neck: supple, symmetrical, trachea midline, no adenopathy, no carotid bruit, and no JVD  Lungs: clear to auscultation bilaterally  Heart: regular rate and rhythm              Disclaimer:  Return if symptoms worsen or fail to improve. Advised patient to call back or return to office if symptoms worsen/change/persist.     Discussed expected course/resolution/complications of diagnosis in detail with patient. Medication risks/benefits/costs/interactions/alternatives discussed with patient. Patient was given an after visit summary which includes diagnoses, current medications, & vitals. Patient expressed understanding with the diagnosis and plan.

## 2022-08-03 ENCOUNTER — PATIENT MESSAGE (OUTPATIENT)
Dept: INTERNAL MEDICINE CLINIC | Age: 87
End: 2022-08-03

## 2022-08-03 RX ORDER — GLIMEPIRIDE 2 MG/1
2 TABLET ORAL
Qty: 90 TABLET | Refills: 1 | Status: SHIPPED | OUTPATIENT
Start: 2022-08-03

## 2022-08-03 NOTE — PROGRESS NOTES
diabetes mellitus not controlled. Kidney function stable. Hemoglobin improved. Increase glimepiride to 2mg daily in morning.  Pt notified via [employer] 8/3/2022

## 2022-09-22 NOTE — PROGRESS NOTES
Abdomen , soft, nontender, nondistended , no guarding or rigidity , no masses palpable , normal bowel sounds , Liver and Spleen , no hepatomegaly present , no hepatosplenomegaly , liver nontender , spleen not palpable Subjective:       Earnest Iyer is a 80 y.o. male presents for postop care following exploratolry laparotomy and small bowel resection on 12/28/2017 by Dr. Chantelle Camilo. Appetite is good. Eating a regular diet without difficulty. Bowel movements are regular. The patient is voiding without difficulty. The patient is not having any pain. .    Patient has an advanced directive: NO      Mr. Meghan Herrera has a reminder for a \"due or due soon\" health maintenance. I have asked that he contact his primary care provider for follow-up on this health maintenance. Objective:     Visit Vitals    /82 (BP 1 Location: Left arm, BP Patient Position: Sitting)    Pulse 85    Temp 97.4 °F (36.3 °C) (Oral)    Resp 16    Ht 5' 9\" (1.753 m)    Wt 190 lb (86.2 kg)    SpO2 96%    BMI 28.06 kg/m2       General:  alert, cooperative, no distress   Abdomen: soft, bowel sounds active, non-tender   Incision:   healing well, no drainage, no erythema, no hernia, no seroma, no swelling, no dehiscence, incision well approximated     Assessment:     Doing well postoperatively. Plan:     1. Follow up as needed  2. May increase activity as tolerated. 3. May take a bath. Pt verbalized understanding and questions were answered to the best of my knowledge and ability.

## 2022-11-18 ENCOUNTER — OFFICE VISIT (OUTPATIENT)
Dept: INTERNAL MEDICINE CLINIC | Age: 87
End: 2022-11-18
Payer: MEDICARE

## 2022-11-18 VITALS
OXYGEN SATURATION: 98 % | DIASTOLIC BLOOD PRESSURE: 84 MMHG | HEIGHT: 69 IN | SYSTOLIC BLOOD PRESSURE: 119 MMHG | WEIGHT: 190 LBS | BODY MASS INDEX: 28.14 KG/M2 | RESPIRATION RATE: 16 BRPM | TEMPERATURE: 97.2 F | HEART RATE: 91 BPM

## 2022-11-18 DIAGNOSIS — N18.30 TYPE 2 DIABETES MELLITUS WITH STAGE 3 CHRONIC KIDNEY DISEASE, WITHOUT LONG-TERM CURRENT USE OF INSULIN, UNSPECIFIED WHETHER STAGE 3A OR 3B CKD (HCC): ICD-10-CM

## 2022-11-18 DIAGNOSIS — Z13.31 SCREENING FOR DEPRESSION: ICD-10-CM

## 2022-11-18 DIAGNOSIS — Z79.899 ENCOUNTER FOR LONG-TERM (CURRENT) USE OF MEDICATIONS: ICD-10-CM

## 2022-11-18 DIAGNOSIS — E78.2 MIXED HYPERLIPIDEMIA: ICD-10-CM

## 2022-11-18 DIAGNOSIS — E11.22 TYPE 2 DIABETES MELLITUS WITH STAGE 3 CHRONIC KIDNEY DISEASE, WITHOUT LONG-TERM CURRENT USE OF INSULIN, UNSPECIFIED WHETHER STAGE 3A OR 3B CKD (HCC): ICD-10-CM

## 2022-11-18 DIAGNOSIS — Z00.00 MEDICARE ANNUAL WELLNESS VISIT, SUBSEQUENT: Primary | ICD-10-CM

## 2022-11-18 DIAGNOSIS — N39.498 OTHER URINARY INCONTINENCE: ICD-10-CM

## 2022-11-18 LAB
ALBUMIN SERPL-MCNC: 3.3 G/DL (ref 3.5–5)
ALBUMIN/GLOB SERPL: 0.9 {RATIO} (ref 1.1–2.2)
ALP SERPL-CCNC: 107 U/L (ref 45–117)
ALT SERPL-CCNC: 19 U/L (ref 12–78)
ANION GAP SERPL CALC-SCNC: 7 MMOL/L (ref 5–15)
AST SERPL-CCNC: 16 U/L (ref 15–37)
BASOPHILS # BLD: 0.1 K/UL (ref 0–0.1)
BASOPHILS NFR BLD: 1 % (ref 0–1)
BILIRUB SERPL-MCNC: 0.5 MG/DL (ref 0.2–1)
BUN SERPL-MCNC: 29 MG/DL (ref 6–20)
BUN/CREAT SERPL: 19 (ref 12–20)
CALCIUM SERPL-MCNC: 9.2 MG/DL (ref 8.5–10.1)
CHLORIDE SERPL-SCNC: 108 MMOL/L (ref 97–108)
CO2 SERPL-SCNC: 24 MMOL/L (ref 21–32)
CREAT SERPL-MCNC: 1.55 MG/DL (ref 0.7–1.3)
DIFFERENTIAL METHOD BLD: ABNORMAL
EOSINOPHIL # BLD: 0.1 K/UL (ref 0–0.4)
EOSINOPHIL NFR BLD: 2 % (ref 0–7)
ERYTHROCYTE [DISTWIDTH] IN BLOOD BY AUTOMATED COUNT: 15.8 % (ref 11.5–14.5)
EST. AVERAGE GLUCOSE BLD GHB EST-MCNC: 151 MG/DL
GLOBULIN SER CALC-MCNC: 3.6 G/DL (ref 2–4)
GLUCOSE SERPL-MCNC: 162 MG/DL (ref 65–100)
HBA1C MFR BLD: 6.9 % (ref 4–5.6)
HCT VFR BLD AUTO: 35 % (ref 36.6–50.3)
HGB BLD-MCNC: 10.6 G/DL (ref 12.1–17)
IMM GRANULOCYTES # BLD AUTO: 0.1 K/UL (ref 0–0.04)
IMM GRANULOCYTES NFR BLD AUTO: 1 % (ref 0–0.5)
LYMPHOCYTES # BLD: 1.4 K/UL (ref 0.8–3.5)
LYMPHOCYTES NFR BLD: 17 % (ref 12–49)
MCH RBC QN AUTO: 24.3 PG (ref 26–34)
MCHC RBC AUTO-ENTMCNC: 30.3 G/DL (ref 30–36.5)
MCV RBC AUTO: 80.3 FL (ref 80–99)
MONOCYTES # BLD: 0.8 K/UL (ref 0–1)
MONOCYTES NFR BLD: 11 % (ref 5–13)
NEUTS SEG # BLD: 5.5 K/UL (ref 1.8–8)
NEUTS SEG NFR BLD: 68 % (ref 32–75)
NRBC # BLD: 0 K/UL (ref 0–0.01)
NRBC BLD-RTO: 0 PER 100 WBC
PLATELET # BLD AUTO: 353 K/UL (ref 150–400)
PMV BLD AUTO: 9.2 FL (ref 8.9–12.9)
POTASSIUM SERPL-SCNC: 4.9 MMOL/L (ref 3.5–5.1)
PROT SERPL-MCNC: 6.9 G/DL (ref 6.4–8.2)
RBC # BLD AUTO: 4.36 M/UL (ref 4.1–5.7)
SODIUM SERPL-SCNC: 139 MMOL/L (ref 136–145)
WBC # BLD AUTO: 8 K/UL (ref 4.1–11.1)

## 2022-11-18 PROCEDURE — G8536 NO DOC ELDER MAL SCRN: HCPCS | Performed by: INTERNAL MEDICINE

## 2022-11-18 PROCEDURE — G0439 PPPS, SUBSEQ VISIT: HCPCS | Performed by: INTERNAL MEDICINE

## 2022-11-18 PROCEDURE — G8427 DOCREV CUR MEDS BY ELIG CLIN: HCPCS | Performed by: INTERNAL MEDICINE

## 2022-11-18 PROCEDURE — G8417 CALC BMI ABV UP PARAM F/U: HCPCS | Performed by: INTERNAL MEDICINE

## 2022-11-18 PROCEDURE — 1101F PT FALLS ASSESS-DOCD LE1/YR: CPT | Performed by: INTERNAL MEDICINE

## 2022-11-18 PROCEDURE — 99212 OFFICE O/P EST SF 10 MIN: CPT | Performed by: INTERNAL MEDICINE

## 2022-11-18 PROCEDURE — G8510 SCR DEP NEG, NO PLAN REQD: HCPCS | Performed by: INTERNAL MEDICINE

## 2022-11-18 NOTE — PROGRESS NOTES
Verified name and birth date for privacy precautions. Chart reviewed in preparation for today's visit.      Chief Complaint   Patient presents with    Annual Wellness Visit          Health Maintenance Due   Topic    Shingrix Vaccine Age 49> (1 of 2)    COVID-19 Vaccine (4 - Booster for Garcia Peter series)    Flu Vaccine (1)    Medicare Yearly Exam          Wt Readings from Last 3 Encounters:   11/18/22 190 lb (86.2 kg)   07/15/22 190 lb (86.2 kg)   03/15/22 189 lb (85.7 kg)     Temp Readings from Last 3 Encounters:   11/18/22 97.2 °F (36.2 °C) (Temporal)   07/15/22 97.2 °F (36.2 °C) (Temporal)   03/15/22 97.1 °F (36.2 °C) (Temporal)     BP Readings from Last 3 Encounters:   11/18/22 119/84   07/15/22 120/77   03/15/22 105/69     Pulse Readings from Last 3 Encounters:   11/18/22 91   07/15/22 87   03/15/22 90         Learning Assessment:  :     Learning Assessment 1/15/2020 1/17/2019 10/3/2017 11/9/2016 5/5/2015 2/21/2014   PRIMARY LEARNER Patient Patient Patient Patient Patient Patient   HIGHEST LEVEL OF EDUCATION - PRIMARY LEARNER  > 4 YEARS OF COLLEGE > 4 YEARS OF COLLEGE > 4 YEARS OF COLLEGE - > 4 YEARS OF COLLEGE > 4 YEARS OF COLLEGE   BARRIERS PRIMARY LEARNER NONE NONE NONE - NONE NONE   CO-LEARNER CAREGIVER No - No - No No   PRIMARY LANGUAGE ENGLISH ENGLISH ENGLISH ENGLISH ENGLISH ENGLISH    NEED - - - - No No   LEARNER PREFERENCE PRIMARY READING DEMONSTRATION DEMONSTRATION DEMONSTRATION READING DEMONSTRATION     - - - - DEMONSTRATION READING     - - - - - LISTENING   LEARNING SPECIAL TOPICS - - - - no no   ANSWERED BY patient  patient patient patient patient patient   RELATIONSHIP SELF SELF SELF SELF SELF SELF   ASSESSMENT COMMENT - - - - n/a -       Depression Screening:  :     3 most recent PHQ Screens 11/18/2022   Little interest or pleasure in doing things Not at all   Feeling down, depressed, irritable, or hopeless Not at all   Total Score PHQ 2 0       Fall Risk Assessment:  :     Fall Risk Assessment, last 12 mths 11/18/2022   Able to walk? Yes   Fall in past 12 months? 0   Do you feel unsteady? 0   Are you worried about falling 0   Number of falls in past 12 months -   Fall with injury? -       Abuse Screening:  :     Abuse Screening Questionnaire 11/18/2022 7/15/2022 6/15/2021 1/15/2020 7/8/2019 1/17/2019 10/3/2017   Do you ever feel afraid of your partner? N N N N N N N   Are you in a relationship with someone who physically or mentally threatens you? N N N N N N N   Is it safe for you to go home?  Jeffrey Winslow

## 2022-11-18 NOTE — PATIENT INSTRUCTIONS
Medicare Wellness Visit, Male    The best way to live healthy is to have a lifestyle where you eat a well-balanced diet, exercise regularly, limit alcohol use, and quit all forms of tobacco/nicotine, if applicable. Regular preventive services are another way to keep healthy. Preventive services (vaccines, screening tests, monitoring & exams) can help personalize your care plan, which helps you manage your own care. Screening tests can find health problems at the earliest stages, when they are easiest to treat. [facility] follows the current, evidence-based guidelines published by the [facility] ([facility]) when recommending preventive services for our patients. Because we follow these guidelines, sometimes recommendations change over time as research supports it. (For example, a prostate screening blood test is no longer routinely recommended for men with no symptoms). Of course, you and your doctor may decide to screen more often for some diseases, based on your risk and co-morbidities (chronic disease you are already diagnosed with). Preventive services for you include:  - Medicare offers their members a free annual wellness visit, which is time for you and your primary care provider to discuss and plan for your preventive service needs. Take advantage of this benefit every year!  -All adults over age 72 should receive the recommended pneumonia vaccines. Current USPSTF guidelines recommend a series of two vaccines for the best pneumonia protection.   -All adults should have a flu vaccine yearly and tetanus vaccine every 10 years.  -All adults age 48 and older should receive the shingles vaccines (series of two vaccines).        -All adults age 38-68 who are overweight should have a diabetes screening test once every three years.   -Other screening tests & preventive services for persons with diabetes include: an eye exam to screen for diabetic retinopathy, a kidney function test, a foot exam, and stricter control over your cholesterol.   -Cardiovascular screening for adults with routine risk involves an electrocardiogram (ECG) at intervals determined by the provider.   -Colorectal cancer screening should be done for adults age 54-65 with no increased risk factors for colorectal cancer. There are a number of acceptable methods of screening for this type of cancer. Each test has its own benefits and drawbacks. Discuss with your provider what is most appropriate for you during your annual wellness visit. The different tests include: colonoscopy (considered the best screening method), a fecal occult blood test, a fecal DNA test, and sigmoidoscopy.  -All adults born between [facility] should be screened once for Hepatitis C.  -An Abdominal Aortic Aneurysm (AAA) Screening is recommended for men age 73-68 who has ever smoked in their lifetime.      Here is a list of your current Health Maintenance items (your personalized list of preventive services) with a due date:  Health Maintenance Due   Topic Date Due    Shingles Vaccine (1 of 2) Never done    COVID-19 Vaccine (4 - Booster for Garcia Peter series) 12/21/2021    Yearly Flu Vaccine (1) 08/01/2022

## 2022-11-18 NOTE — PROGRESS NOTES
Assessment/Plan:     1. Type 2 diabetes mellitus with stage 3 chronic kidney disease, without long-term current use of insulin, unspecified whether stage 3a or 3b CKD (HCC)  -glucose appears controlled.   -labs today  -taking glimepiride 2mg daily and metformin 1000mg bid.     - CBC WITH AUTOMATED DIFF; Future  - METABOLIC PANEL, COMPREHENSIVE; Future  - HEMOGLOBIN A1C WITH EAG; Future    2. Mixed hyperlipidemia  -taking atorvastatin 10mg daily. 3. Encounter for long-term (current) use of medications      4. Medicare annual wellness visit, subsequent  -completed today.   -[facility] decision maker reviewed with patient and updated. - DEPRESSION SCREEN ANNUAL    5. Screening for depression    - DEPRESSION SCREEN ANNUAL    6. Other urinary incontinence  -working with urology  -currently in PT  -encouraged scheduled bathroom visits. Orders Placed This Encounter    ANNUAL DEPRESSION SCREEN 8-15 MIN    HEMOGLOBIN A1C WITH EAG     Standing Status:   Future     Number of Occurrences:   1     Standing Expiration Date:   05/28/0728    METABOLIC PANEL, COMPREHENSIVE     Standing Status:   Future     Number of Occurrences:   1     Standing Expiration Date:   11/18/2023    CBC WITH AUTOMATED DIFF     Standing Status:   Future     Number of Occurrences:   1     Standing Expiration Date:   11/18/2023             Follow-up Disposition:     Follow up 4 months                Subjective:      Alia Tuttle is a 80 y.o. male who presents today for his Medicare Wellness Exam and follow up of his diabetes mellitus type 2, hyperlipidemia     -is here today with his wife. Since last visit :  -glimeprimide increased to 2mg daily in  August 2022.     -he is checking his glucose in the mornings fasting. Averaging in the 130's. -he is most frustrated with urinary urgency and incontinence. He had urinary blockage earlier this year and had a dilatation with his urologist, Dr. Ana Lilia Martinez.   He states that he is wearing a pad and 'safety underpants' and both are constantly wet because he cannot get to restroom fast enough after the urge to go. He is going to pelvic PT once weekly. Patient Care Team:  -Dr. Mazin Rashid. [facility] - [facility]- using cpap  -Dr. Jim Wu - urology. - last month  -Dr. Fahad Brown - cardiology- heart block. Has pacemaker. Objective: Wt Readings from Last 3 Encounters:   11/18/22 190 lb (86.2 kg)   07/15/22 190 lb (86.2 kg)   03/15/22 189 lb (85.7 kg)     BP Readings from Last 3 Encounters:   11/18/22 119/84   07/15/22 120/77   03/15/22 105/69     Visit Vitals  /84   Pulse 91   Temp 97.2 °F (36.2 °C) (Temporal)   Resp 16   Ht 5' 9\" (1.753 m)   Wt 190 lb (86.2 kg)   SpO2 98%   BMI 28.06 kg/m²     General appearance: alert, cooperative, no distress, appears stated age  Head: Normocephalic, without obvious abnormality, atraumatic  Neck: supple, symmetrical, trachea midline, no adenopathy, no carotid bruit, and no JVD  Lungs: clear to auscultation bilaterally  Heart: regular rate and rhythm, S1, S2 normal, no murmur, click, rub or gallop  Extremities: no edema              Disclaimer:  Return if symptoms worsen or fail to improve. Advised patient to call back or return to office if symptoms worsen/change/persist.     Discussed expected course/resolution/complications of diagnosis in detail with patient. Medication risks/benefits/costs/interactions/alternatives discussed with patient. Patient was given an after visit summary which includes diagnoses, current medications, & vitals. Patient expressed understanding with the diagnosis and plan. This is the Subsequent Medicare Annual Wellness Exam, performed 12 months or more after the Initial AWV or the last Subsequent AWV    I have reviewed the patient's medical history in detail and updated the computerized patient record.        Assessment/Plan   Education and counseling provided:  Are appropriate based on today's review and evaluation    1. Medicare annual wellness visit, subsequent  -     [facility]  2. Type 2 diabetes mellitus with stage 3 chronic kidney disease, without long-term current use of insulin, unspecified whether stage 3a or 3b CKD ([facility] [address]  3. Mixed hyperlipidemia  4. Encounter for long-term (current) use of medications  5. Screening for depression  -     DEPRESSION SCREEN ANNUAL       Depression Risk Factor Screening     3 most recent PHQ Screens 11/18/2022   Little interest or pleasure in doing things Not at all   Feeling down, depressed, irritable, or hopeless Not at all   Total Score PHQ 2 0       Alcohol & Drug Abuse Risk Screen    Do you average more than 1 drink per night or more than 7 drinks a week: No    In the past three months have you have had more than 4 drinks containing alcohol on one occasion: No          Functional Ability and Level of Safety    Hearing: Hearing is good. Activities of Daily Living: The home contains: handrails and grab bars  Patient needs help with:  transportation, preparing meals, managing medications, and managing money      Ambulation: with no difficulty     Fall Risk:  Fall Risk Assessment, last 12 mths 11/18/2022   Able to walk? Yes   Fall in past 12 months? 0   Do you feel unsteady? 0   Are you worried about falling 0   Number of falls in past 12 months -   Fall with injury? -      Abuse Screen:  Patient is not abused       Cognitive Screening    Has your family/caregiver stated any concerns about your memory: yes - had mild memory impairment. Wife is primary caregiver.          Health Maintenance Due     Health Maintenance Due   Topic Date Due    Shingrix Vaccine Age 49> (1 of 2) Never done    COVID-19 Vaccine (4 - Booster for Garcia Peter series) 12/21/2021    Flu Vaccine (1) 08/01/2022       Patient Care Team   Patient Care Team:  Sixto Smith MD as PCP - General (Internal Medicine Physician)  Sixto Smith MD as PCP - [facility] Empaneled Provider  Flo Victroia MD (Ophthalmology)  Marrianne Dubin, MD (Urology)  Lorraine Perez MD as Surgeon (General Surgery)  Oneal Shin MD as Physician (Optometry)

## 2022-12-07 ENCOUNTER — APPOINTMENT (OUTPATIENT)
Dept: CT IMAGING | Age: 87
End: 2022-12-07
Attending: STUDENT IN AN ORGANIZED HEALTH CARE EDUCATION/TRAINING PROGRAM
Payer: MEDICARE

## 2022-12-07 ENCOUNTER — TELEPHONE (OUTPATIENT)
Dept: INTERNAL MEDICINE CLINIC | Age: 87
End: 2022-12-07

## 2022-12-07 ENCOUNTER — HOSPITAL ENCOUNTER (INPATIENT)
Age: 87
LOS: 3 days | Discharge: HOME | End: 2022-12-10
Attending: STUDENT IN AN ORGANIZED HEALTH CARE EDUCATION/TRAINING PROGRAM | Admitting: STUDENT IN AN ORGANIZED HEALTH CARE EDUCATION/TRAINING PROGRAM
Payer: MEDICARE

## 2022-12-07 ENCOUNTER — APPOINTMENT (OUTPATIENT)
Dept: GENERAL RADIOLOGY | Age: 87
End: 2022-12-07
Attending: STUDENT IN AN ORGANIZED HEALTH CARE EDUCATION/TRAINING PROGRAM
Payer: MEDICARE

## 2022-12-07 DIAGNOSIS — K56.600 PARTIAL SMALL BOWEL OBSTRUCTION (HCC): Primary | ICD-10-CM

## 2022-12-07 LAB
ALBUMIN SERPL-MCNC: 3.2 G/DL (ref 3.5–5)
ALBUMIN/GLOB SERPL: 0.7 {RATIO} (ref 1.1–2.2)
ALP SERPL-CCNC: 124 U/L (ref 45–117)
ALT SERPL-CCNC: 20 U/L (ref 12–78)
ANION GAP SERPL CALC-SCNC: 9 MMOL/L (ref 5–15)
AST SERPL-CCNC: 16 U/L (ref 15–37)
BILIRUB SERPL-MCNC: 0.6 MG/DL (ref 0.2–1)
BUN SERPL-MCNC: 28 MG/DL (ref 6–20)
BUN/CREAT SERPL: 17 (ref 12–20)
CALCIUM SERPL-MCNC: 9.7 MG/DL (ref 8.5–10.1)
CHLORIDE SERPL-SCNC: 105 MMOL/L (ref 97–108)
CO2 SERPL-SCNC: 25 MMOL/L (ref 21–32)
COMMENT, HOLDF: NORMAL
CREAT SERPL-MCNC: 1.63 MG/DL (ref 0.7–1.3)
ERYTHROCYTE [DISTWIDTH] IN BLOOD BY AUTOMATED COUNT: 17.1 % (ref 11.5–14.5)
GLOBULIN SER CALC-MCNC: 4.3 G/DL (ref 2–4)
GLUCOSE SERPL-MCNC: 258 MG/DL (ref 65–100)
HCT VFR BLD AUTO: 41.9 % (ref 36.6–50.3)
HGB BLD-MCNC: 12.9 G/DL (ref 12.1–17)
LIPASE SERPL-CCNC: 101 U/L (ref 73–393)
MCH RBC QN AUTO: 24.2 PG (ref 26–34)
MCHC RBC AUTO-ENTMCNC: 30.8 G/DL (ref 30–36.5)
MCV RBC AUTO: 78.6 FL (ref 80–99)
NRBC # BLD: 0 K/UL (ref 0–0.01)
NRBC BLD-RTO: 0 PER 100 WBC
PLATELET # BLD AUTO: 437 K/UL (ref 150–400)
PMV BLD AUTO: 9 FL (ref 8.9–12.9)
POTASSIUM SERPL-SCNC: 4.9 MMOL/L (ref 3.5–5.1)
PROT SERPL-MCNC: 7.5 G/DL (ref 6.4–8.2)
RBC # BLD AUTO: 5.33 M/UL (ref 4.1–5.7)
SAMPLES BEING HELD,HOLD: NORMAL
SODIUM SERPL-SCNC: 139 MMOL/L (ref 136–145)
WBC # BLD AUTO: 13.6 K/UL (ref 4.1–11.1)

## 2022-12-07 PROCEDURE — 99285 EMERGENCY DEPT VISIT HI MDM: CPT

## 2022-12-07 PROCEDURE — 74018 RADEX ABDOMEN 1 VIEW: CPT

## 2022-12-07 PROCEDURE — 74011000636 HC RX REV CODE- 636: Performed by: STUDENT IN AN ORGANIZED HEALTH CARE EDUCATION/TRAINING PROGRAM

## 2022-12-07 PROCEDURE — 83690 ASSAY OF LIPASE: CPT

## 2022-12-07 PROCEDURE — 85027 COMPLETE CBC AUTOMATED: CPT

## 2022-12-07 PROCEDURE — 65270000046 HC RM TELEMETRY

## 2022-12-07 PROCEDURE — 74177 CT ABD & PELVIS W/CONTRAST: CPT

## 2022-12-07 PROCEDURE — 80053 COMPREHEN METABOLIC PANEL: CPT

## 2022-12-07 RX ORDER — ENOXAPARIN SODIUM 100 MG/ML
40 INJECTION SUBCUTANEOUS DAILY
Status: DISCONTINUED | OUTPATIENT
Start: 2022-12-08 | End: 2022-12-10 | Stop reason: HOSPADM

## 2022-12-07 RX ORDER — POLYETHYLENE GLYCOL 3350 17 G/17G
17 POWDER, FOR SOLUTION ORAL DAILY PRN
Status: DISCONTINUED | OUTPATIENT
Start: 2022-12-07 | End: 2022-12-10 | Stop reason: HOSPADM

## 2022-12-07 RX ORDER — ASPIRIN 81 MG/1
81 TABLET ORAL DAILY
Status: DISCONTINUED | OUTPATIENT
Start: 2022-12-08 | End: 2022-12-10 | Stop reason: HOSPADM

## 2022-12-07 RX ORDER — MAGNESIUM SULFATE 100 %
4 CRYSTALS MISCELLANEOUS AS NEEDED
Status: DISCONTINUED | OUTPATIENT
Start: 2022-12-07 | End: 2022-12-10 | Stop reason: HOSPADM

## 2022-12-07 RX ORDER — SODIUM CHLORIDE, SODIUM LACTATE, POTASSIUM CHLORIDE, CALCIUM CHLORIDE 600; 310; 30; 20 MG/100ML; MG/100ML; MG/100ML; MG/100ML
75 INJECTION, SOLUTION INTRAVENOUS CONTINUOUS
Status: DISCONTINUED | OUTPATIENT
Start: 2022-12-07 | End: 2022-12-10 | Stop reason: HOSPADM

## 2022-12-07 RX ORDER — INSULIN LISPRO 100 [IU]/ML
INJECTION, SOLUTION INTRAVENOUS; SUBCUTANEOUS
Status: DISCONTINUED | OUTPATIENT
Start: 2022-12-08 | End: 2022-12-10 | Stop reason: HOSPADM

## 2022-12-07 RX ORDER — SODIUM CHLORIDE 0.9 % (FLUSH) 0.9 %
5-40 SYRINGE (ML) INJECTION EVERY 8 HOURS
Status: DISCONTINUED | OUTPATIENT
Start: 2022-12-07 | End: 2022-12-10 | Stop reason: HOSPADM

## 2022-12-07 RX ORDER — ACETAMINOPHEN 650 MG/1
650 SUPPOSITORY RECTAL
Status: DISCONTINUED | OUTPATIENT
Start: 2022-12-07 | End: 2022-12-10 | Stop reason: HOSPADM

## 2022-12-07 RX ORDER — ACETAMINOPHEN 325 MG/1
650 TABLET ORAL
Status: DISCONTINUED | OUTPATIENT
Start: 2022-12-07 | End: 2022-12-10 | Stop reason: HOSPADM

## 2022-12-07 RX ORDER — ATORVASTATIN CALCIUM 10 MG/1
10 TABLET, FILM COATED ORAL DAILY
Status: DISCONTINUED | OUTPATIENT
Start: 2022-12-08 | End: 2022-12-10 | Stop reason: HOSPADM

## 2022-12-07 RX ORDER — ONDANSETRON 4 MG/1
4 TABLET, ORALLY DISINTEGRATING ORAL
Status: DISCONTINUED | OUTPATIENT
Start: 2022-12-07 | End: 2022-12-10 | Stop reason: HOSPADM

## 2022-12-07 RX ORDER — ONDANSETRON 2 MG/ML
4 INJECTION INTRAMUSCULAR; INTRAVENOUS
Status: DISCONTINUED | OUTPATIENT
Start: 2022-12-07 | End: 2022-12-10 | Stop reason: HOSPADM

## 2022-12-07 RX ORDER — SODIUM CHLORIDE 0.9 % (FLUSH) 0.9 %
5-40 SYRINGE (ML) INJECTION AS NEEDED
Status: DISCONTINUED | OUTPATIENT
Start: 2022-12-07 | End: 2022-12-10 | Stop reason: HOSPADM

## 2022-12-07 RX ADMIN — IOPAMIDOL 100 ML: 755 INJECTION, SOLUTION INTRAVENOUS at 20:53

## 2022-12-07 NOTE — Clinical Note
Patient ID: Enrrique is a 70 year old male.    Chief Complaint   Patient presents with   • Diabetes Mellitus     smbg-3   • Office Visit     Eye exam Dr. Velazquez 2020       c/w T2DM since   Secondary neuropathy      Currently on:  -Metformin 1000 mg BID  - Jardiance 10 mg a day   -Novolog 15 units TID  -Lantus 32 units daily in am     Compliant to his medications, not forgetting to take anymore     SMBG 3 times a day:  says his numbers is better 160 this am yesterday 140  Then 128 in afternoons   One time he had low sugar because he did not eat much     Ophtalmology:  May 2020     Wife w breast cancer doing better  Son  of sudden heart attack at age 40 back in 2020      Lab Results   Component Value Date    POCGLU 316 (A) 2020     Lab Results   Component Value Date    HGBA1C 9.5 (H) 2020    HGBA1C 10.3 (H) 2020    HGBA1C 9.1 (H) 2019     Lab Results   Component Value Date    CREATININE 1.13 2020     Lab Results   Component Value Date    CALCIUM 8.6 2020     Lab Results   Component Value Date    AST 25 2020     Lab Results   Component Value Date    GPT 37 2020     Lab Results   Component Value Date    CALCLDL 98 2019     Lab Results   Component Value Date    TRIGLYCERIDE 164 (H) 2019     Lab Results   Component Value Date    MALBCR 5.2 2020     Lab Results   Component Value Date    TSH 1.253 2019       ROS:  All other 12 ROS reviewed and are negative otherwise except for what is reported in HPI     Past Medical History:   Diagnosis Date   • H/O colonoscopy 2015     No past surgical history on file.  No family history on file.  Social History     Socioeconomic History   • Marital status: /Civil Union     Spouse name: Not on file   • Number of children: Not on file   • Years of education: Not on file   • Highest education level: Not on file   Occupational History   • Not on file   Social Needs   • Financial resource  Status[de-identified] INPATIENT [101]   Type of Bed: Telemetry [19]   Cardiac Monitoring Required?: Yes   Inpatient Hospitalization Certified Necessary for the Following Reasons: 9.  Other (further clarification in H&P documentation)   Admitting Diagnosis: Partial small bowel obstruction [facility]) [[id number]]   Admitting Physician: Katlyn Kelly [[id number]]   Attending Physician: Katlyn Kelly [[id number]]   Estimated Length of Stay: 3-4 Midnights   Discharge Plan[de-identified] Home with Office Follow-up strain: Not on file   • Food insecurity     Worry: Not on file     Inability: Not on file   • Transportation needs     Medical: Not on file     Non-medical: Not on file   Tobacco Use   • Smoking status: Never Smoker   • Smokeless tobacco: Never Used   Substance and Sexual Activity   • Alcohol use: No     Frequency: Never   • Drug use: Not on file   • Sexual activity: Not on file   Lifestyle   • Physical activity     Days per week: Not on file     Minutes per session: Not on file   • Stress: Not on file   Relationships   • Social connections     Talks on phone: Not on file     Gets together: Not on file     Attends Latter day service: Not on file     Active member of club or organization: Not on file     Attends meetings of clubs or organizations: Not on file     Relationship status: Not on file   • Intimate partner violence     Fear of current or ex partner: Not on file     Emotionally abused: Not on file     Physically abused: Not on file     Forced sexual activity: Not on file   Other Topics Concern   • Not on file   Social History Narrative   • Not on file     ALLERGIES:  No Known Allergies  Current Outpatient Medications   Medication Sig Dispense Refill   • insulin aspart (NovoLOG FlexPen) 100 UNIT/ML pen-injector Inject 15 Units into the skin 3 times daily (before meals). 15 mL 3   • insulin glargine (LANTUS SOLOSTAR) 100 UNIT/ML pen-injector Inject 32 units one a day 15 mL 6   • empagliflozin (JARDIANCE) 10 MG tablet Take 1 tablet by mouth daily (before breakfast). 30 tablet 3   • metformin (GLUCOPHAGE) 1000 MG tablet TAKE 1 TABLET BY MOUTH TWICE DAILY 180 tablet 1   • gabapentin (NEURONTIN) 300 MG capsule Use 1 pill in morning and 1 pill at noon, 2 at bedtime 120 capsule 6   • B-D U/F PEN NEEDLE 31G X 5 MM Misc USE AS DIRECTED FOUR TIMES DAILY 400 each 1   • tiZANidine (ZANAFLEX) 4 MG tablet Take 4 mg by mouth every 6 hours as needed.     • omeprazole (PRILOSEC) 20 MG capsule Take 20 mg by mouth daily.     •  furosemide (LASIX) 20 MG tablet Take 1 tablet by mouth 2 times daily. 90 tablet 3   • diclofenac (VOLTAREN) 50 MG EC tablet Take 1 tablet by mouth 2 times daily. 30 tablet 1   • fentaNYL (DURAGESIC) 25 MCG/HR patch Place onto the skin every 72 hours.      • traMADol (ULTRAM) 50 MG tablet every 6 hours as needed.   0   • atorvastatin (LIPITOR) 40 MG tablet TOME MORGAN TABLETA POR VIA ORAL AL ACOSTARSE       No current facility-administered medications for this visit.        Physical exam    Visit Vitals  /68 (BP Location: RUE - Right upper extremity)   Pulse 76   Temp 97.7 °F (36.5 °C)     GA: NAD, well appearing   Head and Neck: Normocephalic atraumatic, swallows ok, good ROM of neck   Moist mucous membranes  ENT: EOM intact, no scleral icterus, Hearing ok, vision ok, no hoarseness   Thyroid not enlarged  Resp: Non labored breathing, no wheezing, No resp distress   Neuro: Alert, awake, conversive, ambulating ok, speech ok, no focal deficits   Sites of injection/insertion: intact   +1 bilat LE edema    Skin no palor, no rashes   Musculoskelettal: uses a cane   Psych: appropriate mood and affect, cooperative    Assessment      1- T2DM uncontrolled w secondary neuropathy, glycemia better :   Emphasize lifestyle changes  Keep DM meds same   Get A1c and Cr    BP at goal    2- Dyslipidemia:   on Atorva, keep same     RTC in 3-4 months    Rowan Dodge MD, FACE   of Clinical Medicine  Endocrinology Division, Internal Medicine Department   [facility]

## 2022-12-07 NOTE — PROGRESS NOTES
Continue iron daily , kidney impaired, but stable, diabetes mellitus controlled.  Pt notified via [employer] 12/7/2022

## 2022-12-07 NOTE — TELEPHONE ENCOUNTER
12/7/2022 spoke with patient.     -constipated. Has not had a bm in about 5 days. Not drinking liquids because he is incontinent of urine. He does wear depends.     -recommended he use miralax 17grams daily. He will need to increase his fluid intake. he states understanding and agrees with plan.

## 2022-12-07 NOTE — TELEPHONE ENCOUNTER
Reason for call:  Spoke with pt. He requested a call back from Dr. Ruthann Cox in regards some concerns with  urinary and bowel movement.     Is this a new problem: yes     Date of last appointment:  11/18/2022     Can we respond via [employer]: no    Best call back number: Phuong Bower \"Chon\"     [phone]

## 2022-12-08 ENCOUNTER — APPOINTMENT (OUTPATIENT)
Dept: GENERAL RADIOLOGY | Age: 87
End: 2022-12-08
Attending: SURGERY
Payer: MEDICARE

## 2022-12-08 LAB
ANION GAP SERPL CALC-SCNC: 10 MMOL/L (ref 5–15)
APPEARANCE UR: ABNORMAL
BACTERIA URNS QL MICRO: ABNORMAL /HPF
BASOPHILS # BLD: 0.1 K/UL (ref 0–0.1)
BASOPHILS NFR BLD: 0 % (ref 0–1)
BILIRUB UR QL: NEGATIVE
BUN SERPL-MCNC: 27 MG/DL (ref 6–20)
BUN/CREAT SERPL: 19 (ref 12–20)
CALCIUM SERPL-MCNC: 8.9 MG/DL (ref 8.5–10.1)
CHLORIDE SERPL-SCNC: 109 MMOL/L (ref 97–108)
CO2 SERPL-SCNC: 19 MMOL/L (ref 21–32)
COLOR UR: ABNORMAL
CREAT SERPL-MCNC: 1.43 MG/DL (ref 0.7–1.3)
DIFFERENTIAL METHOD BLD: ABNORMAL
EOSINOPHIL # BLD: 0 K/UL (ref 0–0.4)
EOSINOPHIL NFR BLD: 0 % (ref 0–7)
EPITH CASTS URNS QL MICRO: ABNORMAL /LPF
ERYTHROCYTE [DISTWIDTH] IN BLOOD BY AUTOMATED COUNT: 16.8 % (ref 11.5–14.5)
GLUCOSE BLD STRIP.AUTO-MCNC: 138 MG/DL (ref 65–117)
GLUCOSE BLD STRIP.AUTO-MCNC: 154 MG/DL (ref 65–117)
GLUCOSE BLD STRIP.AUTO-MCNC: 158 MG/DL (ref 65–117)
GLUCOSE SERPL-MCNC: 171 MG/DL (ref 65–100)
GLUCOSE UR STRIP.AUTO-MCNC: 100 MG/DL
HCT VFR BLD AUTO: 34.7 % (ref 36.6–50.3)
HGB BLD-MCNC: 10.8 G/DL (ref 12.1–17)
HGB UR QL STRIP: ABNORMAL
IMM GRANULOCYTES # BLD AUTO: 0.1 K/UL (ref 0–0.04)
IMM GRANULOCYTES NFR BLD AUTO: 1 % (ref 0–0.5)
KETONES UR QL STRIP.AUTO: NEGATIVE MG/DL
LACTATE SERPL-SCNC: 2.6 MMOL/L (ref 0.4–2)
LEUKOCYTE ESTERASE UR QL STRIP.AUTO: ABNORMAL
LYMPHOCYTES # BLD: 1.3 K/UL (ref 0.8–3.5)
LYMPHOCYTES NFR BLD: 11 % (ref 12–49)
MAGNESIUM SERPL-MCNC: 2 MG/DL (ref 1.6–2.4)
MCH RBC QN AUTO: 24.2 PG (ref 26–34)
MCHC RBC AUTO-ENTMCNC: 31.1 G/DL (ref 30–36.5)
MCV RBC AUTO: 77.8 FL (ref 80–99)
MONOCYTES # BLD: 1.1 K/UL (ref 0–1)
MONOCYTES NFR BLD: 9 % (ref 5–13)
NEUTS SEG # BLD: 9.7 K/UL (ref 1.8–8)
NEUTS SEG NFR BLD: 79 % (ref 32–75)
NITRITE UR QL STRIP.AUTO: NEGATIVE
NRBC # BLD: 0 K/UL (ref 0–0.01)
NRBC BLD-RTO: 0 PER 100 WBC
PH UR STRIP: 5 [PH] (ref 5–8)
PLATELET # BLD AUTO: 340 K/UL (ref 150–400)
PMV BLD AUTO: 8.7 FL (ref 8.9–12.9)
POTASSIUM SERPL-SCNC: 4.3 MMOL/L (ref 3.5–5.1)
PROT UR STRIP-MCNC: 30 MG/DL
RBC # BLD AUTO: 4.46 M/UL (ref 4.1–5.7)
RBC #/AREA URNS HPF: ABNORMAL /HPF (ref 0–5)
SERVICE CMNT-IMP: ABNORMAL
SODIUM SERPL-SCNC: 138 MMOL/L (ref 136–145)
SP GR UR REFRACTOMETRY: 1.01 (ref 1–1.03)
UROBILINOGEN UR QL STRIP.AUTO: 0.2 EU/DL (ref 0.2–1)
WBC # BLD AUTO: 12.3 K/UL (ref 4.1–11.1)
WBC URNS QL MICRO: ABNORMAL /HPF (ref 0–4)

## 2022-12-08 PROCEDURE — 83735 ASSAY OF MAGNESIUM: CPT

## 2022-12-08 PROCEDURE — 81001 URINALYSIS AUTO W/SCOPE: CPT

## 2022-12-08 PROCEDURE — 74018 RADEX ABDOMEN 1 VIEW: CPT

## 2022-12-08 PROCEDURE — 74011250636 HC RX REV CODE- 250/636: Performed by: STUDENT IN AN ORGANIZED HEALTH CARE EDUCATION/TRAINING PROGRAM

## 2022-12-08 PROCEDURE — 85025 COMPLETE CBC W/AUTO DIFF WBC: CPT

## 2022-12-08 PROCEDURE — 83605 ASSAY OF LACTIC ACID: CPT

## 2022-12-08 PROCEDURE — 36415 COLL VENOUS BLD VENIPUNCTURE: CPT

## 2022-12-08 PROCEDURE — 80048 BASIC METABOLIC PNL TOTAL CA: CPT

## 2022-12-08 PROCEDURE — 74011250636 HC RX REV CODE- 250/636: Performed by: GENERAL ACUTE CARE HOSPITAL

## 2022-12-08 PROCEDURE — 99221 1ST HOSP IP/OBS SF/LOW 40: CPT | Performed by: SURGERY

## 2022-12-08 PROCEDURE — 74011000250 HC RX REV CODE- 250: Performed by: STUDENT IN AN ORGANIZED HEALTH CARE EDUCATION/TRAINING PROGRAM

## 2022-12-08 PROCEDURE — 97161 PT EVAL LOW COMPLEX 20 MIN: CPT

## 2022-12-08 PROCEDURE — 82962 GLUCOSE BLOOD TEST: CPT

## 2022-12-08 PROCEDURE — 74011000636 HC RX REV CODE- 636: Performed by: SURGERY

## 2022-12-08 PROCEDURE — 74011636637 HC RX REV CODE- 636/637: Performed by: STUDENT IN AN ORGANIZED HEALTH CARE EDUCATION/TRAINING PROGRAM

## 2022-12-08 PROCEDURE — 65270000046 HC RM TELEMETRY

## 2022-12-08 PROCEDURE — 97530 THERAPEUTIC ACTIVITIES: CPT

## 2022-12-08 PROCEDURE — 97530 THERAPEUTIC ACTIVITIES: CPT | Performed by: OCCUPATIONAL THERAPIST

## 2022-12-08 PROCEDURE — 97165 OT EVAL LOW COMPLEX 30 MIN: CPT | Performed by: OCCUPATIONAL THERAPIST

## 2022-12-08 RX ORDER — HYDRALAZINE HYDROCHLORIDE 20 MG/ML
10 INJECTION INTRAMUSCULAR; INTRAVENOUS
Status: DISCONTINUED | OUTPATIENT
Start: 2022-12-08 | End: 2022-12-10 | Stop reason: HOSPADM

## 2022-12-08 RX ADMIN — SODIUM CHLORIDE, POTASSIUM CHLORIDE, SODIUM LACTATE AND CALCIUM CHLORIDE 75 ML/HR: 600; 310; 30; 20 INJECTION, SOLUTION INTRAVENOUS at 12:19

## 2022-12-08 RX ADMIN — DIATRIZOATE MEGLUMINE AND DIATRIZOATE SODIUM 80 ML: 660; 100 LIQUID ORAL; RECTAL at 12:19

## 2022-12-08 RX ADMIN — SODIUM CHLORIDE, PRESERVATIVE FREE 10 ML: 5 INJECTION INTRAVENOUS at 13:26

## 2022-12-08 RX ADMIN — SODIUM CHLORIDE, PRESERVATIVE FREE 10 ML: 5 INJECTION INTRAVENOUS at 00:04

## 2022-12-08 RX ADMIN — SODIUM CHLORIDE, PRESERVATIVE FREE 10 ML: 5 INJECTION INTRAVENOUS at 07:02

## 2022-12-08 RX ADMIN — Medication 2 UNITS: at 12:17

## 2022-12-08 RX ADMIN — SODIUM CHLORIDE, POTASSIUM CHLORIDE, SODIUM LACTATE AND CALCIUM CHLORIDE 75 ML/HR: 600; 310; 30; 20 INJECTION, SOLUTION INTRAVENOUS at 00:09

## 2022-12-08 RX ADMIN — SODIUM CHLORIDE, PRESERVATIVE FREE 10 ML: 5 INJECTION INTRAVENOUS at 22:31

## 2022-12-08 RX ADMIN — HYDRALAZINE HYDROCHLORIDE 10 MG: 20 INJECTION INTRAMUSCULAR; INTRAVENOUS at 17:12

## 2022-12-08 NOTE — ED PROVIDER NOTES
EMERGENCY DEPARTMENT HISTORY AND PHYSICAL EXAM      Date: 12/7/2022  Patient Name: Diana Lizarraga    History of Presenting Illness     Chief Complaint   Patient presents with    Constipation     Patient reports no BM for 5 days and hiccups. Wife reports last time this happened he had a SBO and colon resection. Pt denies pain at time of triage       History Provided By: Patient and Patient's Wife    HPI: Diana Lizarraga, 80 y.o. male presents to the ED with cc of constipation for the past 5 days. Patient's wife states that he has history of bowel obstructions that have necessitated surgical intervention in the past.  She reports that he has not had a bowel movement for the past 5 days. He is complaining of mild abdominal pain. She also states that he has not been experiencing hiccups which she states he also gets when he has a bowel obstruction. He denies any nausea or vomiting. He denies any urinary complaints. There are no other complaints, changes, or physical findings at this time. PCP: Debbie Xiong MD    No current facility-administered medications on file prior to encounter. Current Outpatient Medications on File Prior to Encounter   Medication Sig Dispense Refill    glimepiride (AMARYL) 2 mg tablet Take 1 Tablet by mouth every morning. 90 Tablet 1    iron bis-gly/FA/C/B12/Ca/succ (IRON-150 PO) Take  by mouth.      metFORMIN ER (GLUCOPHAGE XR) 500 mg tablet Take 2 Tablets by mouth two (2) times daily (with meals). 120 Tablet 5    atorvastatin (LIPITOR) 10 mg tablet TAKE 1 TABLET BY MOUTH DAILY 90 Tablet 1    glucose blood VI test strips (True Metrix Glucose Test Strip) strip USE TO TEST BLOOD SUGAR ONCE DAILY 100 Strip 3    lancets (TRUEplus Lancets) 33 gauge misc USE TO TEST BLOOD GLUCOSE ONCE DAILY. E11.9 100 Each 3    aspirin delayed-release 81 mg tablet Take 81 mg by mouth daily.       Lancing Device misc Use to test blood sugars once daily DX:E11.9 1 Each 0       Past History     Past Medical History:  Past Medical History:   Diagnosis Date    Arthritis     Calculus of kidney     Chronic kidney disease     slightly increased creatinine    GERD (gastroesophageal reflux disease)     Glaucoma     Pneumonia     prostate cancer 2000    seed radiation, cryosurgery    Unspecified sleep apnea     uses CPAP       Past Surgical History:  Past Surgical History:   Procedure Laterality Date    ENDOSCOPY, COLON, DIAGNOSTIC  2010    HX HEENT      uvulectomy    HX OTHER SURGICAL  12/28/2017    Exploratory Lap with small bowel [id number]. Janee Jacinto Shindel    HX PACEMAKER  09 01 14    HX PROSTATECTOMY      brachytherapy, cryosurgery    HX TONSILLECTOMY      HX UROLOGICAL      vasectomy    NM TRABECULOPLASTY BY LASER SURGERY         Family History:  Family History   Problem Relation Age of Onset    Heart Disease Father     Cancer Sister         breast/lung    Other Mother         encephalitis    Cancer Maternal Aunt         breast/lower extremity - 2 maternal aunts    Cancer Other         vaginal    No Known Problems Daughter        Social History:  Social History     Tobacco Use    Smoking status: Never    Smokeless tobacco: Never   Vaping Use    Vaping Use: Never used   Substance Use Topics    Alcohol use: Yes     Alcohol/week: 6.0 standard drinks     Types: 2 Glasses of wine, 4 Standard drinks or equivalent per week    Drug use: No       Allergies: Allergies   Allergen Reactions    Pcn [Penicillins] Hives     Reaction was in 1950's following a PCN shot    Venom-Honey Bee Other (comments)     anaphylaxis         Review of Systems   Review of Systems   Constitutional:  Negative for chills and fever. HENT:  Negative for sore throat. Eyes:  Negative for visual disturbance. Respiratory:  Negative for shortness of breath. Cardiovascular:  Negative for chest pain and leg swelling. Gastrointestinal:  Positive for abdominal pain. Negative for diarrhea, nausea and vomiting.    Genitourinary:  Negative for difficulty urinating and hematuria. Musculoskeletal:  Negative for back pain. Skin:  Negative for rash. Neurological:  Negative for dizziness, syncope and light-headedness. Physical Exam   Physical Exam  Vitals and nursing note reviewed. Constitutional:       Appearance: Normal appearance. HENT:      Head: Normocephalic and atraumatic. Eyes:      Conjunctiva/sclera: Conjunctivae normal.   Cardiovascular:      Rate and Rhythm: Normal rate and regular rhythm. Pulses: Normal pulses. Pulmonary:      Effort: Pulmonary effort is normal.      Breath sounds: Normal breath sounds. Abdominal:      General: Abdomen is flat. Palpations: Abdomen is soft. Musculoskeletal:      Cervical back: Neck supple. Skin:     General: Skin is warm. Neurological:      General: No focal deficit present. Mental Status: He is alert.    Psychiatric:         Mood and Affect: Mood normal.         Behavior: Behavior normal.       Diagnostic Study Results     Labs -     Recent Results (from the past 12 hour(s))   CBC W/O DIFF    Collection Time: 12/07/22  6:45 PM   Result Value Ref Range    WBC 13.6 (H) 4.1 - 11.1 K/uL    RBC 5.33 4.10 - 5.70 M/uL    HGB 12.9 12.1 - 17.0 g/dL    HCT 41.9 36.6 - 50.3 %    MCV 78.6 (L) 80.0 - 99.0 FL    MCH 24.2 (L) 26.0 - 34.0 PG    MCHC 30.8 30.0 - 36.5 g/dL    RDW 17.1 (H) 11.5 - 14.5 %    PLATELET 674 (H) 883 - 400 K/uL    MPV 9.0 8.9 - 12.9 FL    NRBC 0.0 0  WBC    ABSOLUTE NRBC 0.00 0.00 - 0.01 K/uL   LIPASE    Collection Time: 12/07/22  6:45 PM   Result Value Ref Range    Lipase 101 73 - 167 U/L   METABOLIC PANEL, COMPREHENSIVE    Collection Time: 12/07/22  6:45 PM   Result Value Ref Range    Sodium 139 136 - 145 mmol/L    Potassium 4.9 3.5 - 5.1 mmol/L    Chloride 105 97 - 108 mmol/L    CO2 25 21 - 32 mmol/L    Anion gap 9 5 - 15 mmol/L    Glucose 258 (H) 65 - 100 mg/dL    BUN 28 (H) 6 - 20 MG/DL    Creatinine 1.63 (H) 0.70 - 1.30 MG/DL    BUN/Creatinine ratio 17 12 - 20 eGFR 40 (L) >60 ml/min/1.73m2    Calcium 9.7 8.5 - 10.1 MG/DL    Bilirubin, total 0.6 0.2 - 1.0 MG/DL    ALT (SGPT) 20 12 - 78 U/L    AST (SGOT) 16 15 - 37 U/L    Alk. phosphatase 124 (H) 45 - 117 U/L    Protein, total 7.5 6.4 - 8.2 g/dL    Albumin 3.2 (L) 3.5 - 5.0 g/dL    Globulin 4.3 (H) 2.0 - 4.0 g/dL    A-G Ratio 0.7 (L) 1.1 - 2.2     SAMPLES BEING HELD    Collection Time: 12/07/22  6:45 PM   Result Value Ref Range    SAMPLES BEING HELD GOLD     COMMENT        Add-on orders for these samples will be processed based on acceptable specimen integrity and analyte stability, which may vary by analyte. Radiologic Studies -   CT ABD PELV W CONT   Final Result   1. Prominent loops of mid jejunum and proximal ileum gradually changing to   normal caliber distal ileum. 2.  Large bowel is nondilated nor completely decompressed. 3.  Findings suggestive of enteritis or partial small bowel obstruction but no   evidence of mechanical bowel obstruction. XR ABD (KUB)    (Results Pending)     CT Results  (Last 48 hours)                 12/07/22 2053  CT ABD PELV W CONT Final result    Impression:  1. Prominent loops of mid jejunum and proximal ileum gradually changing to   normal caliber distal ileum. 2.  Large bowel is nondilated nor completely decompressed. 3.  Findings suggestive of enteritis or partial small bowel obstruction but no   evidence of mechanical bowel obstruction. Narrative:  EXAM: CT ABD PELV W CONT       INDICATION: Small bowel obstruction       COMPARISON: 11/27/2021        CONTRAST: 100 mL of Isovue-370. ORAL CONTRAST: Oral contrast was administered to better evaluate the bowel. TECHNIQUE:    Following the uneventful intravenous administration of contrast, thin axial   images were obtained through the abdomen and pelvis. Coronal and sagittal   reconstructions were generated.  CT dose reduction was achieved through use of a   standardized protocol tailored for this examination and automatic exposure   control for dose modulation. FINDINGS:    LOWER THORAX: Trace right pleural effusion. Cardiac pacing wires noted. LIVER: No mass. BILIARY TREE: Gallbladder is within normal limits. CBD is not dilated. SPLEEN: within normal limits. PANCREAS: No mass or ductal dilatation. ADRENALS: Unremarkable. KIDNEYS: Bilateral renal parenchymal scarring. Large simple cyst at the lower   pole of left kidney not requiring further follow-up. No hydronephrosis. STOMACH: Thickened distal esophagus and fluid distended stomach. SMALL BOWEL: Periampullary duodenal diverticulum without associated inflammatory   changes. Duodenum is nondilated. Proximal jejunum is nondilated. Beginning in   the ileum, there are dilated fluid-filled loops of small bowel with a gradual   transition to normal caliber distal ileum. .   COLON: The colon is normal in caliber, containing stool, not completely   decompressed. There are scattered diverticula. No evidence of active   inflammation   APPENDIX: Normal   PERITONEUM: Small volume intraloop fluid. RETROPERITONEUM: No lymphadenopathy or aortic aneurysm. REPRODUCTIVE ORGANS: Brachytherapy seeds in the prostate. URINARY BLADDER: No mass or calculus. BONES: Severe degenerative changes at the lumbosacral junction. Degenerative   changes of the hip joints. No aggressive osseous lesion. ABDOMINAL WALL: Umbilical hernia containing fat and a portion of the small bowel   but no evidence of incarceration. ADDITIONAL COMMENTS: Postsurgical changes from small bowel resection. Segment of   small bowel that contain sutures is nondilated. CXR Results  (Last 48 hours)      None            Medical Decision Making   I am the first provider for this patient. I reviewed the vital signs, available nursing notes, past medical history, past surgical history, family history and social history.     Vital Signs-Reviewed the patient's vital signs. Patient Vitals for the past 12 hrs:   Temp Pulse Resp BP SpO2   12/07/22 2257 98.3 °F (36.8 °C) 93 28 (!) 151/81 97 %   12/07/22 2227 -- 92 22 132/83 96 %   12/07/22 2157 -- 89 24 (!) 180/91 98 %   12/07/22 2127 -- -- -- (!) 166/95 --   12/07/22 1957 -- 87 23 (!) 159/99 99 %   12/07/22 1852 -- 89 26 (!) 150/85 --   12/07/22 1747 98.6 °F (37 °C) 97 20 92/60 99 %       Records Reviewed: Nursing Notes, Previous Radiology Studies, and Previous Laboratory Studies    Provider Notes (Medical Decision Making):   Patient presents with abdominal pain. Differentials include: bowel obstruction vs appendicitis vs colitis vs diverticulitis vs gastroenteritis vs IBD. Consider mesenteric ischemia, AAA, dissection, ACS, kidney stone. Will order lab work, CT abdomen. ED Course:   Initial assessment performed. The patients presenting problems have been discussed, and they are in agreement with the care plan formulated and outlined with them. I have encouraged them to ask questions as they arise throughout their visit. ED Course as of 12/07/22 2330   Wed Dec 07, 2022   2208 Discussed with Dr. Yane Diaz regardng partial SBO, patient not a surgical candidate at this time, will discuss with hospitalist. [NM]      ED Course User Index  [NM] Payal Carbajal DO         NG tube placed, admitted to hospitalist      Disposition:    Admitted to [address] [place] [address],[address] Floor the case was discussed with the admitting physician       Diagnosis     Clinical Impression:   1. Partial small bowel obstruction ([address]        Attestations:    Valentino Oakland, DO        Please note that this dictation was completed with [employer], the computer voice recognition software. Quite often unanticipated grammatical, syntax, homophones, and other interpretive errors are inadvertently transcribed by the computer software. Please disregard these errors. Please excuse any errors that have escaped final proofreading. Thank you.

## 2022-12-08 NOTE — PROGRESS NOTES
Problem: Mobility Impaired (Adult and Pediatric)  Goal: *Acute Goals and Plan of Care (Insert Text)  Description: FUNCTIONAL STATUS PRIOR TO ADMISSION: Pt lives with his wife in a 2 story home and bedrooms are upstairs. He has remained active for his age and states he and his wife attend lectures, movies and like to take walks. He has been ambulatory without a device independently, does his own ADLs and still drives. HOME SUPPORT PRIOR TO ADMISSION: The patient lived with wife but did not require assist.    Physical Therapy Goals  Initiated 12/8/2022  1. Patient will move from supine to sit and sit to supine , scoot up and down, and roll side to side in bed with independence within 7 day(s). 2.  Patient will transfer from bed to chair and chair to bed with independence using the least restrictive device within 7 day(s). 3.  Patient will perform sit to stand with independence within 7 day(s). 4.  Patient will ambulate with independence for 150 feet with the least restrictive device within 7 day(s). 5.  Patient will ascend/descend 13 stairs with handrail(s) with modified independence within 7 day(s). Outcome: Not Met   PHYSICAL THERAPY EVALUATION  Patient: Ara Martinez (60 y.o. male)  Date: 12/8/2022  Primary Diagnosis: Partial small bowel obstruction (HCC) [K56.600]       Precautions: fall, NG tube at eval       ASSESSMENT  Based on the objective data described below, the patient presents with limitations in gait and functional mobility with some decreased activity tolerance and presenting very orthostatic (see below) but was asymptomatic. He was able to complete basic bed mobility, transfers and a few steps at edge of bed with CGA to occasional light min A. Expect he will return well to baseline as medical issues resolve. Would recommend HHPT at d/c.   Vitals:     12/08/22 1121 12/08/22 1122 12/08/22 1125 12/08/22 1127   BP: 98/68 111/64 (!) 77/52 (!) 144/79   BP 1 Location: Right upper arm Right upper arm Right upper arm Right arm   BP Patient Position: Sitting Sitting Standing Supine   Pulse: 92 90 96       Current Level of Function Impacting Discharge (mobility/balance): overall mainly CGA with occasional light min A; some mobility limited in ED room as pt was attached to wall with NG tube and was being readied to be transported to his IP floor. Functional Outcome Measure: The patient scored 55/100 on the barthel outcome measure which is indicative of 45% functional impairment. Other factors to consider for discharge: very young-appearing and active for age; very independent prior; very orthostatic - relayed to RN     Patient will benefit from skilled therapy intervention to address the above noted impairments. PLAN :  Recommendations and Planned Interventions: bed mobility training, transfer training, gait training, therapeutic exercises, patient and family training/education, and therapeutic activities      Frequency/Duration: Patient will be followed by physical therapy:  4 times a week to address goals. Recommendation for discharge: (in order for the patient to meet his/her long term goals)  Physical therapy at least 2 days/week in the home     This discharge recommendation:  A follow-up discussion with the attending provider and/or case management is planned    IF patient discharges home will need the following DME: expecting he will need none         SUBJECTIVE:   Patient stated I am not dizzy.     OBJECTIVE DATA SUMMARY:   HISTORY:    Past Medical History:   Diagnosis Date    Arthritis     Calculus of kidney     Chronic kidney disease     slightly increased creatinine    GERD (gastroesophageal reflux disease)     Glaucoma     Pneumonia     prostate cancer 2000    seed radiation, cryosurgery    Unspecified sleep apnea     uses CPAP     Past Surgical History:   Procedure Laterality Date    ENDOSCOPY, COLON, DIAGNOSTIC  2010    HX HEENT      uvulectomy    HX OTHER SURGICAL 2017    Exploratory Lap with small bowel [id number]. Tomeka Posadas Cassandra    HX PACEMAKER  09  15    HX PROSTATECTOMY      brachytherapy, cryosurgery    HX TONSILLECTOMY      HX UROLOGICAL      vasectomy    MD TRABECULOPLASTY BY LASER SURGERY         Personal factors and/or comorbidities impacting plan of care:     Home Situation  Home Environment: Private residence  # Steps to Enter: 7  Rails to Enter: Yes  Hand Rails : Left  One/Two Story Residence: Two story  # of Interior Steps: 15  Interior Rails: Both  Living Alone: No  Support Systems: Spouse/Significant Other  Patient Expects to be Discharged to[de-identified] Home  Current DME Used/Available at Home: None  Tub or Shower Type: Shower    EXAMINATION/PRESENTATION/DECISION MAKING:   Critical Behavior:  Neurologic State: Alert  Orientation Level: Oriented X4  Cognition: Follows commands     Hearing: Auditory  Auditory Impairment: None    Range Of Motion:  AROM: Generally decreased, functional           PROM: Generally decreased, functional           Strength:    Strength: Generally decreased, functional                    Tone & Sensation:   Tone: Normal              Sensation: Intact               Coordination:  Coordination: Within functional limits       Functional Mobility:  Bed Mobility:  Rolling: Contact guard assistance  Supine to Sit: Minimum assistance; Other (comment) (very minimal HHA on stretcher)  Sit to Supine: Contact guard assistance  Scooting: Contact guard assistance  Transfers:  Sit to Stand: Contact guard assistance  Stand to Sit: Contact guard assistance                       Balance:   Sitting: Intact  Standing: Impaired  Standing - Static: Fair  Standing - Dynamic : Fair  Ambulation/Gait Training:              Gait Description (WDL):  (side step only at EOB limited in ED room by lines and NG tube attached to wall; CGA with flexed posture)                 Functional Measure:  Barthel Index:    Bathin  Bladder: 5  Bowels: 10  Grooming: 5  Dressin  Feeding: 10  Mobility: 0  Stairs: 0  Toilet Use: 5  Transfer (Bed to Chair and Back): 10  Total: 55/100       The Barthel ADL Index: Guidelines  1. The index should be used as a record of what a patient does, not as a record of what a patient could do. 2. The main aim is to establish degree of independence from any help, physical or verbal, however minor and for whatever reason. 3. The need for supervision renders the patient not independent. 4. A patient's performance should be established using the best available evidence. Asking the patient, friends/relatives and nurses are the usual sources, but direct observation and common sense are also important. However direct testing is not needed. 5. Usually the patient's performance over the preceding 24-48 hours is important, but occasionally longer periods will be relevant. 6. Middle categories imply that the patient supplies over 50 per cent of the effort. 7. Use of aids to be independent is allowed. Score Interpretation (from [address] [facility])    Independent   60-79 Minimally independent   40-59 Partially dependent   20-39 Very dependent   <20 Totally dependent     -Santiago Mccollum., Barthel, D.W. (1965). Functional evaluation: the Barthel Index. [facility] ([address] (). The Barthel activities of daily living index: self-reporting versus actual performance in the old (> or = 75 years). Journal of [address] [place] [address] 45(7), 14 [facility], J.JUANJOSE, Frederick Garcia., Tracey Martinez. (1999). Measuring the change in disability after inpatient rehabilitation; comparison of the responsiveness of the Barthel Index and Functional [place] Measure. Journal of Neurology, Neurosurgery, and Psychiatry, 66(4), 865-070. Kasey Tello, N.J.A, JAYLIN Martinez.WILFRED, & Jane Rebolledo MMIKEY. (2004) Assessment of post-stroke quality of life in cost-effectiveness studies:  The usefulness of the Barthel Index and the EuroQoL-5D. Quality of Life Research, 15, 024-23           Physical Therapy Evaluation Charge Determination   History Examination Presentation Decision-Making   MEDIUM  Complexity : 1-2 comorbidities / personal factors will impact the outcome/ POC  HIGH Complexity : 4+ Standardized tests and measures addressing body structure, function, activity limitation and / or participation in recreation  MEDIUM Complexity : Evolving with changing characteristics  LOW Complexity : FOTO score of       Based on the above components, the patient evaluation is determined to be of the following complexity level: LOW     Pain Rating:  No c/o    Activity Tolerance:   Fair and signs and symptoms of orthostatic hypotension    After treatment patient left in no apparent distress: On stretcher being taken to IP floor    COMMUNICATION/EDUCATION:   The patients plan of care was discussed with: Occupational therapist and Registered nurse. Fall prevention education was provided and the patient/caregiver indicated understanding., Patient/family have participated as able in goal setting and plan of care. , and Patient/family agree to work toward stated goals and plan of care.     Thank you for this referral.  Arielle Henley, PT   Time Calculation: 17 mins

## 2022-12-08 NOTE — PROGRESS NOTES
[phone] (12/7/2022) - TRANSFER - IN REPORT:  Verbal report received from Cesar Taylor RN (name) on Janee Nur  being received from ED (unit) for routine progression of care    Report consisted of patients Situation, Background, Assessment and   Recommendations(SBAR). Information from the following report(s) SBAR, Kardex, ED Summary, Intake/Output, MAR, Accordion, Recent Results, Med Rec Status, Cardiac Rhythm NSR, and Alarm Parameters  was reviewed with the receiving nurse. Opportunity for questions and clarification was provided. Assessment completed upon patients arrival to unit and care assumed    0227 (12/8/2022) - Critical Lactic Acid: 2.6. Pt. is asymptomatic and stable vital signs. Pt. is sleeping in bed without distress at this time. RN notified provider Reasoner. 0500 - Pt. report cough which started 2 days ago and request for cough medicine and cough drop. RN notified provider Reasoner. 0501 - Received reply from Provider Reasoner \"Day shift\".

## 2022-12-08 NOTE — PROGRESS NOTES
Eval complete and note to follow. Recommend home health at discharge. As of note pt is very orthostatic with change in position and is completely asymptomatic. Nursing notified.         Vitals:    12/08/22 1121 12/08/22 1122 12/08/22 1125 12/08/22 1127   BP: 98/68 111/64 (!) 77/52 (!) 144/79   BP 1 Location: Right upper arm Right upper arm Right upper arm Right arm   BP Patient Position: Sitting Sitting Standing Supine   Pulse: 92 90 96    Temp:       Resp:       Height:       Weight:       SpO2:

## 2022-12-08 NOTE — PROGRESS NOTES
Hospitalist Progress Note    NAME: Luly Oates   :  1932   MRN:  [id number]       Assessment / Plan:  Partial small bowel obstruction, recurrent  Chronic constipation  Mild leukocytosis, suspect reactive  H/o perforated diverticulitis on , h/o SBO on   General surgery consulted  NG tube  Gastrographin challenge results reviewed  Serial abd exams  Monitor electrolytes   Keep n.p.o.  IVF  Out of bed 3 times daily  Recommend patient be on daily bowel regimen after resolution of obs    CKD 3  Currently at baseline renal function, trend creatinine  Renally dose medications and avoid nephrotoxic agents-noted IV contrast given today     Heart block status post PPM  No concerns noted on telemetry     Diabetes mellitus  Sliding scale insulin     RONNY not compliant with CPAP  Will not pursue nocturnal CPAP while inpatient as patient is noncompliant in outpatient setting     Incidental findings requiring outpatient follow up/ evaluation:  Umbilical hernia containing fat and a portion of the small bowel  but no evidence of incarceration. 25.0 - 29.9 Overweight / Body mass index is 26.69 kg/m². Code status: Full  Prophylaxis: Lovenox  Recommended Disposition: Home w/Family  Anticipated Discharge Date:  24-48 hours        Subjective:     Discussed with RN events overnight. Had a BM  Passing gas  Not nauseated  Afebrile  No SOB    Review of Systems:  Symptom Y/N Comments  Symptom Y/N Comments   Fever/Chills    Chest Pain     Poor Appetite    Edema     Cough    Abdominal Pain     Sputum    Joint Pain     SOB/RICHTER    Pruritis/Rash     Nausea/vomit    Tolerating PT/OT     Diarrhea    Tolerating Diet     Constipation    Other       PO intake: No data found.     Wt Readings from Last 10 Encounters:   22 84.4 kg (186 lb)   22 86.2 kg (190 lb)   07/15/22 86.2 kg (190 lb)   03/15/22 85.7 kg (189 lb) 12/06/21 85.3 kg (188 lb)   11/27/21 85.7 kg (189 lb)   10/07/21 85.7 kg (189 lb)   06/15/21 87.1 kg (192 lb)   02/15/21 85.5 kg (188 lb 9.6 oz)   10/05/20 84.4 kg (186 lb)       Objective:     VITALS:   Last 24hrs VS reviewed since prior progress note. Most recent are:  Patient Vitals for the past 24 hrs:   Temp Pulse Resp BP SpO2   12/08/22 1606 98.1 °F (36.7 °C) 89 18 (!) 169/86 97 %   12/08/22 1545 -- 91 -- -- --   12/08/22 1205 98.8 °F (37.1 °C) 85 22 137/77 99 %   12/08/22 1127 -- -- -- (!) 144/79 --   12/08/22 1125 -- 96 -- (!) 77/52 --   12/08/22 1122 -- 90 -- 111/64 --   12/08/22 1121 -- 92 -- 98/68 --   12/08/22 1118 -- 78 -- (!) 149/76 --   12/08/22 1100 97.9 °F (36.6 °C) 84 23 (!) 149/73 91 %   12/08/22 1000 97.9 °F (36.6 °C) 84 17 135/73 91 %   12/08/22 0700 -- 84 22 131/78 --   12/08/22 0500 -- 83 20 118/71 --   12/08/22 0400 -- 86 15 117/70 --   12/08/22 0300 98.2 °F (36.8 °C) 82 21 128/76 95 %   12/08/22 0200 -- 90 20 122/83 --   12/08/22 0018 -- 92 24 128/78 --   12/07/22 2327 98.2 °F (36.8 °C) 92 26 132/74 97 %   12/07/22 2257 98.3 °F (36.8 °C) 93 28 (!) 151/81 97 %   12/07/22 2227 -- 92 22 132/83 96 %   12/07/22 2157 -- 89 24 (!) 180/91 98 %   12/07/22 2127 -- -- -- (!) 166/95 --   12/07/22 1957 -- 87 23 (!) 159/99 99 %       Intake/Output Summary (Last 24 hours) at 12/8/2022 1859  Last data filed at 12/8/2022 1417  Gross per 24 hour   Intake 0 ml   Output 400 ml   Net -400 ml        I had a face to face encounter, and independently examined this patient as outlined below:    PHYSICAL EXAM:  General:    No distress     HEENT: Atraumatic, anicteric sclerae, pink conjunctivae, MMM  Neck:  Supple, symmetrical  Lungs:   CTA. No Wheezing/Rhonchi. No rales. No tenderness. No Accessory muscle use. CVS:   Regular rhythm. No murmur. No JVD   GI/:   Soft. NT. ND. BS neg. NGT with minimal drainage last 4 hours  Extremities: No edema. No cyanosis. No clubbing. Skin:     Not pale. Not Jaundiced.  No rashes   Psych:  Good insight. Not depressed. Not anxious or agitated. Neurologic: Alert and oriented X 4. EOMs intact. No facial asymmetry. No slurred speech. Symmetrical strength, Sensation grossly intact. Labs     I reviewed today's most current labs and imaging studies. Pertinent labs include:  Recent Labs     12/08/22 0443 12/07/22  1845   WBC 12.3* 13.6*   HGB 10.8* 12.9   HCT 34.7* 41.9    437*     Recent Labs     12/08/22 0443 12/07/22  1845    139   K 4.3 4.9   * 105   CO2 19* 25   * 258*   BUN 27* 28*   CREA 1.43* 1.63*   CA 8.9 9.7   ALB  --  3.2*   TBILI  --  0.6   ALT  --  20     XR ABD (KUB)    Result Date: 12/8/2022  Contrast is seen diffusely enlarged and small bowel. This could be ileus or partial small bowel obstruction. XR ABD (KUB)    Result Date: 12/8/2022  NG tube satisfactory position. CT ABD PELV W CONT    Result Date: 12/7/2022  1. Prominent loops of mid jejunum and proximal ileum gradually changing to normal caliber distal ileum. 2.  Large bowel is nondilated nor completely decompressed. 3.  Findings suggestive of enteritis or partial small bowel obstruction but no evidence of mechanical bowel obstruction. XR ABD (KUB)    Result Date: 12/8/2022  Contrast is seen diffusely enlarged and small bowel. This could be ileus or partial small bowel obstruction. XR ABD (KUB)    Result Date: 12/8/2022  NG tube satisfactory position. CT ABD PELV W CONT    Result Date: 12/7/2022  1. Prominent loops of mid jejunum and proximal ileum gradually changing to normal caliber distal ileum. 2.  Large bowel is nondilated nor completely decompressed. 3.  Findings suggestive of enteritis or partial small bowel obstruction but no evidence of mechanical bowel obstruction. All Micro Results       Procedure Component Value Units Date/Time    CULTURE, BODY FLUID [place] STAIN [[id number]]     Order Status: Canceled Specimen:  Body Fluid from Peritoneal [place]               Current Medications:     Current Facility-Administered Medications:     hydrALAZINE (APRESOLINE) 20 mg/mL injection 10 mg, 10 mg, IntraVENous, Q6H PRN, Sindhu Tang MD, 10 mg at 12/08/22 1712    aspirin delayed-release tablet 81 mg, 81 mg, Oral, DAILY, Gopi Frizzle, DO    atorvastatin (LIPITOR) tablet 10 mg, 10 mg, Oral, DAILY, Gopi Frizzle, DO    insulin lispro (HUMALOG) injection, , SubCUTAneous, AC&HS, Gopi Frizzle, DO, 2 Units at 12/08/22 1217    glucose chewable tablet 16 g, 4 Tablet, Oral, PRN, Gopi Frizzle, DO    glucagon (GLUCAGEN) injection 1 mg, 1 mg, IntraMUSCular, PRN, Gopi Frizzle, DO    dextrose 10 % infusion 0-250 mL, 0-250 mL, IntraVENous, PRN, Gopi Frizzle, DO    sodium chloride (NS) flush 5-40 mL, 5-40 mL, IntraVENous, Q8H, Gopi Frizzle, DO, 10 mL at 12/08/22 1326    sodium chloride (NS) flush 5-40 mL, 5-40 mL, IntraVENous, PRN, Gopi Frizzle, DO    acetaminophen (TYLENOL) tablet 650 mg, 650 mg, Oral, Q6H PRN **OR** acetaminophen (TYLENOL) suppository 650 mg, 650 mg, Rectal, Q6H PRN, Gopi Frizzle, DO    polyethylene glycol (MIRALAX) packet 17 g, 17 g, Oral, DAILY PRN, Gopi Frizzle, DO    ondansetron (ZOFRAN ODT) tablet 4 mg, 4 mg, Oral, Q8H PRN **OR** ondansetron (ZOFRAN) injection 4 mg, 4 mg, IntraVENous, Q6H PRN, Gopi Frizzle, DO    enoxaparin (LOVENOX) injection 40 mg, 40 mg, SubCUTAneous, DAILY, Gopi Frizzle, DO    lactated Ringers infusion, 75 mL/hr, IntraVENous, CONTINUOUS, Gopi Frijoanle, DO, Last Rate: 75 mL/hr at 12/08/22 1219, 75 mL/hr at 12/08/22 1219     Procedures: see electronic medical records for all procedures/Xrays and details which were not copied into this note but were reviewed prior to creation of Plan.     Reviewed most current lab test results and cultures  YES  Reviewed most current radiology test results   YES  Review and summation of old records today    NO  Reviewed patient's current orders and MAR    YES  PMH/SH reviewed - no change compared to H&P  ________________________________________________________________________  Care Plan discussed with:    Comments   Patient x    Family  x    RN     Care Manager     Consultant                        Multidiciplinary team rounds were held today with , nursing, pharmacist and clinical coordinator. Patient's plan of care was discussed; medications were reviewed and discharge planning was addressed.      ________________________________________________________________________  Total NON critical care TIME:   33  Minutes    Total CRITICAL CARE TIME Spent:   Minutes non procedure based      Comments   >50% of visit spent in counseling and coordination of care x     This includes time during multidisciplinary rounds if indicated above   ________________________________________________________________________  Patricia Prieto MD

## 2022-12-08 NOTE — ED NOTES
Bedside shift change report given to Althea Kennedy RN (oncoming nurse) by Nonnie Kocher RN (offgoing nurse). Report included the following information SBAR and ED Summary.

## 2022-12-08 NOTE — PROGRESS NOTES
Bedside shift change report given to Kobe Hurtado RN (oncoming nurse) by Neal Romo RN (offgoing nurse). Report included the following information SBAR, Kardex, ED Summary, Intake/Output, MAR, Accordion, Recent Results, Med Rec Status, Cardiac Rhythm V-Paced, and Alarm Parameters .

## 2022-12-08 NOTE — CONSULTS
Surgery Consult    Subjective:      Mohinder Chavez is a 89 [address] y.o. male who is being seen for evaluation of abdominal pain. The pain is located in the diffusely. Pain is described as dull and aching and measures 2/10 in intensity. Onset of pain was 5 days ago. Aggravating factors include none. Alleviating factors include none. Associated symptoms include constipation. He is passing flatus. Patient has a history of a perforated jejunal diverticulum in 2017. Since then he has had a few admission for SBOs that have virginie managed with bowel rest.      Patient Active Problem List    Diagnosis Date Noted    Partial small bowel obstruction ([facility] [address] 12/07/2022    Type 2 diabetes mellitus with chronic kidney disease ([facility] [address] 03/15/2022    Type 2 diabetes mellitus with diabetic neuropathy 03/15/2022    Routine eye exam 07/23/2021    History of small bowel obstruction 04/06/2020    Colon cancer screening 01/17/2019    RONNY on CPAP 09/23/2015    Glaucoma 11/17/2014    Heart block 08/06/2014    Peripheral neuropathy 12/09/2011    History of prostate cancer 12/09/2011     Past Medical History:   Diagnosis Date    Arthritis     Calculus of kidney     Chronic kidney disease     slightly increased creatinine    GERD (gastroesophageal reflux disease)     Glaucoma     Pneumonia     prostate cancer 2000    seed radiation, cryosurgery    Unspecified sleep apnea     uses CPAP      Past Surgical History:   Procedure Laterality Date    ENDOSCOPY, COLON, DIAGNOSTIC  2010    HX HEENT      uvulectomy    HX OTHER SURGICAL  12/28/2017    Exploratory Lap with small bowel [id number]. Pamela Trujillo    HX PACEMAKER  09 01 14    HX PROSTATECTOMY      brachytherapy, cryosurgery    HX TONSILLECTOMY      HX UROLOGICAL      vasectomy    FL TRABECULOPLASTY BY LASER SURGERY        Social History     Tobacco Use    Smoking status: Never    Smokeless tobacco: Never   Substance Use Topics    Alcohol use:  Yes     Alcohol/week: 6.0 standard drinks     Types: 2 Glasses of wine, 4 Standard drinks or equivalent per week      Family History   Problem Relation Age of Onset    Heart Disease Father     Cancer Sister         breast/lung    Other Mother         encephalitis    Cancer Maternal Aunt         breast/lower extremity - 2 maternal aunts    Cancer Other         vaginal    No Known Problems Daughter       Current Facility-Administered Medications   Medication Dose Route Frequency    aspirin delayed-release tablet 81 mg  81 mg Oral DAILY    atorvastatin (LIPITOR) tablet 10 mg  10 mg Oral DAILY    insulin lispro (HUMALOG) injection   SubCUTAneous AC&HS    glucose chewable tablet 16 g  4 Tablet Oral PRN    glucagon (GLUCAGEN) injection 1 mg  1 mg IntraMUSCular PRN    dextrose 10 % infusion 0-250 mL  0-250 mL IntraVENous PRN    sodium chloride (NS) flush 5-40 mL  5-40 mL IntraVENous Q8H    sodium chloride (NS) flush 5-40 mL  5-40 mL IntraVENous PRN    acetaminophen (TYLENOL) tablet 650 mg  650 mg Oral Q6H PRN    Or    acetaminophen (TYLENOL) suppository 650 mg  650 mg Rectal Q6H PRN    polyethylene glycol (MIRALAX) packet 17 g  17 g Oral DAILY PRN    ondansetron (ZOFRAN ODT) tablet 4 mg  4 mg Oral Q8H PRN    Or    ondansetron (ZOFRAN) injection 4 mg  4 mg IntraVENous Q6H PRN    enoxaparin (LOVENOX) injection 40 mg  40 mg SubCUTAneous DAILY    lactated Ringers infusion  75 mL/hr IntraVENous CONTINUOUS     Current Outpatient Medications   Medication Sig    glimepiride (AMARYL) 2 mg tablet Take 1 Tablet by mouth every morning. iron bis-gly/FA/C/B12/Ca/succ (IRON-150 PO) Take  by mouth.    metFORMIN ER (GLUCOPHAGE XR) 500 mg tablet Take 2 Tablets by mouth two (2) times daily (with meals). atorvastatin (LIPITOR) 10 mg tablet TAKE 1 TABLET BY MOUTH DAILY    glucose blood VI test strips (True Metrix Glucose Test Strip) strip USE TO TEST BLOOD SUGAR ONCE DAILY    lancets (TRUEplus Lancets) 33 gauge misc USE TO TEST BLOOD GLUCOSE ONCE DAILY.  E11.9    aspirin delayed-release 81 mg tablet Take 81 mg by mouth daily. Lancing Device misc Use to test blood sugars once daily DX:E11.9      Allergies   Allergen Reactions    Pcn [Penicillins] Hives     Reaction was in 1950's following a PCN shot    Venom-Honey Bee Other (comments)     anaphylaxis       Review of Systems:    A comprehensive review of systems was negative except for that written in the History of Present Illness. Objective:      Visit Vitals  /73   Pulse 84   Temp 97.9 °F (36.6 °C)   Resp 17   Ht 5' 10\" (1.778 m)   Wt 84.4 kg (186 lb)   SpO2 91%   BMI 26.69 kg/m²       Physical Exam:  GENERAL: alert, cooperative, no distress, appears stated age, LUNG: clear to auscultation bilaterally, HEART: regular rate and rhythm, S1, S2 normal, no murmur, click, rub or gallop, ABDOMEN: soft, non-tender. Bowel sounds normal. No masses,  no organomegaly    Imaging:  images and reports reviewed    Lab/Data Review:  BMP:   Lab Results   Component Value Date/Time     12/08/2022 04:43 AM    K 4.3 12/08/2022 04:43 AM     (H) 12/08/2022 04:43 AM    CO2 19 (L) 12/08/2022 04:43 AM    AGAP 10 12/08/2022 04:43 AM     (H) 12/08/2022 04:43 AM    BUN 27 (H) 12/08/2022 04:43 AM    CREA 1.43 (H) 12/08/2022 04:43 AM     CBC:   Lab Results   Component Value Date/Time    WBC 12.3 (H) 12/08/2022 04:43 AM    HGB 10.8 (L) 12/08/2022 04:43 AM    HCT 34.7 (L) 12/08/2022 04:43 AM     12/08/2022 04:43 AM         Assessment:     80year old male with constipation. CT suggest enteritis vs early PSBO. Plan:     1. I recommend proceeding with gastrografin challenge.

## 2022-12-08 NOTE — PROGRESS NOTES
Contrast seen in colon within 6 hours of administration and patient has had a BM. Appears any Partial obstruction has resolved. I order removal of NG tube and Clear liquid diet.

## 2022-12-08 NOTE — H&P
This note is compiled to communicate with the medical care team. It may contain sensitive and protected information. It is not intended to serve as a source of communication with patients/families/friends; that communication occurs at the bedside or via alternative direct communications means (secure messaging, letters, video/telephone, etc.). Hospitalist Admission Note    NAME: Alia Tuttle   :  1932   MRN:  [id number]     Date/Time:  2022 1:23 AM    Patient PCP: Dale Crane MD  ______________________________________________________________________  Given the patient's current clinical presentation, I have a high level of concern for decompensation if discharged from the emergency department. Complex decision making was performed, which includes reviewing the patient's available past medical records, laboratory results, and x-ray films. My assessment of this patient's clinical condition and my plan of care is as follows. Subjective:   CHIEF COMPLAINT: Abdominal discomfort, hiccups, constipation    HISTORY OF PRESENT ILLNESS:     Alia Tuttle is a 80 y.o.  male with pertinent past medical history of heart block status post PPM, and diabetes mellitus, RONNY not compliant with CPAP, chronic constipation not compliant with bowel regimen who presents with complaints of 5 days constipation (reporting only single small hard BM in that timeframe) which she has not attempted to treat with any medications despite PCP direction to utilize daily MiraLAX. Today he began to develop hiccups which is similar to prior presentations when he has been found to have small bowel obstructions prompting him to present to the emergency department for evaluation. He denies any other associated symptoms and ROS otherwise negative. He denies tobacco, alcohol, or illicit drug use. In the ED, patient afebrile and hemodynamically stable saturating upper 90s on room air.   CT abdomen pelvis demonstrates prominent loops of mid jejunal and proximal ileum gradually changing and normal caliber distal ileum with findings suggestive of enteritis versus partial small bowel obstruction. NG tube placed for gastric decompression with KUB demonstrating satisfactory positioning. Case discussed with Dr. Markos Gil, general surgery, who reports no plans for operative intervention at this time. Labs demonstrate: Lipase 101, sodium 139, potassium 4.9, BUN 28, creatinine 1.63 (baseline), WBC 13.6, hemoglobin 12.9, platelets 530. We were asked to admit for work up and evaluation of the above problems. Past Medical History:   Diagnosis Date    Arthritis     Calculus of kidney     Chronic kidney disease     slightly increased creatinine    GERD (gastroesophageal reflux disease)     Glaucoma     Pneumonia     prostate cancer 2000    seed radiation, cryosurgery    Unspecified sleep apnea     uses CPAP        Past Surgical History:   Procedure Laterality Date    ENDOSCOPY, COLON, DIAGNOSTIC  2010    HX HEENT      uvulectomy    HX OTHER SURGICAL  12/28/2017    Exploratory Lap with small bowel [id number]. Toy Phlegm Shindel    HX PACEMAKER  09 01 14    HX PROSTATECTOMY      brachytherapy, cryosurgery    HX TONSILLECTOMY      HX UROLOGICAL      vasectomy    IN TRABECULOPLASTY BY LASER SURGERY         Social History     Tobacco Use    Smoking status: Never    Smokeless tobacco: Never   Substance Use Topics    Alcohol use:  Yes     Alcohol/week: 6.0 standard drinks     Types: 2 Glasses of wine, 4 Standard drinks or equivalent per week        Family History   Problem Relation Age of Onset    Heart Disease Father     Cancer Sister         breast/lung    Other Mother         encephalitis    Cancer Maternal Aunt         breast/lower extremity - 2 maternal aunts    Cancer Other         vaginal    No Known Problems Daughter        Allergies   Allergen Reactions    Pcn [Penicillins] Hives     Reaction was in 65's following a PCN shot Venom-Honey Bee Other (comments)     anaphylaxis        Prior to Admission medications    Medication Sig Start Date End Date Taking? Authorizing Provider   glimepiride (AMARYL) 2 mg tablet Take 1 Tablet by mouth every morning. 8/3/22  Yes Junior Nicole MD   iron bis-gly/FA/C/B12/Ca/succ (IRON-150 PO) Take  by mouth. Yes Provider, Historical   metFORMIN ER (GLUCOPHAGE XR) 500 mg tablet Take 2 Tablets by mouth two (2) times daily (with meals). 6/6/22  Yes Junior Nicole MD   atorvastatin (LIPITOR) 10 mg tablet TAKE 1 TABLET BY MOUTH DAILY 5/24/22  Yes Junior Nicole MD   glucose blood VI test strips (True Metrix Glucose Test Strip) strip USE TO TEST BLOOD SUGAR ONCE DAILY 5/1/22  Yes Junior Nicole MD   lancets (TRUEplus Lancets) 33 gauge misc USE TO TEST BLOOD GLUCOSE ONCE DAILY. E11.9 11/30/21  Yes Junior Nicole MD   aspirin delayed-release 81 mg tablet Take 81 mg by mouth daily.    Yes Provider, Historical   Lancing Device misc Use to test blood sugars once daily DX:E11.9 4/20/20  Yes Destini Colón MD       REVIEW OF SYSTEMS:       Total of 12 systems reviewed as follows:       POSITIVE= Red text   General: Fever, chills, sweats, weakness/malaise, weight loss/gain, loss of appetite    Eyes:   Vision change, eye pain   ENT:    Rhinorrhea, pharyngitis, Hearing loss    Respiratory:   cough, sputum production, SOB, RICHTER, wheezing, pleuritic pain    Cardiology:   chest pain, palpitations, orthopnea, edema, syncope    Gastrointestinal:  abdominal pain , N/V, diarrhea, dysphagia, constipation, bleeding    Genitourinary:  frequency, urgency, dysuria, hematuria, incontinence    Muskuloskeletal:  arthralgia, myalgia, back pain   Hematology:  easy bruising, nose or gum bleeding, lymphadenopathy    Dermatological: rash, ulceration, pruritis, color change / jaundice   Endocrine:  hot flashes or polydipsia    Neurological:  headache, dizziness, confusion, focal weakness, paresthesia, Speech difficulties, memory loss, gait difficulty   Psychological: Feelings of anxiety, depression, agitation       Objective:   VITALS:    Visit Vitals  /74   Pulse 92   Temp 98.3 °F (36.8 °C)   Resp 26   Ht 5' 10\" (1.778 m)   Wt 83.9 kg (185 lb)   SpO2 97%   BMI 26.54 kg/m²       PHYSICAL EXAM:    General:   Alert, cooperative, no distress, elderly  male        HEENT:  Atraumatic, anicteric sclerae, pink conjunctivae, mucosa moist, dentition fair     Neck:  Supple, symmetrical, trachea midline, no abnormalities on palpation     Lungs:   CTAB. Symmetric expansion. Good aeration. Normal respiratory effort. Chest wall:  No tenderness. No Accessory muscle use. Cardiovascular:   RRR, No murmur/rub/gallop. No JVD. Radial/AT/DP pulse 2+     GI/:   Soft, non-tender. Mildly distended. Bowel sounds hypoactive. No HSM     Extremities No edema. No cyanosis. Capillary refill normal     Skin: No significant lesions noted. Not Jaundiced. No rashes      Neurologic: PERRL. EOMI. No facial asymmetry. No aphasia or slurred speech. Moves all extremities. Sensation to light touch grossly intact BUE/BLE. No overt focal deficits appreciated     Psych:  Alert and oriented X 4. Normal affect.  Good insight     Other:   N/A         LAB DATA REVIEWED:    Recent Results (from the past 24 hour(s))   CBC W/O DIFF    Collection Time: 12/07/22  6:45 PM   Result Value Ref Range    WBC 13.6 (H) 4.1 - 11.1 K/uL    RBC 5.33 4.10 - 5.70 M/uL    HGB 12.9 12.1 - 17.0 g/dL    HCT 41.9 36.6 - 50.3 %    MCV 78.6 (L) 80.0 - 99.0 FL    MCH 24.2 (L) 26.0 - 34.0 PG    MCHC 30.8 30.0 - 36.5 g/dL    RDW 17.1 (H) 11.5 - 14.5 %    PLATELET 823 (H) 559 - 400 K/uL    MPV 9.0 8.9 - 12.9 FL    NRBC 0.0 0  WBC    ABSOLUTE NRBC 0.00 0.00 - 0.01 K/uL   LIPASE    Collection Time: 12/07/22  6:45 PM   Result Value Ref Range    Lipase 101 73 - 893 U/L   METABOLIC PANEL, COMPREHENSIVE    Collection Time: 12/07/22  6:45 PM   Result Value Ref Range    Sodium 139 136 - 145 mmol/L    Potassium 4.9 3.5 - 5.1 mmol/L    Chloride 105 97 - 108 mmol/L    CO2 25 21 - 32 mmol/L    Anion gap 9 5 - 15 mmol/L    Glucose 258 (H) 65 - 100 mg/dL    BUN 28 (H) 6 - 20 MG/DL    Creatinine 1.63 (H) 0.70 - 1.30 MG/DL    BUN/Creatinine ratio 17 12 - 20      eGFR 40 (L) >60 ml/min/1.73m2    Calcium 9.7 8.5 - 10.1 MG/DL    Bilirubin, total 0.6 0.2 - 1.0 MG/DL    ALT (SGPT) 20 12 - 78 U/L    AST (SGOT) 16 15 - 37 U/L    Alk. phosphatase 124 (H) 45 - 117 U/L    Protein, total 7.5 6.4 - 8.2 g/dL    Albumin 3.2 (L) 3.5 - 5.0 g/dL    Globulin 4.3 (H) 2.0 - 4.0 g/dL    A-G Ratio 0.7 (L) 1.1 - 2.2     SAMPLES BEING HELD    Collection Time: 12/07/22  6:45 PM   Result Value Ref Range    SAMPLES BEING HELD GOLD     COMMENT        Add-on orders for these samples will be processed based on acceptable specimen integrity and analyte stability, which may vary by analyte. IMAGING:  KUB-NG tube satisfactory position    CT abdomen pelvis:  1. Prominent loops of mid jejunum and proximal ileum gradually changing to  normal caliber distal ileum. 2.  Large bowel is nondilated nor completely decompressed. 3.  Findings suggestive of enteritis or partial small bowel obstruction but no  evidence of mechanical bowel obstruction.  -Umbilical hernia containing fat and a portion of the small bowel  but no evidence of incarceration. ______________________________________________________________________    Assessment / Plan:  Partial small bowel obstruction, recurrent  Chronic constipation  Mild leukocytosis, suspect reactive  CKD 3  Heart block status post PPM  Diabetes mellitus  RONNY not compliant with CPAP    PLAN:    Partial small bowel obstruction, recurrent  Chronic constipation  Mild leukocytosis, suspect reactive  Admit to telemetry monitoring  General surgery consulted, greatly appreciate their expertise  NG tube placed with positioning verified on KUB-placed to low intermittent suction for gastric decompression  Keep n.p.o.   Lactated Ringer's 75 cc/h while n.p.o. for maintenance fluids  Out of bed 3 times daily  Ambulate patient orders provided for bowel stimulation  We will forego laxatives at this time in setting of partial obstruction  Recommend patient be on daily bowel regimen moving forward    CKD 3  Currently at baseline renal function, trend creatinine  Renally dose medications and avoid nephrotoxic agents-noted IV contrast given today    Heart block status post PPM  No concerns noted on telemetry    Diabetes mellitus  Sliding scale insulin    RONNY not compliant with CPAP  Will not pursue nocturnal CPAP while inpatient as patient is noncompliant in outpatient setting    Incidental findings requiring outpatient follow up/ evaluation:  Umbilical hernia containing fat and a portion of the small bowel  but no evidence of incarceration. Code Status: Full  Emergency contact:   S    DVT Prophylaxis: Lovenox  GI Prophylaxis: Not indicated  Diet: N.p.o.       Care Plan discussed with:    Comments   Patient x    Family      RN     Care Manager                    Consultant:      _______________________________________________________________________  Baseline Level of Function: Independent    Expected  Disposition:   Home with Family x   HH/PT/OT/RN    SNF/LTC    CHUY    ________________________________________________________________________  TOTAL TIME: 72 minutes   MEDICAL COMPLEXITY: high  TOBACCO CESSATION COUNSELING: NO        Comments    x Reviewed previous records   >50% of visit spent in counseling and coordination of care x Discussion with patient and/or family and questions answered       ________________________________________________________________________  Signed: Allegra Cosby DO  12/8/2022 1:23 AM    Please note that this documentation was completed in part with Dragon dictation software. Occasionally unanticipated grammatical, syntax, homophones, and other interpretive errors are inadvertently transcribed by the computer software. Please excuse and disregard any errors that have escaped final proofreading. If in doubt, please do not hesitate to reach out to myself or the assigned hospitalist via [facility] paging system for clarification.

## 2022-12-08 NOTE — PROGRESS NOTES
Problem: Self Care Deficits Care Plan (Adult)  Goal: *Acute Goals and Plan of Care (Insert Text)  Description: FUNCTIONAL STATUS PRIOR TO ADMISSION: Patient was independent and active without use of DME. Drives goes to social events. HOME SUPPORT PRIOR TO ADMISSION: The patient lived with wife but didn't require assist.    Occupational Therapy Goals:  Initiated 12/8/2022  1. Patient will perform grooming standing with modified independence within 7 days. 2. Patient will perform upper body dressing and lower body dressing with modified independence within 7 days. 3. Patient will perform toileting with modified independence within 7 days. 4. Patient will transfer from toilet with modified independence using the least restrictive device and appropriate durable medical equipment within 7 days. Outcome: Not Met   OCCUPATIONAL THERAPY EVALUATION  Patient: Nancy Malave (9 y.o. male)  Date: 12/8/2022  Primary Diagnosis: Partial small bowel obstruction (HCC) [K56.600]       Precautions: orthostatic and asymptomatic       ASSESSMENT  Based on the objective data described below, the patient presents with decreased endurance and decreased higher level balance due to immobility. Pt had NG tube in place and was limited by short lines and leads. Pt was seen holding in the ED and IV was running with no IV pole. He reported no pain. Pt was found to be very orthostatic but was completely asymptomatic. Bp improved once pt was supine. He was able to perform crossed leg technique on both legs to manage socks. It is expected that when his medical condition improves so will pts independence with functional tasks. Recommend OT in the hospital to improve pts independence/activity tolerance.           12/08/22 1121 12/08/22 1122 12/08/22 1125 12/08/22 1127   BP: 98/68 111/64 (!) 77/52 (!) 144/79   BP 1 Location: Right upper arm Right upper arm Right upper arm Right arm   BP Patient Position: Sitting Sitting Standing Supine   Pulse: 92 90 96     Temp:           Resp:           Height:           Weight:           SpO2:             Current Level of Function Impacting Discharge (ADLs/self-care): CGA to min assist mobility and ADLS    Functional Outcome Measure: The patient scored 55/100 on the barthel outcome measure which is indicative of moderate decline in mobility and ADL. Other factors to consider for discharge: none     Patient will benefit from skilled therapy intervention to address the above noted impairments. PLAN :  Recommendations and Planned Interventions: self care training, functional mobility training, therapeutic exercise, balance training, therapeutic activities, patient education, home safety training, and family training/education    Frequency/Duration: Patient will be followed by occupational therapy 3 times a week to address goals. Recommendation for discharge: (in order for the patient to meet his/her long term goals)  Occupational therapy at least 2 days/week in the home     This discharge recommendation:  Has not yet been discussed the attending provider and/or case management    IF patient discharges home will need the following DME: most likely none       SUBJECTIVE:   Patient stated I don't feel dizzy.     OBJECTIVE DATA SUMMARY:   HISTORY:   Past Medical History:   Diagnosis Date    Arthritis     Calculus of kidney     Chronic kidney disease     slightly increased creatinine    GERD (gastroesophageal reflux disease)     Glaucoma     Pneumonia     prostate cancer 2000    seed radiation, cryosurgery    Unspecified sleep apnea     uses CPAP     Past Surgical History:   Procedure Laterality Date    ENDOSCOPY, COLON, DIAGNOSTIC  2010    HX HEENT      uvulectomy    HX OTHER SURGICAL  12/28/2017    Exploratory Lap with small bowel [id number]-RlDr. Fito Trujillo    HX PACEMAKER  09 01 14    HX PROSTATECTOMY      brachytherapy, cryosurgery    HX TONSILLECTOMY      HX UROLOGICAL      vasectomy    TX TRABECULOPLASTY BY LASER SURGERY         Expanded or extensive additional review of patient history:     Home Situation  Home Environment: Private residence  # Steps to Enter: 7  Rails to Enter: Yes  Hand Rails : Left  One/Two Story Residence: Two story  # of Interior Steps: 15  Interior Rails: Both  Living Alone: No  Support Systems: Spouse/Significant Other  Patient Expects to be Discharged to[de-identified] Home  Current DME Used/Available at Home: None  Tub or Shower Type: Shower    Hand dominance: Right    EXAMINATION OF PERFORMANCE DEFICITS:  Cognitive/Behavioral Status:  Neurologic State: Alert  Orientation Level: Oriented X4  Cognition: Appropriate decision making; Appropriate for age attention/concentration; Appropriate safety awareness; Follows commands  Perception: Cues to maintain midline in standing  Perseveration: No perseveration noted  Safety/Judgement: Fall prevention;Home safety; Insight into deficits    Hearing: Auditory  Auditory Impairment: None    Vision/Perceptual:                           Acuity: Within Defined Limits         Range of Motion:    AROM: Generally decreased, functional  PROM: Generally decreased, functional                      Strength:    Strength: Generally decreased, functional                Coordination:  Coordination: Within functional limits  Fine Motor Skills-Upper: Left Intact; Right Intact    Gross Motor Skills-Upper: Left Intact; Right Intact    Tone & Sensation:    Tone: Normal  Sensation: Intact                      Balance:  Sitting: Intact  Standing: Impaired  Standing - Static: Fair  Standing - Dynamic : Fair    Functional Mobility and Transfers for ADLs:  Bed Mobility:  Rolling: Contact guard assistance  Supine to Sit: Minimum assistance; Other (comment) (very minimal HHA on stretcher)  Sit to Supine: Contact guard assistance  Scooting: Contact guard assistance    Transfers:  Sit to Stand: Contact guard assistance  Stand to Sit: Contact guard assistance  Bed to Chair: Contact guard assistance  Toilet Transfer : Contact guard assistance    ADL Assessment:  Feeding: Independent    Oral Facial Hygiene/Grooming: Contact guard assistance    Bathing: Minimum assistance      Upper Body Dressing: Setup    Lower Body Dressing: Minimum assistance    Toileting: Minimum assistance                  ADL Intervention and task modifications:     Performed crossed leg technique to manage socks seated  Cognitive Retraining  Safety/Judgement: Fall prevention;Home safety; Insight into deficits       Functional Measure:    Barthel Index:  Bathin  Bladder: 5  Bowels: 10  Groomin  Dressin  Feeding: 10  Mobility: 0  Stairs: 0  Toilet Use: 5  Transfer (Bed to Chair and Back): 10  Total: 55/100      The Barthel ADL Index: Guidelines  1. The index should be used as a record of what a patient does, not as a record of what a patient could do. 2. The main aim is to establish degree of independence from any help, physical or verbal, however minor and for whatever reason. 3. The need for supervision renders the patient not independent. 4. A patient's performance should be established using the best available evidence. Asking the patient, friends/relatives and nurses are the usual sources, but direct observation and common sense are also important. However direct testing is not needed. 5. Usually the patient's performance over the preceding 24-48 hours is important, but occasionally longer periods will be relevant. 6. Middle categories imply that the patient supplies over 50 per cent of the effort. 7. Use of aids to be independent is allowed. Score Interpretation (from [facility])    Independent   60-79 Minimally independent   40-59 Partially dependent   20-39 Very dependent   <20 Totally dependent     -Santiago Mccollum., Barthel, D.W. (1965). Functional evaluation: the Barthel Index. [facility] ([address] (1997).  The Barthel activities of daily living index: self-reporting versus actual performance in the old (> or = 75 years). Journal of [address] [place] [address] 45(2), [facility], .FLORA.F, Marti Al., Juanjo Hawkins. (1999). Measuring the change in disability after inpatient rehabilitation; comparison of the responsiveness of the Barthel Index and Functional [place] Measure. Journal of Neurology, Neurosurgery, and Psychiatry, 66(4), 384-227. VANDANA Navarrete, MICHAEL Martinez, & Jorden Homans, M.A. (2004) Assessment of post-stroke quality of life in cost-effectiveness studies: The usefulness of the Barthel Index and the EuroQoL-5D. Quality of Life Research, 15, 736-95             Occupational Therapy Evaluation Charge Determination   History Examination Decision-Making   LOW Complexity : Brief history review  LOW Complexity : 1-3 performance deficits relating to physical, cognitive , or psychosocial skils that result in activity limitations and / or participation restrictions  LOW Complexity : No comorbidities that affect functional and no verbal or physical assistance needed to complete eval tasks       Based on the above components, the patient evaluation is determined to be of the following complexity level: LOW   Pain Ratin/10    Activity Tolerance:   Fair and orthostatic but asymptomatic    After treatment patient left in no apparent distress:    Supine in bed, Call bell within reach, and bilateral rails up on stretcher with transporter at bedside to take pt to IP room    COMMUNICATION/EDUCATION:   The patients plan of care was discussed with: Physical therapist, Registered nurse, and patient . Home safety education was provided and the patient/caregiver indicated understanding., Patient/family have participated as able in goal setting and plan of care. , and Patient/family agree to work toward stated goals and plan of care. This patients plan of care is appropriate for delegation to [facility].     Thank you for this referral.  Radha Carlos, OTR/L  Time Calculation: 18 mins

## 2022-12-08 NOTE — PROGRESS NOTES
Ara Martinez 80 y.o. male    Change of shift. Christopher Bell RN has reported off to Carol, the oncoming nurse. Report has included, Demographics, Chief complaint, SBAR, kardex, most recent lab results, MAR and plan of care. All questions and concerns have been answered.

## 2022-12-08 NOTE — ED NOTES
NG tube placed, 60 at the nares. Gastric content aspirated upon placement. Awaiting X-ray confirmation before setting to suction.

## 2022-12-09 LAB
ALBUMIN SERPL-MCNC: 2.6 G/DL (ref 3.5–5)
ALBUMIN/GLOB SERPL: 0.7 {RATIO} (ref 1.1–2.2)
ALP SERPL-CCNC: 122 U/L (ref 45–117)
ALT SERPL-CCNC: 16 U/L (ref 12–78)
ANION GAP SERPL CALC-SCNC: 9 MMOL/L (ref 5–15)
AST SERPL-CCNC: 22 U/L (ref 15–37)
BASOPHILS # BLD: 0.1 K/UL (ref 0–0.1)
BASOPHILS NFR BLD: 1 % (ref 0–1)
BILIRUB SERPL-MCNC: 0.6 MG/DL (ref 0.2–1)
BUN SERPL-MCNC: 24 MG/DL (ref 6–20)
BUN/CREAT SERPL: 19 (ref 12–20)
CALCIUM SERPL-MCNC: 8.8 MG/DL (ref 8.5–10.1)
CHLORIDE SERPL-SCNC: 111 MMOL/L (ref 97–108)
CO2 SERPL-SCNC: 22 MMOL/L (ref 21–32)
CREAT SERPL-MCNC: 1.27 MG/DL (ref 0.7–1.3)
DIFFERENTIAL METHOD BLD: ABNORMAL
EOSINOPHIL # BLD: 0.1 K/UL (ref 0–0.4)
EOSINOPHIL NFR BLD: 1 % (ref 0–7)
ERYTHROCYTE [DISTWIDTH] IN BLOOD BY AUTOMATED COUNT: 17 % (ref 11.5–14.5)
GLOBULIN SER CALC-MCNC: 3.8 G/DL (ref 2–4)
GLUCOSE BLD STRIP.AUTO-MCNC: 149 MG/DL (ref 65–117)
GLUCOSE BLD STRIP.AUTO-MCNC: 160 MG/DL (ref 65–117)
GLUCOSE BLD STRIP.AUTO-MCNC: 237 MG/DL (ref 65–117)
GLUCOSE BLD STRIP.AUTO-MCNC: 98 MG/DL (ref 65–117)
GLUCOSE SERPL-MCNC: 162 MG/DL (ref 65–100)
HCT VFR BLD AUTO: 34.2 % (ref 36.6–50.3)
HGB BLD-MCNC: 10.7 G/DL (ref 12.1–17)
IMM GRANULOCYTES # BLD AUTO: 0 K/UL (ref 0–0.04)
IMM GRANULOCYTES NFR BLD AUTO: 0 % (ref 0–0.5)
LYMPHOCYTES # BLD: 1.2 K/UL (ref 0.8–3.5)
LYMPHOCYTES NFR BLD: 12 % (ref 12–49)
MAGNESIUM SERPL-MCNC: 1.8 MG/DL (ref 1.6–2.4)
MCH RBC QN AUTO: 24.2 PG (ref 26–34)
MCHC RBC AUTO-ENTMCNC: 31.3 G/DL (ref 30–36.5)
MCV RBC AUTO: 77.2 FL (ref 80–99)
MONOCYTES # BLD: 0.9 K/UL (ref 0–1)
MONOCYTES NFR BLD: 9 % (ref 5–13)
NEUTS SEG # BLD: 7.7 K/UL (ref 1.8–8)
NEUTS SEG NFR BLD: 77 % (ref 32–75)
NRBC # BLD: 0 K/UL (ref 0–0.01)
NRBC BLD-RTO: 0 PER 100 WBC
PLATELET # BLD AUTO: 299 K/UL (ref 150–400)
PMV BLD AUTO: 8.6 FL (ref 8.9–12.9)
POTASSIUM SERPL-SCNC: 3.9 MMOL/L (ref 3.5–5.1)
PROT SERPL-MCNC: 6.4 G/DL (ref 6.4–8.2)
RBC # BLD AUTO: 4.43 M/UL (ref 4.1–5.7)
SERVICE CMNT-IMP: ABNORMAL
SERVICE CMNT-IMP: NORMAL
SODIUM SERPL-SCNC: 142 MMOL/L (ref 136–145)
WBC # BLD AUTO: 9.9 K/UL (ref 4.1–11.1)

## 2022-12-09 PROCEDURE — 83735 ASSAY OF MAGNESIUM: CPT

## 2022-12-09 PROCEDURE — 99231 SBSQ HOSP IP/OBS SF/LOW 25: CPT | Performed by: SURGERY

## 2022-12-09 PROCEDURE — 65270000046 HC RM TELEMETRY

## 2022-12-09 PROCEDURE — 82962 GLUCOSE BLOOD TEST: CPT

## 2022-12-09 PROCEDURE — 74011250637 HC RX REV CODE- 250/637: Performed by: GENERAL ACUTE CARE HOSPITAL

## 2022-12-09 PROCEDURE — 74011000250 HC RX REV CODE- 250: Performed by: STUDENT IN AN ORGANIZED HEALTH CARE EDUCATION/TRAINING PROGRAM

## 2022-12-09 PROCEDURE — 36415 COLL VENOUS BLD VENIPUNCTURE: CPT

## 2022-12-09 PROCEDURE — 74011636637 HC RX REV CODE- 636/637: Performed by: STUDENT IN AN ORGANIZED HEALTH CARE EDUCATION/TRAINING PROGRAM

## 2022-12-09 PROCEDURE — 85025 COMPLETE CBC W/AUTO DIFF WBC: CPT

## 2022-12-09 PROCEDURE — 74011250636 HC RX REV CODE- 250/636: Performed by: STUDENT IN AN ORGANIZED HEALTH CARE EDUCATION/TRAINING PROGRAM

## 2022-12-09 PROCEDURE — 80053 COMPREHEN METABOLIC PANEL: CPT

## 2022-12-09 PROCEDURE — 74011250637 HC RX REV CODE- 250/637: Performed by: STUDENT IN AN ORGANIZED HEALTH CARE EDUCATION/TRAINING PROGRAM

## 2022-12-09 RX ORDER — POTASSIUM CHLORIDE 20 MEQ/1
20 TABLET, EXTENDED RELEASE ORAL
Status: COMPLETED | OUTPATIENT
Start: 2022-12-09 | End: 2022-12-09

## 2022-12-09 RX ORDER — LANOLIN ALCOHOL/MO/W.PET/CERES
400 CREAM (GRAM) TOPICAL 2 TIMES DAILY
Status: COMPLETED | OUTPATIENT
Start: 2022-12-09 | End: 2022-12-09

## 2022-12-09 RX ADMIN — SODIUM CHLORIDE, PRESERVATIVE FREE 10 ML: 5 INJECTION INTRAVENOUS at 05:55

## 2022-12-09 RX ADMIN — Medication 400 MG: at 11:00

## 2022-12-09 RX ADMIN — ATORVASTATIN CALCIUM 10 MG: 10 TABLET, FILM COATED ORAL at 09:43

## 2022-12-09 RX ADMIN — Medication 2 UNITS: at 18:10

## 2022-12-09 RX ADMIN — SODIUM CHLORIDE, PRESERVATIVE FREE 10 ML: 5 INJECTION INTRAVENOUS at 18:12

## 2022-12-09 RX ADMIN — POTASSIUM CHLORIDE 20 MEQ: 1500 TABLET, EXTENDED RELEASE ORAL at 09:43

## 2022-12-09 RX ADMIN — ASPIRIN 81 MG: 81 TABLET, COATED ORAL at 09:43

## 2022-12-09 RX ADMIN — ENOXAPARIN SODIUM 40 MG: 100 INJECTION SUBCUTANEOUS at 09:43

## 2022-12-09 RX ADMIN — Medication 400 MG: at 18:12

## 2022-12-09 RX ADMIN — SODIUM CHLORIDE, POTASSIUM CHLORIDE, SODIUM LACTATE AND CALCIUM CHLORIDE 75 ML/HR: 600; 310; 30; 20 INJECTION, SOLUTION INTRAVENOUS at 03:09

## 2022-12-09 RX ADMIN — Medication 3 UNITS: at 09:43

## 2022-12-09 NOTE — PROGRESS NOTES
SURGERY PROGRESS NOTE      Admit Date: 2022    POD * No surgery found *    Procedure: * No surgery found *      Subjective:     Patient has no complaints. Denies pain and nausea. Tolerating liquids and passing flatus. Has had a BM>        Objective:     Visit Vitals  BP (!) 151/70   Pulse 87   Temp 98.4 °F (36.9 °C)   Resp 16   Ht 5' 10\" (1.778 m)   Wt 84.4 kg (186 lb)   SpO2 98%   BMI 26.69 kg/m²        Temp (24hrs), Av.5 °F (36.9 °C), Min:98.1 °F (36.7 °C), Max:99.3 °F (37.4 °C)      No intake/output data recorded.   1901 -  07  In: 0   Out: 1000 [Urine:700]    Physical Exam:    General:  alert, cooperative, no distress, appears stated age   Abdomen: soft, bowel sounds active, non-tender           Lab Results   Component Value Date/Time    WBC 9.9 2022 02:58 AM    HGB 10.7 (L) 2022 02:58 AM    HCT 34.2 (L) 2022 02:58 AM    PLATELET 537  02:58 AM    MCV 77.2 (L) 2022 02:58 AM     Lab Results   Component Value Date/Time    GFR est non-AA 41 (L) 07/15/2022 10:00 AM    GFR est AA 50 (L) 07/15/2022 10:00 AM    Creatinine 1.27 2022 02:58 AM    BUN 24 (H) 2022 02:58 AM    Sodium 142 2022 02:58 AM    Potassium 3.9 2022 02:58 AM    Chloride 111 (H) 2022 02:58 AM    CO2 22 2022 02:58 AM    Magnesium 1.8 2022 02:58 AM    Phosphorus 2.9 2018 03:34 AM       Assessment:     Active Problems:    Partial small bowel obstruction ([address] (2022)    Resolved     Plan:     Regular diet  If tolerates the regular diet, can be discharged

## 2022-12-09 NOTE — PROGRESS NOTES
End of Shift Note    Bedside shift change report given to Jonh ROLA Ramirez (oncoming nurse) by Harry Vargas (offgoing nurse). Report included the following information SBAR, Kardex, Intake/Output, MAR, and Recent Results    Shift worked:  8311-4283     Shift summary and any significant changes:     NG tube was removed as per MD orders. Patient was up and ambulating to the bathroom with assistance. Periodic confusion throughout the night. Concerns for physician to address:       Zone phone for oncoming shift:   [phone]         Activity:  Activity Level: Up with Assistance  Number times ambulated in hallways past shift: 0  Number of times OOB to chair past shift: 0    Cardiac:   Cardiac Monitoring: Yes      Cardiac Rhythm: Ventricular Paced    Access:  Current line(s): PIV     Genitourinary:   Urinary status: voiding and incontinent    Respiratory:   O2 Device: None (Room air)  Chronic home O2 use?: N/A  Incentive spirometer at bedside: YES       GI:  Last Bowel Movement Date: 12/08/22  Current diet:  ADULT DIET Clear Liquid  Passing flatus: YES  Tolerating current diet: YES       Pain Management:   Patient states pain is manageable on current regimen: YES    Skin:  Gerardo Score: 17  Interventions: float heels, increase time out of bed, and nutritional support     Patient Safety:  Fall Score:  Total Score: 2  Interventions: bed/chair alarm, assistive device (walker, cane, etc), gripper socks, and pt to call before getting OOB       Length of Stay:  Expected LOS: 2d 9h  Actual LOS: [address] LPN

## 2022-12-09 NOTE — PROGRESS NOTES
Surgery NP Progress Note    Timm Rinne  [id number]  male  80 y.o.  1932    Admitted for Active Problems:    Partial small bowel obstruction ([address] (2022)      Pt seen with Dr. Evan Garvin    Assessment:   Problem resolving. Plan/Recommendations/Medical Decision Making:     - Mobilize with nursing and OOB to chair for meals  - Advance diet  - Continue care per primary team  - Appears pSBO will resolve without surgery. We are available as needed should his condition change. Subjective:     Patient doing well. Feels like he is improving based on his previous experience with bowel obstructions. No nausea/vomiting. Liquids went well and feels ready for solid food. Is having bowel function. Objective:     Blood pressure (!) 151/70, pulse 87, temperature 98.4 °F (36.9 °C), resp. rate 16, height 5' 10\" (1.778 m), weight 84.4 kg (186 lb), SpO2 98 %. Temp (24hrs), Av.6 °F (37 °C), Min:98.1 °F (36.7 °C), Max:99.3 °F (37.4 °C)      Pt resting in bed. NAD  Abd flat and soft. No pain. SCDs for mechanical DVT proph while in bed     Body mass index is 26.69 kg/m². Reference: BMI greater than 30 is classified as obesity and greater than 40 is classified as morbid obesity.      Last 3 Recorded Weights in this Encounter    22 1747 22 0300   Weight: 83.9 kg (185 lb) 84.4 kg (186 lb)         Karrie Leach NP   MSN, APRN, FNP-C, CWOCN-AP, RNFA    22

## 2022-12-09 NOTE — PROGRESS NOTES
PCP hospital follow-up transitional care appointment has been scheduled with Dr. Maverick Kohli on 12/22/22 at 1240. This is the first available appt due to limited provider availability. PCP office does not offer alternate provider option for hospital follow up. Pending patient discharge.   Eileen Mckeon, Care Management Assistant

## 2022-12-09 NOTE — PROGRESS NOTES
Hospitalist Progress Note    NAME: Nacho Agarwal   :  1932   MRN:  [id number]       Assessment / Plan:  Partial small bowel obstruction, recurrent  Chronic constipation  Mild leukocytosis, suspect reactive  H/o perforated diverticulitis on , h/o SBO on   General surgery consulted  Gastrographin challenge results reviewed  Serial abd exams  Monitor electrolytes   CLD  IVF  Out of bed 3 times daily  Recommend patient be on daily bowel regimen after resolution of obs    CKD 3  Currently at baseline renal function, trend creatinine  Renally dose medications and avoid nephrotoxic agents-noted IV contrast given today     Heart block status post PPM  No concerns noted on telemetry     Diabetes mellitus  Sliding scale insulin     RONNY not compliant with CPAP  Will not pursue nocturnal CPAP while inpatient as patient is noncompliant in outpatient setting     Incidental findings requiring outpatient follow up/ evaluation:  Umbilical hernia containing fat and a portion of the small bowel  but no evidence of incarceration. 25.0 - 29.9 Overweight / Body mass index is 26.69 kg/m². Code status: Full  Prophylaxis: Lovenox  Recommended Disposition: Home w/Family  Anticipated Discharge Date:  24-48 hours        Subjective:     Discussed with RN events overnight. Had a BM yesteday  Passing gas  Not nauseated  Afebrile  No SOB    Review of Systems:  Symptom Y/N Comments  Symptom Y/N Comments   Fever/Chills    Chest Pain     Poor Appetite    Edema     Cough    Abdominal Pain     Sputum    Joint Pain     SOB/RICHTER    Pruritis/Rash     Nausea/vomit    Tolerating PT/OT     Diarrhea    Tolerating Diet     Constipation    Other       PO intake: No data found.     Wt Readings from Last 10 Encounters:   22 84.4 kg (186 lb)   22 86.2 kg (190 lb)   07/15/22 86.2 kg (190 lb)   03/15/22 85.7 kg (189 lb)   21 85.3 kg (188 lb)   11/27/21 85.7 kg (189 lb)   10/07/21 85.7 kg (189 lb)   06/15/21 87.1 kg (192 lb)   02/15/21 85.5 kg (188 lb 9.6 oz)   10/05/20 84.4 kg (186 lb)       Objective:     VITALS:   Last 24hrs VS reviewed since prior progress note. Most recent are:  Patient Vitals for the past 24 hrs:   Temp Pulse Resp BP SpO2   12/09/22 1600 98.4 °F (36.9 °C) 89 16 133/66 96 %   12/09/22 1111 98.4 °F (36.9 °C) 87 16 (!) 151/70 98 %   12/09/22 0835 98.2 °F (36.8 °C) 87 16 (!) 142/70 98 %   12/09/22 0306 98.6 °F (37 °C) 93 -- 113/86 97 %   12/08/22 2303 99.3 °F (37.4 °C) (!) 106 18 133/68 96 %         Intake/Output Summary (Last 24 hours) at 12/9/2022 1851  Last data filed at 12/9/2022 1600  Gross per 24 hour   Intake 640 ml   Output 600 ml   Net 40 ml          I had a face to face encounter, and independently examined this patient as outlined below:    PHYSICAL EXAM:  General:    No distress     HEENT: Atraumatic, anicteric sclerae, pink conjunctivae, MMM  Neck:  Supple, symmetrical  Lungs:   CTA. No Wheezing/Rhonchi. No rales. No tenderness. No Accessory muscle use. CVS:   Regular rhythm. No murmur. No JVD   GI/:   Soft. NT. ND. BS neg. Extremities: No edema. No cyanosis. No clubbing. Skin:     Not pale. Not Jaundiced. No rashes   Psych:  Good insight. Not depressed. Not anxious or agitated. Neurologic: Alert and oriented X 4. EOMs intact. No facial asymmetry. No slurred speech. Symmetrical strength, Sensation grossly intact. Labs     I reviewed today's most current labs and imaging studies.   Pertinent labs include:  Recent Labs     12/09/22  0258 12/08/22  0443 12/07/22  1845   WBC 9.9 12.3* 13.6*   HGB 10.7* 10.8* 12.9   HCT 34.2* 34.7* 41.9    340 437*       Recent Labs     12/09/22  0258 12/08/22  0443 12/08/22  0423 12/07/22  1845    138  --  139   K 3.9 4.3  --  4.9   * 109*  --  105   CO2 22 19*  --  25   * 171*  --  258*   BUN 24* 27*  --  28*   CREA 1.27 1.43*  --  1.63* CA 8.8 8.9  --  9.7   MG 1.8  --  2.0  --    ALB 2.6*  --   --  3.2*   TBILI 0.6  --   --  0.6   ALT 16  --   --  20       XR ABD (KUB)    Result Date: 12/8/2022  Contrast is seen diffusely enlarged and small bowel. This could be ileus or partial small bowel obstruction. XR ABD (KUB)    Result Date: 12/8/2022  NG tube satisfactory position. CT ABD PELV W CONT    Result Date: 12/7/2022  1. Prominent loops of mid jejunum and proximal ileum gradually changing to normal caliber distal ileum. 2.  Large bowel is nondilated nor completely decompressed. 3.  Findings suggestive of enteritis or partial small bowel obstruction but no evidence of mechanical bowel obstruction. No results found. All Micro Results       Procedure Component Value Units Date/Time    CULTURE, BODY FLUID [address] STAIN [[id number]]     Order Status: Canceled Specimen:  Body Fluid from Peritoneal Dialy Fld               Current Medications:     Current Facility-Administered Medications:     hydrALAZINE (APRESOLINE) 20 mg/mL injection 10 mg, 10 mg, IntraVENous, Q6H PRN, Sindhu Tang MD, 10 mg at 12/08/22 1712    aspirin delayed-release tablet 81 mg, 81 mg, Oral, DAILY, Irene Rogersi, DO, 81 mg at 12/09/22 0943    atorvastatin (LIPITOR) tablet 10 mg, 10 mg, Oral, DAILY, Irene Rogersi, DO, 10 mg at 12/09/22 0943    insulin lispro (HUMALOG) injection, , SubCUTAneous, AC&HS, Irene Rogersi, DO, 2 Units at 12/09/22 1810    glucose chewable tablet 16 g, 4 Tablet, Oral, PRN, Irene Velazconeri, DO    glucagon (GLUCAGEN) injection 1 mg, 1 mg, IntraMUSCular, PRN, Irene Velazconeri, DO    dextrose 10 % infusion 0-250 mL, 0-250 mL, IntraVENous, PRN, Irene Velazconeri, DO    sodium chloride (NS) flush 5-40 mL, 5-40 mL, IntraVENous, Q8H, Irene Rogersi, DO, 10 mL at 12/09/22 1812    sodium chloride (NS) flush 5-40 mL, 5-40 mL, IntraVENous, PRN, Irene Hagan,     acetaminophen (TYLENOL) tablet 650 mg, 650 mg, Oral, Q6H PRN **OR** acetaminophen (TYLENOL) suppository 650 mg, 650 mg, Rectal, Q6H PRN, Layvonne Payment, DO    polyethylene glycol (MIRALAX) packet 17 g, 17 g, Oral, DAILY PRN, Layvonne Payment, DO    ondansetron (ZOFRAN ODT) tablet 4 mg, 4 mg, Oral, Q8H PRN **OR** ondansetron (ZOFRAN) injection 4 mg, 4 mg, IntraVENous, Q6H PRN, Layvonne Payment, DO    enoxaparin (LOVENOX) injection 40 mg, 40 mg, SubCUTAneous, DAILY, Layvonne Payment, DO, 40 mg at 12/09/22 0943    lactated Ringers infusion, 75 mL/hr, IntraVENous, CONTINUOUS, Layvonne Payment, DO, Last Rate: 75 mL/hr at 12/09/22 0309, 75 mL/hr at 12/09/22 0309     Procedures: see electronic medical records for all procedures/Xrays and details which were not copied into this note but were reviewed prior to creation of Plan. Reviewed most current lab test results and cultures  YES  Reviewed most current radiology test results   YES  Review and summation of old records today    NO  Reviewed patient's current orders and MAR    YES  PMH/ reviewed - no change compared to H&P  ________________________________________________________________________  Care Plan discussed with:    Comments   Patient x    Family  x    RN     Care Manager     Consultant                        Multidiciplinary team rounds were held today with , nursing, pharmacist and clinical coordinator. Patient's plan of care was discussed; medications were reviewed and discharge planning was addressed.      ________________________________________________________________________  Total NON critical care TIME:   33  Minutes    Total CRITICAL CARE TIME Spent:   Minutes non procedure based      Comments   >50% of visit spent in counseling and coordination of care x     This includes time during multidisciplinary rounds if indicated above   ________________________________________________________________________  Tristan Wheeler MD

## 2022-12-09 NOTE — PROGRESS NOTES
Transition of Care Plan:    RUR: 12%  Disposition: Home with home health PT/OT - referrals pending via [facility]  If SNF or IPR: Date FOC offered:  Date FOC received:  Date authorization started with reference number:  Date authorization received and expires:  Accepting facility:  Follow up appointments:PCP/Specialist  DME needed: none  Transportation at [address] or means to access home:  wife      IM Medicare Letter: harsh 12/8/22  Is patient a  and connected with the [place]? If yes, was [employer] transfer form completed and VA notified? Caregiver Contact:Griselda Alonso Line [phone]  Discharge Caregiver contacted prior to discharge?   At bedside- per patient  Care Conference needed?:   no    Reason for Admission:  small bowel obstruction                     RUR Score:          12% low           Plan for utilizing home health:          PCP: First and Last name:  Venus Reid MD     Name of Practice:    Are you a current patient: Yes/No:    Approximate date of last visit: 2 weels ago   Can you participate in a virtual visit with your PCP: no                    Current Advanced Directive/Advance Care Plan: Full Code      Healthcare Decision Maker:   Click here to complete [address] including selection of the Healthcare Decision Maker Relationship (ie \"Primary\")             Primary Decision MakerIrena Torres - Spouse - [phone]                  Transition of Care Plan:             Met with patient, wife and sister-in-law at bedside- confirmed demographics on file- patient lives with wife in a 2 story home with 3 steps to enter front and has bedroom and bathroom on lower level - patient uses no DME - has never had home care services - recently had OP PT/OT - [facility] on [address] [facility] declined [facility] appointment but provided then with their information if needed - is agreeable to home health PT/OT - freedom of choice obtained and referrals made to 3 agencies- [facility] can accept patient for services. Wife to transport upon discharge. ANGELIQUE Hector    Care Management Interventions  PCP Verified by CM:  Yes  Transition of Care Consult (CM Consult): [address]: No  Reason Outside [place]: Unable to staff case  Discharge Durable Medical Equipment: No  Physical Therapy Consult: Yes  Occupational Therapy Consult: Yes  Speech Therapy Consult: No  Support Systems: Spouse/Significant Other  Confirm Follow Up Transport: Family  Discharge Location  Patient Expects to be Discharged to[de-identified] Home with home health

## 2022-12-09 NOTE — PROGRESS NOTES
PT note:     Chart reviewed and noted duplicate PT orders. PT evaluated patient yesterday on 12/8 and recommended HH PT at discharge. Patient is requiring CGA-min A x 1 and can continue mobilizing with nursing staff over the weekend. Will continue to follow patient for POC at 4x/week.      Merly Norwood, PT, DPT

## 2022-12-10 VITALS
HEIGHT: 70 IN | SYSTOLIC BLOOD PRESSURE: 146 MMHG | WEIGHT: 186 LBS | OXYGEN SATURATION: 97 % | TEMPERATURE: 98.6 F | BODY MASS INDEX: 26.63 KG/M2 | RESPIRATION RATE: 16 BRPM | DIASTOLIC BLOOD PRESSURE: 78 MMHG | HEART RATE: 86 BPM

## 2022-12-10 LAB
GLUCOSE BLD STRIP.AUTO-MCNC: 153 MG/DL (ref 65–117)
SERVICE CMNT-IMP: ABNORMAL

## 2022-12-10 PROCEDURE — 82962 GLUCOSE BLOOD TEST: CPT

## 2022-12-10 PROCEDURE — 74011000636 HC RX REV CODE- 636: Performed by: GENERAL ACUTE CARE HOSPITAL

## 2022-12-10 PROCEDURE — 74011250636 HC RX REV CODE- 250/636: Performed by: STUDENT IN AN ORGANIZED HEALTH CARE EDUCATION/TRAINING PROGRAM

## 2022-12-10 PROCEDURE — 74011250637 HC RX REV CODE- 250/637: Performed by: STUDENT IN AN ORGANIZED HEALTH CARE EDUCATION/TRAINING PROGRAM

## 2022-12-10 PROCEDURE — 74011636637 HC RX REV CODE- 636/637: Performed by: STUDENT IN AN ORGANIZED HEALTH CARE EDUCATION/TRAINING PROGRAM

## 2022-12-10 PROCEDURE — 90471 IMMUNIZATION ADMIN: CPT

## 2022-12-10 PROCEDURE — 90686 IIV4 VACC NO PRSV 0.5 ML IM: CPT | Performed by: GENERAL ACUTE CARE HOSPITAL

## 2022-12-10 PROCEDURE — 74011000250 HC RX REV CODE- 250: Performed by: STUDENT IN AN ORGANIZED HEALTH CARE EDUCATION/TRAINING PROGRAM

## 2022-12-10 RX ADMIN — SODIUM CHLORIDE, PRESERVATIVE FREE 10 ML: 5 INJECTION INTRAVENOUS at 13:18

## 2022-12-10 RX ADMIN — INFLUENZA VIRUS VACCINE 0.5 ML: 15; 15; 15; 15 SUSPENSION INTRAMUSCULAR at 14:37

## 2022-12-10 RX ADMIN — Medication 2 UNITS: at 09:23

## 2022-12-10 RX ADMIN — ASPIRIN 81 MG: 81 TABLET, COATED ORAL at 09:23

## 2022-12-10 RX ADMIN — ENOXAPARIN SODIUM 40 MG: 100 INJECTION SUBCUTANEOUS at 09:23

## 2022-12-10 RX ADMIN — ATORVASTATIN CALCIUM 10 MG: 10 TABLET, FILM COATED ORAL at 09:23

## 2022-12-10 NOTE — PROGRESS NOTES
No further concerns indicated at this time. AVS updated. Pt is ready for discharge from a Care Management standpoint. RN informed. Transition of Care Plan:     RUR: 12%  Disposition: Home with home health PT/OT - referrals pending via [facility]port  If SNF or IPR: Date FOC offered:  Date FOC received:  Date authorization started with reference number:  Date authorization received and expires:  Accepting facility:  Follow up appointments:PCP/Specialist  DME needed: none  Transportation at [address] or means to access home:  wife      IM Medicare Letter: ahrsh 12/8/22  Is patient a [place] and connected with the [place]? If yes, was [employer] transfer form completed and VA notified? Caregiver Contact:Griselda Helm [phone]  Discharge Caregiver contacted prior to discharge?   At bedside- per patient  Care Conference needed?:   no

## 2022-12-10 NOTE — DISCHARGE SUMMARY
Hospitalist Discharge Summary     Patient ID:  Alia Tuttle  [id number]  25 y.o.  6/22/1932 12/7/2022    PCP on record: Dale Crane MD    Admit date: 12/7/2022  Discharge date and time: 12/10/2022    DISCHARGE DIAGNOSIS:  As below     CONSULTATIONS:  IP CONSULT TO GENERAL SURGERY    Excerpted HPI from H&P of Ender Schwatrz, DO:  Alia Tuttle is a 80 y.o.  male with pertinent past medical history of heart block status post PPM, and diabetes mellitus, RONNY not compliant with CPAP, chronic constipation not compliant with bowel regimen who presents with complaints of 5 days constipation (reporting only single small hard BM in that timeframe) which she has not attempted to treat with any medications despite PCP direction to utilize daily MiraLAX. Today he began to develop hiccups which is similar to prior presentations when he has been found to have small bowel obstructions prompting him to present to the emergency department for evaluation. He denies any other associated symptoms and ROS otherwise negative. He denies tobacco, alcohol, or illicit drug use. In the ED, patient afebrile and hemodynamically stable saturating upper 90s on room air. CT abdomen pelvis demonstrates prominent loops of mid jejunal and proximal ileum gradually changing and normal caliber distal ileum with findings suggestive of enteritis versus partial small bowel obstruction. NG tube placed for gastric decompression with KUB demonstrating satisfactory positioning. Case discussed with Dr. Melody Ring, general surgery, who reports no plans for operative intervention at this time. Labs demonstrate: Lipase 101, sodium 139, potassium 4.9, BUN 28, creatinine 1.63 (baseline), WBC 13.6, hemoglobin 12.9, platelets 143.     ____________________________________________________________________  DISCHARGE SUMMARY/HOSPITAL COURSE:  for full details see H&P, daily progress notes, labs, consult notes.      Partial small bowel obstruction, recurrent  Chronic constipation  Mild leukocytosis, suspect reactive  H/o perforated diverticulitis on 2017, h/o SBO on 2020  General surgery consulted  Gastrographin challenge results reviewed  Monitor electrolytes   CLD advanced to regular diet and pt tolerated that well, had small Bms and passing gas  IVF  Recommend patient be on daily bowel regimen after resolution of obs    CKD 3  Currently at baseline renal function, trend creatinine  Renally dose medications and avoid nephrotoxic agents-noted IV contrast given today     Heart block status post PPM  No concerns noted on telemetry     Diabetes mellitus  Sliding scale insulin     RONNY not compliant with CPAP  Will not pursue nocturnal CPAP while inpatient as patient is noncompliant in outpatient setting     Incidental findings requiring outpatient follow up/ evaluation:  Umbilical hernia containing fat and a portion of the small bowel  but no evidence of incarceration. All Micro Results       Procedure Component Value Units Date/Time    CULTURE, BODY FLUID Jostin Organ STAIN [[id number]]     Order Status: Canceled Specimen: Body Fluid from Peritoneal Dialy Fld              _______________________________________________________________________  Patient seen and examined by me on discharge day. Pertinent Findings:  Gen:    Not in distress  Chest: Clear lungs  CVS:   Regular rhythm. No edema  Abd/: Soft, not distended, not tender  Neuro:  Alert, oriented   _______________________________________________________________________  DISCHARGE MEDICATIONS:   Current Discharge Medication List        CONTINUE these medications which have NOT CHANGED    Details   glimepiride (AMARYL) 2 mg tablet Take 1 Tablet by mouth every morning. Qty: 90 Tablet, Refills: 1      iron bis-gly/FA/C/B12/Ca/succ (IRON-150 PO) Take  by mouth.      metFORMIN ER (GLUCOPHAGE XR) 500 mg tablet Take 2 Tablets by mouth two (2) times daily (with meals).   Qty: 120 Tablet, Refills: 5 atorvastatin (LIPITOR) 10 mg tablet TAKE 1 TABLET BY MOUTH DAILY  Qty: 90 Tablet, Refills: 1      glucose blood VI test strips (True Metrix Glucose Test Strip) strip USE TO TEST BLOOD SUGAR ONCE DAILY  Qty: 100 Strip, Refills: 3      lancets (TRUEplus Lancets) 33 gauge misc USE TO TEST BLOOD GLUCOSE ONCE DAILY. E11.9  Qty: 100 Each, Refills: 3      aspirin delayed-release 81 mg tablet Take 81 mg by mouth daily. Lancing Device misc Use to test blood sugars once daily DX:E11.9  Qty: 1 Each, Refills: 0               Patient Follow Up Instructions: Activity: Activity as tolerated  Diet: ADULT DIET Regular; Low Fiber  Wound Care: None needed  Follow-up with PCP in  1 week. Follow-up tests/labs none   If you have any concerns that you feel you need to come back to the hospital, please do not hesitate.     Follow-up Information       Follow up With Specialties Details Why Contact Info    Vivien Liu MD Internal Medicine Physician Go on 12/22/2022 at 12:40pm for your PCP hospital follow up. [address]  [address]  [facility] [address] Follow up home health PT/OT - will see within 48 hours of discharge [address] [facility], [address] [facility] [address]          ________________________________________________________________    Risk of deterioration: Low    Condition at Discharge:  Stable  __________________________________________________________________    Disposition  Home with family, no needs    ____________________________________________________________________    Code Status: Full Code  ___________________________________________________________________      Total time in minutes spent coordinating this discharge (includes going over instructions, follow-up, prescriptions, and preparing report for sign off to her PCP) :  >30 minutes    Signed:  Ewa Macias MD

## 2022-12-10 NOTE — DISCHARGE INSTRUCTIONS
Bowel Blockage (Intestinal Obstruction): Care Instructions  Your Care Instructions  A bowel blockage, also called an intestinal obstruction, can prevent gas, fluids, or solids from moving through the intestines normally. It can cause constipation and, rarely, diarrhea. You may have pain, nausea, vomiting, and cramping. Most of the time, complete blockages require a stay in the hospital and possibly surgery. But if your bowel is only partly blocked, your doctor may tell you to wait until it clears on its own and you are able to pass gas and stool. If so, there are things you can do at home to help make you feel better. If you have had surgery for a bowel blockage, there are things you can do at home to make sure you heal well. You can also make some changes to keep your bowel from becoming blocked again. Follow-up care is a key part of your treatment and safety. Be sure to make and go to all appointments, and call your doctor if you are having problems. It's also a good idea to know your test results and keep a list of the medicines you take. How can you care for yourself at home? If your doctor has told you to wait at home for a blockage to clear on its own: Follow your doctor's instructions. These may include eating a liquid diet to avoid complete blockage. Be safe with medicines. Take your medicines exactly as prescribed. Call your doctor if you think you are having a problem with your medicine. Put a heating pad set on low on your belly to relieve mild cramps and pain. To prevent another blockage  Try to eat smaller amounts of food more often. For example, have 5 or 6 small meals throughout the day instead of 2 or 3 large meals. Chew your food very well. Try to chew each bite about 20 times or until it is liquid. Avoid high-fiber foods and raw fruits and vegetables with skins, husks, strings, or seeds.  These can form a ball of undigested material that can cause a blockage if a part of your bowel is scarred or narrowed. Check with your doctor before you eat whole-grain products or use a fiber supplement such as Citrucel or Metamucil. To help you have regular bowel movements, eat at regular times, do not strain during a bowel movement, and drink plenty of water. If you have kidney, heart, or liver disease and have to limit fluids, talk with your doctor before you increase the amount of fluids you drink. Drink high-calorie liquid formulas if your doctor says to. Severe symptoms may make it hard for your body to take in vitamins and minerals. Get regular exercise. It helps you digest your food better. Get at least 30 minutes of physical activity on most days of the week. Walking is a good choice. When should you call for help? Call your doctor now or seek immediate medical care if:    You have a fever. You are vomiting. You have new or worse belly pain. You cannot pass stools or gas. Watch closely for changes in your health, and be sure to contact your doctor if you have any problems. Where can you learn more? Go to http://www.gray.com/  Enter B082 in the search box to learn more about \"Bowel Blockage (Intestinal Obstruction): Care Instructions. \"  Current as of: June 6, 2022               Content Version: 13.4  © 2006-2022 [employer]wise, Incorporated. Care instructions adapted under license by [employer] (which disclaims liability or warranty for this information). If you have questions about a medical condition or this instruction, always ask your healthcare professional. [place] [address] any warranty or liability for your use of this information.

## 2022-12-11 RX ORDER — ATORVASTATIN CALCIUM 10 MG/1
TABLET, FILM COATED ORAL
Qty: 90 TABLET | Refills: 1 | Status: SHIPPED | OUTPATIENT
Start: 2022-12-11

## 2022-12-12 ENCOUNTER — PATIENT OUTREACH (OUTPATIENT)
Dept: CASE MANAGEMENT | Age: 87
End: 2022-12-12

## 2022-12-12 ENCOUNTER — TELEPHONE (OUTPATIENT)
Dept: INTERNAL MEDICINE CLINIC | Age: 87
End: 2022-12-12

## 2022-12-12 NOTE — TELEPHONE ENCOUNTER
----- Message from Woody Sarathcherise sent at 12/9/2022  4:39 PM EST -----  Subject: Message to Provider    QUESTIONS  Information for Provider? Lesley Mcclain with [facility] called and   requested to leave a message for Dr. Radha Lopez. She says they received a referral   from [facility] to provide home health services for this patient,   and they would like to know if Dr. Radha Lopez will follow and sign home health   orders for this patient. She can be reached at [phone], voicemail is   secure.   ---------------------------------------------------------------------------  --------------  Larry Whalen [facility]  [phone]; OK to leave message on voicemail  ---------------------------------------------------------------------------  --------------  SCRIPT ANSWERS  Relationship to Patient? Third Party  Third Party Type? Home Health Care? Representative Name?  Lesley Mcclain

## 2022-12-12 NOTE — PROGRESS NOTES
Care Transitions Initial Call    Call within 2 business days of discharge: Yes     Patient: Nuno Ruffin Patient : 1932 MRN: [id number]    Last Discharge [address]       Date Complaint Diagnosis Description Type Department Provider    22 Constipation Partial small bowel obstruction [facility]) ED to Hosp-Admission (Discharged) (ADMIT) MRM3ST Destini Murphy MD; Mandy Allen. .. Was this an external facility discharge? No Discharge Facility: [facility]    Challenges to be reviewed by the provider   Additional needs identified to be addressed with provider: yes  medications- miralax to be taken to facilitate bm after hospitalized for sbo requiring ng decompression         Method of communication with provider : chart routing    Discussed COVID-19 related testing which was not done at this time. Test results were not done. Patient informed of results, if available? no     Advance Care Planning:   Does patient have an Advance Directive: not on file. Will discuss acp at next outreach    Inpatient Readmission Risk score: Unplanned Readmit Risk Score: 11.6    Was this a readmission? no   Patient stated reason for the admission: gut blockage    Patients top risk factors for readmission: functional physical ability and polypharmacy   Interventions to address risk factors: Scheduled appointment with PCP-22 and Obtained and reviewed discharge summary and/or continuity of care documents    Care Transition Nurse (CTN) contacted the patient by telephone to perform post hospital discharge assessment. Verified name and  with patient as identifiers. Provided introduction to self, and explanation of the CTN role. CTN reviewed discharge instructions, medical action plan and red flags with patient who verbalized understanding. Were discharge instructions available to patient? yes. Reviewed appropriate site of care based on symptoms and resources available to patient including: PCP and Home Health. Patient given an opportunity to ask questions and does not have any further questions or concerns at this time. The patient agrees to contact the PCP office for questions related to their healthcare. Medication reconciliation was performed with patient, who verbalizes understanding of administration of home medications. Advised obtaining a 90-day supply of all daily and as-needed medications. Referral to Pharm D needed: no     Home Health/Outpatient orders at discharge: home health care, PT, and [facility]: [address] [address]  Date of initial visit: 12/12/22    Durable Medical Equipment ordered at discharge: None      Was patient discharged with a pulse oximeter? no    Discussed follow-up appointments. If no appointment was previously scheduled, appointment scheduling offered: yes. Is follow up appointment scheduled within 7 days of discharge? no.   [facility] follow up appointment(s):   Future Appointments   Date Time Provider Jenna Mcgregor   12/22/2022 12:40 PM MD ERNESTO Shields AMB   3/21/2023  9:40 AM MD ERNESTO Shields AMB     Non-[facility] follow up appointment(s): none at this time    Plan for follow-up call in 5-7 days based on severity of symptoms and risk factors. Plan for next call: symptom management-frequency of bm, using miralax, [facility]  CTN provided contact information for future needs. Goals Addressed                   This Visit's Progress     Prevent complications post hospitalization. 12/12/22    Patient to take miralax as directed to achieve desired result for BM. Patient to follow low fiber diet  Patient to push fluids to ensure 3 to 4 voids per day. Patient to attend pcp appt on 12/22/22  Ctn to follow up in one week.   Barbara Nieves RN

## 2022-12-22 ENCOUNTER — OFFICE VISIT (OUTPATIENT)
Dept: INTERNAL MEDICINE CLINIC | Age: 87
End: 2022-12-22
Payer: MEDICARE

## 2022-12-22 VITALS
WEIGHT: 183 LBS | HEIGHT: 70 IN | BODY MASS INDEX: 26.2 KG/M2 | SYSTOLIC BLOOD PRESSURE: 108 MMHG | DIASTOLIC BLOOD PRESSURE: 72 MMHG | OXYGEN SATURATION: 98 % | RESPIRATION RATE: 16 BRPM | TEMPERATURE: 97.4 F | HEART RATE: 91 BPM

## 2022-12-22 DIAGNOSIS — K56.609 SBO (SMALL BOWEL OBSTRUCTION) (HCC): Primary | ICD-10-CM

## 2022-12-22 DIAGNOSIS — N18.30 STAGE 3 CHRONIC KIDNEY DISEASE, UNSPECIFIED WHETHER STAGE 3A OR 3B CKD (HCC): ICD-10-CM

## 2022-12-22 PROCEDURE — 99495 TRANSJ CARE MGMT MOD F2F 14D: CPT | Performed by: INTERNAL MEDICINE

## 2022-12-22 PROCEDURE — G8427 DOCREV CUR MEDS BY ELIG CLIN: HCPCS | Performed by: INTERNAL MEDICINE

## 2022-12-22 NOTE — PROGRESS NOTES
Assessment/Plan:       ICD-10-CM ICD-9-CM    1. SBO (small bowel obstruction) ([facility]  K56.609 560.9       2. Stage 3 chronic kidney disease, unspecified whether stage 3a or 3b CKD ([facility]  N18.30 585. 3          Plan:  -encouraged patient to continue use of miralax. Stop only if he develops diarrhea  -maintain hydration  -maintain diabetes mellitus diet. Follow-up Disposition:     Has established visit for chronic medical problems in March, 2023    Disclaimer:  Return if symptoms worsen or fail to improve. Advised patient to call back or return to office if symptoms worsen/change/persist.     Discussed expected course/resolution/complications of diagnosis in detail with patient. Medication risks/benefits/costs/interactions/alternatives discussed with patient. Patient was given an after visit summary which includes diagnoses, current medications, & vitals. Patient expressed understanding with the diagnosis and plan. Subjective:      Alia Carmichael is a 80 y.o. male who presents today for post hospital follow up. Diagnosis: SBO  Date of Admission:  12/7/2022  Date of Discharge: 12/10/2022    Hospital Course:  -bowel rest. NG tube for gastric decompression. Procedures:  -none    Since discharge:  -hiccups have resolved  -eating without any problems  -using miralax daily. Objective:       Visit Vitals  /72   Pulse 91   Temp 97.4 °F (36.3 °C) (Temporal)   Resp 16   Ht 5' 10\" (1.778 m)   Wt 183 lb (83 kg)   SpO2 98%   BMI 26.26 kg/m²     General appearance: alert, cooperative, no distress, appears stated age  Head: Normocephalic, without obvious abnormality, atraumatic  Abdomen: soft, non-tender.  Bowel sounds normal. No masses,  no organomegaly

## 2022-12-22 NOTE — PROGRESS NOTES
Verified name and birth date for privacy precautions. Chart reviewed in preparation for today's visit. Chief Complaint   Patient presents with    Hospital Follow Up          Health Maintenance Due   Topic    Shingles Vaccine (1 of 2)    COVID-19 Vaccine (4 - Booster for Pfizer series)         Wt Readings from Last 3 Encounters:   12/22/22 183 lb (83 kg)   12/08/22 186 lb (84.4 kg)   11/18/22 190 lb (86.2 kg)     Temp Readings from Last 3 Encounters:   12/22/22 97.4 °F (36.3 °C) (Temporal)   12/10/22 98.6 °F (37 °C)   11/18/22 97.2 °F (36.2 °C) (Temporal)     BP Readings from Last 3 Encounters:   12/22/22 108/72   12/10/22 (!) 146/78   11/18/22 119/84     Pulse Readings from Last 3 Encounters:   12/22/22 91   12/10/22 86   11/18/22 91         Learning Assessment:  :     Learning Assessment 1/15/2020 1/17/2019 10/3/2017 11/9/2016 5/5/2015 2/21/2014   PRIMARY LEARNER Patient Patient Patient Patient Patient Patient   HIGHEST LEVEL OF EDUCATION - PRIMARY LEARNER  > 4 YEARS OF COLLEGE > 4 YEARS OF COLLEGE > 4 YEARS OF COLLEGE - > 4 YEARS OF COLLEGE > 4 YEARS OF COLLEGE   BARRIERS PRIMARY LEARNER NONE NONE NONE - NONE NONE   CO-LEARNER CAREGIVER No - No - No No   PRIMARY LANGUAGE ENGLISH ENGLISH ENGLISH ENGLISH ENGLISH ENGLISH    NEED - - - - No No   LEARNER PREFERENCE PRIMARY READING DEMONSTRATION DEMONSTRATION DEMONSTRATION READING DEMONSTRATION     - - - - DEMONSTRATION READING     - - - - - LISTENING   LEARNING SPECIAL TOPICS - - - - no no   ANSWERED BY patient  patient patient patient patient patient   RELATIONSHIP SELF SELF SELF SELF SELF SELF   ASSESSMENT COMMENT - - - - n/a -       Depression Screening:  :     3 most recent PHQ Screens 12/22/2022   Little interest or pleasure in doing things Not at all   Feeling down, depressed, irritable, or hopeless Not at all   Total Score PHQ 2 0       Fall Risk Assessment:  :     Fall Risk Assessment, last 12 mths 12/22/2022   Able to walk?  Yes   Fall in past 12 months? 0   Do you feel unsteady? 1   Are you worried about falling 0   Number of falls in past 12 months -   Fall with injury? -       Abuse Screening:  :     Abuse Screening Questionnaire 12/22/2022 11/18/2022 7/15/2022 6/15/2021 1/15/2020 7/8/2019 1/17/2019   Do you ever feel afraid of your partner? N N N N N N N   Are you in a relationship with someone who physically or mentally threatens you? N N N N N N N   Is it safe for you to go home?  Devere Flight

## 2022-12-28 ENCOUNTER — PATIENT OUTREACH (OUTPATIENT)
Dept: CASE MANAGEMENT | Age: 87
End: 2022-12-28

## 2022-12-28 NOTE — PROGRESS NOTES
Care Transitions Outreach Attempt    Call within 2 business days of discharge: Yes   Attempted to reach patient for transitions of care follow up. Unable to reach patient. Ctn left multiple messages. Will attempt outreach in one week. Jaswant Slaughter RN, CPN - Care Transition Nurse- [phone]       Patient: Diana Lizarraga Patient : 1932 MRN: [id number]    Last Discharge [address]       Date Complaint Diagnosis Description Type Department Provider    22 Constipation Partial small bowel obstruction [facility]) ED to Hosp-Admission (Discharged) (ADMIT) MRM3ST Aubrey Fuentes MD; Mary Garcia. .. Was this an external facility discharge?  No Discharge Facility: [facility]      Noted following upcoming appointments from discharge chart review:   [facility] follow up appointment(s):   Future Appointments   Date Time Provider Jenna Mcgregor   3/21/2023  9:40 AM Debbie Xiong MD [facility]     Non-[facility] follow up appointment(s): none at this time

## 2023-01-03 ENCOUNTER — PATIENT OUTREACH (OUTPATIENT)
Dept: CASE MANAGEMENT | Age: 88
End: 2023-01-03

## 2023-01-03 NOTE — PROGRESS NOTES
Care Transitions Follow Up Call    Challenges to be reviewed by the provider   Additional needs identified to be addressed with provider: no  none           Method of communication with provider : none    Care Transition Nurse (CTN) contacted the family by telephone to follow up after admission on 22. Verified name and  with family as identifiers. Addressed changes since last contact: none  Follow up appointment completed? yes. Was follow up appointment scheduled within 7 days of discharge? no. 12 days after discharge    CTN reviewed discharge instructions, medical action plan and red flags with family and discussed any barriers to care and/or understanding of plan of care after discharge. Discussed appropriate site of care based on symptoms and resources available to patient including: PCP and Home Health. The family agrees to contact the PCP office for questions related to their healthcare. Patients top risk factors for readmission: functional cognitive ability, functional physical ability, and medical condition-sbo    Interventions to address risk factors: Scheduled appointment with PCP-22 and Obtained and reviewed discharge summary and/or continuity of care documents    [facility]] follow up appointment(s):   Future Appointments   Date Time Provider Jenna Mcgregor   3/21/2023  9:40 AM En Hernández MD [facility]     Non-[facility] follow up appointment(s): none at this time    CTN provided contact information for future needs. Plan for follow-up call in 5-7 days based on severity of symptoms and risk factors. Plan for next call: symptom management-diarrhea or constipation concerns. Is home health still following. Graduation call. Goals Addressed                   This Visit's Progress     Prevent complications post hospitalization. On track     22    Patient to take miralax as directed to achieve desired result for BM.    Patient to follow low fiber diet  Patient to push fluids to ensure 3 to 4 voids per day. Patient to attend pcp appt on 12/22/22  Ctn to follow up in one week. Dalia Bright RN    12/28/22  Left messages for return call. Will follow up in one week. Dalia Bright RN    1/3/23  Patient to continue miralax as prescribed unless he has diarrhea  Patient to push po fluids to ensure 3 to 4 voids per day. Patient wife stated that pt still continuing to work with patient. Ctn will follow up in one week.    Dalia Bright RN  Patient attended pcp appt on 12/22/22

## 2023-01-12 ENCOUNTER — PATIENT OUTREACH (OUTPATIENT)
Dept: CASE MANAGEMENT | Age: 88
End: 2023-01-12

## 2023-01-12 NOTE — PROGRESS NOTES
Patient has graduated from the Transitions of Care Coordination  program on 1/12/23. Patient/family has the ability to self-manage at this time Care management goals have been completed. Patient was not referred to the [facility] team for further management. Goals Addressed                   This Visit's Progress     COMPLETED: Prevent complications post hospitalization. On track     12/12/22    Patient to take miralax as directed to achieve desired result for BM. Patient to follow low fiber diet  Patient to push fluids to ensure 3 to 4 voids per day. Patient to attend pcp appt on 12/22/22  Ctn to follow up in one week. Vijay Corona RN    12/28/22  Left messages for return call. Will follow up in one week. Vijay Corona RN    1/3/23  Patient to continue miralax as prescribed unless he has diarrhea  Patient to push po fluids to ensure 3 to 4 voids per day. Patient wife stated that pt still continuing to work with patient. Ctn will follow up in one week. Vijay Corona RN  Patient attended pcp appt on 12/22/22 1/12/23  Patient has had no readmissions for 30 days. Patient wants information on ACP and ctn will place referral.   Patient attended pcp appt on 12/22/22. Vijay Corona RN              Patient has Care Transition Nurse's contact information for any further questions, concerns, or needs.   Patients upcoming visits:    Future Appointments   Date Time Provider Jenna Mcgregor   3/21/2023  9:40 AM MD ERNESTO Otto AMB

## 2023-01-12 NOTE — ACP (ADVANCE CARE PLANNING)
Advance Care Planning     General Advance Care Planning (ACP) Conversation      Date of Conversation: 1/12/2023  Conducted with: Patient with Decision Making Capacity    Healthcare Decision Maker:     Primary Decision Maker: Ainsley Harper - [phone]  Click here to complete [address] including selection of the Healthcare Decision Maker Relationship (ie \"Primary\")        Content/Action Overview:   Has NO ACP documents/care preferences - refer to ACP Clinical Specialist  Reviewed DNR/DNI and patient elects Full Code (Attempt Resuscitation)    Additional Comments : Patient wanted information on ACP but wasn't sure if he wanted any documents  filed immediately. Ctn stated that she would place a referral to our acp specialist and he was receptive to receiving information.        Length of Voluntary ACP Conversation in minutes:  <16 minutes (Non-Billable)    Zakia Allen RN

## 2023-01-16 ENCOUNTER — PATIENT OUTREACH (OUTPATIENT)
Dept: CASE MANAGEMENT | Age: 88
End: 2023-01-16

## 2023-01-16 NOTE — ACP (ADVANCE CARE PLANNING)
Initial outbound call made to patient. Spoke to patient and wife who state that he has an AMD.  They agreed to take a copy to PCP to have scanned in to chart at next appt. No further outreach scheduled.

## 2023-01-20 RX ORDER — LANCETS 33 GAUGE
EACH MISCELLANEOUS
Qty: 100 EACH | Refills: 3 | Status: SHIPPED | OUTPATIENT
Start: 2023-01-20

## 2023-02-23 ENCOUNTER — TRANSCRIBE ORDER (OUTPATIENT)
Dept: SCHEDULING | Age: 88
End: 2023-02-23

## 2023-02-23 DIAGNOSIS — C61 PROSTATE CANCER (HCC): Primary | ICD-10-CM

## 2023-03-21 ENCOUNTER — OFFICE VISIT (OUTPATIENT)
Dept: INTERNAL MEDICINE CLINIC | Age: 88
End: 2023-03-21

## 2023-03-21 VITALS
HEIGHT: 70 IN | OXYGEN SATURATION: 98 % | WEIGHT: 187 LBS | HEART RATE: 90 BPM | DIASTOLIC BLOOD PRESSURE: 73 MMHG | RESPIRATION RATE: 16 BRPM | TEMPERATURE: 96.7 F | SYSTOLIC BLOOD PRESSURE: 105 MMHG | BODY MASS INDEX: 26.77 KG/M2

## 2023-03-21 DIAGNOSIS — Z85.46 HISTORY OF PROSTATE CANCER: ICD-10-CM

## 2023-03-21 DIAGNOSIS — N18.30 TYPE 2 DIABETES MELLITUS WITH STAGE 3 CHRONIC KIDNEY DISEASE, WITHOUT LONG-TERM CURRENT USE OF INSULIN, UNSPECIFIED WHETHER STAGE 3A OR 3B CKD (HCC): Primary | ICD-10-CM

## 2023-03-21 DIAGNOSIS — E11.22 TYPE 2 DIABETES MELLITUS WITH STAGE 3 CHRONIC KIDNEY DISEASE, WITHOUT LONG-TERM CURRENT USE OF INSULIN, UNSPECIFIED WHETHER STAGE 3A OR 3B CKD (HCC): Primary | ICD-10-CM

## 2023-03-21 DIAGNOSIS — Z79.899 ENCOUNTER FOR LONG-TERM (CURRENT) USE OF MEDICATIONS: ICD-10-CM

## 2023-03-21 DIAGNOSIS — E78.2 MIXED HYPERLIPIDEMIA: ICD-10-CM

## 2023-03-21 DIAGNOSIS — N18.30 STAGE 3 CHRONIC KIDNEY DISEASE, UNSPECIFIED WHETHER STAGE 3A OR 3B CKD (HCC): ICD-10-CM

## 2023-03-21 LAB
ALBUMIN SERPL-MCNC: 3.3 G/DL (ref 3.5–5)
ALBUMIN/GLOB SERPL: 0.9 (ref 1.1–2.2)
ALP SERPL-CCNC: 106 U/L (ref 45–117)
ALT SERPL-CCNC: 16 U/L (ref 12–78)
ANION GAP SERPL CALC-SCNC: 4 MMOL/L (ref 5–15)
AST SERPL-CCNC: 19 U/L (ref 15–37)
BASOPHILS # BLD: 0.1 K/UL (ref 0–0.1)
BASOPHILS NFR BLD: 1 % (ref 0–1)
BILIRUB SERPL-MCNC: 0.3 MG/DL (ref 0.2–1)
BUN SERPL-MCNC: 26 MG/DL (ref 6–20)
BUN/CREAT SERPL: 16 (ref 12–20)
CALCIUM SERPL-MCNC: 9.7 MG/DL (ref 8.5–10.1)
CHLORIDE SERPL-SCNC: 109 MMOL/L (ref 97–108)
CO2 SERPL-SCNC: 27 MMOL/L (ref 21–32)
CREAT SERPL-MCNC: 1.61 MG/DL (ref 0.7–1.3)
DIFFERENTIAL METHOD BLD: ABNORMAL
EOSINOPHIL # BLD: 0.1 K/UL (ref 0–0.4)
EOSINOPHIL NFR BLD: 2 % (ref 0–7)
ERYTHROCYTE [DISTWIDTH] IN BLOOD BY AUTOMATED COUNT: 18.1 % (ref 11.5–14.5)
EST. AVERAGE GLUCOSE BLD GHB EST-MCNC: 169 MG/DL
GLOBULIN SER CALC-MCNC: 3.5 G/DL (ref 2–4)
GLUCOSE SERPL-MCNC: 126 MG/DL (ref 65–100)
HBA1C MFR BLD: 7.5 % (ref 4–5.6)
HCT VFR BLD AUTO: 42.4 % (ref 36.6–50.3)
HGB BLD-MCNC: 12.8 G/DL (ref 12.1–17)
IMM GRANULOCYTES # BLD AUTO: 0 K/UL (ref 0–0.04)
IMM GRANULOCYTES NFR BLD AUTO: 0 % (ref 0–0.5)
LYMPHOCYTES # BLD: 1.7 K/UL (ref 0.8–3.5)
LYMPHOCYTES NFR BLD: 19 % (ref 12–49)
MCH RBC QN AUTO: 25.4 PG (ref 26–34)
MCHC RBC AUTO-ENTMCNC: 30.2 G/DL (ref 30–36.5)
MCV RBC AUTO: 84.1 FL (ref 80–99)
MONOCYTES # BLD: 0.7 K/UL (ref 0–1)
MONOCYTES NFR BLD: 9 % (ref 5–13)
NEUTS SEG # BLD: 5.9 K/UL (ref 1.8–8)
NEUTS SEG NFR BLD: 69 % (ref 32–75)
NRBC # BLD: 0 K/UL (ref 0–0.01)
NRBC BLD-RTO: 0 PER 100 WBC
PLATELET # BLD AUTO: 332 K/UL (ref 150–400)
PMV BLD AUTO: 8.7 FL (ref 8.9–12.9)
POTASSIUM SERPL-SCNC: 5.5 MMOL/L (ref 3.5–5.1)
PROT SERPL-MCNC: 6.8 G/DL (ref 6.4–8.2)
RBC # BLD AUTO: 5.04 M/UL (ref 4.1–5.7)
SODIUM SERPL-SCNC: 140 MMOL/L (ref 136–145)
TSH SERPL DL<=0.05 MIU/L-ACNC: 4.92 UIU/ML (ref 0.36–3.74)
WBC # BLD AUTO: 8.5 K/UL (ref 4.1–11.1)

## 2023-03-21 RX ORDER — POLYETHYLENE GLYCOL 3350 17 G/17G
17 POWDER, FOR SOLUTION ORAL DAILY
COMMUNITY

## 2023-03-21 RX ORDER — ENZALUTAMIDE 40 MG/1
160 TABLET ORAL DAILY
COMMUNITY
Start: 2023-03-06

## 2023-03-21 NOTE — PROGRESS NOTES
Assessment/Plan:     1. Type 2 diabetes mellitus with stage 3 chronic kidney disease, without long-term current use of insulin, unspecified whether stage 3a or 3b CKD ([facility]  -taking metformin 1000mg bid. Continue to monitor renal function. Also taking amaryl 2mg daily  -requesting referral to podiatrist in [place]. Recommended [facility]. - CBC WITH AUTOMATED DIFF; Future  - METABOLIC PANEL, COMPREHENSIVE; Future  - HEMOGLOBIN A1C WITH EAG; Future  - HEMOGLOBIN A1C WITH EAG  - METABOLIC PANEL, COMPREHENSIVE  - CBC WITH AUTOMATED DIFF    2. Stage 3 chronic kidney disease, unspecified whether stage 3a or 3b CKD ([facility]      3. Mixed hyperlipidemia  -taking atorvastatin 10mg daily.     - CBC WITH AUTOMATED DIFF; Future  - METABOLIC PANEL, COMPREHENSIVE; Future  - TSH 3RD GENERATION; Future  - TSH 3RD GENERATION  - METABOLIC PANEL, COMPREHENSIVE  - CBC WITH AUTOMATED DIFF    4. History of prostate cancer  -working with urology. Has recurrence. -currently in treatment with xtandi daily and lupron every 6 months. 5. Encounter for long-term (current) use of medications    Orders Placed This Encounter    CBC WITH AUTOMATED DIFF     Standing Status:   Future     Number of Occurrences:   1     Standing Expiration Date:   3/12/6546    METABOLIC PANEL, COMPREHENSIVE     Standing Status:   Future     Number of Occurrences:   1     Standing Expiration Date:   3/21/2024    HEMOGLOBIN A1C WITH EAG     Standing Status:   Future     Number of Occurrences:   1     Standing Expiration Date:   3/21/2024    TSH 3RD GENERATION     Standing Status:   Future     Number of Occurrences:   1     Standing Expiration Date:   3/21/2024    polyethylene glycol (Miralax) 17 gram packet     Sig: Take 17 g by mouth daily. Half dose daily    enzalutamide (Xtandi) 40 mg tab     Sig: Take 160 mg by mouth daily. leuprolide depot (LUPRON 6 MONTH) 45 mg injection     Si mg by IntraMUSCular route once. Follow-up Disposition:     Follow up in 4 months               Subjective:      Adonis Barbour is a 80 y.o. male who presents today for follow up of his diabetes mellitus type 2, hyperlipidemia, and history of prostate cancer. Here today with his wife. Since last visit :  -started on medication [place] and pending PET on 4/3/2023 for significant increase in PSA with history of prostate cancer. Working with Dr. Marija Leon, urology    -states his incontinence is better, but still bothersome.    -has fatigue.   -appetite is good. -no significant bowel changes. Patient Care Team:  -Dr. Paco Hussein - [facility]- using cpap  -Dr. Marija Leon - urology. - last month  -Dr. Carmen Trent - cardiology- heart block. Has pacema         Objective: Wt Readings from Last 3 Encounters:   12/22/22 183 lb (83 kg)   12/08/22 186 lb (84.4 kg)   11/18/22 190 lb (86.2 kg)     BP Readings from Last 3 Encounters:   12/22/22 108/72   12/10/22 (!) 146/78   11/18/22 119/84     Visit Vitals  /73   Pulse 90   Temp (!) 96.7 °F (35.9 °C) (Temporal)   Resp 16   Ht 5' 10\" (1.778 m)   Wt 187 lb (84.8 kg)   SpO2 98%   BMI 26.83 kg/m²     General appearance: alert, cooperative, no distress, appears stated age  Head: Normocephalic, without obvious abnormality, atraumatic  Neck: supple, symmetrical, trachea midline, no adenopathy, no carotid bruit, and no JVD  Lungs: clear to auscultation bilaterally  Heart: regular rate and rhythm, S1, S2 normal, no murmur, click, rub or gallop  Abdomen: soft, non-tender. Bowel sounds normal. No masses,  no organomegaly  Extremities: no edema              Disclaimer:  Return if symptoms worsen or fail to improve. Advised patient to call back or return to office if symptoms worsen/change/persist.     Discussed expected course/resolution/complications of diagnosis in detail with patient. Medication risks/benefits/costs/interactions/alternatives discussed with patient.    Patient was given an after visit summary which includes diagnoses, current medications, & vitals. Patient expressed understanding with the diagnosis and plan.

## 2023-03-21 NOTE — PROGRESS NOTES
Verified name and birth date for privacy precautions. Chart reviewed in preparation for today's visit. Chief Complaint   Patient presents with    Cholesterol Problem          Health Maintenance Due   Topic    Shingles Vaccine (1 of 2)    COVID-19 Vaccine (4 - Booster for Pfizer series)         Wt Readings from Last 3 Encounters:   03/21/23 187 lb (84.8 kg)   12/22/22 183 lb (83 kg)   12/08/22 186 lb (84.4 kg)     Temp Readings from Last 3 Encounters:   03/21/23 (!) 96.7 °F (35.9 °C) (Temporal)   12/22/22 97.4 °F (36.3 °C) (Temporal)   12/10/22 98.6 °F (37 °C)     BP Readings from Last 3 Encounters:   03/21/23 105/73   12/22/22 108/72   12/10/22 (!) 146/78     Pulse Readings from Last 3 Encounters:   03/21/23 90   12/22/22 91   12/10/22 86         Learning Assessment:  :     Learning Assessment 1/15/2020 1/17/2019 10/3/2017 11/9/2016 5/5/2015 2/21/2014   PRIMARY LEARNER Patient Patient Patient Patient Patient Patient   HIGHEST LEVEL OF EDUCATION - PRIMARY LEARNER  > 4 YEARS OF COLLEGE > 4 YEARS OF COLLEGE > 4 YEARS OF COLLEGE - > 4 YEARS OF COLLEGE > 4 YEARS OF COLLEGE   BARRIERS PRIMARY LEARNER NONE NONE NONE - NONE NONE   CO-LEARNER CAREGIVER No - No - No No   PRIMARY LANGUAGE ENGLISH ENGLISH ENGLISH ENGLISH ENGLISH ENGLISH    NEED - - - - No No   LEARNER PREFERENCE PRIMARY READING DEMONSTRATION DEMONSTRATION DEMONSTRATION READING DEMONSTRATION     - - - - DEMONSTRATION READING     - - - - - LISTENING   LEARNING SPECIAL TOPICS - - - - no no   ANSWERED BY patient  patient patient patient patient patient   RELATIONSHIP SELF SELF SELF SELF SELF SELF   ASSESSMENT COMMENT - - - - n/a -       Depression Screening:  :     3 most recent PHQ Screens 3/21/2023   Little interest or pleasure in doing things Not at all   Feeling down, depressed, irritable, or hopeless Not at all   Total Score PHQ 2 0       Fall Risk Assessment:  :     Fall Risk Assessment, last 12 mths 3/21/2023   Able to walk?  Yes   Fall in past 12 months? 0   Do you feel unsteady? 0   Are you worried about falling 0   Number of falls in past 12 months -   Fall with injury? -       Abuse Screening:  :     Abuse Screening Questionnaire 3/21/2023 12/22/2022 11/18/2022 7/15/2022 6/15/2021 1/15/2020 7/8/2019   Do you ever feel afraid of your partner? N N N N N N N   Are you in a relationship with someone who physically or mentally threatens you? N N N N N N N   Is it safe for you to go home?  Thalia Ortiz

## 2023-04-03 ENCOUNTER — HOSPITAL ENCOUNTER (OUTPATIENT)
Dept: PET IMAGING | Age: 88
End: 2023-04-03
Attending: UROLOGY
Payer: MEDICARE

## 2023-04-03 DIAGNOSIS — C61 PROSTATE CANCER (HCC): ICD-10-CM

## 2023-04-03 PROCEDURE — 78815 PET IMAGE W/CT SKULL-THIGH: CPT

## 2023-04-22 DIAGNOSIS — C61 PROSTATE CANCER (HCC): Primary | ICD-10-CM

## 2023-05-02 ENCOUNTER — HOSPITAL ENCOUNTER (OUTPATIENT)
Dept: RADIATION THERAPY | Age: 88
Discharge: HOME | End: 2023-05-02

## 2023-05-12 ENCOUNTER — CLINICAL DOCUMENTATION (OUTPATIENT)
Age: 88
End: 2023-05-12

## 2023-05-12 NOTE — PROGRESS NOTES
Received office notes from [address] [place] [address]. Forwarded to Dr. Lyssa Rodríguez for review. Patient isn't an established patient of Dr. Joselito Crockett.

## 2023-05-17 ENCOUNTER — HOSPITAL ENCOUNTER (OUTPATIENT)
Facility: HOSPITAL | Age: 88
Discharge: HOME | End: 2023-05-20
Attending: RADIOLOGY

## 2023-05-22 ENCOUNTER — TRANSCRIBE ORDERS (OUTPATIENT)
Facility: HOSPITAL | Age: 88
End: 2023-05-22

## 2023-05-22 DIAGNOSIS — Z79.818 PROPHYLACTIC USE OF AGENTS AFFECTING ESTROGEN RECEPTORS OR LEVELS: Primary | ICD-10-CM

## 2023-05-24 RX ORDER — ATORVASTATIN CALCIUM 10 MG/1
TABLET, FILM COATED ORAL DAILY
Qty: 90 TABLET | Refills: 1 | Status: SHIPPED | OUTPATIENT
Start: 2023-05-24

## 2023-05-24 RX ORDER — CALCIUM CITRATE/VITAMIN D3 200MG-6.25
TABLET ORAL
Qty: 100 STRIP | Refills: 3 | Status: SHIPPED | OUTPATIENT
Start: 2023-05-24

## 2023-05-25 NOTE — TELEPHONE ENCOUNTER
Medication Refill Request    Holli Lorenzjohn is requesting a refill of the following medication(s):     Atorvastatin 10MG Tablets  Sig: Take 1 Tablet by mouth daily    Please send refill to:     [address] [phone] Georgia Clemons [address]  [address] [address]  Phone: [phone] Fax: [phone]

## 2023-05-26 ENCOUNTER — HOSPITAL ENCOUNTER (OUTPATIENT)
Facility: HOSPITAL | Age: 88
Discharge: HOME | End: 2023-05-29
Attending: RADIOLOGY

## 2023-05-26 ENCOUNTER — HOSPITAL ENCOUNTER (OUTPATIENT)
Age: 88
End: 2023-05-26
Payer: MEDICARE

## 2023-05-26 DIAGNOSIS — Z79.818 PROPHYLACTIC USE OF AGENTS AFFECTING ESTROGEN RECEPTORS OR LEVELS: ICD-10-CM

## 2023-05-26 PROBLEM — K56.600 PARTIAL SMALL BOWEL OBSTRUCTION (HCC): Status: RESOLVED | Noted: 2022-12-07 | Resolved: 2023-05-26

## 2023-05-26 PROBLEM — Z12.11 COLON CANCER SCREENING: Chronic | Status: ACTIVE | Noted: 2019-01-17

## 2023-05-26 LAB
RAD ONC ARIA COURSE FIRST TREATMENT DATE: NORMAL
RAD ONC ARIA COURSE ID: NORMAL
RAD ONC ARIA COURSE INTENT: NORMAL
RAD ONC ARIA COURSE LAST TREATMENT DATE: NORMAL
RAD ONC ARIA COURSE SESSION NUMBER: 1
RAD ONC ARIA COURSE START DATE: NORMAL
RAD ONC ARIA COURSE TREATMENT ELAPSED DAYS: 0
RAD ONC ARIA PLAN FRACTIONS TREATED TO DATE: 1
RAD ONC ARIA PLAN ID: NORMAL
RAD ONC ARIA PLAN PRESCRIBED DOSE PER FRACTION: 7 GY
RAD ONC ARIA PLAN PRIMARY REFERENCE POINT: NORMAL
RAD ONC ARIA PLAN TOTAL FRACTIONS PRESCRIBED: 3
RAD ONC ARIA PLAN TOTAL PRESCRIBED DOSE: 2100 CGY
RAD ONC ARIA REFERENCE POINT DOSAGE GIVEN TO DATE: 7 GY
RAD ONC ARIA REFERENCE POINT DOSAGE GIVEN TO DATE: 7.26 GY
RAD ONC ARIA REFERENCE POINT ID: NORMAL
RAD ONC ARIA REFERENCE POINT ID: NORMAL
RAD ONC ARIA REFERENCE POINT SESSION DOSAGE GIVEN: 7 GY
RAD ONC ARIA REFERENCE POINT SESSION DOSAGE GIVEN: 7.26 GY

## 2023-05-26 PROCEDURE — 77080 DXA BONE DENSITY AXIAL: CPT

## 2023-05-26 RX ORDER — ATORVASTATIN CALCIUM 10 MG/1
TABLET, FILM COATED ORAL DAILY
Qty: 90 TABLET | Refills: 1 | OUTPATIENT
Start: 2023-05-26

## 2023-05-31 ENCOUNTER — HOSPITAL ENCOUNTER (OUTPATIENT)
Facility: HOSPITAL | Age: 88
Discharge: HOME | End: 2023-06-03
Attending: RADIOLOGY

## 2023-05-31 LAB
RAD ONC ARIA COURSE FIRST TREATMENT DATE: NORMAL
RAD ONC ARIA COURSE ID: NORMAL
RAD ONC ARIA COURSE INTENT: NORMAL
RAD ONC ARIA COURSE LAST TREATMENT DATE: NORMAL
RAD ONC ARIA COURSE SESSION NUMBER: 2
RAD ONC ARIA COURSE START DATE: NORMAL
RAD ONC ARIA COURSE TREATMENT ELAPSED DAYS: 5
RAD ONC ARIA PLAN FRACTIONS TREATED TO DATE: 2
RAD ONC ARIA PLAN ID: NORMAL
RAD ONC ARIA PLAN PRESCRIBED DOSE PER FRACTION: 7 GY
RAD ONC ARIA PLAN PRIMARY REFERENCE POINT: NORMAL
RAD ONC ARIA PLAN TOTAL FRACTIONS PRESCRIBED: 3
RAD ONC ARIA PLAN TOTAL PRESCRIBED DOSE: 2100 CGY
RAD ONC ARIA REFERENCE POINT DOSAGE GIVEN TO DATE: 14 GY
RAD ONC ARIA REFERENCE POINT DOSAGE GIVEN TO DATE: 14.52 GY
RAD ONC ARIA REFERENCE POINT ID: NORMAL
RAD ONC ARIA REFERENCE POINT ID: NORMAL
RAD ONC ARIA REFERENCE POINT SESSION DOSAGE GIVEN: 7 GY
RAD ONC ARIA REFERENCE POINT SESSION DOSAGE GIVEN: 7.26 GY

## 2023-06-02 ENCOUNTER — HOSPITAL ENCOUNTER (OUTPATIENT)
Facility: HOSPITAL | Age: 88
Discharge: HOME | End: 2023-06-05
Attending: RADIOLOGY

## 2023-06-02 LAB
RAD ONC ARIA COURSE FIRST TREATMENT DATE: NORMAL
RAD ONC ARIA COURSE ID: NORMAL
RAD ONC ARIA COURSE INTENT: NORMAL
RAD ONC ARIA COURSE LAST TREATMENT DATE: NORMAL
RAD ONC ARIA COURSE SESSION NUMBER: 3
RAD ONC ARIA COURSE START DATE: NORMAL
RAD ONC ARIA COURSE TREATMENT ELAPSED DAYS: 7
RAD ONC ARIA PLAN FRACTIONS TREATED TO DATE: 3
RAD ONC ARIA PLAN ID: NORMAL
RAD ONC ARIA PLAN PRESCRIBED DOSE PER FRACTION: 7 GY
RAD ONC ARIA PLAN PRIMARY REFERENCE POINT: NORMAL
RAD ONC ARIA PLAN TOTAL FRACTIONS PRESCRIBED: 3
RAD ONC ARIA PLAN TOTAL PRESCRIBED DOSE: 2100 CGY
RAD ONC ARIA REFERENCE POINT DOSAGE GIVEN TO DATE: 21 GY
RAD ONC ARIA REFERENCE POINT DOSAGE GIVEN TO DATE: 21.79 GY
RAD ONC ARIA REFERENCE POINT ID: NORMAL
RAD ONC ARIA REFERENCE POINT ID: NORMAL
RAD ONC ARIA REFERENCE POINT SESSION DOSAGE GIVEN: 7 GY
RAD ONC ARIA REFERENCE POINT SESSION DOSAGE GIVEN: 7.26 GY

## 2023-07-12 ENCOUNTER — APPOINTMENT (OUTPATIENT)
Facility: HOSPITAL | Age: 88
DRG: 872 | End: 2023-07-12
Payer: MEDICARE

## 2023-07-12 ENCOUNTER — HOSPITAL ENCOUNTER (INPATIENT)
Facility: HOSPITAL | Age: 88
LOS: 7 days | Discharge: NURSING, REHAB OR LONG-TERM CARE | DRG: 872 | End: 2023-07-19
Attending: INTERNAL MEDICINE | Admitting: INTERNAL MEDICINE
Payer: MEDICARE
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
## 2023-07-12 DIAGNOSIS — N34.2 INFECTIVE URETHRITIS: ICD-10-CM

## 2023-07-12 DIAGNOSIS — N30.01 ACUTE CYSTITIS WITH HEMATURIA: ICD-10-CM

## 2023-07-12 PROBLEM — N39.0 UTI (URINARY TRACT INFECTION): Status: ACTIVE | Noted: 2023-07-12

## 2023-07-12 LAB
ALBUMIN SERPL-MCNC: 2.3 G/DL (ref 3.5–5)
ALBUMIN/GLOB SERPL: 0.4 (ref 1.1–2.2)
ALP SERPL-CCNC: 171 U/L (ref 45–117)
ALT SERPL-CCNC: 14 U/L (ref 12–78)
ANION GAP SERPL CALC-SCNC: 8 MMOL/L (ref 5–15)
APPEARANCE UR: ABNORMAL
AST SERPL-CCNC: 32 U/L (ref 15–37)
BACTERIA URNS QL MICRO: ABNORMAL /HPF
BASOPHILS # BLD: 0.1 K/UL (ref 0–0.1)
BASOPHILS NFR BLD: 0 % (ref 0–1)
BILIRUB SERPL-MCNC: 0.6 MG/DL (ref 0.2–1)
BILIRUB UR QL: NEGATIVE
BUN SERPL-MCNC: 37 MG/DL (ref 6–20)
BUN/CREAT SERPL: 23 (ref 12–20)
CALCIUM SERPL-MCNC: 9.1 MG/DL (ref 8.5–10.1)
CHLORIDE SERPL-SCNC: 107 MMOL/L (ref 97–108)
CO2 SERPL-SCNC: 20 MMOL/L (ref 21–32)
COLOR UR: ABNORMAL
CREAT SERPL-MCNC: 1.6 MG/DL (ref 0.7–1.3)
DIFFERENTIAL METHOD BLD: ABNORMAL
EOSINOPHIL # BLD: 0 K/UL (ref 0–0.4)
EOSINOPHIL NFR BLD: 0 % (ref 0–7)
EPITH CASTS URNS QL MICRO: ABNORMAL /LPF
ERYTHROCYTE [DISTWIDTH] IN BLOOD BY AUTOMATED COUNT: 15.7 % (ref 11.5–14.5)
GLOBULIN SER CALC-MCNC: 5.5 G/DL (ref 2–4)
GLUCOSE BLD STRIP.AUTO-MCNC: 214 MG/DL (ref 65–117)
GLUCOSE SERPL-MCNC: 175 MG/DL (ref 65–100)
GLUCOSE UR STRIP.AUTO-MCNC: NEGATIVE MG/DL
HCT VFR BLD AUTO: 33.1 % (ref 36.6–50.3)
HGB BLD-MCNC: 10.6 G/DL (ref 12.1–17)
HGB UR QL STRIP: ABNORMAL
IMM GRANULOCYTES # BLD AUTO: 0.2 K/UL (ref 0–0.04)
IMM GRANULOCYTES NFR BLD AUTO: 1 % (ref 0–0.5)
KETONES UR QL STRIP.AUTO: ABNORMAL MG/DL
LACTATE BLD-SCNC: 1.22 MMOL/L (ref 0.4–2)
LEUKOCYTE ESTERASE UR QL STRIP.AUTO: ABNORMAL
LYMPHOCYTES # BLD: 1 K/UL (ref 0.8–3.5)
LYMPHOCYTES NFR BLD: 7 % (ref 12–49)
MCH RBC QN AUTO: 26.5 PG (ref 26–34)
MCHC RBC AUTO-ENTMCNC: 32 G/DL (ref 30–36.5)
MCV RBC AUTO: 82.8 FL (ref 80–99)
MONOCYTES # BLD: 1.3 K/UL (ref 0–1)
MONOCYTES NFR BLD: 8 % (ref 5–13)
NEUTS SEG # BLD: 13.3 K/UL (ref 1.8–8)
NEUTS SEG NFR BLD: 84 % (ref 32–75)
NITRITE UR QL STRIP.AUTO: NEGATIVE
NRBC # BLD: 0 K/UL (ref 0–0.01)
NRBC BLD-RTO: 0 PER 100 WBC
PH UR STRIP: 6 (ref 5–8)
PLATELET # BLD AUTO: 443 K/UL (ref 150–400)
PMV BLD AUTO: 8 FL (ref 8.9–12.9)
POTASSIUM SERPL-SCNC: 4.3 MMOL/L (ref 3.5–5.1)
PROT SERPL-MCNC: 7.8 G/DL (ref 6.4–8.2)
PROT UR STRIP-MCNC: 100 MG/DL
RBC # BLD AUTO: 4 M/UL (ref 4.1–5.7)
RBC #/AREA URNS HPF: ABNORMAL /HPF (ref 0–5)
SERVICE CMNT-IMP: ABNORMAL
SODIUM SERPL-SCNC: 135 MMOL/L (ref 136–145)
SP GR UR REFRACTOMETRY: 1.02
URINE CULTURE IF INDICATED: ABNORMAL
UROBILINOGEN UR QL STRIP.AUTO: 1 EU/DL (ref 0.2–1)
WBC # BLD AUTO: 15.9 K/UL (ref 4.1–11.1)
WBC URNS QL MICRO: >100 /HPF (ref 0–4)

## 2023-07-12 PROCEDURE — 87077 CULTURE AEROBIC IDENTIFY: CPT

## 2023-07-12 PROCEDURE — 80053 COMPREHEN METABOLIC PANEL: CPT

## 2023-07-12 PROCEDURE — 6360000004 HC RX CONTRAST MEDICATION

## 2023-07-12 PROCEDURE — 72125 CT NECK SPINE W/O DYE: CPT

## 2023-07-12 PROCEDURE — 96374 THER/PROPH/DIAG INJ IV PUSH: CPT

## 2023-07-12 PROCEDURE — 71260 CT THORAX DX C+: CPT

## 2023-07-12 PROCEDURE — 87186 SC STD MICRODIL/AGAR DIL: CPT

## 2023-07-12 PROCEDURE — 1100000000 HC RM PRIVATE

## 2023-07-12 PROCEDURE — 36415 COLL VENOUS BLD VENIPUNCTURE: CPT

## 2023-07-12 PROCEDURE — 6360000002 HC RX W HCPCS

## 2023-07-12 PROCEDURE — 81001 URINALYSIS AUTO W/SCOPE: CPT

## 2023-07-12 PROCEDURE — 6360000002 HC RX W HCPCS: Performed by: INTERNAL MEDICINE

## 2023-07-12 PROCEDURE — 74177 CT ABD & PELVIS W/CONTRAST: CPT

## 2023-07-12 PROCEDURE — 93005 ELECTROCARDIOGRAM TRACING: CPT

## 2023-07-12 PROCEDURE — 2580000003 HC RX 258

## 2023-07-12 PROCEDURE — 6370000000 HC RX 637 (ALT 250 FOR IP)

## 2023-07-12 PROCEDURE — 87040 BLOOD CULTURE FOR BACTERIA: CPT

## 2023-07-12 PROCEDURE — 70450 CT HEAD/BRAIN W/O DYE: CPT

## 2023-07-12 PROCEDURE — 2580000003 HC RX 258: Performed by: INTERNAL MEDICINE

## 2023-07-12 PROCEDURE — 85025 COMPLETE CBC W/AUTO DIFF WBC: CPT

## 2023-07-12 PROCEDURE — 82962 GLUCOSE BLOOD TEST: CPT

## 2023-07-12 PROCEDURE — 87086 URINE CULTURE/COLONY COUNT: CPT

## 2023-07-12 PROCEDURE — 73522 X-RAY EXAM HIPS BI 3-4 VIEWS: CPT

## 2023-07-12 PROCEDURE — 6370000000 HC RX 637 (ALT 250 FOR IP): Performed by: INTERNAL MEDICINE

## 2023-07-12 PROCEDURE — 83605 ASSAY OF LACTIC ACID: CPT

## 2023-07-12 PROCEDURE — 99285 EMERGENCY DEPT VISIT HI MDM: CPT

## 2023-07-12 RX ORDER — ENOXAPARIN SODIUM 100 MG/ML
40 INJECTION SUBCUTANEOUS DAILY
Status: DISCONTINUED | OUTPATIENT
Start: 2023-07-12 | End: 2023-07-19 | Stop reason: HOSPADM

## 2023-07-12 RX ORDER — SODIUM CHLORIDE 9 MG/ML
INJECTION, SOLUTION INTRAVENOUS CONTINUOUS
Status: DISCONTINUED | OUTPATIENT
Start: 2023-07-12 | End: 2023-07-16

## 2023-07-12 RX ORDER — ACETAMINOPHEN 650 MG/1
650 SUPPOSITORY RECTAL EVERY 6 HOURS PRN
Status: DISCONTINUED | OUTPATIENT
Start: 2023-07-12 | End: 2023-07-19 | Stop reason: HOSPADM

## 2023-07-12 RX ORDER — SODIUM CHLORIDE 9 MG/ML
INJECTION, SOLUTION INTRAVENOUS PRN
Status: DISCONTINUED | OUTPATIENT
Start: 2023-07-12 | End: 2023-07-19 | Stop reason: HOSPADM

## 2023-07-12 RX ORDER — INSULIN LISPRO 100 [IU]/ML
0-4 INJECTION, SOLUTION INTRAVENOUS; SUBCUTANEOUS
Status: DISCONTINUED | OUTPATIENT
Start: 2023-07-12 | End: 2023-07-19 | Stop reason: HOSPADM

## 2023-07-12 RX ORDER — ONDANSETRON 4 MG/1
4 TABLET, ORALLY DISINTEGRATING ORAL EVERY 8 HOURS PRN
Status: DISCONTINUED | OUTPATIENT
Start: 2023-07-12 | End: 2023-07-19 | Stop reason: HOSPADM

## 2023-07-12 RX ORDER — ACETAMINOPHEN 500 MG
1000 TABLET ORAL
Status: COMPLETED | OUTPATIENT
Start: 2023-07-12 | End: 2023-07-12

## 2023-07-12 RX ORDER — ONDANSETRON 2 MG/ML
4 INJECTION INTRAMUSCULAR; INTRAVENOUS EVERY 6 HOURS PRN
Status: DISCONTINUED | OUTPATIENT
Start: 2023-07-12 | End: 2023-07-19 | Stop reason: HOSPADM

## 2023-07-12 RX ORDER — INSULIN LISPRO 100 [IU]/ML
0-4 INJECTION, SOLUTION INTRAVENOUS; SUBCUTANEOUS NIGHTLY
Status: DISCONTINUED | OUTPATIENT
Start: 2023-07-12 | End: 2023-07-19 | Stop reason: HOSPADM

## 2023-07-12 RX ORDER — SODIUM CHLORIDE 0.9 % (FLUSH) 0.9 %
5-40 SYRINGE (ML) INJECTION PRN
Status: DISCONTINUED | OUTPATIENT
Start: 2023-07-12 | End: 2023-07-19 | Stop reason: HOSPADM

## 2023-07-12 RX ORDER — ACETAMINOPHEN 325 MG/1
650 TABLET ORAL EVERY 6 HOURS PRN
Status: DISCONTINUED | OUTPATIENT
Start: 2023-07-12 | End: 2023-07-19 | Stop reason: HOSPADM

## 2023-07-12 RX ORDER — ATORVASTATIN CALCIUM 10 MG/1
10 TABLET, FILM COATED ORAL NIGHTLY
Status: DISCONTINUED | OUTPATIENT
Start: 2023-07-12 | End: 2023-07-19 | Stop reason: HOSPADM

## 2023-07-12 RX ORDER — ASPIRIN 81 MG/1
81 TABLET ORAL DAILY
Status: DISCONTINUED | OUTPATIENT
Start: 2023-07-13 | End: 2023-07-19 | Stop reason: HOSPADM

## 2023-07-12 RX ORDER — POLYETHYLENE GLYCOL 3350 17 G/17G
17 POWDER, FOR SOLUTION ORAL DAILY PRN
Status: DISCONTINUED | OUTPATIENT
Start: 2023-07-12 | End: 2023-07-19 | Stop reason: HOSPADM

## 2023-07-12 RX ORDER — SODIUM CHLORIDE 0.9 % (FLUSH) 0.9 %
5-40 SYRINGE (ML) INJECTION EVERY 12 HOURS SCHEDULED
Status: DISCONTINUED | OUTPATIENT
Start: 2023-07-12 | End: 2023-07-19 | Stop reason: HOSPADM

## 2023-07-12 RX ADMIN — ATORVASTATIN CALCIUM 10 MG: 10 TABLET, FILM COATED ORAL at 21:40

## 2023-07-12 RX ADMIN — SODIUM CHLORIDE: 9 INJECTION, SOLUTION INTRAVENOUS at 18:56

## 2023-07-12 RX ADMIN — ACETAMINOPHEN 650 MG: 325 TABLET ORAL at 21:40

## 2023-07-12 RX ADMIN — IOPAMIDOL 100 ML: 755 INJECTION, SOLUTION INTRAVENOUS at 16:00

## 2023-07-12 RX ADMIN — SODIUM CHLORIDE, PRESERVATIVE FREE 10 ML: 5 INJECTION INTRAVENOUS at 21:41

## 2023-07-12 RX ADMIN — SODIUM CHLORIDE 1000 MG: 900 INJECTION INTRAVENOUS at 16:23

## 2023-07-12 RX ADMIN — ENOXAPARIN SODIUM 40 MG: 100 INJECTION SUBCUTANEOUS at 18:56

## 2023-07-12 RX ADMIN — ACETAMINOPHEN 1000 MG: 500 TABLET ORAL at 14:11

## 2023-07-12 ASSESSMENT — PAIN DESCRIPTION - LOCATION
LOCATION: HIP
LOCATION: ELBOW

## 2023-07-12 ASSESSMENT — PAIN SCALES - GENERAL
PAINLEVEL_OUTOF10: 6
PAINLEVEL_OUTOF10: 0
PAINLEVEL_OUTOF10: 1

## 2023-07-12 ASSESSMENT — PAIN DESCRIPTION - DESCRIPTORS: DESCRIPTORS: ACHING;SORE

## 2023-07-12 ASSESSMENT — PAIN DESCRIPTION - ORIENTATION
ORIENTATION: LEFT
ORIENTATION: RIGHT

## 2023-07-13 LAB
ANION GAP SERPL CALC-SCNC: 8 MMOL/L (ref 5–15)
BUN SERPL-MCNC: 31 MG/DL (ref 6–20)
BUN/CREAT SERPL: 24 (ref 12–20)
CALCIUM SERPL-MCNC: 8.7 MG/DL (ref 8.5–10.1)
CHLORIDE SERPL-SCNC: 109 MMOL/L (ref 97–108)
CO2 SERPL-SCNC: 18 MMOL/L (ref 21–32)
CREAT SERPL-MCNC: 1.3 MG/DL (ref 0.7–1.3)
EKG ATRIAL RATE: 95 BPM
EKG DIAGNOSIS: NORMAL
EKG P AXIS: 42 DEGREES
EKG P-R INTERVAL: 172 MS
EKG Q-T INTERVAL: 414 MS
EKG QRS DURATION: 164 MS
EKG QTC CALCULATION (BAZETT): 520 MS
EKG R AXIS: -77 DEGREES
EKG T AXIS: 83 DEGREES
EKG VENTRICULAR RATE: 95 BPM
ERYTHROCYTE [DISTWIDTH] IN BLOOD BY AUTOMATED COUNT: 16 % (ref 11.5–14.5)
GLUCOSE BLD STRIP.AUTO-MCNC: 152 MG/DL (ref 65–117)
GLUCOSE BLD STRIP.AUTO-MCNC: 168 MG/DL (ref 65–117)
GLUCOSE BLD STRIP.AUTO-MCNC: 169 MG/DL (ref 65–117)
GLUCOSE BLD STRIP.AUTO-MCNC: 177 MG/DL (ref 65–117)
GLUCOSE BLD STRIP.AUTO-MCNC: 181 MG/DL (ref 65–117)
GLUCOSE SERPL-MCNC: 140 MG/DL (ref 65–100)
HCT VFR BLD AUTO: 31.4 % (ref 36.6–50.3)
HGB BLD-MCNC: 9.7 G/DL (ref 12.1–17)
MAGNESIUM SERPL-MCNC: 1.8 MG/DL (ref 1.6–2.4)
MCH RBC QN AUTO: 25.8 PG (ref 26–34)
MCHC RBC AUTO-ENTMCNC: 30.9 G/DL (ref 30–36.5)
MCV RBC AUTO: 83.5 FL (ref 80–99)
NRBC # BLD: 0 K/UL (ref 0–0.01)
NRBC BLD-RTO: 0 PER 100 WBC
PHOSPHATE SERPL-MCNC: 3.4 MG/DL (ref 2.6–4.7)
PLATELET # BLD AUTO: 407 K/UL (ref 150–400)
PMV BLD AUTO: 8.2 FL (ref 8.9–12.9)
POTASSIUM SERPL-SCNC: 4.1 MMOL/L (ref 3.5–5.1)
RBC # BLD AUTO: 3.76 M/UL (ref 4.1–5.7)
SERVICE CMNT-IMP: ABNORMAL
SODIUM SERPL-SCNC: 135 MMOL/L (ref 136–145)
WBC # BLD AUTO: 14.2 K/UL (ref 4.1–11.1)

## 2023-07-13 PROCEDURE — 82962 GLUCOSE BLOOD TEST: CPT

## 2023-07-13 PROCEDURE — 97116 GAIT TRAINING THERAPY: CPT | Performed by: PHYSICAL THERAPIST

## 2023-07-13 PROCEDURE — 83735 ASSAY OF MAGNESIUM: CPT

## 2023-07-13 PROCEDURE — 1100000000 HC RM PRIVATE

## 2023-07-13 PROCEDURE — 6360000002 HC RX W HCPCS: Performed by: INTERNAL MEDICINE

## 2023-07-13 PROCEDURE — 97165 OT EVAL LOW COMPLEX 30 MIN: CPT

## 2023-07-13 PROCEDURE — 6360000002 HC RX W HCPCS

## 2023-07-13 PROCEDURE — 97530 THERAPEUTIC ACTIVITIES: CPT | Performed by: PHYSICAL THERAPIST

## 2023-07-13 PROCEDURE — 97535 SELF CARE MNGMENT TRAINING: CPT

## 2023-07-13 PROCEDURE — 51702 INSERT TEMP BLADDER CATH: CPT

## 2023-07-13 PROCEDURE — 85027 COMPLETE CBC AUTOMATED: CPT

## 2023-07-13 PROCEDURE — 97530 THERAPEUTIC ACTIVITIES: CPT

## 2023-07-13 PROCEDURE — 80048 BASIC METABOLIC PNL TOTAL CA: CPT

## 2023-07-13 PROCEDURE — 2580000003 HC RX 258: Performed by: INTERNAL MEDICINE

## 2023-07-13 PROCEDURE — 6370000000 HC RX 637 (ALT 250 FOR IP): Performed by: INTERNAL MEDICINE

## 2023-07-13 PROCEDURE — 2580000003 HC RX 258

## 2023-07-13 PROCEDURE — 84100 ASSAY OF PHOSPHORUS: CPT

## 2023-07-13 PROCEDURE — 97161 PT EVAL LOW COMPLEX 20 MIN: CPT | Performed by: PHYSICAL THERAPIST

## 2023-07-13 PROCEDURE — 36415 COLL VENOUS BLD VENIPUNCTURE: CPT

## 2023-07-13 RX ORDER — METRONIDAZOLE 500 MG/100ML
500 INJECTION, SOLUTION INTRAVENOUS EVERY 8 HOURS
Status: DISCONTINUED | OUTPATIENT
Start: 2023-07-13 | End: 2023-07-16

## 2023-07-13 RX ADMIN — ACETAMINOPHEN 650 MG: 325 TABLET ORAL at 11:26

## 2023-07-13 RX ADMIN — SODIUM CHLORIDE, PRESERVATIVE FREE 10 ML: 5 INJECTION INTRAVENOUS at 21:58

## 2023-07-13 RX ADMIN — METRONIDAZOLE 500 MG: 500 INJECTION, SOLUTION INTRAVENOUS at 19:00

## 2023-07-13 RX ADMIN — ACETAMINOPHEN 650 MG: 325 TABLET ORAL at 21:55

## 2023-07-13 RX ADMIN — CEFEPIME 2000 MG: 2 INJECTION, POWDER, FOR SOLUTION INTRAVENOUS at 19:00

## 2023-07-13 RX ADMIN — ASPIRIN 81 MG: 81 TABLET, COATED ORAL at 09:17

## 2023-07-13 RX ADMIN — SODIUM CHLORIDE 1000 MG: 900 INJECTION INTRAVENOUS at 16:55

## 2023-07-13 RX ADMIN — SODIUM CHLORIDE, PRESERVATIVE FREE 10 ML: 5 INJECTION INTRAVENOUS at 09:19

## 2023-07-13 RX ADMIN — ATORVASTATIN CALCIUM 10 MG: 10 TABLET, FILM COATED ORAL at 21:57

## 2023-07-13 RX ADMIN — ENOXAPARIN SODIUM 40 MG: 100 INJECTION SUBCUTANEOUS at 09:17

## 2023-07-13 ASSESSMENT — PAIN SCALES - GENERAL
PAINLEVEL_OUTOF10: 0
PAINLEVEL_OUTOF10: 3

## 2023-07-13 ASSESSMENT — PAIN DESCRIPTION - LOCATION: LOCATION: BACK

## 2023-07-14 ENCOUNTER — APPOINTMENT (OUTPATIENT)
Facility: HOSPITAL | Age: 88
DRG: 872 | End: 2023-07-14
Attending: STUDENT IN AN ORGANIZED HEALTH CARE EDUCATION/TRAINING PROGRAM
Payer: MEDICARE

## 2023-07-14 ENCOUNTER — APPOINTMENT (OUTPATIENT)
Facility: HOSPITAL | Age: 88
DRG: 872 | End: 2023-07-14
Payer: MEDICARE

## 2023-07-14 LAB
ANION GAP SERPL CALC-SCNC: 11 MMOL/L (ref 5–15)
BASOPHILS # BLD: 0 K/UL (ref 0–0.1)
BASOPHILS NFR BLD: 0 % (ref 0–1)
BUN SERPL-MCNC: 24 MG/DL (ref 6–20)
BUN/CREAT SERPL: 21 (ref 12–20)
CALCIUM SERPL-MCNC: 8.6 MG/DL (ref 8.5–10.1)
CHLORIDE SERPL-SCNC: 108 MMOL/L (ref 97–108)
CO2 SERPL-SCNC: 19 MMOL/L (ref 21–32)
CREAT SERPL-MCNC: 1.16 MG/DL (ref 0.7–1.3)
DIFFERENTIAL METHOD BLD: ABNORMAL
EOSINOPHIL # BLD: 0.1 K/UL (ref 0–0.4)
EOSINOPHIL NFR BLD: 1 % (ref 0–7)
ERYTHROCYTE [DISTWIDTH] IN BLOOD BY AUTOMATED COUNT: 15.9 % (ref 11.5–14.5)
FERRITIN SERPL-MCNC: 440 NG/ML (ref 26–388)
GLUCOSE BLD STRIP.AUTO-MCNC: 132 MG/DL (ref 65–117)
GLUCOSE BLD STRIP.AUTO-MCNC: 137 MG/DL (ref 65–117)
GLUCOSE BLD STRIP.AUTO-MCNC: 138 MG/DL (ref 65–117)
GLUCOSE BLD STRIP.AUTO-MCNC: 146 MG/DL (ref 65–117)
GLUCOSE BLD STRIP.AUTO-MCNC: 177 MG/DL (ref 65–117)
GLUCOSE SERPL-MCNC: 132 MG/DL (ref 65–100)
HCT VFR BLD AUTO: 27.6 % (ref 36.6–50.3)
HGB BLD-MCNC: 8.9 G/DL (ref 12.1–17)
IMM GRANULOCYTES # BLD AUTO: 0.2 K/UL (ref 0–0.04)
IMM GRANULOCYTES NFR BLD AUTO: 1 % (ref 0–0.5)
IRON SATN MFR SERPL: 8 % (ref 20–50)
IRON SERPL-MCNC: 13 UG/DL (ref 35–150)
LYMPHOCYTES # BLD: 0.9 K/UL (ref 0.8–3.5)
LYMPHOCYTES NFR BLD: 7 % (ref 12–49)
MCH RBC QN AUTO: 27.1 PG (ref 26–34)
MCHC RBC AUTO-ENTMCNC: 32.2 G/DL (ref 30–36.5)
MCV RBC AUTO: 83.9 FL (ref 80–99)
MONOCYTES # BLD: 0.8 K/UL (ref 0–1)
MONOCYTES NFR BLD: 7 % (ref 5–13)
NEUTS SEG # BLD: 10 K/UL (ref 1.8–8)
NEUTS SEG NFR BLD: 84 % (ref 32–75)
NRBC # BLD: 0 K/UL (ref 0–0.01)
NRBC BLD-RTO: 0 PER 100 WBC
PLATELET # BLD AUTO: 386 K/UL (ref 150–400)
PMV BLD AUTO: 8.3 FL (ref 8.9–12.9)
POTASSIUM SERPL-SCNC: 3.7 MMOL/L (ref 3.5–5.1)
RBC # BLD AUTO: 3.29 M/UL (ref 4.1–5.7)
SERVICE CMNT-IMP: ABNORMAL
SODIUM SERPL-SCNC: 138 MMOL/L (ref 136–145)
TIBC SERPL-MCNC: 169 UG/DL (ref 250–450)
WBC # BLD AUTO: 12 K/UL (ref 4.1–11.1)

## 2023-07-14 PROCEDURE — 1100000000 HC RM PRIVATE

## 2023-07-14 PROCEDURE — 82728 ASSAY OF FERRITIN: CPT

## 2023-07-14 PROCEDURE — 6360000004 HC RX CONTRAST MEDICATION: Performed by: STUDENT IN AN ORGANIZED HEALTH CARE EDUCATION/TRAINING PROGRAM

## 2023-07-14 PROCEDURE — 97530 THERAPEUTIC ACTIVITIES: CPT

## 2023-07-14 PROCEDURE — 83540 ASSAY OF IRON: CPT

## 2023-07-14 PROCEDURE — 2580000003 HC RX 258: Performed by: INTERNAL MEDICINE

## 2023-07-14 PROCEDURE — C8929 TTE W OR WO FOL WCON,DOPPLER: HCPCS

## 2023-07-14 PROCEDURE — 80048 BASIC METABOLIC PNL TOTAL CA: CPT

## 2023-07-14 PROCEDURE — 74176 CT ABD & PELVIS W/O CONTRAST: CPT

## 2023-07-14 PROCEDURE — 82962 GLUCOSE BLOOD TEST: CPT

## 2023-07-14 PROCEDURE — 6370000000 HC RX 637 (ALT 250 FOR IP): Performed by: INTERNAL MEDICINE

## 2023-07-14 PROCEDURE — 36415 COLL VENOUS BLD VENIPUNCTURE: CPT

## 2023-07-14 PROCEDURE — 84466 ASSAY OF TRANSFERRIN: CPT

## 2023-07-14 PROCEDURE — 6360000002 HC RX W HCPCS: Performed by: INTERNAL MEDICINE

## 2023-07-14 PROCEDURE — 2580000003 HC RX 258: Performed by: STUDENT IN AN ORGANIZED HEALTH CARE EDUCATION/TRAINING PROGRAM

## 2023-07-14 PROCEDURE — 85025 COMPLETE CBC W/AUTO DIFF WBC: CPT

## 2023-07-14 PROCEDURE — 6360000002 HC RX W HCPCS: Performed by: STUDENT IN AN ORGANIZED HEALTH CARE EDUCATION/TRAINING PROGRAM

## 2023-07-14 RX ADMIN — METRONIDAZOLE 500 MG: 500 INJECTION, SOLUTION INTRAVENOUS at 19:00

## 2023-07-14 RX ADMIN — METRONIDAZOLE 500 MG: 500 INJECTION, SOLUTION INTRAVENOUS at 03:00

## 2023-07-14 RX ADMIN — CEFEPIME 2000 MG: 2 INJECTION, POWDER, FOR SOLUTION INTRAVENOUS at 20:28

## 2023-07-14 RX ADMIN — SODIUM CHLORIDE, PRESERVATIVE FREE 10 ML: 5 INJECTION INTRAVENOUS at 09:28

## 2023-07-14 RX ADMIN — ACETAMINOPHEN 650 MG: 325 TABLET ORAL at 17:58

## 2023-07-14 RX ADMIN — ASPIRIN 81 MG: 81 TABLET, COATED ORAL at 11:25

## 2023-07-14 RX ADMIN — SODIUM CHLORIDE: 9 INJECTION, SOLUTION INTRAVENOUS at 04:55

## 2023-07-14 RX ADMIN — VANCOMYCIN HYDROCHLORIDE 2000 MG: 10 INJECTION, POWDER, LYOPHILIZED, FOR SOLUTION INTRAVENOUS at 17:16

## 2023-07-14 RX ADMIN — SODIUM CHLORIDE, PRESERVATIVE FREE 10 ML: 5 INJECTION INTRAVENOUS at 20:29

## 2023-07-14 RX ADMIN — SODIUM CHLORIDE: 9 INJECTION, SOLUTION INTRAVENOUS at 23:16

## 2023-07-14 RX ADMIN — PERFLUTREN 1.5 ML: 6.52 INJECTION, SUSPENSION INTRAVENOUS at 16:35

## 2023-07-14 RX ADMIN — ATORVASTATIN CALCIUM 10 MG: 10 TABLET, FILM COATED ORAL at 20:28

## 2023-07-14 RX ADMIN — METRONIDAZOLE 500 MG: 500 INJECTION, SOLUTION INTRAVENOUS at 11:22

## 2023-07-14 ASSESSMENT — PAIN SCALES - GENERAL
PAINLEVEL_OUTOF10: 0
PAINLEVEL_OUTOF10: 0

## 2023-07-15 LAB
BACTERIA SPEC CULT: ABNORMAL
CC UR VC: ABNORMAL
GLUCOSE BLD STRIP.AUTO-MCNC: 133 MG/DL (ref 65–117)
GLUCOSE BLD STRIP.AUTO-MCNC: 140 MG/DL (ref 65–117)
GLUCOSE BLD STRIP.AUTO-MCNC: 141 MG/DL (ref 65–117)
GLUCOSE BLD STRIP.AUTO-MCNC: 151 MG/DL (ref 65–117)
SERVICE CMNT-IMP: ABNORMAL

## 2023-07-15 PROCEDURE — 6360000002 HC RX W HCPCS: Performed by: INTERNAL MEDICINE

## 2023-07-15 PROCEDURE — 82962 GLUCOSE BLOOD TEST: CPT

## 2023-07-15 PROCEDURE — 6370000000 HC RX 637 (ALT 250 FOR IP): Performed by: INTERNAL MEDICINE

## 2023-07-15 PROCEDURE — 1100000000 HC RM PRIVATE

## 2023-07-15 PROCEDURE — 6360000002 HC RX W HCPCS: Performed by: STUDENT IN AN ORGANIZED HEALTH CARE EDUCATION/TRAINING PROGRAM

## 2023-07-15 PROCEDURE — 2580000003 HC RX 258: Performed by: STUDENT IN AN ORGANIZED HEALTH CARE EDUCATION/TRAINING PROGRAM

## 2023-07-15 PROCEDURE — 2580000003 HC RX 258: Performed by: INTERNAL MEDICINE

## 2023-07-15 RX ADMIN — ASPIRIN 81 MG: 81 TABLET, COATED ORAL at 08:36

## 2023-07-15 RX ADMIN — METRONIDAZOLE 500 MG: 500 INJECTION, SOLUTION INTRAVENOUS at 12:25

## 2023-07-15 RX ADMIN — SODIUM CHLORIDE: 9 INJECTION, SOLUTION INTRAVENOUS at 18:45

## 2023-07-15 RX ADMIN — SODIUM CHLORIDE, PRESERVATIVE FREE 10 ML: 5 INJECTION INTRAVENOUS at 08:37

## 2023-07-15 RX ADMIN — Medication 1250 MG: at 18:47

## 2023-07-15 RX ADMIN — CEFEPIME 2000 MG: 2 INJECTION, POWDER, FOR SOLUTION INTRAVENOUS at 08:36

## 2023-07-15 RX ADMIN — ATORVASTATIN CALCIUM 10 MG: 10 TABLET, FILM COATED ORAL at 22:13

## 2023-07-15 RX ADMIN — METRONIDAZOLE 500 MG: 500 INJECTION, SOLUTION INTRAVENOUS at 22:13

## 2023-07-15 RX ADMIN — METRONIDAZOLE 500 MG: 500 INJECTION, SOLUTION INTRAVENOUS at 03:20

## 2023-07-15 RX ADMIN — SODIUM CHLORIDE, PRESERVATIVE FREE 10 ML: 5 INJECTION INTRAVENOUS at 22:15

## 2023-07-15 RX ADMIN — ACETAMINOPHEN 650 MG: 325 TABLET ORAL at 18:40

## 2023-07-15 RX ADMIN — ENOXAPARIN SODIUM 40 MG: 100 INJECTION SUBCUTANEOUS at 08:36

## 2023-07-15 RX ADMIN — CEFEPIME 2000 MG: 2 INJECTION, POWDER, FOR SOLUTION INTRAVENOUS at 22:14

## 2023-07-15 ASSESSMENT — PAIN SCALES - GENERAL
PAINLEVEL_OUTOF10: 0
PAINLEVEL_OUTOF10: 3
PAINLEVEL_OUTOF10: 0

## 2023-07-15 ASSESSMENT — PAIN DESCRIPTION - LOCATION: LOCATION: BACK

## 2023-07-15 ASSESSMENT — PAIN DESCRIPTION - PAIN TYPE: TYPE: ACUTE PAIN

## 2023-07-15 ASSESSMENT — PAIN DESCRIPTION - ORIENTATION: ORIENTATION: LOWER;MID

## 2023-07-15 ASSESSMENT — PAIN DESCRIPTION - ONSET: ONSET: GRADUAL

## 2023-07-15 ASSESSMENT — PAIN DESCRIPTION - FREQUENCY: FREQUENCY: INTERMITTENT

## 2023-07-15 ASSESSMENT — PAIN - FUNCTIONAL ASSESSMENT: PAIN_FUNCTIONAL_ASSESSMENT: ACTIVITIES ARE NOT PREVENTED

## 2023-07-15 ASSESSMENT — PAIN DESCRIPTION - DESCRIPTORS: DESCRIPTORS: ACHING

## 2023-07-16 ENCOUNTER — APPOINTMENT (OUTPATIENT)
Facility: HOSPITAL | Age: 88
DRG: 872 | End: 2023-07-16
Payer: MEDICARE

## 2023-07-16 LAB
ANION GAP SERPL CALC-SCNC: 6 MMOL/L (ref 5–15)
BUN SERPL-MCNC: 18 MG/DL (ref 6–20)
BUN/CREAT SERPL: 16 (ref 12–20)
CALCIUM SERPL-MCNC: 8.6 MG/DL (ref 8.5–10.1)
CHLORIDE SERPL-SCNC: 113 MMOL/L (ref 97–108)
CO2 SERPL-SCNC: 18 MMOL/L (ref 21–32)
CREAT SERPL-MCNC: 1.1 MG/DL (ref 0.7–1.3)
GLUCOSE BLD STRIP.AUTO-MCNC: 128 MG/DL (ref 65–117)
GLUCOSE BLD STRIP.AUTO-MCNC: 170 MG/DL (ref 65–117)
GLUCOSE BLD STRIP.AUTO-MCNC: 188 MG/DL (ref 65–117)
GLUCOSE SERPL-MCNC: 129 MG/DL (ref 65–100)
POTASSIUM SERPL-SCNC: 4.3 MMOL/L (ref 3.5–5.1)
SERVICE CMNT-IMP: ABNORMAL
SODIUM SERPL-SCNC: 137 MMOL/L (ref 136–145)
TRANSFERRIN SERPL-MCNC: 138 MG/DL (ref 149–313)

## 2023-07-16 PROCEDURE — 6370000000 HC RX 637 (ALT 250 FOR IP): Performed by: INTERNAL MEDICINE

## 2023-07-16 PROCEDURE — 82962 GLUCOSE BLOOD TEST: CPT

## 2023-07-16 PROCEDURE — 6360000002 HC RX W HCPCS: Performed by: INTERNAL MEDICINE

## 2023-07-16 PROCEDURE — 2580000003 HC RX 258: Performed by: STUDENT IN AN ORGANIZED HEALTH CARE EDUCATION/TRAINING PROGRAM

## 2023-07-16 PROCEDURE — 1100000000 HC RM PRIVATE

## 2023-07-16 PROCEDURE — 71045 X-RAY EXAM CHEST 1 VIEW: CPT

## 2023-07-16 PROCEDURE — 6360000002 HC RX W HCPCS: Performed by: STUDENT IN AN ORGANIZED HEALTH CARE EDUCATION/TRAINING PROGRAM

## 2023-07-16 PROCEDURE — 36415 COLL VENOUS BLD VENIPUNCTURE: CPT

## 2023-07-16 PROCEDURE — 80048 BASIC METABOLIC PNL TOTAL CA: CPT

## 2023-07-16 PROCEDURE — 2580000003 HC RX 258: Performed by: INTERNAL MEDICINE

## 2023-07-16 RX ADMIN — ENOXAPARIN SODIUM 40 MG: 100 INJECTION SUBCUTANEOUS at 09:59

## 2023-07-16 RX ADMIN — ASPIRIN 81 MG: 81 TABLET, COATED ORAL at 09:59

## 2023-07-16 RX ADMIN — SODIUM CHLORIDE, PRESERVATIVE FREE 10 ML: 5 INJECTION INTRAVENOUS at 11:11

## 2023-07-16 RX ADMIN — SODIUM CHLORIDE, PRESERVATIVE FREE 10 ML: 5 INJECTION INTRAVENOUS at 22:15

## 2023-07-16 RX ADMIN — Medication 1250 MG: at 16:46

## 2023-07-16 RX ADMIN — METRONIDAZOLE 500 MG: 500 INJECTION, SOLUTION INTRAVENOUS at 03:43

## 2023-07-16 RX ADMIN — SODIUM CHLORIDE: 9 INJECTION, SOLUTION INTRAVENOUS at 10:07

## 2023-07-16 RX ADMIN — ATORVASTATIN CALCIUM 10 MG: 10 TABLET, FILM COATED ORAL at 22:15

## 2023-07-16 RX ADMIN — ACETAMINOPHEN 650 MG: 325 TABLET ORAL at 11:08

## 2023-07-16 ASSESSMENT — PAIN DESCRIPTION - DESCRIPTORS: DESCRIPTORS: ACHING

## 2023-07-16 ASSESSMENT — PAIN SCALES - GENERAL
PAINLEVEL_OUTOF10: 3
PAINLEVEL_OUTOF10: 0

## 2023-07-16 ASSESSMENT — PAIN DESCRIPTION - LOCATION: LOCATION: GENERALIZED

## 2023-07-17 LAB
ANION GAP SERPL CALC-SCNC: 10 MMOL/L (ref 5–15)
BASOPHILS # BLD: 0 K/UL (ref 0–0.1)
BASOPHILS NFR BLD: 0 % (ref 0–1)
BUN SERPL-MCNC: 17 MG/DL (ref 6–20)
BUN/CREAT SERPL: 16 (ref 12–20)
CALCIUM SERPL-MCNC: 8.6 MG/DL (ref 8.5–10.1)
CHLORIDE SERPL-SCNC: 109 MMOL/L (ref 97–108)
CO2 SERPL-SCNC: 16 MMOL/L (ref 21–32)
CREAT SERPL-MCNC: 1.09 MG/DL (ref 0.7–1.3)
DIFFERENTIAL METHOD BLD: ABNORMAL
ECHO AV AREA PEAK VELOCITY: 1.3 CM2
ECHO AV AREA VTI: 1.2 CM2
ECHO AV AREA/BSA PEAK VELOCITY: 0.7 CM2/M2
ECHO AV AREA/BSA VTI: 0.6 CM2/M2
ECHO AV MEAN GRADIENT: 10 MMHG
ECHO AV MEAN VELOCITY: 1.6 M/S
ECHO AV PEAK GRADIENT: 15 MMHG
ECHO AV PEAK VELOCITY: 1.9 M/S
ECHO AV VELOCITY RATIO: 0.47
ECHO AV VTI: 36.4 CM
ECHO BSA: 2.01 M2
ECHO LA DIAMETER INDEX: 1.4 CM/M2
ECHO LA DIAMETER: 2.8 CM
ECHO LV EDV A4C: 107 ML
ECHO LV EDV INDEX A4C: 54 ML/M2
ECHO LV EJECTION FRACTION A4C: 64 %
ECHO LV ESV A4C: 39 ML
ECHO LV ESV INDEX A4C: 20 ML/M2
ECHO LV FRACTIONAL SHORTENING: 33 % (ref 28–44)
ECHO LV INTERNAL DIMENSION DIASTOLE INDEX: 2.75 CM/M2
ECHO LV INTERNAL DIMENSION DIASTOLIC: 5.5 CM (ref 4.2–5.9)
ECHO LV INTERNAL DIMENSION SYSTOLIC INDEX: 1.85 CM/M2
ECHO LV INTERNAL DIMENSION SYSTOLIC: 3.7 CM
ECHO LV IVSD: 0.8 CM (ref 0.6–1)
ECHO LV MASS 2D: 160 G (ref 88–224)
ECHO LV MASS INDEX 2D: 80 G/M2 (ref 49–115)
ECHO LV POSTERIOR WALL DIASTOLIC: 0.8 CM (ref 0.6–1)
ECHO LV RELATIVE WALL THICKNESS RATIO: 0.29
ECHO LVOT AREA: 2.8 CM2
ECHO LVOT AV VTI INDEX: 0.4
ECHO LVOT DIAM: 1.9 CM
ECHO LVOT MEAN GRADIENT: 2 MMHG
ECHO LVOT PEAK GRADIENT: 3 MMHG
ECHO LVOT PEAK VELOCITY: 0.9 M/S
ECHO LVOT STROKE VOLUME INDEX: 20.5 ML/M2
ECHO LVOT SV: 41.1 ML
ECHO LVOT VTI: 14.5 CM
ECHO TV REGURGITANT MAX VELOCITY: 2.68 M/S
ECHO TV REGURGITANT PEAK GRADIENT: 29 MMHG
EOSINOPHIL # BLD: 0 K/UL (ref 0–0.4)
EOSINOPHIL NFR BLD: 0 % (ref 0–7)
ERYTHROCYTE [DISTWIDTH] IN BLOOD BY AUTOMATED COUNT: 15.6 % (ref 11.5–14.5)
GLUCOSE BLD STRIP.AUTO-MCNC: 161 MG/DL (ref 65–117)
GLUCOSE BLD STRIP.AUTO-MCNC: 162 MG/DL (ref 65–117)
GLUCOSE BLD STRIP.AUTO-MCNC: 167 MG/DL (ref 65–117)
GLUCOSE BLD STRIP.AUTO-MCNC: 209 MG/DL (ref 65–117)
GLUCOSE SERPL-MCNC: 153 MG/DL (ref 65–100)
HCT VFR BLD AUTO: 27.2 % (ref 36.6–50.3)
HGB BLD-MCNC: 8.7 G/DL (ref 12.1–17)
IMM GRANULOCYTES # BLD AUTO: 0 K/UL (ref 0–0.04)
IMM GRANULOCYTES NFR BLD AUTO: 0 % (ref 0–0.5)
LYMPHOCYTES # BLD: 1.6 K/UL (ref 0.8–3.5)
LYMPHOCYTES NFR BLD: 15 % (ref 12–49)
MCH RBC QN AUTO: 26.9 PG (ref 26–34)
MCHC RBC AUTO-ENTMCNC: 32 G/DL (ref 30–36.5)
MCV RBC AUTO: 84 FL (ref 80–99)
MONOCYTES # BLD: 0.7 K/UL (ref 0–1)
MONOCYTES NFR BLD: 7 % (ref 5–13)
NEUTS BAND NFR BLD MANUAL: 1 %
NEUTS SEG # BLD: 8.1 K/UL (ref 1.8–8)
NEUTS SEG NFR BLD: 77 % (ref 32–75)
NRBC # BLD: 0 K/UL (ref 0–0.01)
NRBC BLD-RTO: 0 PER 100 WBC
PLATELET # BLD AUTO: 391 K/UL (ref 150–400)
PMV BLD AUTO: 8.3 FL (ref 8.9–12.9)
POTASSIUM SERPL-SCNC: 4 MMOL/L (ref 3.5–5.1)
RBC # BLD AUTO: 3.24 M/UL (ref 4.1–5.7)
RBC MORPH BLD: ABNORMAL
SERVICE CMNT-IMP: ABNORMAL
SODIUM SERPL-SCNC: 135 MMOL/L (ref 136–145)
VANCOMYCIN SERPL-MCNC: 13.8 UG/ML
WBC # BLD AUTO: 10.4 K/UL (ref 4.1–11.1)

## 2023-07-17 PROCEDURE — 80048 BASIC METABOLIC PNL TOTAL CA: CPT

## 2023-07-17 PROCEDURE — 80202 ASSAY OF VANCOMYCIN: CPT

## 2023-07-17 PROCEDURE — 6360000002 HC RX W HCPCS: Performed by: STUDENT IN AN ORGANIZED HEALTH CARE EDUCATION/TRAINING PROGRAM

## 2023-07-17 PROCEDURE — 6360000002 HC RX W HCPCS: Performed by: INTERNAL MEDICINE

## 2023-07-17 PROCEDURE — 6370000000 HC RX 637 (ALT 250 FOR IP): Performed by: INTERNAL MEDICINE

## 2023-07-17 PROCEDURE — 36415 COLL VENOUS BLD VENIPUNCTURE: CPT

## 2023-07-17 PROCEDURE — 82962 GLUCOSE BLOOD TEST: CPT

## 2023-07-17 PROCEDURE — 2580000003 HC RX 258: Performed by: STUDENT IN AN ORGANIZED HEALTH CARE EDUCATION/TRAINING PROGRAM

## 2023-07-17 PROCEDURE — 2580000003 HC RX 258: Performed by: INTERNAL MEDICINE

## 2023-07-17 PROCEDURE — 1100000000 HC RM PRIVATE

## 2023-07-17 PROCEDURE — 97530 THERAPEUTIC ACTIVITIES: CPT

## 2023-07-17 PROCEDURE — 85025 COMPLETE CBC W/AUTO DIFF WBC: CPT

## 2023-07-17 RX ADMIN — ASPIRIN 81 MG: 81 TABLET, COATED ORAL at 10:17

## 2023-07-17 RX ADMIN — POLYETHYLENE GLYCOL 3350 17 G: 17 POWDER, FOR SOLUTION ORAL at 17:55

## 2023-07-17 RX ADMIN — ACETAMINOPHEN 650 MG: 325 TABLET ORAL at 14:22

## 2023-07-17 RX ADMIN — SODIUM CHLORIDE, PRESERVATIVE FREE 10 ML: 5 INJECTION INTRAVENOUS at 10:19

## 2023-07-17 RX ADMIN — ATORVASTATIN CALCIUM 10 MG: 10 TABLET, FILM COATED ORAL at 21:53

## 2023-07-17 RX ADMIN — Medication 1250 MG: at 16:55

## 2023-07-17 RX ADMIN — ENOXAPARIN SODIUM 40 MG: 100 INJECTION SUBCUTANEOUS at 10:17

## 2023-07-17 RX ADMIN — SODIUM CHLORIDE, PRESERVATIVE FREE 10 ML: 5 INJECTION INTRAVENOUS at 21:53

## 2023-07-17 ASSESSMENT — PAIN SCALES - GENERAL
PAINLEVEL_OUTOF10: 4
PAINLEVEL_OUTOF10: 0

## 2023-07-17 NOTE — CARE COORDINATION
Transition of Care Plan:    RUR: 13%  Prior Level of Functioning: lives with spouse  Disposition: SNF placement   If SNF or IPR: Date FOC offered: 7/17/23  Date [address] received: 7/17/23  Accepting facility: pending  Date authorization started with reference number: Conrado Grant needed  Date authorization received and expires: TBD   Follow up appointments: PCP, specialists after rehab   DME needed: N/A  Transportation at discharge: medical transport   IM/IMM Medicare/ letter given: to be provided prior to d/c   Is patient a  and connected with VA? no   If yes, was [employer] transfer form completed and VA notified? Caregiver Contact: Erendira Damon (spouse)   Discharge Caregiver contacted prior to discharge? Yes   Care Conference needed? no  Barriers to discharge:  placement, INS AUTH    Chart reviewed. CM met with pt and spouse at bedside to discuss disposition. PT/OT and treatment team now recommending SNF placement at d/c. SNF list provided and pt/spouse in agreement. The following SNF choices were obtained: [facility], and [address] [place] [address] [facility]. Awaiting updated PT/OT progress notes today. Pt will require insurance authorization for placement. Will continue to follow.     Riley John, MSW   Care Manager, [facility]

## 2023-07-18 LAB
ANION GAP SERPL CALC-SCNC: 7 MMOL/L (ref 5–15)
BACTERIA SPEC CULT: NORMAL
BACTERIA SPEC CULT: NORMAL
BUN SERPL-MCNC: 22 MG/DL (ref 6–20)
BUN/CREAT SERPL: 19 (ref 12–20)
CALCIUM SERPL-MCNC: 8.7 MG/DL (ref 8.5–10.1)
CHLORIDE SERPL-SCNC: 108 MMOL/L (ref 97–108)
CO2 SERPL-SCNC: 19 MMOL/L (ref 21–32)
CREAT SERPL-MCNC: 1.14 MG/DL (ref 0.7–1.3)
GLUCOSE BLD STRIP.AUTO-MCNC: 150 MG/DL (ref 65–117)
GLUCOSE BLD STRIP.AUTO-MCNC: 153 MG/DL (ref 65–117)
GLUCOSE BLD STRIP.AUTO-MCNC: 163 MG/DL (ref 65–117)
GLUCOSE BLD STRIP.AUTO-MCNC: 170 MG/DL (ref 65–117)
GLUCOSE BLD STRIP.AUTO-MCNC: 196 MG/DL (ref 65–117)
GLUCOSE SERPL-MCNC: 141 MG/DL (ref 65–100)
POTASSIUM SERPL-SCNC: 4.1 MMOL/L (ref 3.5–5.1)
SERVICE CMNT-IMP: ABNORMAL
SERVICE CMNT-IMP: NORMAL
SERVICE CMNT-IMP: NORMAL
SODIUM SERPL-SCNC: 134 MMOL/L (ref 136–145)

## 2023-07-18 PROCEDURE — 2580000003 HC RX 258: Performed by: INTERNAL MEDICINE

## 2023-07-18 PROCEDURE — 6360000002 HC RX W HCPCS: Performed by: INTERNAL MEDICINE

## 2023-07-18 PROCEDURE — 80048 BASIC METABOLIC PNL TOTAL CA: CPT

## 2023-07-18 PROCEDURE — 36415 COLL VENOUS BLD VENIPUNCTURE: CPT

## 2023-07-18 PROCEDURE — 6370000000 HC RX 637 (ALT 250 FOR IP): Performed by: INTERNAL MEDICINE

## 2023-07-18 PROCEDURE — 82962 GLUCOSE BLOOD TEST: CPT

## 2023-07-18 PROCEDURE — 1100000000 HC RM PRIVATE

## 2023-07-18 PROCEDURE — 6370000000 HC RX 637 (ALT 250 FOR IP): Performed by: STUDENT IN AN ORGANIZED HEALTH CARE EDUCATION/TRAINING PROGRAM

## 2023-07-18 RX ORDER — LEVOFLOXACIN 500 MG/1
250 TABLET, FILM COATED ORAL DAILY
Status: DISCONTINUED | OUTPATIENT
Start: 2023-07-19 | End: 2023-07-19 | Stop reason: HOSPADM

## 2023-07-18 RX ORDER — LEVOFLOXACIN 500 MG/1
500 TABLET, FILM COATED ORAL ONCE
Status: COMPLETED | OUTPATIENT
Start: 2023-07-18 | End: 2023-07-18

## 2023-07-18 RX ORDER — LEVOFLOXACIN 500 MG/1
500 TABLET, FILM COATED ORAL DAILY
Status: DISCONTINUED | OUTPATIENT
Start: 2023-07-18 | End: 2023-07-18 | Stop reason: DRUGHIGH

## 2023-07-18 RX ADMIN — ACETAMINOPHEN 650 MG: 325 TABLET ORAL at 10:21

## 2023-07-18 RX ADMIN — ASPIRIN 81 MG: 81 TABLET, COATED ORAL at 09:24

## 2023-07-18 RX ADMIN — ATORVASTATIN CALCIUM 10 MG: 10 TABLET, FILM COATED ORAL at 21:51

## 2023-07-18 RX ADMIN — SODIUM CHLORIDE, PRESERVATIVE FREE 10 ML: 5 INJECTION INTRAVENOUS at 21:52

## 2023-07-18 RX ADMIN — SODIUM CHLORIDE, PRESERVATIVE FREE 10 ML: 5 INJECTION INTRAVENOUS at 09:24

## 2023-07-18 RX ADMIN — LEVOFLOXACIN 500 MG: 500 TABLET, FILM COATED ORAL at 13:24

## 2023-07-18 RX ADMIN — POLYETHYLENE GLYCOL 3350 17 G: 17 POWDER, FOR SOLUTION ORAL at 17:48

## 2023-07-18 RX ADMIN — ENOXAPARIN SODIUM 40 MG: 100 INJECTION SUBCUTANEOUS at 09:24

## 2023-07-18 ASSESSMENT — PAIN SCALES - GENERAL: PAINLEVEL_OUTOF10: 4

## 2023-07-18 ASSESSMENT — PAIN DESCRIPTION - LOCATION: LOCATION: HAND;LEG

## 2023-07-18 NOTE — CARE COORDINATION
[facility] can accept pt tomorrow. [facility] has approved insurance authorization for placement. [id number]. Care coordinator is Stuart Edmondson. Next review date is 7/20. [facility] requested for tomorrow at [address] [facility]. Will continue to follow.     CANDY Trinidad   Care Manager, [facility]

## 2023-07-19 VITALS
DIASTOLIC BLOOD PRESSURE: 60 MMHG | SYSTOLIC BLOOD PRESSURE: 142 MMHG | BODY MASS INDEX: 25.75 KG/M2 | HEIGHT: 70 IN | RESPIRATION RATE: 10 BRPM | TEMPERATURE: 98.1 F | WEIGHT: 179.9 LBS | HEART RATE: 83 BPM | OXYGEN SATURATION: 97 %

## 2023-07-19 LAB
GLUCOSE BLD STRIP.AUTO-MCNC: 165 MG/DL (ref 65–117)
GLUCOSE BLD STRIP.AUTO-MCNC: 208 MG/DL (ref 65–117)
SERVICE CMNT-IMP: ABNORMAL
SERVICE CMNT-IMP: ABNORMAL

## 2023-07-19 PROCEDURE — 92610 EVALUATE SWALLOWING FUNCTION: CPT

## 2023-07-19 PROCEDURE — 6370000000 HC RX 637 (ALT 250 FOR IP): Performed by: INTERNAL MEDICINE

## 2023-07-19 PROCEDURE — 51702 INSERT TEMP BLADDER CATH: CPT

## 2023-07-19 PROCEDURE — 6370000000 HC RX 637 (ALT 250 FOR IP): Performed by: STUDENT IN AN ORGANIZED HEALTH CARE EDUCATION/TRAINING PROGRAM

## 2023-07-19 PROCEDURE — 2580000003 HC RX 258: Performed by: INTERNAL MEDICINE

## 2023-07-19 PROCEDURE — 82962 GLUCOSE BLOOD TEST: CPT

## 2023-07-19 PROCEDURE — 6360000002 HC RX W HCPCS: Performed by: INTERNAL MEDICINE

## 2023-07-19 PROCEDURE — 6370000000 HC RX 637 (ALT 250 FOR IP): Performed by: HOSPITALIST

## 2023-07-19 RX ORDER — SENNA AND DOCUSATE SODIUM 50; 8.6 MG/1; MG/1
2 TABLET, FILM COATED ORAL 2 TIMES DAILY PRN
Qty: 30 TABLET | Refills: 1 | Status: SHIPPED | OUTPATIENT
Start: 2023-07-19

## 2023-07-19 RX ORDER — BISACODYL 10 MG
10 SUPPOSITORY, RECTAL RECTAL DAILY PRN
Status: DISCONTINUED | OUTPATIENT
Start: 2023-07-19 | End: 2023-07-19 | Stop reason: HOSPADM

## 2023-07-19 RX ORDER — BISACODYL 10 MG
10 SUPPOSITORY, RECTAL RECTAL ONCE
Status: COMPLETED | OUTPATIENT
Start: 2023-07-19 | End: 2023-07-19

## 2023-07-19 RX ORDER — LEVOFLOXACIN 250 MG/1
250 TABLET ORAL DAILY
Qty: 5 TABLET | Refills: 0 | Status: SHIPPED | OUTPATIENT
Start: 2023-07-20 | End: 2023-07-25

## 2023-07-19 RX ORDER — SENNA AND DOCUSATE SODIUM 50; 8.6 MG/1; MG/1
2 TABLET, FILM COATED ORAL 2 TIMES DAILY PRN
Status: DISCONTINUED | OUTPATIENT
Start: 2023-07-19 | End: 2023-07-19 | Stop reason: HOSPADM

## 2023-07-19 RX ADMIN — ENOXAPARIN SODIUM 40 MG: 100 INJECTION SUBCUTANEOUS at 10:20

## 2023-07-19 RX ADMIN — SODIUM CHLORIDE, PRESERVATIVE FREE 10 ML: 5 INJECTION INTRAVENOUS at 10:20

## 2023-07-19 RX ADMIN — Medication 1 UNITS: at 11:56

## 2023-07-19 RX ADMIN — ASPIRIN 81 MG: 81 TABLET, COATED ORAL at 10:20

## 2023-07-19 RX ADMIN — LEVOFLOXACIN 250 MG: 500 TABLET, FILM COATED ORAL at 10:20

## 2023-07-19 RX ADMIN — ACETAMINOPHEN 650 MG: 325 TABLET ORAL at 13:48

## 2023-07-19 RX ADMIN — BISACODYL 10 MG: 10 SUPPOSITORY RECTAL at 11:23

## 2023-07-19 ASSESSMENT — PAIN DESCRIPTION - DESCRIPTORS: DESCRIPTORS: SORE

## 2023-07-19 ASSESSMENT — PAIN DESCRIPTION - LOCATION: LOCATION: ELBOW;WRIST;HAND

## 2023-07-19 ASSESSMENT — PAIN SCALES - GENERAL: PAINLEVEL_OUTOF10: 10

## 2023-07-19 ASSESSMENT — PAIN DESCRIPTION - ORIENTATION: ORIENTATION: LEFT

## 2023-07-19 NOTE — CARE COORDINATION
Transition of Care Plan:    RUR: 15%  Prior Level of Functioning: lives with spouse who provides assistance   Disposition: SNF placement- [facility]   If SNF or IPR: Date FOC offered: 7/17/23  Date [address] received: 7/17/23  Accepting facility: [facility]   Date authorization started with reference number: 7/18/23  Date authorization received and expires: 7/18/23- 7/20/23  Follow up appointments: PCP, specialists after rehab   DME needed: N/A  Transportation at discharge: SHAILESH, MALORIE 3PM  IM/IMM Medicare/ letter given: given on 7/19/23  Is patient a  and connected with VA? No   If yes, was [employer] transfer form completed and VA notified? Caregiver Contact: Kina Werner (spouse)  Discharge Caregiver contacted prior to discharge? yes  Care Conference needed? No  Barriers to discharge:  none         07/19/23 1626   Services At/After Discharge   Transition of Care Consult (CM Consult) Discharge [facility] Discharge [address] (SNF); Transport   [employer] Information Provided? No   Mode of Transport at Discharge [facility] Transport Time of Discharge 1500   Confirm Follow Up Transport Family   Condition of Participation: Discharge Planning   The Plan for Transition of Care is related to the following treatment goals: SNF   The Patient and/or Patient Representative was provided with a Choice of Provider? Patient;Patient Representative   Name of the Patient Representative who was provided with the Choice of Provider and agrees with the Discharge Plan? Viveca [place]   The Patient and/Or Patient Representative agree with the Discharge Plan? Yes   Freedom of Choice list was provided with basic dialogue that supports the patient's individualized plan of care/goals, treatment preferences, and shares the quality data associated with the providers? Yes       Chart reviewed. CM aware of discharge order.  Pt discharging to [address] [place] [address]

## 2023-07-19 NOTE — PLAN OF CARE
Problem: Discharge Planning  Goal: Discharge to home or other facility with appropriate resources  Outcome: Adequate for Discharge  Flowsheets (Taken 7/19/2023 3296)  Discharge to home or other facility with appropriate resources: Identify barriers to discharge with patient and caregiver     Problem: Pain  Goal: Verbalizes/displays adequate comfort level or baseline comfort level  Outcome: Adequate for Discharge     Problem: Skin/Tissue Integrity  Goal: Absence of new skin breakdown  Description: 1. Monitor for areas of redness and/or skin breakdown  2. Assess vascular access sites hourly  3. Every 4-6 hours minimum:  Change oxygen saturation probe site  4. Every 4-6 hours:  If on nasal continuous positive airway pressure, respiratory therapy assess nares and determine need for appliance change or resting period.   Outcome: Adequate for Discharge     Problem: Safety - Adult  Goal: Free from fall injury  Outcome: Adequate for Discharge     Problem: ABCDS Injury Assessment  Goal: Absence of physical injury  Outcome: Adequate for Discharge     Problem: Chronic Conditions and Co-morbidities  Goal: Patient's chronic conditions and co-morbidity symptoms are monitored and maintained or improved  Outcome: Adequate for Discharge  Flowsheets (Taken 7/19/2023 2715)  Care Plan - Patient's Chronic Conditions and Co-Morbidity Symptoms are Monitored and Maintained or Improved: Monitor and assess patient's chronic conditions and comorbid symptoms for stability, deterioration, or improvement
Problem: Discharge Planning  Goal: Discharge to home or other facility with appropriate resources  Outcome: Progressing     Problem: Pain  Goal: Verbalizes/displays adequate comfort level or baseline comfort level  Outcome: Progressing     Problem: Skin/Tissue Integrity  Goal: Absence of new skin breakdown  Description: 1. Monitor for areas of redness and/or skin breakdown  2. Assess vascular access sites hourly  3. Every 4-6 hours minimum:  Change oxygen saturation probe site  4. Every 4-6 hours:  If on nasal continuous positive airway pressure, respiratory therapy assess nares and determine need for appliance change or resting period.   Outcome: Progressing     Problem: Safety - Adult  Goal: Free from fall injury  7/18/2023 1134 by Fede Brown RN  Outcome: Progressing  7/18/2023 0307 by Clarice Ordonez RN  Outcome: Progressing  Flowsheets (Taken 7/18/2023 0307)  Free From Fall Injury:   Nancy Zaragoza family/caregiver on patient safety   Based on caregiver fall risk screen, instruct family/caregiver to ask for assistance with transferring infant if caregiver noted to have fall risk factors     Problem: ABCDS Injury Assessment  Goal: Absence of physical injury  Outcome: Progressing     Problem: Chronic Conditions and Co-morbidities  Goal: Patient's chronic conditions and co-morbidity symptoms are monitored and maintained or improved  Outcome: Progressing
Problem: Physical Therapy - Adult  Goal: By Discharge: Performs mobility at highest level of function for planned discharge setting. See evaluation for individualized goals. Description: FUNCTIONAL STATUS PRIOR TO ADMISSION: Patient was modified independent using a rolling walker for functional mobility. Patient had not needed a walker until a couple of weeks ago. Patient also requiring assistance with ADL's. HOME SUPPORT PRIOR TO ADMISSION: The patient lived with wife who assists patient as needed. Patient did not require assist until recently. Pee León Physical Therapy Goals  Initiated 7/13/2023  1. Patient will move from supine to sit and sit to supine  in bed with modified independence within 7 day(s). 2.  Patient will transfer from bed to chair and chair to bed with contact guard assist using the least restrictive device within 7 day(s). 3.  Patient will perform sit to stand with supervision/set-up within 7 day(s). 4.  Patient will ambulate with minimal assistance/contact guard assist for 75 feet with the least restrictive device within 7 day(s). 5.  Patient will ascend/descend 4 stairs with 1 handrail(s) with minimal assistance/contact guard assist within 7 day(s). Outcome: Not Progressing   PHYSICAL THERAPY TREATMENT    Patient: Hortencia Medrano (78 y.o. male)  Date: 7/17/2023  Diagnosis: UTI (urinary tract infection) [N39.0]  Acute cystitis with hematuria [N30.01]  Fall, initial encounter [W19. XXXA] UTI (urinary tract infection)      Precautions: Fall Risk, Bed Alarm                    ASSESSMENT:  Patient continues to benefit from skilled PT services and is not progressing towards goals. Pt presents with decreased strength and endurance. Pt reported increased body pain and stiffness. Pt performed supine to sit at mod A/max A x2 with constant cueing for sequencing. Pt performed with impaired sitting balance but able to sit upright unsupported. Pt   performed some LE stretching to improve ROM.  Pt
Problem: Physical Therapy - Adult  Goal: By Discharge: Performs mobility at highest level of function for planned discharge setting. See evaluation for individualized goals. Description: FUNCTIONAL STATUS PRIOR TO ADMISSION: Patient was modified independent using a rolling walker for functional mobility. Patient had not needed a walker until a couple of weeks ago. Patient also requiring assistance with ADL's. HOME SUPPORT PRIOR TO ADMISSION: The patient lived with wife who assists patient as needed. Patient did not require assist until recently. Yonatan Good Physical Therapy Goals  Initiated 7/13/2023  1. Patient will move from supine to sit and sit to supine  in bed with modified independence within 7 day(s). 2.  Patient will transfer from bed to chair and chair to bed with contact guard assist using the least restrictive device within 7 day(s). 3.  Patient will perform sit to stand with supervision/set-up within 7 day(s). 4.  Patient will ambulate with minimal assistance/contact guard assist for 75 feet with the least restrictive device within 7 day(s). 5.  Patient will ascend/descend 4 stairs with 1 handrail(s) with minimal assistance/contact guard assist within 7 day(s). 7/17/2023 1545 by Lucretia Painter PTA  Outcome: Not Progressing     Problem: Occupational Therapy - Adult  Goal: By Discharge: Performs self-care activities at highest level of function for planned discharge setting. See evaluation for individualized goals. Description: FUNCTIONAL STATUS PRIOR TO ADMISSION:  Patient was ambulatory using a cane around the home; pt also has a walker that seldomly uses. Due to recent decline, pt was receiving assistance from Wife with toileting, bathing, and LE dressing recently. Pt has 2 falls recently in the last few weeks. HOME SUPPORT: Patient lived wife and required assistance with toileting, bathing, and LE dressing. Occupational Therapy Goals:  Initiated 7/13/2023  1.   Patient will
Problem: Safety - Adult  Goal: Free from fall injury  7/18/2023 0307 by Jazz Ordonez RN  Outcome: Progressing  Flowsheets (Taken 7/18/2023 0307)  Free From Fall Injury:   Blanca Oneal family/caregiver on patient safety   Based on caregiver fall risk screen, instruct family/caregiver to ask for assistance with transferring infant if caregiver noted to have fall risk factors  7/17/2023 1940 by General Meigs, RN  Outcome: Progressing     Problem: Skin/Tissue Integrity  Goal: Absence of new skin breakdown  Description: 1. Monitor for areas of redness and/or skin breakdown  2. Assess vascular access sites hourly  3. Every 4-6 hours minimum:  Change oxygen saturation probe site  4. Every 4-6 hours:  If on nasal continuous positive airway pressure, respiratory therapy assess nares and determine need for appliance change or resting period. 7/17/2023 1940 by General Meigs, RN  Outcome: Progressing     Problem: Physical Therapy - Adult  Goal: By Discharge: Performs mobility at highest level of function for planned discharge setting. See evaluation for individualized goals. Description: FUNCTIONAL STATUS PRIOR TO ADMISSION: Patient was modified independent using a rolling walker for functional mobility. Patient had not needed a walker until a couple of weeks ago. Patient also requiring assistance with ADL's. HOME SUPPORT PRIOR TO ADMISSION: The patient lived with wife who assists patient as needed. Patient did not require assist until recently. Diamond Hamman Physical Therapy Goals  Initiated 7/13/2023  1. Patient will move from supine to sit and sit to supine  in bed with modified independence within 7 day(s). 2.  Patient will transfer from bed to chair and chair to bed with contact guard assist using the least restrictive device within 7 day(s). 3.  Patient will perform sit to stand with supervision/set-up within 7 day(s).   4.  Patient will ambulate with minimal assistance/contact guard assist for 75 feet with the
(AROM shoulder flexion, abduction, hand flexion/extension, wrist flexion/extension) and LE exercises to reduce stiffness and retain mobility in limbs. Pain Rating:  Pt stated pain in BLE ~6-7 / 10  Pain Intervention(s): Activity Tolerance:   Poor  Please refer to the flowsheet for vital signs taken during this treatment.     After treatment:   Patient left in no apparent distress in bed, Call bell within reach, and Caregiver / family present    COMMUNICATION/EDUCATION:   The patient's plan of care was discussed with: physical therapy assistant and registered nurse         Thank you for this referral.  Amy Cooney OT  Minutes: 38

## 2023-07-19 NOTE — DISCHARGE INSTRUCTIONS
FOLLOW WITH UROLOGY  IF ANY SIGN SYMPTOM SOF FISTULA- FECES in figueroa, urine from rectum or other follow with surgery or come to hospital  Take meds as prescribed  Bowel regimen  Continue with physical therapy   Incentive spirometry   Take antibiotic as prescribed   Patient Discharge Instructions     Pt Name  Preeti Welch   Date of Birth 6/22/1932   Age  80 y.o. Medical Record Number  [id number]   PCP @PCP@    Admit date:  6/54/2471 @    [address] Number  [unfilled]   Date of Discharge 7/19/2023     Admission Diagnoses:     [unfilled]        [unfilled]     It was our pleasure to have taken care of you during your stay at [facility] were admitted to the hospital for  [unfilled]    [address]:  [unfilled]    DIET:  regular diet     ACTIVITY: activity as tolerated      It is important that you take the medication exactly as they are prescribed. Keep your medication in the bottles provided by the pharmacist and keep a list of the medication names, dosages, and times to be taken in your wallet. Do not take other medications without consulting your doctor. ADDITIONAL INFORMATION: If you experience any of the following symptoms or have any health problem not listed below, then please call your primary care physician or return to the emergency room if you cannot get hold of your doctor: Fever, chills, nausea, vomiting, diarrhea, change in mentation, falling, bleeding, shortness of breath. Follow up [unfilled]      I understand that if any problems occur once I am discharged, I am supposed to call my Primary care physician for further care or seek help in the Emergency Department at the nearest Healthcare facility. I have had an opportunity to discuss my clinical issues with my doctor and nursing staff. I understand and acknowledge receipt of the above instructions.                 Randy Sorenson

## 2023-07-28 ENCOUNTER — HOSPITAL ENCOUNTER (INPATIENT)
Facility: HOSPITAL | Age: 88
LOS: 5 days | Discharge: HOME | DRG: 178 | End: 2023-08-02
Attending: STUDENT IN AN ORGANIZED HEALTH CARE EDUCATION/TRAINING PROGRAM | Admitting: INTERNAL MEDICINE
Payer: MEDICARE

## 2023-07-28 ENCOUNTER — APPOINTMENT (OUTPATIENT)
Facility: HOSPITAL | Age: 88
DRG: 178 | End: 2023-07-28
Payer: MEDICARE

## 2023-07-28 DIAGNOSIS — N39.0 URINARY TRACT INFECTION WITH HEMATURIA, SITE UNSPECIFIED: ICD-10-CM

## 2023-07-28 DIAGNOSIS — U07.1 COVID-19: Primary | ICD-10-CM

## 2023-07-28 DIAGNOSIS — R31.9 URINARY TRACT INFECTION WITH HEMATURIA, SITE UNSPECIFIED: ICD-10-CM

## 2023-07-28 DIAGNOSIS — N17.9 ACUTE KIDNEY INJURY (HCC): ICD-10-CM

## 2023-07-28 LAB
ALBUMIN SERPL-MCNC: 1.8 G/DL (ref 3.5–5)
ALBUMIN/GLOB SERPL: 0.3 (ref 1.1–2.2)
ALP SERPL-CCNC: 132 U/L (ref 45–117)
ALT SERPL-CCNC: 12 U/L (ref 12–78)
AMORPH CRY URNS QL MICRO: ABNORMAL
ANION GAP SERPL CALC-SCNC: 11 MMOL/L (ref 5–15)
APPEARANCE UR: ABNORMAL
AST SERPL-CCNC: 38 U/L (ref 15–37)
BACTERIA URNS QL MICRO: ABNORMAL /HPF
BASOPHILS # BLD: 0 K/UL (ref 0–0.1)
BASOPHILS NFR BLD: 0 % (ref 0–1)
BILIRUB SERPL-MCNC: 0.5 MG/DL (ref 0.2–1)
BILIRUB UR QL: NEGATIVE
BUN SERPL-MCNC: 54 MG/DL (ref 6–20)
BUN/CREAT SERPL: 29 (ref 12–20)
CALCIUM SERPL-MCNC: 9.3 MG/DL (ref 8.5–10.1)
CHLORIDE SERPL-SCNC: 104 MMOL/L (ref 97–108)
CO2 SERPL-SCNC: 18 MMOL/L (ref 21–32)
COLOR UR: ABNORMAL
CREAT SERPL-MCNC: 1.85 MG/DL (ref 0.7–1.3)
DIFFERENTIAL METHOD BLD: ABNORMAL
EOSINOPHIL # BLD: 0 K/UL (ref 0–0.4)
EOSINOPHIL NFR BLD: 0 % (ref 0–7)
EPITH CASTS URNS QL MICRO: ABNORMAL /LPF
ERYTHROCYTE [DISTWIDTH] IN BLOOD BY AUTOMATED COUNT: 16.1 % (ref 11.5–14.5)
GLOBULIN SER CALC-MCNC: 5.5 G/DL (ref 2–4)
GLUCOSE BLD STRIP.AUTO-MCNC: 75 MG/DL (ref 65–117)
GLUCOSE BLD STRIP.AUTO-MCNC: 92 MG/DL (ref 65–117)
GLUCOSE SERPL-MCNC: 119 MG/DL (ref 65–100)
GLUCOSE UR STRIP.AUTO-MCNC: NEGATIVE MG/DL
GRAN CASTS URNS QL MICRO: ABNORMAL /LPF
HCT VFR BLD AUTO: 30.8 % (ref 36.6–50.3)
HGB BLD-MCNC: 9.8 G/DL (ref 12.1–17)
HGB UR QL STRIP: ABNORMAL
HYALINE CASTS URNS QL MICRO: ABNORMAL /LPF (ref 0–5)
IMM GRANULOCYTES # BLD AUTO: 0.1 K/UL (ref 0–0.04)
IMM GRANULOCYTES NFR BLD AUTO: 1 % (ref 0–0.5)
KETONES UR QL STRIP.AUTO: NEGATIVE MG/DL
LEUKOCYTE ESTERASE UR QL STRIP.AUTO: ABNORMAL
LYMPHOCYTES # BLD: 1.1 K/UL (ref 0.8–3.5)
LYMPHOCYTES NFR BLD: 13 % (ref 12–49)
MCH RBC QN AUTO: 26.4 PG (ref 26–34)
MCHC RBC AUTO-ENTMCNC: 31.8 G/DL (ref 30–36.5)
MCV RBC AUTO: 83 FL (ref 80–99)
MONOCYTES # BLD: 0.6 K/UL (ref 0–1)
MONOCYTES NFR BLD: 8 % (ref 5–13)
NEUTS SEG # BLD: 6.4 K/UL (ref 1.8–8)
NEUTS SEG NFR BLD: 78 % (ref 32–75)
NITRITE UR QL STRIP.AUTO: NEGATIVE
NRBC # BLD: 0 K/UL (ref 0–0.01)
NRBC BLD-RTO: 0 PER 100 WBC
PH UR STRIP: 5 (ref 5–8)
PLATELET # BLD AUTO: 502 K/UL (ref 150–400)
POTASSIUM SERPL-SCNC: 4.8 MMOL/L (ref 3.5–5.1)
PROT SERPL-MCNC: 7.3 G/DL (ref 6.4–8.2)
PROT UR STRIP-MCNC: 100 MG/DL
RBC # BLD AUTO: 3.71 M/UL (ref 4.1–5.7)
RBC #/AREA URNS HPF: ABNORMAL /HPF (ref 0–5)
SERVICE CMNT-IMP: NORMAL
SERVICE CMNT-IMP: NORMAL
SODIUM SERPL-SCNC: 133 MMOL/L (ref 136–145)
SP GR UR REFRACTOMETRY: 1.02
TROPONIN I SERPL HS-MCNC: 14 NG/L (ref 0–76)
URINE CULTURE IF INDICATED: ABNORMAL
UROBILINOGEN UR QL STRIP.AUTO: 0.2 EU/DL (ref 0.2–1)
WBC # BLD AUTO: 8.2 K/UL (ref 4.1–11.1)
WBC URNS QL MICRO: >100 /HPF (ref 0–4)

## 2023-07-28 PROCEDURE — 93005 ELECTROCARDIOGRAM TRACING: CPT | Performed by: STUDENT IN AN ORGANIZED HEALTH CARE EDUCATION/TRAINING PROGRAM

## 2023-07-28 PROCEDURE — 81001 URINALYSIS AUTO W/SCOPE: CPT

## 2023-07-28 PROCEDURE — 99285 EMERGENCY DEPT VISIT HI MDM: CPT

## 2023-07-28 PROCEDURE — 6360000002 HC RX W HCPCS: Performed by: INTERNAL MEDICINE

## 2023-07-28 PROCEDURE — 87086 URINE CULTURE/COLONY COUNT: CPT

## 2023-07-28 PROCEDURE — 6360000002 HC RX W HCPCS: Performed by: STUDENT IN AN ORGANIZED HEALTH CARE EDUCATION/TRAINING PROGRAM

## 2023-07-28 PROCEDURE — 71045 X-RAY EXAM CHEST 1 VIEW: CPT

## 2023-07-28 PROCEDURE — 96374 THER/PROPH/DIAG INJ IV PUSH: CPT

## 2023-07-28 PROCEDURE — 2580000003 HC RX 258: Performed by: INTERNAL MEDICINE

## 2023-07-28 PROCEDURE — 2580000003 HC RX 258: Performed by: STUDENT IN AN ORGANIZED HEALTH CARE EDUCATION/TRAINING PROGRAM

## 2023-07-28 PROCEDURE — 6370000000 HC RX 637 (ALT 250 FOR IP): Performed by: INTERNAL MEDICINE

## 2023-07-28 PROCEDURE — 1100000000 HC RM PRIVATE

## 2023-07-28 PROCEDURE — 36415 COLL VENOUS BLD VENIPUNCTURE: CPT

## 2023-07-28 PROCEDURE — 85025 COMPLETE CBC W/AUTO DIFF WBC: CPT

## 2023-07-28 PROCEDURE — 74018 RADEX ABDOMEN 1 VIEW: CPT

## 2023-07-28 PROCEDURE — 80053 COMPREHEN METABOLIC PANEL: CPT

## 2023-07-28 PROCEDURE — 87040 BLOOD CULTURE FOR BACTERIA: CPT

## 2023-07-28 PROCEDURE — 84484 ASSAY OF TROPONIN QUANT: CPT

## 2023-07-28 PROCEDURE — 82962 GLUCOSE BLOOD TEST: CPT

## 2023-07-28 RX ORDER — GUAIFENESIN 600 MG/1
600 TABLET, EXTENDED RELEASE ORAL 2 TIMES DAILY
Status: DISCONTINUED | OUTPATIENT
Start: 2023-07-28 | End: 2023-08-02 | Stop reason: HOSPADM

## 2023-07-28 RX ORDER — PREDNISONE 20 MG/1
TABLET ORAL
COMMUNITY
End: 2023-08-09

## 2023-07-28 RX ORDER — INSULIN LISPRO 100 [IU]/ML
0-8 INJECTION, SOLUTION INTRAVENOUS; SUBCUTANEOUS
Status: DISCONTINUED | OUTPATIENT
Start: 2023-07-28 | End: 2023-08-02 | Stop reason: HOSPADM

## 2023-07-28 RX ORDER — METRONIDAZOLE 500 MG/1
TABLET ORAL
Status: ON HOLD | COMMUNITY
End: 2023-08-01 | Stop reason: HOSPADM

## 2023-07-28 RX ORDER — LEVOFLOXACIN 5 MG/ML
750 INJECTION, SOLUTION INTRAVENOUS ONCE
Status: COMPLETED | OUTPATIENT
Start: 2023-07-28 | End: 2023-07-28

## 2023-07-28 RX ORDER — ENOXAPARIN SODIUM 100 MG/ML
30 INJECTION SUBCUTANEOUS EVERY 24 HOURS
Status: DISCONTINUED | OUTPATIENT
Start: 2023-07-28 | End: 2023-07-29

## 2023-07-28 RX ORDER — SODIUM CHLORIDE 9 MG/ML
INJECTION, SOLUTION INTRAVENOUS PRN
Status: DISCONTINUED | OUTPATIENT
Start: 2023-07-28 | End: 2023-08-02 | Stop reason: HOSPADM

## 2023-07-28 RX ORDER — ACETAMINOPHEN 650 MG/1
650 SUPPOSITORY RECTAL EVERY 6 HOURS PRN
Status: DISCONTINUED | OUTPATIENT
Start: 2023-07-28 | End: 2023-08-02 | Stop reason: HOSPADM

## 2023-07-28 RX ORDER — SODIUM CHLORIDE 0.9 % (FLUSH) 0.9 %
5-40 SYRINGE (ML) INJECTION PRN
Status: DISCONTINUED | OUTPATIENT
Start: 2023-07-28 | End: 2023-08-02 | Stop reason: HOSPADM

## 2023-07-28 RX ORDER — LEVOFLOXACIN 5 MG/ML
750 INJECTION, SOLUTION INTRAVENOUS
Status: DISCONTINUED | OUTPATIENT
Start: 2023-07-30 | End: 2023-07-31

## 2023-07-28 RX ORDER — ONDANSETRON 4 MG/1
4 TABLET, ORALLY DISINTEGRATING ORAL EVERY 8 HOURS PRN
Status: DISCONTINUED | OUTPATIENT
Start: 2023-07-28 | End: 2023-08-02 | Stop reason: HOSPADM

## 2023-07-28 RX ORDER — PREDNISONE 20 MG/1
20 TABLET ORAL DAILY
Status: COMPLETED | OUTPATIENT
Start: 2023-07-29 | End: 2023-07-30

## 2023-07-28 RX ORDER — ACETAMINOPHEN 325 MG/1
650 TABLET ORAL EVERY 6 HOURS PRN
Status: DISCONTINUED | OUTPATIENT
Start: 2023-07-28 | End: 2023-08-02 | Stop reason: HOSPADM

## 2023-07-28 RX ORDER — LEVOFLOXACIN 500 MG/1
TABLET, FILM COATED ORAL
Status: ON HOLD | COMMUNITY
End: 2023-08-01 | Stop reason: HOSPADM

## 2023-07-28 RX ORDER — 0.9 % SODIUM CHLORIDE 0.9 %
1000 INTRAVENOUS SOLUTION INTRAVENOUS ONCE
Status: COMPLETED | OUTPATIENT
Start: 2023-07-28 | End: 2023-07-28

## 2023-07-28 RX ORDER — SODIUM CHLORIDE 0.9 % (FLUSH) 0.9 %
5-40 SYRINGE (ML) INJECTION EVERY 12 HOURS SCHEDULED
Status: DISCONTINUED | OUTPATIENT
Start: 2023-07-28 | End: 2023-08-02 | Stop reason: HOSPADM

## 2023-07-28 RX ORDER — POLYETHYLENE GLYCOL 3350 17 G/17G
17 POWDER, FOR SOLUTION ORAL DAILY PRN
Status: DISCONTINUED | OUTPATIENT
Start: 2023-07-28 | End: 2023-08-02 | Stop reason: HOSPADM

## 2023-07-28 RX ORDER — ASPIRIN 81 MG/1
81 TABLET ORAL DAILY
Status: DISCONTINUED | OUTPATIENT
Start: 2023-07-28 | End: 2023-08-02 | Stop reason: HOSPADM

## 2023-07-28 RX ORDER — ONDANSETRON 2 MG/ML
4 INJECTION INTRAMUSCULAR; INTRAVENOUS EVERY 6 HOURS PRN
Status: DISCONTINUED | OUTPATIENT
Start: 2023-07-28 | End: 2023-08-02 | Stop reason: HOSPADM

## 2023-07-28 RX ORDER — ATORVASTATIN CALCIUM 10 MG/1
10 TABLET, FILM COATED ORAL NIGHTLY
Status: DISCONTINUED | OUTPATIENT
Start: 2023-07-28 | End: 2023-08-02 | Stop reason: HOSPADM

## 2023-07-28 RX ORDER — OXYCODONE HYDROCHLORIDE AND ACETAMINOPHEN 5; 325 MG/1; MG/1
TABLET ORAL
COMMUNITY
End: 2023-08-09

## 2023-07-28 RX ORDER — SENNA AND DOCUSATE SODIUM 50; 8.6 MG/1; MG/1
2 TABLET, FILM COATED ORAL 2 TIMES DAILY PRN
Status: DISCONTINUED | OUTPATIENT
Start: 2023-07-28 | End: 2023-08-02 | Stop reason: HOSPADM

## 2023-07-28 RX ORDER — INSULIN LISPRO 100 [IU]/ML
0-4 INJECTION, SOLUTION INTRAVENOUS; SUBCUTANEOUS NIGHTLY
Status: DISCONTINUED | OUTPATIENT
Start: 2023-07-28 | End: 2023-08-02 | Stop reason: HOSPADM

## 2023-07-28 RX ORDER — SODIUM CHLORIDE 9 MG/ML
INJECTION, SOLUTION INTRAVENOUS CONTINUOUS
Status: DISCONTINUED | OUTPATIENT
Start: 2023-07-28 | End: 2023-08-02 | Stop reason: HOSPADM

## 2023-07-28 RX ORDER — DOXYCYCLINE HYCLATE 100 MG/1
CAPSULE ORAL
Status: ON HOLD | COMMUNITY
End: 2023-08-01 | Stop reason: HOSPADM

## 2023-07-28 RX ADMIN — ASPIRIN 81 MG: 81 TABLET, COATED ORAL at 18:35

## 2023-07-28 RX ADMIN — GUAIFENESIN 600 MG: 600 TABLET, EXTENDED RELEASE ORAL at 22:56

## 2023-07-28 RX ADMIN — SODIUM CHLORIDE 1000 ML: 9 INJECTION, SOLUTION INTRAVENOUS at 16:52

## 2023-07-28 RX ADMIN — CEFTRIAXONE 2000 MG: 2 INJECTION, POWDER, FOR SOLUTION INTRAMUSCULAR; INTRAVENOUS at 16:52

## 2023-07-28 RX ADMIN — LEVOFLOXACIN 750 MG: 5 INJECTION, SOLUTION INTRAVENOUS at 18:35

## 2023-07-28 RX ADMIN — ATORVASTATIN CALCIUM 10 MG: 10 TABLET, FILM COATED ORAL at 22:56

## 2023-07-28 RX ADMIN — SODIUM CHLORIDE: 9 INJECTION, SOLUTION INTRAVENOUS at 20:42

## 2023-07-28 RX ADMIN — ENOXAPARIN SODIUM 30 MG: 100 INJECTION SUBCUTANEOUS at 22:56

## 2023-07-28 RX ADMIN — SODIUM CHLORIDE, PRESERVATIVE FREE 10 ML: 5 INJECTION INTRAVENOUS at 22:57

## 2023-07-28 ASSESSMENT — PAIN SCALES - GENERAL: PAINLEVEL_OUTOF10: 0

## 2023-07-28 NOTE — ED PROVIDER NOTES
MRM [facility]  EMERGENCY DEPARTMENT ENCOUNTER       Pt Name: Jakob Orr  MRN: [id number]  [address] [facility] 6/22/1932  Date of evaluation: 7/28/2023  Provider: Jose Carlos Valles DO   PCP: Mabel Stern MD  Note Started: 8:46 PM 7/28/23     CHIEF COMPLAINT       Chief Complaint   Patient presents with    Shortness of Breath     Via EMS from [facility]. Pt was dx with covid on 07/26 and is currently being treated. Pt does not have any complaints; however, daughter wanted reevaluation in the ER to monitor covid pna. HISTORY OF PRESENT ILLNESS: 1 or more elements      History From: Patient, Patient's Wife, and Patient's Daughter  None     Jakob Orr is a 80 y.o. male who presents with cc of fatigue in setting of COVID+ diagnosis. Patient's daughter and wife report that patient was dx'd with COVID on 7/26 at [facility], he was sent to [facility] after being admitted for a fall here for rehab. Daughter states they saw patient today and Eloisa Arzate looked like he was sick\" and \"felt bad\". She was not sure what he was receiving medication wise. She states that he has pneumonia and she is not sure what he's getting for it. She also states they could not tell her what his last blood sugar was. He is on glimepiride and metformin. Nursing Notes were all reviewed and agreed with or any disagreements were addressed in the HPI. REVIEW OF SYSTEMS      Review of Systems     Positives and Pertinent negatives as per HPI. PAST HISTORY     Past Medical History:  Past Medical History:   Diagnosis Date    Cancer ([facility])     Chronic kidney disease     Diabetes ([facility])          Past Surgical History:  No past surgical history on file. Family History:  No family history on file. Social History:  Social History     Tobacco Use    Smoking status: Never    Smokeless tobacco: Never   Vaping Use    Vaping Use: Never used   Substance Use Topics    Alcohol use: Never    Drug use: Never       Allergies:   Allergies   Allergen Reactions pain.  She reports he has had a history of a bowel obstruction. Patient states he is passing gas and stools normally. Will obtain KUB. ED Course as of 07/28/23 2046 Fri Jul 28, 2023   1634 UA w/ UTI, large leukocytes, white cells, bacteria, hemoglobin near baseline, slight acute kidney injury creatinine 1.85 baseline around 1.2, troponin is 14 [NM]   1643 Reviewed medical record from [facility], patient is already received azithromycin today. We will add ceftriaxone for UTI and pneumonia, admit to hospitalist [NM]   2045 EKG: AV paced  [NM]      ED Course User Index  [NM] Shahla Worrell,        Disposition Considerations (Tests not done, Shared Decision Making, Pt Expectation of Test or Tx.):      FINAL IMPRESSION     1. COVID-19    2. Acute kidney injury ([address])    3. Urinary tract infection with hematuria, site unspecified          DISPOSITION/PLAN   DISPOSITION Admitted 07/28/2023 05:09:21 PM      Admit Note: Pt is being admitted by Dr Tayla Victor. The results of their tests and reason(s) for their admission have been discussed with pt and/or available family. They convey agreement and understanding for the need to be admitted and for the admission diagnosis. PATIENT REFERRED TO:  No follow-up provider specified.        DISCHARGE MEDICATIONS:     Medication List        ASK your doctor about these medications      aspirin 81 MG EC tablet     atorvastatin 10 MG tablet  Commonly known as: LIPITOR  TAKE 1 TABLET BY MOUTH DAILY     doxycycline hyclate 100 MG capsule  Commonly known as: VIBRAMYCIN     glimepiride 2 MG tablet  Commonly known as: AMARYL     leuprolide 45 MG injection  Commonly known as: LUPRON     levoFLOXacin 500 MG tablet  Commonly known as: LEVAQUIN     metFORMIN 500 MG extended release tablet  Commonly known as: GLUCOPHAGE-XR     metroNIDAZOLE 500 MG tablet  Commonly known as: FLAGYL     oxyCODONE-acetaminophen 5-325 MG per tablet  Commonly known as: PERCOCET     polyethylene glycol 17

## 2023-07-28 NOTE — H&P
Bilateral renal cortical scarring and mild atrophy. Bilateral renal cysts. No hydronephrosis or obstructing calculus PERITONEUM: No abdominal lymphadenopathy or ascites. Left abdominal spilgelian hernia containing fat and very small segment of the anterior wall of the descending colon. COLON: No dilatation or wall thickening. APPENDIX: Unremarkable. SMALL BOWEL: No dilatation or wall thickening. Duodenal diverticulum STOMACH: Unremarkable. PELVIS: Presacral edema. Jean catheter and balloon terminates in the prostate bed, confirming fistulous connection. There is drainage of the previous fluid collection within the prostate. Enlarged left inguinal lymph node is again seen measuring 18 mm. Small amount of air in the bladder. BONES: Stable mixed lytic and sclerotic bone metastases. VISUALIZED THORAX: Bibasilar atelectasis ADDITIONAL COMMENTS: N/A     1. Jean catheter and balloon terminate in the prostate bed, confirming fistulous connection. There is drainage of the previous fluid collection within the prostate. 2. Left abdominal spilgelian hernia containing fat and very small segment of the anterior wall of the descending colon. 3. No other significant change. Please refer to above findings for complete details. Findings discussed with Jerrod Brice at 10:11 AM 7/14/2023     XR ABDOMEN (KUB) (SINGLE AP VIEW)    Result Date: 7/28/2023  EXAM:  XR ABDOMEN (KUB) (SINGLE AP VIEW) Clinical indication: Abdominal distention. KUB obtained. Prior bowel surgery right upper quadrant. The gas pattern is nonspecific. Prostate implants. Nonspecific gas pattern. CT HEAD WO CONTRAST    Result Date: 7/12/2023  INDICATION: Trauma. Exam: Noncontrast CT of the brain is performed with 5 mm collimation. CT dose reduction was achieved to the use of a standardized protocol tailored for this examination and automatic exposure control for dose modulation.  FINDINGS: There is no acute intracranial hemorrhage, mass, mass effect or is visualized. Prostate: The prostate gland is absent consistent with prior prostatectomy. In the prostate surgical bed, there is a collection, containing gas, fluid and brachytherapy seeds; this collection measures approximately 3.5 cm x 4.4 cm and was not present on prior CT PET scan dated April 2023; prior CT PET scan dated April 2023, did demonstrate radiotracer activity in the prostate surgical bed and extending into the right perineum. Miscellaneous: There is no free intraperitoneal gas. 1. New 3.5 cm x 4.4 cm gas and fluid containing collection in the prostatectomy surgical bed. 2. Interval increase in size of left inguinal lymph node, now measuring 2.8 cm x 1.8 cm. 3. Osseous metastatic disease, as described above. XR CHEST PORTABLE    Result Date: 7/28/2023  EXAM:  XR CHEST PORTABLE INDICATION: Covid pneumonia COMPARISON: 7/16/2023 TECHNIQUE: portable chest AP view FINDINGS: The cardiac silhouette is within normal limits. Transvenous pacemaker from the right appears stable. The pulmonary vasculature is within normal limits. Atelectasis or minimal infiltrate in the left lung base is new. Lung volumes are low to moderate. The visualized bones and upper abdomen are age-appropriate. Atelectasis or minimal infiltrate in the left lung base, new since prior study. XR CHEST PORTABLE    Result Date: 7/16/2023  EXAM:  XR CHEST PORTABLE INDICATION: Tachypnea COMPARISON: 12/28/2017 chest x-ray, CT 7/12/2023 TECHNIQUE: 1717 hours portable chest AP view FINDINGS: Right subclavian pacemaker with dual leads is again shown. Low lung volumes are noted. The distal and hilar contours are stable. The cardiac silhouette is within normal limits. The pulmonary vasculature is within normal limits. The lungs and pleural spaces are clear. The visualized bones and upper abdomen are age-appropriate.      No acute process on portable chest.     XR HIP 3-4 VW W PELVIS BILATERAL    Result Date: 7/12/2023  INDICATION: trauma, left hip pain and brusing AP view of the pelvis and lateral view of the both hips. No acute fracture or dislocation is visualized. There is moderate to severe narrowing of the hip joint spaces bilaterally, as well as associated osteophytes and mild subchondral cystic formation. Brachytherapy seeds overlie the pelvis. Patient's known sacral, L5 and left acetabular metastases, seen on prior CT PET scan dated April 2023, are again noted. No evidence of acute fracture or dislocation. Osseous metastatic disease. Bilateral hip degenerative changes, as described above. Transthoracic echocardiogram (TTE) complete with contrast, bubble, strain, and 3D PRN    Result Date: 7/17/2023    Left Ventricle: Normal left ventricular systolic function with a visually estimated EF of 55 - 60%. Not well visualized. Left ventricle size is normal. Normal wall motion. _______________________________________________________________________    TOTAL TIME:  76 Minutes    Critical Care Provided     Minutes non procedure based    Signed: Christian Lazaro MD    Procedures: see electronic medical records for all procedures/Xrays and details which were not copied into this note but were reviewed prior to creation of Plan.

## 2023-07-29 LAB
ANION GAP SERPL CALC-SCNC: 8 MMOL/L (ref 5–15)
BACTERIA SPEC CULT: NORMAL
BUN SERPL-MCNC: 47 MG/DL (ref 6–20)
BUN/CREAT SERPL: 35 (ref 12–20)
CALCIUM SERPL-MCNC: 8.7 MG/DL (ref 8.5–10.1)
CHLORIDE SERPL-SCNC: 110 MMOL/L (ref 97–108)
CO2 SERPL-SCNC: 19 MMOL/L (ref 21–32)
CREAT SERPL-MCNC: 1.34 MG/DL (ref 0.7–1.3)
EKG ATRIAL RATE: 86 BPM
EKG DIAGNOSIS: NORMAL
EKG P AXIS: 84 DEGREES
EKG P-R INTERVAL: 174 MS
EKG Q-T INTERVAL: 456 MS
EKG QRS DURATION: 174 MS
EKG QTC CALCULATION (BAZETT): 545 MS
EKG R AXIS: -81 DEGREES
EKG T AXIS: 86 DEGREES
EKG VENTRICULAR RATE: 86 BPM
GLUCOSE BLD STRIP.AUTO-MCNC: 238 MG/DL (ref 65–117)
GLUCOSE BLD STRIP.AUTO-MCNC: 248 MG/DL (ref 65–117)
GLUCOSE BLD STRIP.AUTO-MCNC: 73 MG/DL (ref 65–117)
GLUCOSE BLD STRIP.AUTO-MCNC: 74 MG/DL (ref 65–117)
GLUCOSE BLD STRIP.AUTO-MCNC: 93 MG/DL (ref 65–117)
GLUCOSE SERPL-MCNC: 59 MG/DL (ref 65–100)
POTASSIUM SERPL-SCNC: 3.9 MMOL/L (ref 3.5–5.1)
SERVICE CMNT-IMP: ABNORMAL
SERVICE CMNT-IMP: ABNORMAL
SERVICE CMNT-IMP: NORMAL
SODIUM SERPL-SCNC: 137 MMOL/L (ref 136–145)

## 2023-07-29 PROCEDURE — 82962 GLUCOSE BLOOD TEST: CPT

## 2023-07-29 PROCEDURE — 2580000003 HC RX 258: Performed by: INTERNAL MEDICINE

## 2023-07-29 PROCEDURE — 6370000000 HC RX 637 (ALT 250 FOR IP): Performed by: INTERNAL MEDICINE

## 2023-07-29 PROCEDURE — 36415 COLL VENOUS BLD VENIPUNCTURE: CPT

## 2023-07-29 PROCEDURE — 1100000000 HC RM PRIVATE

## 2023-07-29 PROCEDURE — 6360000002 HC RX W HCPCS: Performed by: INTERNAL MEDICINE

## 2023-07-29 PROCEDURE — 80048 BASIC METABOLIC PNL TOTAL CA: CPT

## 2023-07-29 RX ORDER — ENOXAPARIN SODIUM 100 MG/ML
40 INJECTION SUBCUTANEOUS EVERY 24 HOURS
Status: DISCONTINUED | OUTPATIENT
Start: 2023-07-29 | End: 2023-08-02 | Stop reason: HOSPADM

## 2023-07-29 RX ORDER — CASTOR OIL AND BALSAM, PERU 788; 87 MG/G; MG/G
OINTMENT TOPICAL 2 TIMES DAILY
Status: DISCONTINUED | OUTPATIENT
Start: 2023-07-29 | End: 2023-08-01

## 2023-07-29 RX ADMIN — PREDNISONE 20 MG: 20 TABLET ORAL at 11:51

## 2023-07-29 RX ADMIN — Medication: at 09:00

## 2023-07-29 RX ADMIN — GUAIFENESIN 600 MG: 600 TABLET, EXTENDED RELEASE ORAL at 11:51

## 2023-07-29 RX ADMIN — SODIUM CHLORIDE, PRESERVATIVE FREE 10 ML: 5 INJECTION INTRAVENOUS at 11:51

## 2023-07-29 RX ADMIN — ACETAMINOPHEN 650 MG: 325 TABLET ORAL at 15:35

## 2023-07-29 RX ADMIN — Medication: at 23:26

## 2023-07-29 RX ADMIN — GUAIFENESIN 600 MG: 600 TABLET, EXTENDED RELEASE ORAL at 22:51

## 2023-07-29 RX ADMIN — SODIUM CHLORIDE, PRESERVATIVE FREE 10 ML: 5 INJECTION INTRAVENOUS at 22:51

## 2023-07-29 RX ADMIN — ASPIRIN 81 MG: 81 TABLET, COATED ORAL at 11:51

## 2023-07-29 RX ADMIN — ENOXAPARIN SODIUM 40 MG: 100 INJECTION SUBCUTANEOUS at 22:51

## 2023-07-29 RX ADMIN — ATORVASTATIN CALCIUM 10 MG: 10 TABLET, FILM COATED ORAL at 22:51

## 2023-07-29 ASSESSMENT — PAIN DESCRIPTION - LOCATION: LOCATION: GENERALIZED

## 2023-07-29 ASSESSMENT — PAIN SCALES - GENERAL
PAINLEVEL_OUTOF10: 0
PAINLEVEL_OUTOF10: 3

## 2023-07-29 ASSESSMENT — PAIN DESCRIPTION - DESCRIPTORS: DESCRIPTORS: ACHING

## 2023-07-29 NOTE — PROGRESS NOTES
Bedside shift change report given to Edgard Julio (oncoming nurse) by Jarocho Tee RN (offgoing nurse). Report included the following information Nurse Handoff Report, Adult Overview, Intake/Output, MAR, Recent Results, and Med Rec Status. Pt had a calm night. Q2 turns done during the entire shift. Jean care done as well.   Pt at the bed side shift report was sleeping with visible rise and fall of chest.   No complaints made

## 2023-07-29 NOTE — PROGRESS NOTES
PT orders received and chart reviewed for PT Evaluation. Nursing clears patient to participate with caveat that he has not been feeling well and has been tired today. When PT offers services, patient politely asks if he can be seen tomorrow. Will defer and continue to follow.

## 2023-07-29 NOTE — PROGRESS NOTES
Occupational Therapy Note  Orders received, chart reviewed, eval attempted. Pt reports that he is too fatigued and not feeling well today to completed OT assessment. Pt \politely asking for OT to return tomorrow. Will defer today and follow up tomorrow as able to assess ADL impairments.   Parker Son, OTR/L

## 2023-07-29 NOTE — PROGRESS NOTES
Patient arrived in the unit per stretcher accompanied by transport staff; alert and oriented. Patient had pressure injury on the buttocks/sacrum, DTI on bilateral heels and MASD on the scrotum/penis; cleaned with soap and water then zinc cream was applied on the site; wound care consult also has been placed.

## 2023-07-29 NOTE — PROGRESS NOTES
P&T-Approved DVT Prophylaxis Dosing    Per P&T Committee-approved protocol lovenox 40 mg daily has been adjusted to lovenox 30 mg daily based on weight and renal function as shown in the table below.          Sue Almaraz, [facility]

## 2023-07-29 NOTE — PROGRESS NOTES
Comprehensive Nutrition Assessment    Type and Reason for Visit:  Initial, Wound    Nutrition Recommendations/Plan:   Continue current diet  Added Ensure high protein BID  Please document % meals and supplements consumed in flowsheet I/O's under intake      Malnutrition Assessment:  Malnutrition Status: At risk for malnutrition (Comment) (07/29/23 1307)    Context:  Acute Illness     Findings of the 6 clinical characteristics of malnutrition:  Energy Intake:  Mild decrease in energy intake (Comment)  Weight Loss:  No significant weight loss     Body Fat Loss:  Mild body fat loss Triceps, Orbital   Muscle Mass Loss:  Mild muscle mass loss Temples (temporalis), Hand (interosseous), Clavicles (pectoralis & deltoids)  Fluid Accumulation:  No significant fluid accumulation     Strength:  Not Performed    Nutrition Assessment:     Chart reviewed for documented wounds. Pt medically noted for COVID, RENATO, dehydration, weakness, recent prostate abscess, prostate ca with mets, DM. Pt reports a decreased appetite for the last 3-4 days d/t decreased activity and not feeling well. Weight hx shows mild weight loss from a few months ago, not significant. Mild fat and muscle wasting notable per NFPE, but could be r/t advanced age. Pt agreeable to supplements d/t increased protein needs for wound healing. Will continue monitoring. Wt Readings from Last 5 Encounters:   07/28/23 81.4 kg (179 lb 7.3 oz)   07/14/23 81.6 kg (179 lb 14.3 oz)   03/21/23 84.8 kg (187 lb)   12/22/22 83 kg (183 lb)   11/18/22 86.2 kg (190 lb)   ]    Nutrition Related Findings:    Labs: Cr 1.34. Meds: lipitor, humalog, levaquin, prednisone, NS. BM PTA. Wound Type: Stage II (butt), Deep Tissue Injury (both heels)    Current Nutrition Intake & Therapies:    Average Meal Intake: 1-25%  Average Supplements Intake: None Ordered  ADULT DIET; Regular; 4 carb choices (60 gm/meal);  Low Fat/Low Chol/High Fiber/2 gm Na  ADULT ORAL NUTRITION SUPPLEMENT;

## 2023-07-30 LAB
ANION GAP SERPL CALC-SCNC: 6 MMOL/L (ref 5–15)
BUN SERPL-MCNC: 33 MG/DL (ref 6–20)
BUN/CREAT SERPL: 28 (ref 12–20)
CALCIUM SERPL-MCNC: 8.6 MG/DL (ref 8.5–10.1)
CHLORIDE SERPL-SCNC: 112 MMOL/L (ref 97–108)
CO2 SERPL-SCNC: 20 MMOL/L (ref 21–32)
CREAT SERPL-MCNC: 1.18 MG/DL (ref 0.7–1.3)
ERYTHROCYTE [DISTWIDTH] IN BLOOD BY AUTOMATED COUNT: 15.9 % (ref 11.5–14.5)
GLUCOSE BLD STRIP.AUTO-MCNC: 102 MG/DL (ref 65–117)
GLUCOSE BLD STRIP.AUTO-MCNC: 111 MG/DL (ref 65–117)
GLUCOSE BLD STRIP.AUTO-MCNC: 123 MG/DL (ref 65–117)
GLUCOSE BLD STRIP.AUTO-MCNC: 209 MG/DL (ref 65–117)
GLUCOSE BLD STRIP.AUTO-MCNC: 235 MG/DL (ref 65–117)
GLUCOSE SERPL-MCNC: 92 MG/DL (ref 65–100)
HCT VFR BLD AUTO: 27.2 % (ref 36.6–50.3)
HGB BLD-MCNC: 8.4 G/DL (ref 12.1–17)
LACTATE SERPL-SCNC: 1.2 MMOL/L (ref 0.4–2)
MAGNESIUM SERPL-MCNC: 1.7 MG/DL (ref 1.6–2.4)
MCH RBC QN AUTO: 26.3 PG (ref 26–34)
MCHC RBC AUTO-ENTMCNC: 30.9 G/DL (ref 30–36.5)
MCV RBC AUTO: 85 FL (ref 80–99)
NRBC # BLD: 0 K/UL (ref 0–0.01)
NRBC BLD-RTO: 0 PER 100 WBC
PLATELET # BLD AUTO: 369 K/UL (ref 150–400)
PMV BLD AUTO: 8.4 FL (ref 8.9–12.9)
POTASSIUM SERPL-SCNC: 3.7 MMOL/L (ref 3.5–5.1)
RBC # BLD AUTO: 3.2 M/UL (ref 4.1–5.7)
SERVICE CMNT-IMP: ABNORMAL
SERVICE CMNT-IMP: NORMAL
SERVICE CMNT-IMP: NORMAL
SODIUM SERPL-SCNC: 138 MMOL/L (ref 136–145)
WBC # BLD AUTO: 5.7 K/UL (ref 4.1–11.1)

## 2023-07-30 PROCEDURE — 97165 OT EVAL LOW COMPLEX 30 MIN: CPT

## 2023-07-30 PROCEDURE — 6370000000 HC RX 637 (ALT 250 FOR IP): Performed by: INTERNAL MEDICINE

## 2023-07-30 PROCEDURE — 97530 THERAPEUTIC ACTIVITIES: CPT

## 2023-07-30 PROCEDURE — 80048 BASIC METABOLIC PNL TOTAL CA: CPT

## 2023-07-30 PROCEDURE — 2580000003 HC RX 258: Performed by: INTERNAL MEDICINE

## 2023-07-30 PROCEDURE — 85027 COMPLETE CBC AUTOMATED: CPT

## 2023-07-30 PROCEDURE — 83605 ASSAY OF LACTIC ACID: CPT

## 2023-07-30 PROCEDURE — 6360000002 HC RX W HCPCS: Performed by: INTERNAL MEDICINE

## 2023-07-30 PROCEDURE — 97116 GAIT TRAINING THERAPY: CPT

## 2023-07-30 PROCEDURE — 36415 COLL VENOUS BLD VENIPUNCTURE: CPT

## 2023-07-30 PROCEDURE — 97535 SELF CARE MNGMENT TRAINING: CPT

## 2023-07-30 PROCEDURE — 97161 PT EVAL LOW COMPLEX 20 MIN: CPT

## 2023-07-30 PROCEDURE — 82962 GLUCOSE BLOOD TEST: CPT

## 2023-07-30 PROCEDURE — 1100000000 HC RM PRIVATE

## 2023-07-30 PROCEDURE — 83735 ASSAY OF MAGNESIUM: CPT

## 2023-07-30 RX ORDER — MAGNESIUM SULFATE 1 G/100ML
1000 INJECTION INTRAVENOUS ONCE
Status: DISCONTINUED | OUTPATIENT
Start: 2023-07-30 | End: 2023-07-30

## 2023-07-30 RX ORDER — MAGNESIUM SULFATE IN WATER 40 MG/ML
2000 INJECTION, SOLUTION INTRAVENOUS ONCE
Status: COMPLETED | OUTPATIENT
Start: 2023-07-30 | End: 2023-07-30

## 2023-07-30 RX ADMIN — Medication: at 08:30

## 2023-07-30 RX ADMIN — ATORVASTATIN CALCIUM 10 MG: 10 TABLET, FILM COATED ORAL at 21:19

## 2023-07-30 RX ADMIN — SODIUM CHLORIDE: 9 INJECTION, SOLUTION INTRAVENOUS at 08:31

## 2023-07-30 RX ADMIN — Medication 2 UNITS: at 12:10

## 2023-07-30 RX ADMIN — LEVOFLOXACIN 750 MG: 5 INJECTION, SOLUTION INTRAVENOUS at 17:40

## 2023-07-30 RX ADMIN — SODIUM CHLORIDE, PRESERVATIVE FREE 10 ML: 5 INJECTION INTRAVENOUS at 21:21

## 2023-07-30 RX ADMIN — GUAIFENESIN 600 MG: 600 TABLET, EXTENDED RELEASE ORAL at 21:19

## 2023-07-30 RX ADMIN — SODIUM CHLORIDE, PRESERVATIVE FREE 10 ML: 5 INJECTION INTRAVENOUS at 08:31

## 2023-07-30 RX ADMIN — PREDNISONE 20 MG: 20 TABLET ORAL at 08:30

## 2023-07-30 RX ADMIN — ASPIRIN 81 MG: 81 TABLET, COATED ORAL at 08:29

## 2023-07-30 RX ADMIN — ENOXAPARIN SODIUM 40 MG: 100 INJECTION SUBCUTANEOUS at 21:19

## 2023-07-30 RX ADMIN — Medication: at 21:20

## 2023-07-30 RX ADMIN — GUAIFENESIN 600 MG: 600 TABLET, EXTENDED RELEASE ORAL at 08:29

## 2023-07-30 RX ADMIN — MAGNESIUM SULFATE HEPTAHYDRATE 2000 MG: 40 INJECTION, SOLUTION INTRAVENOUS at 10:45

## 2023-07-30 RX ADMIN — Medication 2 UNITS: at 17:41

## 2023-07-30 RX ADMIN — POTASSIUM BICARBONATE 40 MEQ: 782 TABLET, EFFERVESCENT ORAL at 10:44

## 2023-07-30 NOTE — CARE COORDINATION
07/30/23 1456   Readmission Assessment   Number of Days since last admission? 8-30 days   Previous Disposition SNF   Who is being Interviewed Caregiver   What was the patient's/caregiver's perception as to why they think they needed to return back to the hospital? Other (Comment)  (poor care at [facility])   Did you visit your Primary Care Physician after you left the hospital, before you returned this time? Yes   Who advised the patient to return to the hospital? Caregiver   Does the patient report anything that got in the way of taking their medications? No   In our efforts to provide the best possible care to you and others like you, can you think of anything that we could have done to help you after you left the hospital the first time, so that you might not have needed to return so soon?  Other (Comment)  (none)

## 2023-07-30 NOTE — PROGRESS NOTES
Bedside shift change report given to [address] [place] [address] (oncoming nurse) by Sabra Smith RN (offgoing nurse). Report included the following information Nurse Handoff Report, Adult Overview, Intake/Output, MAR, Recent Results, and Med Rec Status. Pt had a calm night. Q2 turns done over the night and he was made comfortable in bed.     No complaints made over the night   Pt during bed side shift report was sleeping in bed with a visible rise and fall of the chest.

## 2023-07-30 NOTE — CARE COORDINATION
Care Management Initial Assessment       RUR: 19% moderate  Readmission? Yes - two weeks  1st IM letter given? Yes -   1st  letter given: No       07/30/23 1449   Service Assessment   Patient Orientation Unable to Assess   History Provided By [facility]   Primary [place] is: Named in [address]   PCP Verified by CM Yes  (María Du)   Last Visit to PCP Within last 3 months   Prior Functional Level Assistance with the following:;Bathing; Toileting;Dressing;Feeding;Cooking;Housework   Current Functional Level Bathing;Dressing;Assistance with the following:;Toileting;Feeding;Cooking;Housework; Shopping;Mobility   Can patient return to prior living arrangement Yes   Ability to make needs known: Good   Family able to assist with home care needs: Yes   Would you like for me to discuss the discharge plan with any other family members/significant others, and if so, who? Yes  (Wife, Arlen Low)   Financial Resources Medicare   Social/Functional History   Lives With Spouse   Type of [address] Two level; Able to Live on Main level with bedroom/bathroom   Home Access Stairs to enter with rails   Entrance Stairs - Number of Steps 3   Bathroom Shower/Tub Walk-in shower   Bathroom Accessibility Accessible   Home Equipment Walker, rolling;Walker, standard;Cane   Receives Help From Family   ADL Assistance Needs assistance   Ambulation Assistance Needs assistance   Transfer Assistance Needs assistance   Discharge Planning   Type of Residence House   Living Arrangements Spouse/Significant Other   Current Services Prior To Admission [facility] OT;PT;Nursing Services   Patient expects to be discharged to: [facility]   Transition of [address] [place] [address] Center  (CM Consult) Discharge [address] [employer] Information Provided? No   Mode of Transport at Discharge Other (see comment)  (family)   Confirm Follow Up Transport Family   Condition of Participation: Discharge Planning   The Plan for Transition of Care is related to the following treatment goals: home with [facility] [address]   The Patient and/or Patient Representative was provided with a Choice of Provider? Patient Representative   Name of the Patient Representative who was provided with the Choice of Provider and agrees with the Discharge Plan? Rebbeca Haver   The Patient and/Or Patient Representative agree with the Discharge Plan? Yes   Freedom of Choice list was provided with basic dialogue that supports the patient's individualized plan of care/goals, treatment preferences, and shares the quality data associated with the providers? Yes     CM spoke with pt's wife, Zamzam Saucedo, on the phone to complete assessment. She stated that the pt came here from [facility]. The CM discussed the d/c recommendation that the pt return to SNF, and she stated that she does not want him to go back to SNF. She reported concerns with the care he received, and stated that she would prefer for him to come home with [address] [place] [address] 6100. CM provided FOC, and pt's wife said she would like referrals sent to [employer] and other companies that work with the [employer]. CM to send referrals through [address] KYMBERLY will continue to follow.     Ramon Mayorga, [facility], [address] [place] [address]

## 2023-07-31 LAB
ANION GAP SERPL CALC-SCNC: 7 MMOL/L (ref 5–15)
BUN SERPL-MCNC: 29 MG/DL (ref 6–20)
BUN/CREAT SERPL: 28 (ref 12–20)
CALCIUM SERPL-MCNC: 8.7 MG/DL (ref 8.5–10.1)
CHLORIDE SERPL-SCNC: 110 MMOL/L (ref 97–108)
CO2 SERPL-SCNC: 20 MMOL/L (ref 21–32)
CREAT SERPL-MCNC: 1.02 MG/DL (ref 0.7–1.3)
ERYTHROCYTE [DISTWIDTH] IN BLOOD BY AUTOMATED COUNT: 15.9 % (ref 11.5–14.5)
GLUCOSE BLD STRIP.AUTO-MCNC: 139 MG/DL (ref 65–117)
GLUCOSE BLD STRIP.AUTO-MCNC: 140 MG/DL (ref 65–117)
GLUCOSE BLD STRIP.AUTO-MCNC: 148 MG/DL (ref 65–117)
GLUCOSE SERPL-MCNC: 117 MG/DL (ref 65–100)
HCT VFR BLD AUTO: 27.2 % (ref 36.6–50.3)
HGB BLD-MCNC: 8.6 G/DL (ref 12.1–17)
MAGNESIUM SERPL-MCNC: 1.9 MG/DL (ref 1.6–2.4)
MCH RBC QN AUTO: 26.2 PG (ref 26–34)
MCHC RBC AUTO-ENTMCNC: 31.6 G/DL (ref 30–36.5)
MCV RBC AUTO: 82.9 FL (ref 80–99)
NRBC # BLD: 0 K/UL (ref 0–0.01)
NRBC BLD-RTO: 0 PER 100 WBC
PLATELET # BLD AUTO: 355 K/UL (ref 150–400)
PMV BLD AUTO: 8.2 FL (ref 8.9–12.9)
POTASSIUM SERPL-SCNC: 4 MMOL/L (ref 3.5–5.1)
RBC # BLD AUTO: 3.28 M/UL (ref 4.1–5.7)
SERVICE CMNT-IMP: ABNORMAL
SODIUM SERPL-SCNC: 137 MMOL/L (ref 136–145)
WBC # BLD AUTO: 5.1 K/UL (ref 4.1–11.1)

## 2023-07-31 PROCEDURE — 97530 THERAPEUTIC ACTIVITIES: CPT | Performed by: OCCUPATIONAL THERAPIST

## 2023-07-31 PROCEDURE — 80048 BASIC METABOLIC PNL TOTAL CA: CPT

## 2023-07-31 PROCEDURE — 97530 THERAPEUTIC ACTIVITIES: CPT

## 2023-07-31 PROCEDURE — 6360000002 HC RX W HCPCS: Performed by: INTERNAL MEDICINE

## 2023-07-31 PROCEDURE — 36415 COLL VENOUS BLD VENIPUNCTURE: CPT

## 2023-07-31 PROCEDURE — 1100000000 HC RM PRIVATE

## 2023-07-31 PROCEDURE — 2580000003 HC RX 258: Performed by: INTERNAL MEDICINE

## 2023-07-31 PROCEDURE — 82962 GLUCOSE BLOOD TEST: CPT

## 2023-07-31 PROCEDURE — 83735 ASSAY OF MAGNESIUM: CPT

## 2023-07-31 PROCEDURE — 85027 COMPLETE CBC AUTOMATED: CPT

## 2023-07-31 PROCEDURE — 6370000000 HC RX 637 (ALT 250 FOR IP): Performed by: INTERNAL MEDICINE

## 2023-07-31 RX ORDER — MAGNESIUM SULFATE 1 G/100ML
1000 INJECTION INTRAVENOUS ONCE
Status: COMPLETED | OUTPATIENT
Start: 2023-07-31 | End: 2023-07-31

## 2023-07-31 RX ADMIN — SODIUM CHLORIDE: 9 INJECTION, SOLUTION INTRAVENOUS at 19:57

## 2023-07-31 RX ADMIN — Medication: at 21:43

## 2023-07-31 RX ADMIN — GUAIFENESIN 600 MG: 600 TABLET, EXTENDED RELEASE ORAL at 21:44

## 2023-07-31 RX ADMIN — SODIUM CHLORIDE, PRESERVATIVE FREE 10 ML: 5 INJECTION INTRAVENOUS at 21:46

## 2023-07-31 RX ADMIN — GUAIFENESIN 600 MG: 600 TABLET, EXTENDED RELEASE ORAL at 10:35

## 2023-07-31 RX ADMIN — Medication: at 10:38

## 2023-07-31 RX ADMIN — ASPIRIN 81 MG: 81 TABLET, COATED ORAL at 10:35

## 2023-07-31 RX ADMIN — ATORVASTATIN CALCIUM 10 MG: 10 TABLET, FILM COATED ORAL at 21:44

## 2023-07-31 RX ADMIN — ENOXAPARIN SODIUM 40 MG: 100 INJECTION SUBCUTANEOUS at 21:44

## 2023-07-31 RX ADMIN — MAGNESIUM SULFATE HEPTAHYDRATE 1000 MG: 1 INJECTION, SOLUTION INTRAVENOUS at 10:34

## 2023-07-31 RX ADMIN — SODIUM CHLORIDE, PRESERVATIVE FREE 10 ML: 5 INJECTION INTRAVENOUS at 10:35

## 2023-07-31 NOTE — PROGRESS NOTES
Hospitalist Progress Note    NAME:   Tank Wilson   : 1932   MRN: [id number]     Date/Time: 2023 8:24 AM  Patient PCP: Socorro Dobbs MD    Estimated discharge date:  Barriers: Clinical improvement, home with home health and DME        Assessment / Plan:  RENATO/dehydration with generalized weakness  Creatinine 1.85 with elevated BUN of 54  Admit to medical/surgical remote telemetry bed  IV fluids-normal saline for now, DC once patient is eating and drinking more  BMP in a.m. Inpatient PT OT eval for DC planning for likely home health at discharge at this time per family's wishes  : BUN/creatinine of 47/1.3. RENATO is resolving. Continue with IV fluid for now. : BUN/creatinine of 33/1.1. RENATO has resolved. Patient is eating food and drinking fluids. He feels better overall. : BUN/creatinine of 29/1.0 and improving. COVID-19 infection at nursing home/SNF  -patient not hypoxic in ER  Symptomatic treatment for COVID-19 infection  Mucinex twice daily  Patient not on oxygen  Continue recently started steroids at nursing home, tapering next few days  : Continue with isolation, steroids and supportive care. History of recent early prostatic abscess  Metastatic prostate cancer since 1 year-Bone and periprosthetic mets  Recent UTI Aerococcus  WBC 8.2  Afebrile  Initial blood culture pending  ? UA positive  Urine culture pending  Empiric IV antibiotic Levaquin for now, renal dosing per pharmacy  : Urology consult for Jean catheter that is new for the patient. Diabetes  Sliding scale lispro, diabetic restricted cardiac diet    Wound care consult for stage II sacral wound.                     Medical Decision Making:   I personally reviewed labs: CBC, BMP, blood culture  I personally reviewed imaging:   I personally reviewed EKG:   Toxic drug monitoring:   Discussed case with: Patient, wife, RN        Code Status: DNR  DVT Prophylaxis: Lovenox  GI Prophylaxis:     Subjective:

## 2023-07-31 NOTE — PLAN OF CARE
Problem: Occupational Therapy - Adult  Goal: By Discharge: Performs self-care activities at highest level of function for planned discharge setting. See evaluation for individualized goals. Description: FUNCTIONAL STATUS PRIOR TO ADMISSION:  Patient admitted from SNF where he was for rehab. Prior to this was living at home with wife. Questionable historian but he reports ambulation at RW level and indep with Adl takss. Receives Help From: Family,    HOME SUPPORT: Patient lived wife. Occupational Therapy Goals:  Initiated 7/30/2023  1. Patient will perform grooming standing with Moderate Assist within 7 day(s). 2.  Patient will perform lower body dressing with Moderate Assist within 7 day(s). 3.  Patient will perform UB bathing and anterior thighs with set up and supervision within 7 day(s). 4.  Patient will perform toilet transfers to [facility] with Moderate Assist  within 7 day(s). 5.  Patient will perform all aspects of toileting with Moderate Assist within 7 day(s). 6.  Patient will participate in upper extremity therapeutic exercise/activities with Supervision for 4 minutes within 7 day(s). 7.  Patient will utilize energy conservation techniques during functional activities with verbal cues within 7 day(s). Outcome: Not Progressing    OCCUPATIONAL THERAPY TREATMENT  Patient: Fernando Stein (10 y.o. male)  Date: 7/31/2023  Primary Diagnosis: COVID-19 virus infection [U07.1]       Precautions: Isolation, Fall Risk                Chart, occupational therapy assessment, plan of care, and goals were reviewed. ASSESSMENT  Patient continues to benefit from skilled OT services and is slowly progressing towards goals. Pts supportive wife was at bedside upon arrival.  Pt was at [facility] rehab and was dx with COVID during rehab stay. His wife prefers for pt to return to home and not SNF at discharge. She is aware that pt has wound care needs (has pressure injury on sacrum/buttocks).   Limited tolerance to Minimum assistance  Supine to Sit:  (unable to attempt this session due to fatigue)  Scooting: Maximum assistance;Assist X2      ADL Intervention:      LE Bathing: Dependent/Total (bed level)     Toileting: Dependent/Total (bed level)    Educated pts wife on the following and she voiced understanding:    -Assisting with pressure relief in bed using chux to assist and having pt reach for rails to assist.  Educated on placing pillows between pts knees/legs and behind his back to retain sidelying position  -Floating heels and elbow in the bed for pressure relief  -Chair position in bed as tolerated to improve endurance for upright  -Using either chux or folded flat sheet under pt to reduce shear and assist with scooting pt up in bed with pt pushing with LE on bed to assist  -How to change bedding under pt while he is laying in bed adhering to back safety   -raheel lift is needed at this time for out of bed/explained what a raheel lift is  -need for medical transport to home as pt is currently bed bound    Pain Rating: Moderate buttocks and left hip   Pain Intervention(s):   nursing notified and repositioning      Activity Tolerance:   Fair , Poor, SpO2 stable on room air, and coughing and short of breath with bed level activity  Please refer to the flowsheet for vital signs taken during this treatment. After treatment:   Patient left in no apparent distress in bed, Call bell within reach, Bed/ chair alarm activated, Caregiver / family present, Side rails x3, Heels elevated for pressure relief, and BUE supported with pillows and partial hip shift to the right for pressure relief    COMMUNICATION/EDUCATION:   The patient's plan of care was discussed with: physical therapy assistant, registered nurse, and patient and his wife    Patient Education  Education Given To: Patient; Family  Education Provided: Role of Therapy;Plan of Care  Education Method: Verbal  Barriers to Learning: None  Education Outcome: Verbalized

## 2023-07-31 NOTE — CARE COORDINATION
CM continuing to follow for discharge planning. Spoke with pt's spouse today and she confirms that plan is to take pt home at d/c rather than return to SNF. CM reiterated pt's current need of max A x 2 and she verbalized understanding. She identifies that her sister and daughter will be of assistance once pt returns home. CM working to secure [facility] services (PT/OT/SN) and DME to include a hospital bed, bedside commode, and mechanical lift. Documentation pending- will require signature from physician. Pt will need medical transport home. CM will provide spouse with a caregiver list to secure additional help at home. Will continue to follow.     Janell Menchaca MSW   Care Manager, [facility]

## 2023-07-31 NOTE — PLAN OF CARE
Problem: Discharge Planning  Goal: Discharge to home or other facility with appropriate resources  Outcome: Progressing  Flowsheets (Taken 7/30/2023 1500)  Discharge to home or other facility with appropriate resources: Identify barriers to discharge with patient and caregiver     Problem: Pain  Goal: Verbalizes/displays adequate comfort level or baseline comfort level  Outcome: Progressing  Flowsheets (Taken 7/30/2023 1500)  Verbalizes/displays adequate comfort level or baseline comfort level: Encourage patient to monitor pain and request assistance     Problem: Skin/Tissue Integrity  Goal: Absence of new skin breakdown  Description: 1. Monitor for areas of redness and/or skin breakdown  2. Assess vascular access sites hourly  3. Every 4-6 hours minimum:  Change oxygen saturation probe site  4. Every 4-6 hours:  If on nasal continuous positive airway pressure, respiratory therapy assess nares and determine need for appliance change or resting period.   Outcome: Progressing     Problem: Safety - Adult  Goal: Free from fall injury  Outcome: Progressing     Problem: ABCDS Injury Assessment  Goal: Absence of physical injury  Outcome: Progressing     Problem: Chronic Conditions and Co-morbidities  Goal: Patient's chronic conditions and co-morbidity symptoms are monitored and maintained or improved  Outcome: Progressing  Flowsheets (Taken 7/30/2023 1500)  Care Plan - Patient's Chronic Conditions and Co-Morbidity Symptoms are Monitored and Maintained or Improved: Monitor and assess patient's chronic conditions and comorbid symptoms for stability, deterioration, or improvement     P

## 2023-07-31 NOTE — PLAN OF CARE
Problem: Physical Therapy - Adult  Goal: By Discharge: Performs mobility at highest level of function for planned discharge setting. See evaluation for individualized goals. Description: FUNCTIONAL STATUS PRIOR TO ADMISSION: Pt admitted from [facility] [place] [facility] where he was for ~7 days for weakness and falls. Per chart pt lives with spouse at home at baseline, had increased B LE weakness and falls requiring rehab. HOME SUPPORT PRIOR TO ADMISSION: Lives with wife per chart. Recent [facility] rehab stay    Physical Therapy Goals  Initiated 7/30/2023  1. Patient will move from supine to sit and sit to supine, scoot up and down, and roll side to side in bed with moderate assistance within 7 day(s). 2.  Patient will perform sit to stand with moderate assistance within 7 day(s). 3.  Patient will transfer from bed to chair and chair to bed with maximal assistance x1 using the least restrictive device within 7 day(s). 4.  Patient will ambulate with maximal assistance for 10 feet with the least restrictive device within 7 day(s). Outcome: Progressing   PHYSICAL THERAPY TREATMENT    Patient: Preeti Welch (45 y.o. male)  Date: 7/31/2023  Diagnosis: COVID-19 virus infection [U07.1] COVID-19 virus infection      Precautions: Isolation, Fall Risk                  ASSESSMENT:  Patient continues to benefit from skilled PT services and is not progressing towards goals. Pt continues to present with generalized weakness and overall decreased activity tolerance this session. Pt rolls fairly assisting with bed mobility with Latanya using bed railings as support. Pt was able to roll multiple times for linen changing, incontinence, and wound care however very fatigued post bed mobility and deferring further attempts of EOB/OOB mobility due to his fatigue.  Pt's wife present throughout session and given extensive education in regards to safety with home care/mobilization for transfers, repositioning, pressure relief, and

## 2023-07-31 NOTE — PROGRESS NOTES
Physician Progress Note      Asad Hendricks  CSN #:                  [id number]  :                       1932  ADMIT DATE:       2023 12:58 PM  [facility] DATE:  RESPONDING  PROVIDER #:        Wai Ferro MD          QUERY TEXT:    Patient admitted with SOB; Fatigue in setting of COVID+ diagnosis. Noted   documentation of \"COVID-19 infection at nursing home/SNF\" in IM progress note   dated . In order to support the diagnosis of \"COVID-19 infection\", please   include additional clinical indicators in your documentation. Or please   document if the diagnosis of \"COVID-19 infection\" has been ruled out after   further study. The medical record reflects the following:  Risk Factors: C/o SOB; Fatigue in setting of COVID+ diagnosis  Clinical Indicators: 98.6 86 18 107/49 96% on RA. .. Brady Terry CXR: Atelectasis or   minimal infiltrate in the left lung base, new since prior study    ED NOTED: dx'd with COVID on  at [facility]     AP: RENATO/dehydration with generalized weakness POA  Due to recently diagnosed COVID-19 infection at nursing home/SNF POA-patient   not hypoxic in ER     IM PN: COVID-19 infection at nursing home/SNF? POA  -patient not hypoxic in ER  Symptomatic treatment for COVID-19 infection  Mucinex twice daily  Patient not on oxygen  Continue recently started steroids at nursing home,?tapering next few days  : Continue with isolation, steroids and supportive care. Treatment: CXR; Symptomatic treatment for COVID-19 infection; Mucinex twice   daily    Thank you,    Claudette Coats  CDI  Options provided:  -- COVID-19 infection present as evidenced by, Please document evidence.   -- COVID-19 infection was ruled out  -- Other - I will add my own diagnosis  -- Disagree - Not applicable / Not valid  -- Disagree - Clinically unable to determine / Unknown  -- Refer to Clinical Documentation Reviewer    PROVIDER RESPONSE TEXT:    COVID-19 infection is present as

## 2023-07-31 NOTE — PLAN OF CARE
Problem: Discharge Planning  Goal: Discharge to home or other facility with appropriate resources  7/31/2023 1228 by Nicole Pérez RN  Outcome: Progressing  7/30/2023 2312 by Zenaida Nava RN  Outcome: Progressing  Flowsheets (Taken 7/30/2023 1500)  Discharge to home or other facility with appropriate resources: Identify barriers to discharge with patient and caregiver     Problem: Pain  Goal: Verbalizes/displays adequate comfort level or baseline comfort level  7/30/2023 2312 by Zenaida Nava RN  Outcome: Progressing  Flowsheets (Taken 7/30/2023 1500)  Verbalizes/displays adequate comfort level or baseline comfort level: Encourage patient to monitor pain and request assistance     Problem: Skin/Tissue Integrity  Goal: Absence of new skin breakdown  Description: 1. Monitor for areas of redness and/or skin breakdown  2. Assess vascular access sites hourly  3. Every 4-6 hours minimum:  Change oxygen saturation probe site  4. Every 4-6 hours:  If on nasal continuous positive airway pressure, respiratory therapy assess nares and determine need for appliance change or resting period.   7/30/2023 2312 by Zenaida Nava RN  Outcome: Progressing     Problem: Safety - Adult  Goal: Free from fall injury  7/30/2023 2312 by Zenaida Nava RN  Outcome: Progressing     Problem: ABCDS Injury Assessment  Goal: Absence of physical injury  7/30/2023 2312 by Zenaida Nava RN  Outcome: Progressing     Problem: Chronic Conditions and Co-morbidities  Goal: Patient's chronic conditions and co-morbidity symptoms are monitored and maintained or improved  7/30/2023 2312 by Zenaida Nava RN  Outcome: Progressing  Flowsheets (Taken 7/30/2023 1500)  Care Plan - Patient's Chronic Conditions and Co-Morbidity Symptoms are Monitored and Maintained or Improved: Monitor and assess patient's chronic conditions and comorbid symptoms for stability, deterioration, or improvement

## 2023-08-01 LAB
GLUCOSE BLD STRIP.AUTO-MCNC: 128 MG/DL (ref 65–117)
GLUCOSE BLD STRIP.AUTO-MCNC: 147 MG/DL (ref 65–117)
GLUCOSE BLD STRIP.AUTO-MCNC: 164 MG/DL (ref 65–117)
GLUCOSE BLD STRIP.AUTO-MCNC: 170 MG/DL (ref 65–117)
GLUCOSE BLD STRIP.AUTO-MCNC: 173 MG/DL (ref 65–117)
GLUCOSE BLD STRIP.AUTO-MCNC: 195 MG/DL (ref 65–117)
SERVICE CMNT-IMP: ABNORMAL

## 2023-08-01 PROCEDURE — 97530 THERAPEUTIC ACTIVITIES: CPT | Performed by: PHYSICAL THERAPIST

## 2023-08-01 PROCEDURE — 6370000000 HC RX 637 (ALT 250 FOR IP): Performed by: INTERNAL MEDICINE

## 2023-08-01 PROCEDURE — 2580000003 HC RX 258: Performed by: INTERNAL MEDICINE

## 2023-08-01 PROCEDURE — 97530 THERAPEUTIC ACTIVITIES: CPT

## 2023-08-01 PROCEDURE — 1100000000 HC RM PRIVATE

## 2023-08-01 PROCEDURE — 6360000002 HC RX W HCPCS: Performed by: INTERNAL MEDICINE

## 2023-08-01 RX ORDER — CASTOR OIL AND BALSAM, PERU 788; 87 MG/G; MG/G
OINTMENT TOPICAL 3 TIMES DAILY
Status: DISCONTINUED | OUTPATIENT
Start: 2023-08-01 | End: 2023-08-02 | Stop reason: HOSPADM

## 2023-08-01 RX ORDER — CASTOR OIL AND BALSAM, PERU 788; 87 MG/G; MG/G
OINTMENT TOPICAL 3 TIMES DAILY
Qty: 30 G | Refills: 0 | OUTPATIENT
Start: 2023-08-01

## 2023-08-01 RX ADMIN — Medication: at 22:48

## 2023-08-01 RX ADMIN — ASPIRIN 81 MG: 81 TABLET, COATED ORAL at 09:55

## 2023-08-01 RX ADMIN — SODIUM CHLORIDE, PRESERVATIVE FREE 10 ML: 5 INJECTION INTRAVENOUS at 10:29

## 2023-08-01 RX ADMIN — ENOXAPARIN SODIUM 40 MG: 100 INJECTION SUBCUTANEOUS at 22:48

## 2023-08-01 RX ADMIN — Medication: at 09:56

## 2023-08-01 RX ADMIN — ATORVASTATIN CALCIUM 10 MG: 10 TABLET, FILM COATED ORAL at 22:48

## 2023-08-01 RX ADMIN — SODIUM CHLORIDE, PRESERVATIVE FREE 10 ML: 5 INJECTION INTRAVENOUS at 22:49

## 2023-08-01 RX ADMIN — GUAIFENESIN 600 MG: 600 TABLET, EXTENDED RELEASE ORAL at 09:55

## 2023-08-01 RX ADMIN — GUAIFENESIN 600 MG: 600 TABLET, EXTENDED RELEASE ORAL at 22:48

## 2023-08-01 ASSESSMENT — PAIN DESCRIPTION - LOCATION: LOCATION: GENERALIZED

## 2023-08-01 ASSESSMENT — PAIN DESCRIPTION - DESCRIPTORS: DESCRIPTORS: SORE

## 2023-08-01 ASSESSMENT — PAIN SCALES - GENERAL: PAINLEVEL_OUTOF10: 2

## 2023-08-01 NOTE — PROGRESS NOTES
1026, called wound care for follow up on consult, No response but have left a message on voice mail. 1130 walked down to wound care office but found no one in the office . Family want to  know that patient is seen by wound care.

## 2023-08-01 NOTE — PROGRESS NOTES
Bedside and Verbal shift change report given to URDY wiley (oncoming nurse) by Whitley Acevedo (offgoing nurse). Report included the following information Nurse Handoff Report, Intake/Output, MAR, and Recent Results. 1803 patient was turned, he had a bowel movement, wound was cleaned  and a new dressing was applied.  Venelex was applied

## 2023-08-01 NOTE — CARE COORDINATION
Transition of Care Plan:    RUR: 18%  Prior Level of Functioning: lives with spouse, needs assistance   Disposition: return home with HHC, DME, caregiver support   If SNF or IPR: Date FOC offered: declined SNF   Date FOC received: N/A  Accepting facility: N/A  Date authorization started with reference number:   Date authorization received and expires: Follow up appointments: PCP, specialists as indicated   DME needed: hospital bed, raheel lift, bedside commode   Transportation at discharge: medical transport   IM/IMM Medicare/ letter given: to be given   Is patient a  and connected with VA? No   If yes, was [employer] transfer form completed and VA notified? Caregiver Contact: Sourav Ruiz (spouse)  Discharge Caregiver contacted prior to discharge? Yes   Care Conference needed? No  Barriers to discharge:  DME delay     Chart reviewed. CM aware of discharge order. Pt/spouse declining SNF placement and CM working to secure safe disposition home. DME order has been submitted and remains pending at this time (hospital bed, raheel lift, and bedside commode). Working to secure [facility] services and referral has been sent to [facility]. [facility] [employer] out of network w/ insurance. Pt will require medical transport at time of d/c. Pt's spouse also communicating with [facility] for follow up once home. Current barrier to d/c: DME delay, needs to be delivered prior to d/c    Will continue to follow.     CANDY Gaona   Care Manager, [facility]

## 2023-08-01 NOTE — WOUND CARE
Wound Care Consult for the bilateral Heel pressure injury and the Sacrum pressure injuries that are present on admission. Situation:  Pt. Came to us from [address] [place] [address]. He is 80years old and admitted for RENATO with dehydration and generalized weakness. Recently diagnosed with Covid 19 Virus at the nursing facility. Also had a recent UTI and some ground level falls. Pt. Is about to be discharged within the next few days once everything is set up at home. Pt.'s wife is going to commit to caring for him with some help from therapy and nursing at home  Back ground:   Past Medical History:   Diagnosis Date    Cancer ([address])     Chronic kidney disease     Diabetes ([address])      Assessment: Pressure injuries on a normally ambulating man. He usually walks with a walker. Right heel DTI more concentrated on the   Medial aspect. Left heel: DTI with deep purple color &   Red inflammation around the edges:       DTI to the Sacrum: purple, non-blanching,  Some skin broken. Pt. W / dual incontinence:        Recommended Treatment: Pt. Needs strict off loading of the skin / bony prominences to Prevent further skin damage and the Venelex ointment dressing in order to heal the wounds. Today I taught his wife what she needed to do at home. Off load with turning / floating heels, wound care with cleansing and dressing the wounds only as needed once the Venelex ointment has done as much as it will do. She will need to keep in touch with her PCP in order to re-order the ointment. Wound care orders written for the heels and for the sacrum. Mobilize patient often as tolerated.    Kavon Aguirre, RN, BSN, [employer]

## 2023-08-01 NOTE — PROGRESS NOTES
Patient as stool incontinence with each time he is turned and the wound dressing is change per rising need as it gets soiled .

## 2023-08-01 NOTE — PLAN OF CARE
Problem: Physical Therapy - Adult  Goal: By Discharge: Performs mobility at highest level of function for planned discharge setting. See evaluation for individualized goals. Description: FUNCTIONAL STATUS PRIOR TO ADMISSION: Pt admitted from [facility] where he was for ~7 days for weakness and falls. Per chart pt lives with spouse at home at baseline, had increased B LE weakness and falls requiring rehab. HOME SUPPORT PRIOR TO ADMISSION: Lives with wife per chart. Recent [facility] rehab stay    Physical Therapy Goals  Initiated 7/30/2023  1. Patient will move from supine to sit and sit to supine, scoot up and down, and roll side to side in bed with moderate assistance within 7 day(s). 2.  Patient will perform sit to stand with moderate assistance within 7 day(s). 3.  Patient will transfer from bed to chair and chair to bed with maximal assistance x1 using the least restrictive device within 7 day(s). 4.  Patient will ambulate with maximal assistance for 10 feet with the least restrictive device within 7 day(s). Outcome: Progressing   PHYSICAL THERAPY TREATMENT    Patient: Cal Avelar (83 y.o. male)  Date: 8/1/2023  Diagnosis: COVID-19 virus infection [U07.1] COVID-19 virus infection      Precautions: Isolation, Fall Risk                    ASSESSMENT:  Patient continues to benefit from skilled PT services and is progressing towards goals. Patient overall limited by global weakness, impaired balance, inability to ambulate, sacral wounds and decreased activity tolerance. Overall needing Latanya to roll and modA to come to sit on EOB. Sit to stand for numerous trials with modA x 2 and elevated surface. Only able to stand for a few seconds at a time before needing to sit. Educated wife on seated HEP and positioning in bed. Family has declined SNF rehab and plans [facility] as well as equipment. Will continue to follow.          PLAN:  Patient continues to benefit from skilled intervention

## 2023-08-01 NOTE — DISCHARGE SUMMARY
Discharge Summary    Name: Aly Campos  [id number]  YOB: 1932 (Age: 80 y.o.)   Date of Admission: 7/28/2023  Date of Discharge: 8/1/2023  Attending Physician: Milagro Chatterjee MD    Discharge Diagnosis:   RENATO/dehydration with generalized weakness  COVID-19 infection at nursing home/SNF  History of recent early prostatic abscess  Metastatic prostate cancer since 1 year-Bone and periprosthetic mets  Recent UTI Aerococcus        Consultations:  IP CONSULT TO CASE MANAGEMENT  IP WOUND CARE NURSE CONSULT TO EVAL  IP CONSULT TO UROLOGY  IP CONSULT TO CASE MANAGEMENT      Brief Admission History/Reason for Admission Per Tucker Khalil MD: Aly Campos is a 80 y.o.  male with PMHx significant for recent prostatic abscess with UTI treated with antibiotic was discharged to nursing home for rehab where he developed generalized weakness and shortness of breath and was diagnosed with COVID-19 infection 2 days ago. Patient continued to have decreased p.o. intake despite being started on steroids that prompted visit to the ED where he was found to have RENATO with creatinine of 1.87 and evidence of dehydration but was not hypoxic from his recent COVID-19 infection. Patient was found to have partially treated UA, urine culture and blood cultures are pending and was started on empiric IV antibiotics. We were asked to admit for work up and evaluation of the above problems. Brief Hospital Course by Main Problems:   RENATO/dehydration with generalized weakness  Creatinine 1.85 with elevated BUN of 54  Admit to medical/surgical remote telemetry bed  IV fluids-normal saline for now, DC once patient is eating and drinking more  BMP in a.m. Inpatient PT OT [place] for DC planning for likely home health at discharge at this time per family's wishes  7/29: BUN/creatinine of 47/1.3. RENATO is resolving. Continue with IV fluid for now. 7/30: BUN/creatinine of 33/1.1.   RENATO has

## 2023-08-01 NOTE — WOUND CARE
Wound care consult for the pressure injuries to both heels and to the sacrum that was present on admission.    Chart reviewed including the photos

## 2023-08-01 NOTE — PROGRESS NOTES
Bedside and Verbal shift change report given to Bel (oncoming nurse) by Cristofer Davis (offgoing nurse). Report included the following information Nurse Handoff Report, Intake/Output, and MAR.

## 2023-08-02 ENCOUNTER — TELEPHONE (OUTPATIENT)
Age: 88
End: 2023-08-02

## 2023-08-02 VITALS
SYSTOLIC BLOOD PRESSURE: 146 MMHG | HEART RATE: 95 BPM | WEIGHT: 179.45 LBS | HEIGHT: 70 IN | BODY MASS INDEX: 25.69 KG/M2 | RESPIRATION RATE: 19 BRPM | OXYGEN SATURATION: 99 % | TEMPERATURE: 97.8 F | DIASTOLIC BLOOD PRESSURE: 75 MMHG

## 2023-08-02 LAB
GLUCOSE BLD STRIP.AUTO-MCNC: 141 MG/DL (ref 65–117)
GLUCOSE BLD STRIP.AUTO-MCNC: 150 MG/DL (ref 65–117)
GLUCOSE BLD STRIP.AUTO-MCNC: 178 MG/DL (ref 65–117)
SERVICE CMNT-IMP: ABNORMAL

## 2023-08-02 PROCEDURE — 82962 GLUCOSE BLOOD TEST: CPT

## 2023-08-02 PROCEDURE — 6370000000 HC RX 637 (ALT 250 FOR IP): Performed by: INTERNAL MEDICINE

## 2023-08-02 PROCEDURE — 2580000003 HC RX 258: Performed by: INTERNAL MEDICINE

## 2023-08-02 RX ADMIN — SODIUM CHLORIDE, PRESERVATIVE FREE 10 ML: 5 INJECTION INTRAVENOUS at 10:47

## 2023-08-02 RX ADMIN — Medication: at 15:58

## 2023-08-02 RX ADMIN — Medication: at 11:02

## 2023-08-02 RX ADMIN — ASPIRIN 81 MG: 81 TABLET, COATED ORAL at 10:46

## 2023-08-02 RX ADMIN — GUAIFENESIN 600 MG: 600 TABLET, EXTENDED RELEASE ORAL at 10:46

## 2023-08-02 ASSESSMENT — PAIN SCALES - GENERAL: PAINLEVEL_OUTOF10: 2

## 2023-08-02 ASSESSMENT — PAIN DESCRIPTION - LOCATION: LOCATION: GENERALIZED

## 2023-08-02 NOTE — PROGRESS NOTES
Hospital follow-up PCP VIRTUAL visit has been scheduled with Dr. Donnie Oneill on 8/9/23 1000. PCP office scheduled virtual visit due to patient's COVID+ status. Pending patient discharge.   Jennifer Hsu, Care Management Assistant

## 2023-08-02 NOTE — PROGRESS NOTES
Comprehensive Nutrition Assessment    Type and Reason for Visit:  Reassess    Nutrition Recommendations/Plan:   Continue current diet and ONS     Malnutrition Assessment:  Malnutrition Status: At risk for malnutrition (Comment) (07/29/23 1307)      Nutrition Assessment:    Chart reviewed; medically noted for COVID-19 and RENATO. PMH DM and CKD. Patient with discharge orders x 2 days; DME delivery pending. Appetite remains poor but drinking ONS well. Continue current nutrition plan of care. Encourage intake of meals/ONS. Patient Vitals for the past 120 hrs:   PO Meals Eaten (%)   07/30/23 1825 1 - 25%   07/30/23 1300 1 - 25%   07/30/23 0831 1 - 25%     Patient Vitals for the past 120 hrs:   PO Supplement (%)   07/30/23 1825 76 - 100%   07/30/23 1300 76 - 100%   07/30/23 0831 76 - 100%     Nutrition Related Findings:    [phone]   BM 8/1   Atorvastatin, Humalog   Wound Type: Deep Tissue Injury       Current Nutrition Intake & Therapies:    Average Meal Intake: 1-25%  Average Supplements Intake: %  ADULT DIET; Regular; 4 carb choices (60 gm/meal); Low Fat/Low Chol/High Fiber/2 gm Na  ADULT ORAL NUTRITION SUPPLEMENT; Breakfast, Dinner; Low Calorie/High Protein Oral Supplement    Anthropometric Measures:  Height: 177.8 cm (5' 10\")  Ideal Body Weight (IBW): 166 lbs (75 kg)       Current Body Weight: 179 lb 7.3 oz (81.4 kg), 108.1 % IBW. Weight Source: Not Specified  Current BMI (kg/m2): 25.7        Weight Adjustment For: No Adjustment                 BMI Categories: Overweight (BMI 25.0-29. 9)    Estimated Daily Nutrient Needs:  Energy Requirements Based On: Formula  Weight Used for Energy Requirements: Current  Energy (kcal/day): 1919 kcals (BMR x 1. 3AF)  Weight Used for Protein Requirements: Current  Protein (g/day): 81-98g (1.0-1.2 g/kg bw)  Method Used for Fluid Requirements: 1 ml/kcal  Fluid (ml/day): 1900mL    Nutrition Diagnosis:   Inadequate protein-energy intake related to  (decreased

## 2023-08-02 NOTE — CARE COORDINATION
Transition of Care Plan:     RUR: 18%  Prior Level of Functioning: lives with spouse, needs assistance   Disposition: return home with HHC, DME, caregiver support   If SNF or IPR: Date FOC offered: declined SNF   Date FOC received: N/A  Accepting facility: N/A  Date authorization started with reference number:   Date authorization received and expires: Follow up appointments: PCP, specialists as indicated   DME needed: hospital bed, raheel lift, bedside commode to be delivered 8/2/23  Transportation at discharge: [facility] 6PM  IM/IMM Medicare/ letter given: given on 8/2/23  Is patient a [place] and connected with VA? No              If yes, was [employer] transfer form completed and VA notified? Caregiver Contact: Alejandra Liang (spouse)  Discharge Caregiver contacted prior to discharge? Yes   Care Conference needed? No  Barriers to discharge: none       08/02/23 601 [address] Discharge   Transition of Care Consult (CM Consult) Discharge [facility] Discharge Transport;DME;Home [address] Provided? No   Mode of Transport at Discharge S  ([facility])   Hospital Transport Time of Discharge 1800   Confirm Follow Up Transport Wheelchair Cite Gustavo Jorgensen   Condition of Participation: Discharge Planning   The Plan for Transition of Care is related to the following treatment goals: HHC, DME   The Patient and/or Patient Representative was provided with a Choice of Provider? Patient;Patient Representative   Name of the Patient Representative who was provided with the Choice of Provider and agrees with the Discharge Plan? Alejandra Liang (spouse)   The Patient and/Or Patient Representative agree with the Discharge Plan? Yes   Freedom of Choice list was provided with basic dialogue that supports the patient's individualized plan of care/goals, treatment preferences, and shares the quality data associated with the providers? Yes       Chart reviewed.  KYMBERLY aware of discharge order. Spoke with Cornelious Kussmaul at [address] who confirmed that equipment will be delivered to the pt's home this afternoon (ETA 4PM). CM has secured medical transport for d/c home ([employer]) ETA 6PM. PCS form completed and placed into chart. [facility] accepted [facility] (PT,OT,SN) information added to AVS. 2nd IM e-mailed to pt's spouse and reviewed by phone. No further CM needs identified.     Lucas Kan, MSW   Care Manager, [facility]

## 2023-08-02 NOTE — TELEPHONE ENCOUNTER
[facility] home health called, relayed that Dr Grimaldo is willing to follow patient for home health

## 2023-08-02 NOTE — PROGRESS NOTES
1000 wound care done on on both heels, and sacrum, Venelex was applied, sacrum cleaned with saline and a foam dressing was applied. 1600 wound care done , sacrum cleaned with saline , venelex applied and foam dressing was applied. Patient did not have a bowel movement today. Heels are left open to air.

## 2023-08-02 NOTE — PROGRESS NOTES
Physician Progress Note      Ilda Lee  [facility] #:                  [id number]  :                       1932  ADMIT DATE:       2023 12:58 PM  DISCH DATE:  RESPONDING  PROVIDER #:        Yamilka Driver MD        QUERY TEXT:    Stage of Chronic Kidney Disease: Please provide further specificity, if known. Clinical indicators include: creat, hd, chronic kidney disease, bun,   creatinine  Options provided:  -- Chronic kidney disease stage 1  -- Chronic kidney disease stage 2  -- Chronic kidney disease stage 3  -- Chronic kidney disease stage 3a  -- Chronic kidney disease stage 3b  -- Chronic kidney disease stage 4  -- Chronic kidney disease stage 5  -- Chronic kidney disease stage 5, requiring dialysis  -- End stage renal disease  -- Other - I will add my own diagnosis  -- Disagree - Not applicable / Not valid  -- Disagree - Clinically Unable to determine / Unknown        PROVIDER RESPONSE TEXT:    Provider dismissed this query because it was not applicable to the patient or   not a valid query.   pt had RENATO POA- no CKD      Electronically signed by:  Yamilka Driver MD 2023 10:28 AM

## 2023-08-02 NOTE — TELEPHONE ENCOUNTER
----- Message from Leora Sadler, [address] sent at 8/2/2023  1:55 PM EDT -----  Subject: Message to Provider    QUESTIONS  Information for Provider? Star Mueller from [facility] [place] would like   to know if Dr. Agatha Montanez will follow the pt for Home health do to a referral that   they received from [facility] for [address] [place] [address]. Please   call Star Mueller back and inform [phone]  ---------------------------------------------------------------------------  --------------  Corrine MUKHERJEE  [phone]; OK to leave message on voicemail  ---------------------------------------------------------------------------  --------------  SCRIPT ANSWERS  Relationship to Patient? Covered Entity  Covered Entity Type? Home Health Care? Representative Name?  Star Mueller

## 2023-08-03 ENCOUNTER — TELEPHONE (OUTPATIENT)
Age: 88
End: 2023-08-03

## 2023-08-03 LAB
BACTERIA SPEC CULT: NORMAL
BACTERIA SPEC CULT: NORMAL
SERVICE CMNT-IMP: NORMAL
SERVICE CMNT-IMP: NORMAL

## 2023-08-03 NOTE — PROGRESS NOTES
1800 patient was discharged home on home health. His wife as tele phonically updated about the discharge as she did not come today. Patient was cleaned and a clean gown was applies. All patient belongings and patient supplied medication were packed ad given to the transport crew.

## 2023-08-03 NOTE — TELEPHONE ENCOUNTER
Reason for call:  Spoke with pt's wife. She requested a refill for Pt medication  venelex. Med is not on pt med list.    Per wife Pt was just discharge from hospital.  Wife requested a call back from nurse.     Is this a new problem: Yes    Date of last appointment:  3/21/2023     Can we respond via [facility] No    Best call back number: Chel Scott     [phone]

## 2023-08-04 ENCOUNTER — TELEPHONE (OUTPATIENT)
Age: 88
End: 2023-08-04

## 2023-08-04 NOTE — TELEPHONE ENCOUNTER
Reason for call: opened him up for wound care.   Is there a specific wound care or should she just pick it  Is this a new problem: Yes    Date of last appointment:  3/21/2023     Can we respond via [employer]: Yes    Best call back number:    navneet montano  [phone]  [phone] Remove

## 2023-08-07 NOTE — TELEPHONE ENCOUNTER
Returned call to Edna Mccracken with [facility]  Orders placed for wound care for stage II sacral wound and heal wounds per Edna Mccracken she wants to use the following:   Iodine on heals with foam dressing daily   Sacrum medihoney gel, calcium alginate with foam dressing  She will be sending orders over for BSD to sign

## 2023-08-08 SDOH — ECONOMIC STABILITY: INCOME INSECURITY: HOW HARD IS IT FOR YOU TO PAY FOR THE VERY BASICS LIKE FOOD, HOUSING, MEDICAL CARE, AND HEATING?: NOT HARD AT ALL

## 2023-08-08 SDOH — ECONOMIC STABILITY: FOOD INSECURITY: WITHIN THE PAST 12 MONTHS, YOU WORRIED THAT YOUR FOOD WOULD RUN OUT BEFORE YOU GOT MONEY TO BUY MORE.: NEVER TRUE

## 2023-08-08 SDOH — ECONOMIC STABILITY: HOUSING INSECURITY
IN THE LAST 12 MONTHS, WAS THERE A TIME WHEN YOU DID NOT HAVE A STEADY PLACE TO SLEEP OR SLEPT IN A SHELTER (INCLUDING NOW)?: NO

## 2023-08-08 SDOH — ECONOMIC STABILITY: FOOD INSECURITY: WITHIN THE PAST 12 MONTHS, THE FOOD YOU BOUGHT JUST DIDN'T LAST AND YOU DIDN'T HAVE MONEY TO GET MORE.: NEVER TRUE

## 2023-08-08 SDOH — ECONOMIC STABILITY: TRANSPORTATION INSECURITY
IN THE PAST 12 MONTHS, HAS LACK OF TRANSPORTATION KEPT YOU FROM MEETINGS, WORK, OR FROM GETTING THINGS NEEDED FOR DAILY LIVING?: NO

## 2023-08-09 ENCOUNTER — TELEPHONE (OUTPATIENT)
Age: 88
End: 2023-08-09

## 2023-08-09 ENCOUNTER — TELEMEDICINE (OUTPATIENT)
Age: 88
End: 2023-08-09

## 2023-08-09 DIAGNOSIS — Z09 HOSPITAL DISCHARGE FOLLOW-UP: Primary | ICD-10-CM

## 2023-08-09 DIAGNOSIS — U07.1 COVID: ICD-10-CM

## 2023-08-09 DIAGNOSIS — C61 PROSTATE CANCER (HCC): ICD-10-CM

## 2023-08-09 NOTE — PROGRESS NOTES
Post-Discharge Transitional Care  Follow Up      Angeles George   YOB: 1932    Date of Office Visit:  8/9/2023  Date of Hospital Admission: 7/28/23  Date of Hospital Discharge: 8/2/23  Risk of hospital readmission (high >=14%. Medium >=10%) :Readmission Risk Score: 17.5      Care management risk score Rising risk (score 2-5) and Complex Care (Scores >=6): No Risk Score On File     Non face to face  following discharge, date last encounter closed (first attempt may have been earlier): 08/03/2023    Call initiated 2 business days of discharge: Yes    ASSESSMENT/PLAN:   Below is the assessment and plan developed based on review of pertinent history, physical exam, labs, studies, and medications. Hospital discharge follow-up  -     SD DISCHARGE MEDS RECONCILED W/ CURRENT OUTPATIENT MED LIST  COVID  -repeat Chest Xray in 3 weeks  -still has productive cough. Recommend use of delsym 10 mL every 12 hours as needed  Prostate cancer [facility])  -has established follow up with urology in near future to discuss current treatment and ongoing treatment. Disposition  -he is very weak at this time. Needs assistance with all ADL and is mostly bed bound.  -wife is only caregiver and needs assistance at home. Return in about 3 weeks (around 8/30/2023). Subjective:   HPI:  Follow up of Hospital problems/diagnosis(es): COVID    Inpatient course: Discharge summary reviewed- see chart. Interval history/Current status:   -has home health, wife reports that since discharge on 8/2/2023, there has been only 1 nurse visit. Physical therapy was there yesterday who helped him elevate his legs to prevent further pressure sores. -wound care has been ordered, but has not been to his home. Wife reports that his sacral pressure wound is improving.   -he has questions for his urologist regarding treatment at this time for his metastatic prostate cancer.   He has an appointment on 8/18/2023 with Dr. Tayler Jean-Baptiste and on

## 2023-08-09 NOTE — TELEPHONE ENCOUNTER
Reason for call:  pt is declining rapidly, all clinicians have offered hospice,  spouse is unable to care for pt. He is not getting his wounds cared for. Pt 's [employer] ins is not covering enough for home health to come as often as needed. Pt spouse is in denial of pt condition. Spouse is in severe caregiver burnout. Would like for Dr Iliana Chery to call and discuss hospice care for pt.       Is this a new problem: Yes    Date of last appointment:  8/9/2023     Can we respond via [employer]: No    Best call back number:    arin sevilla  [phone]

## 2023-08-11 PROBLEM — N39.0 UTI (URINARY TRACT INFECTION): Status: RESOLVED | Noted: 2023-07-12 | Resolved: 2023-08-11

## 2023-08-17 ENCOUNTER — TELEPHONE (OUTPATIENT)
Age: 88
End: 2023-08-17

## 2023-08-17 DIAGNOSIS — C61 PROSTATE CANCER (HCC): Primary | ICD-10-CM

## 2023-08-17 NOTE — TELEPHONE ENCOUNTER
Reason for call:  pt has deteriorated in the last week and is not doing anymore treatments other than what he has left at home. Need an order for hospice care.     Is this a new problem: Yes    Date of last appointment:  8/9/2023     Can we respond via [employer]: No    Best call back number:   [place] [phone]

## 2023-08-31 ENCOUNTER — TELEMEDICINE (OUTPATIENT)
Age: 88
End: 2023-08-31
Payer: MEDICARE

## 2023-08-31 DIAGNOSIS — C61 PROSTATE CANCER (HCC): Primary | ICD-10-CM

## 2023-08-31 PROCEDURE — G8427 DOCREV CUR MEDS BY ELIG CLIN: HCPCS | Performed by: INTERNAL MEDICINE

## 2023-08-31 PROCEDURE — 1111F DSCHRG MED/CURRENT MED MERGE: CPT | Performed by: INTERNAL MEDICINE

## 2023-08-31 PROCEDURE — 1036F TOBACCO NON-USER: CPT | Performed by: INTERNAL MEDICINE

## 2023-08-31 PROCEDURE — 99212 OFFICE O/P EST SF 10 MIN: CPT | Performed by: INTERNAL MEDICINE

## 2023-08-31 PROCEDURE — G8419 CALC BMI OUT NRM PARAM NOF/U: HCPCS | Performed by: INTERNAL MEDICINE

## 2023-08-31 PROCEDURE — 1123F ACP DISCUSS/DSCN MKR DOCD: CPT | Performed by: INTERNAL MEDICINE

## 2023-08-31 NOTE — PROGRESS NOTES
Chief Complaint   Patient presents with    Follow-up     Patient has enter hospice     There were no vitals taken for this visit.
cancer [facility])  -has transitioned to hospice. Pain controlled. Resting very comfortably. Wife feeling she is getting adequate support. Stuart Mercedes, was evaluated through a synchronous (real-time) audio-video encounter. The patient (or guardian if applicable) is aware that this is a billable service, which includes applicable co-pays. This Virtual Visit was conducted with patient's (and/or legal guardian's) consent. Patient identification was verified, and a caregiver was present when appropriate. The patient was located at Home: [address] [place] [address] Dr  [address] [facility] [id number]  Provider was located at [facility] (Appt Dept): [address] [facility],  [address]         Total time spent for this encounter: Not billed by time    --Radha Vasquez MD on 8/31/2023 at 12:19 PM    An electronic signature was used to authenticate this note. Due to this being a TeleHealth evaluation, many elements of the physical examination are unable to be assessed. We discussed the expected course, resolution and complications of the diagnosis(es) in detail. Medication risks, benefits, costs, interactions, and alternatives were discussed as indicated. I advised him to contact the office if his condition worsens, changes or fails to improve as anticipated. He expressed understanding with the diagnosis(es) and plan.      Radha Vasquez MD

## 2023-10-25 ENCOUNTER — HOSPICE ADMISSION (OUTPATIENT)
Age: 88
End: 2023-10-25
Payer: COMMERCIAL

## 2023-10-26 PROCEDURE — 0655 HSPC INPATIENT RESPITE

## 2023-10-27 PROCEDURE — 0655 HSPC INPATIENT RESPITE

## 2023-10-28 PROCEDURE — 0655 HSPC INPATIENT RESPITE

## 2025-04-22 NOTE — PATIENT INSTRUCTIONS
Try to keep well hydrated - cut this off about 3 hrs before bedtime    Try to keep your daily intake of sodium at around 2000 mg or less  _____________________________________       Low Sodium Diet (2,000 Milligram): Care Instructions  Your Care Instructions    Too much sodium causes your body to hold on to extra water. This can raise your blood pressure and force your heart and kidneys to work harder. In very serious cases, this could cause you to be put in the hospital. It might even be life-threatening. By limiting sodium, you will feel better and lower your risk of serious problems. The most common source of sodium is salt. People get most of the salt in their diet from canned, prepared, and packaged foods. Fast food and restaurant meals also are very high in sodium. Your doctor will probably limit your sodium to less than 2,000 milligrams (mg) a day. This limit counts all the sodium in prepared and packaged foods and any salt you add to your food. Follow-up care is a key part of your treatment and safety. Be sure to make and go to all appointments, and call your doctor if you are having problems. It's also a good idea to know your test results and keep a list of the medicines you take. How can you care for yourself at home? Read food labels  · Read labels on cans and food packages. The labels tell you how much sodium is in each serving. Make sure that you look at the serving size. If you eat more than the serving size, you have eaten more sodium. · Food labels also tell you the Percent Daily Value for sodium. Choose products with low Percent Daily Values for sodium. · Be aware that sodium can come in forms other than salt, including monosodium glutamate (MSG), sodium citrate, and sodium bicarbonate (baking soda). MSG is often added to Asian food. When you eat out, you can sometimes ask for food without MSG or added salt.   Buy low-sodium foods  · Buy foods that are labeled \"unsalted\" (no salt added), \"sodium-free\" (less than 5 mg of sodium per serving), or \"low-sodium\" (less than 140 mg of sodium per serving). Foods labeled \"reduced-sodium\" and \"light sodium\" may still have too much sodium. Be sure to read the label to see how much sodium you are getting. · Buy fresh vegetables, or frozen vegetables without added sauces. Buy low-sodium versions of canned vegetables, soups, and other canned goods. Prepare low-sodium meals  · Cut back on the amount of salt you use in cooking. This will help you adjust to the taste. Do not add salt after cooking. One teaspoon of salt has about 2,300 mg of sodium. · Take the salt shaker off the table. · Flavor your food with garlic, lemon juice, onion, vinegar, herbs, and spices. Do not use soy sauce, lite soy sauce, steak sauce, onion salt, garlic salt, celery salt, mustard, or ketchup on your food. · Use low-sodium salad dressings, sauces, and ketchup. Or make your own salad dressings and sauces without adding salt. · Use less salt (or none) when recipes call for it. You can often use half the salt a recipe calls for without losing flavor. Other foods such as rice, pasta, and grains do not need added salt. · Rinse canned vegetables, and cook them in fresh water. This removes some--but not all--of the salt. · Avoid water that is naturally high in sodium or that has been treated with water softeners, which add sodium. Call your local water company to find out the sodium content of your water supply. If you buy bottled water, read the label and choose a sodium-free brand. Avoid high-sodium foods  · Avoid eating:  ? Smoked, cured, salted, and canned meat, fish, and poultry. ? Ham, wen, hot dogs, and luncheon meats. ? Regular, hard, and processed cheese and regular peanut butter. ? Crackers with salted tops, and other salted snack foods such as pretzels, chips, and salted popcorn. ? Frozen prepared meals, unless labeled low-sodium. ?  Canned and dried soups, broths, and [de-identified] : There continues to be swelling ecchymosis and tenderness localized over the distal pole of the scaphoid but there is also tenderness over the left thumb CMC joint.  There is no evidence of crepitus or instability of the region negative Finkelstein test no tenderness over the proximal pole the scaphoid scapholunate or lunotriquetral TFCC.  No tenderness over the MP or IP joint and no evidence of joint instability of the MP or IP joint.  The CMC joint is too uncomfortable to be fully stressed.  Good capillary refill negative Tinel's sensation grossly intact. bouillon, unless labeled sodium-free or low-sodium. ? Canned vegetables, unless labeled sodium-free or low-sodium. ? [place] fries, pizza, tacos, and other fast foods. ? Pickles, olives, ketchup, and other condiments, especially soy sauce, unless labeled sodium-free or low-sodium. Where can you learn more? Go to http://brien-le.info/. Enter O986 in the search box to learn more about \"Low Sodium Diet (2,000 Milligram): Care Instructions. \"  Current as of: March 28, 2018  Content Version: 11.9  © 8210-5221 [employer]. Care instructions adapted under license by [employer] (which disclaims liability or warranty for this information). If you have questions about a medical condition or this instruction, always ask your healthcare professional. [address] any warranty or liability for your use of this information. [de-identified] : PA lateral and oblique shows no change in alignment of the proximal pole scaphoid fracture.  There is degenerative changes over the left thumb CMC joint which is without changes compared to previous x-ray.  There is subluxation of the left thumb CMC joint appreciated on lateral

## (undated) DEVICE — TRAY CATH OD16FR SIL URIN M STATLOK STBL DEV SURSTP

## (undated) DEVICE — SUTURE VCRL SZ 3-0 L18IN ABSRB UD L26MM SH 1/2 CIR J864D

## (undated) DEVICE — SKIN TEMPERATURE SENSOR: Brand: DEROYAL

## (undated) DEVICE — SOLUTION IRRIG 1000ML H2O STRL BLT

## (undated) DEVICE — ROCKER SWITCH PENCIL BLADE ELECTRODE, HOLSTER: Brand: EDGE

## (undated) DEVICE — DRAPE FLD WRM W44XL66IN C6L FOR INTRATEMP SYS THERMABASIN

## (undated) DEVICE — KENDALL SCD EXPRESS SLEEVES, KNEE LENGTH, MEDIUM: Brand: KENDALL SCD

## (undated) DEVICE — STERILE POLYISOPRENE POWDER-FREE SURGICAL GLOVES WITH EMOLLIENT COATING: Brand: PROTEXIS

## (undated) DEVICE — SUTURE PERMAHAND SZ 2-0 L18IN NONABSORBABLE BLK L26MM SH C012D

## (undated) DEVICE — SOLUTION IV 1000ML 0.9% SOD CHL

## (undated) DEVICE — (D)PREP SKN CHLRAPRP APPL 26ML -- CONVERT TO ITEM 371833

## (undated) DEVICE — SURGICAL PROCEDURE PACK BASIN MAJ SET CUST NO CAUT

## (undated) DEVICE — ABDOMINAL PAD: Brand: DERMACEA

## (undated) DEVICE — STAPLER WITH DST SERIES TECHNOLOGY: Brand: GIA

## (undated) DEVICE — INFECTION CONTROL KIT SYS

## (undated) DEVICE — CURVED, LARGE JAW, OPEN SEALER/DIVIDER NANO-COATED: Brand: LIGASURE IMPACT

## (undated) DEVICE — TOWEL SURG W17XL27IN STD BLU COT NONFENESTRATED PREWASHED

## (undated) DEVICE — GAUZE SPONGES,12 PLY: Brand: CURITY

## (undated) DEVICE — SUTURE PDS II SZ 1 L96IN ABSRB VLT TP-1 L65MM 1/2 CIR Z880G

## (undated) DEVICE — SLIM BODY SKIN STAPLER: Brand: APPOSE ULC

## (undated) DEVICE — BLADE ASSEMB CLP HAIR FINE --

## (undated) DEVICE — DBD-PACK,LAPAROTOMY,2 REINFORCED GOWNS: Brand: MEDLINE

## (undated) DEVICE — LOADING UNIT WITH DST SERIES TECHNOLOGY: Brand: GIA

## (undated) DEVICE — REM POLYHESIVE ADULT PATIENT RETURN ELECTRODE: Brand: VALLEYLAB

## (undated) DEVICE — SUTURE PERMAHAND SZ 3-0 L18IN NONABSORBABLE BLK L26MM SH C013D

## (undated) DEVICE — DEVON™ KNEE AND BODY STRAP 60" X 3" (1.5 M X 7.6 CM): Brand: DEVON

## (undated) DEVICE — SUTURE PERMAHAND SZ 2-0 L30IN NONABSORBABLE BLK SILK W/O A305H

## (undated) DEVICE — HANDLE LT SNAP ON ULT DURABLE LENS FOR TRUMPF ALC DISPOSABLE

## (undated) DEVICE — BLADE ELECTRODE: Brand: EDGE